# Patient Record
Sex: FEMALE | Race: WHITE | Employment: OTHER | URBAN - METROPOLITAN AREA
[De-identification: names, ages, dates, MRNs, and addresses within clinical notes are randomized per-mention and may not be internally consistent; named-entity substitution may affect disease eponyms.]

---

## 2017-01-27 ENCOUNTER — OFFICE VISIT (OUTPATIENT)
Dept: INTERNAL MEDICINE | Facility: CLINIC | Age: 82
End: 2017-01-27
Payer: COMMERCIAL

## 2017-01-27 VITALS
DIASTOLIC BLOOD PRESSURE: 66 MMHG | WEIGHT: 163 LBS | HEART RATE: 96 BPM | RESPIRATION RATE: 16 BRPM | SYSTOLIC BLOOD PRESSURE: 142 MMHG | TEMPERATURE: 98.1 F | BODY MASS INDEX: 29.32 KG/M2 | OXYGEN SATURATION: 96 %

## 2017-01-27 DIAGNOSIS — J20.9 ACUTE BRONCHITIS, UNSPECIFIED ORGANISM: Primary | ICD-10-CM

## 2017-01-27 PROCEDURE — 99213 OFFICE O/P EST LOW 20 MIN: CPT | Performed by: INTERNAL MEDICINE

## 2017-01-27 RX ORDER — BENZONATATE 200 MG/1
200 CAPSULE ORAL 3 TIMES DAILY PRN
Qty: 21 CAPSULE | Refills: 0 | Status: SHIPPED | OUTPATIENT
Start: 2017-01-27 | End: 2017-03-24

## 2017-01-27 RX ORDER — AZITHROMYCIN 250 MG/1
TABLET, FILM COATED ORAL
Qty: 6 TABLET | Refills: 0 | Status: SHIPPED | OUTPATIENT
Start: 2017-01-27 | End: 2017-02-03

## 2017-01-27 NOTE — NURSING NOTE
"Chief Complaint   Patient presents with     Cough     productive cough for about a week maybe with some crackling in the chest       Initial /66 mmHg  Pulse 96  Temp(Src) 98.1  F (36.7  C) (Temporal)  Resp 16  Wt 163 lb (73.936 kg)  SpO2 96% Estimated body mass index is 29.32 kg/(m^2) as calculated from the following:    Height as of 8/29/16: 5' 2.5\" (1.588 m).    Weight as of this encounter: 163 lb (73.936 kg).  BP completed using cuff size: adan Alvares MA    "

## 2017-01-27 NOTE — MR AVS SNAPSHOT
"              After Visit Summary   2017    Linn Sanon    MRN: [id number]           Patient Information     Date Of Birth          1926        Visit Information        Provider Department      2017 2:00 PM Yoseph Kumar MD [facility]         Follow-ups after your visit        Your next 10 appointments already scheduled     2017  2:00 PM   SHORT with Yoseph Kumar MD   [facility]    [address] [place] [id number] [phone]              Who to contact     If you have questions or need follow up information about today's clinic visit or your schedule please contact [facility] directly at [phone].  Normal or non-critical lab and imaging results will be communicated to you by [employer], letter or phone within 4 business days after the clinic has received the results. If you do not hear from us within 7 days, please contact the clinic through [employer] or phone. If you have a critical or abnormal lab result, we will notify you by phone as soon as possible.  Submit refill requests through [employer] or call your pharmacy and they will forward the refill request to us. Please allow 3 business days for your refill to be completed.          Additional Information About Your Visit        Individual [employer] Information     [employer] lets you send messages to your doctor, view your test results, renew your prescriptions, schedule appointments and more. To sign up, go to [email or web address] . Click on \"Log in\" on the left side of the screen, which will take you to the Welcome page. Then click on \"Sign up Now\" on the right side of the page.     You will be asked to enter the access code listed below, as well as some personal information. Please follow the directions to create your username and password.     Your access code is: [email or web address]  Expires: 2017  1:46 PM     Your access code will  in 90 days. " If you need help or a new code, please call your [facility] clinic or [phone].        Care EveryWhere ID     This is your Care EveryWhere ID. This could be used by other organizations to access your [facility] medical records  [id number]        Your Vitals Were     Pulse Temperature Respirations Pulse Oximetry          96 98.1  F (36.7  C) (Temporal) 16 96%         Blood Pressure from Last 3 Encounters:   01/27/17 142/66   11/04/16 136/66   09/09/16 116/64    Weight from Last 3 Encounters:   01/27/17 163 lb (73.936 kg)   11/04/16 168 lb (76.204 kg)   09/09/16 167 lb 3.2 oz (75.841 kg)              Today, you had the following     No orders found for display       Primary Care Provider Office Phone # Fax #    Yoseph Kumar [phone]       [facility] [address] [place] [address]        Thank you!     Thank you for choosing [facility]  for your care. Our goal is always to provide you with excellent care. Hearing back from our patients is one way we can continue to improve our services. Please take a few minutes to complete the written survey that you may receive in the mail after your visit with us. Thank you!             Your Updated Medication List - Protect others around you: Learn how to safely use, store and throw away your medicines at www.disposemymeds.org.          This list is accurate as of: 1/27/17  1:46 PM.  Always use your most recent med list.                   Brand Name Dispense Instructions for use    acetaminophen 500 MG Caps      Take 1,000 mg by mouth.       ALPRAZolam 0.5 MG tablet    XANAX    270 tablet    Take 1 tablet (0.5 mg) by mouth 3 times daily as needed for anxiety       aspirin 81 MG EC tablet      Take 1 tablet by mouth daily.       calcium 600 + D 600-400 MG-UNIT per tablet   Generic drug:  calcium-vitamin D      Take 1 tablet by mouth 2 times daily       GROUND FLAX SEEDS PO      Two tablespoons daily with cereal        KENALOG 40 MG/ML injection   Generic drug:  triamcinolone acetonide      40 mg by INTRA-ARTICULAR route once       ONE-A-DAY ESSENTIAL Tabs     30 tablet    Take  by mouth.       simvastatin 20 MG tablet    ZOCOR    90 tablet    Take 1 tablet (20 mg) by mouth At Bedtime

## 2017-01-27 NOTE — PROGRESS NOTES
SUBJECTIVE:                                                    Linn Sanon is a 89 year old female who presents to clinic today for the following health issues:      Chief Complaint   Patient presents with     Cough     productive cough for about a week maybe with some crackling in the chest     Cold for a bout a week or more.  Had a cough that has been severe and productive.  Home nurse said her chest was clear.  Patient could hear some rattling, sputum got some discolored last night.      Past Medical History   Diagnosis Date     Pure hypercholesterolemia      Generalized osteoarthrosis, unspecified site      Lump or mass in breast      Breast Lump or Mass     Varicella without mention of complication      Chickenpox     Measles without mention of complication      Measles     Mumps without mention of complication      Mumps     Scarlet fever      Unspecified closed fracture of ankle      Ankle fracture     Closed fracture of navicular (scaphoid) bone of wrist      Wrist fracture     Current Outpatient Prescriptions   Medication     azithromycin (ZITHROMAX) 250 MG tablet     benzonatate (TESSALON) 200 MG capsule     simvastatin (ZOCOR) 20 MG tablet     ALPRAZolam (XANAX) 0.5 MG tablet     calcium-vitamin D (CALCIUM 600 + D) 600-400 MG-UNIT per tablet     Flaxseed, Linseed, (GROUND FLAX SEEDS PO)     Multiple Vitamin (ONE-A-DAY ESSENTIAL) TABS     aspirin 81 MG EC tablet     triamcinolone acetonide (KENALOG) 40 MG/ML injection     acetaminophen 500 MG CAPS     No current facility-administered medications for this visit.     Social History   Substance Use Topics     Smoking status: Never Smoker      Smokeless tobacco: Never Used     Alcohol Use: No     Physical Exam  /66 mmHg  Pulse 96  Temp(Src) 98.1  F (36.7  C) (Temporal)  Resp 16  Wt 163 lb (73.936 kg)  SpO2 96%  General Appearance-healthy, alert, no distress  Eyes-conjuctiva clear, PERRL, EOM intact  ENT-ENT exam normal, no neck nodes or  sinus tenderness  Cardiac-regular rate and rhythm  with normal S1, S2 ; no murmur, rub or gallops  Lungs-clear to auscultation    ASSESSMENT:  Patient was 89 years old.  She has been sick with a cough and cold for over a week. She has a normal exam but is high risk due to her age. She does have more productive, colored sputum. Therefore we'll treat her with a Z-Alfa. Hopefully this will stop any pneumonia from developing. She will also have Tessalon Perles she can use for the cough as needed.    We also discussed her chronic back pain for which he tried to get her on Aleve. There are side effects but she has a normal creatinine and 1 250 mg Aleve a day should be okay for her.    Electronically signed by Yoseph Kumar MD

## 2017-02-03 DIAGNOSIS — J20.9 ACUTE BRONCHITIS, UNSPECIFIED ORGANISM: Primary | ICD-10-CM

## 2017-02-03 RX ORDER — AZITHROMYCIN 250 MG/1
TABLET, FILM COATED ORAL
Qty: 6 TABLET | Refills: 0 | Status: SHIPPED | OUTPATIENT
Start: 2017-02-03 | End: 2017-03-24

## 2017-02-03 NOTE — TELEPHONE ENCOUNTER
Pharmacy states Linn is not quite cleared up and she would like a refill p[debbie.     zithromax           Last Written Prescription Date: 1/27/17  Last Fill Quantity: 6, # refills: 0    Last Office Visit with [facility] or [facility] prescribing provider:  1/27/17   Future Office Visit:       WBC      7.1   10/14/2014  RBC     4.53   10/14/2014  HGB     13.8   10/14/2014  HCT     41.6   10/14/2014  No components found with this name: mct  MCV       92   10/14/2014  MCH     30.5   10/14/2014  MCHC     33.2   10/14/2014  RDW     13.8   10/14/2014  PLT      235   10/14/2014  AST       14   9/9/2016  ALT       17   9/9/2016  CREATININE   Date Value Ref Range Status   09/09/2016 0.76 0.52 - 1.04 mg/dL Final   ]

## 2017-02-23 ENCOUNTER — APPOINTMENT (OUTPATIENT)
Dept: CT IMAGING | Facility: CLINIC | Age: 82
End: 2017-02-23
Attending: FAMILY MEDICINE
Payer: MEDICARE

## 2017-02-23 ENCOUNTER — HOSPITAL ENCOUNTER (EMERGENCY)
Facility: CLINIC | Age: 82
Discharge: HOME | End: 2017-02-23
Attending: FAMILY MEDICINE | Admitting: FAMILY MEDICINE
Payer: MEDICARE

## 2017-02-23 VITALS
WEIGHT: 163 LBS | OXYGEN SATURATION: 98 % | RESPIRATION RATE: 20 BRPM | DIASTOLIC BLOOD PRESSURE: 74 MMHG | TEMPERATURE: 97 F | BODY MASS INDEX: 29.34 KG/M2 | SYSTOLIC BLOOD PRESSURE: 174 MMHG | HEART RATE: 92 BPM

## 2017-02-23 DIAGNOSIS — G45.9 TRANSIENT CEREBRAL ISCHEMIA, UNSPECIFIED TYPE: ICD-10-CM

## 2017-02-23 LAB
ANION GAP SERPL CALCULATED.3IONS-SCNC: 13 MMOL/L (ref 3–14)
APTT PPP: 32 SEC (ref 22–37)
BASOPHILS # BLD AUTO: 0 10E9/L (ref 0–0.2)
BASOPHILS NFR BLD AUTO: 0.2 %
BUN SERPL-MCNC: 14 MG/DL (ref 7–30)
CALCIUM SERPL-MCNC: 9.1 MG/DL (ref 8.5–10.1)
CHLORIDE SERPL-SCNC: 102 MMOL/L (ref 94–109)
CO2 SERPL-SCNC: 26 MMOL/L (ref 20–32)
CREAT SERPL-MCNC: 0.76 MG/DL (ref 0.52–1.04)
DIFFERENTIAL METHOD BLD: NORMAL
EOSINOPHIL # BLD AUTO: 0.1 10E9/L (ref 0–0.7)
EOSINOPHIL NFR BLD AUTO: 1 %
ERYTHROCYTE [DISTWIDTH] IN BLOOD BY AUTOMATED COUNT: 14.5 % (ref 10–15)
GFR SERPL CREATININE-BSD FRML MDRD: 72 ML/MIN/1.7M2
GLUCOSE BLDC GLUCOMTR-MCNC: 109 MG/DL (ref 70–99)
GLUCOSE SERPL-MCNC: 104 MG/DL (ref 70–99)
HCT VFR BLD AUTO: 38.6 % (ref 35–47)
HGB BLD-MCNC: 12.7 G/DL (ref 11.7–15.7)
IMM GRANULOCYTES # BLD: 0 10E9/L (ref 0–0.4)
IMM GRANULOCYTES NFR BLD: 0.2 %
INR PPP: 1 (ref 0.86–1.14)
LYMPHOCYTES # BLD AUTO: 2 10E9/L (ref 0.8–5.3)
LYMPHOCYTES NFR BLD AUTO: 32.3 %
MCH RBC QN AUTO: 30 PG (ref 26.5–33)
MCHC RBC AUTO-ENTMCNC: 32.9 G/DL (ref 31.5–36.5)
MCV RBC AUTO: 91 FL (ref 78–100)
MONOCYTES # BLD AUTO: 0.7 10E9/L (ref 0–1.3)
MONOCYTES NFR BLD AUTO: 11.7 %
NEUTROPHILS # BLD AUTO: 3.3 10E9/L (ref 1.6–8.3)
NEUTROPHILS NFR BLD AUTO: 54.6 %
PLATELET # BLD AUTO: 238 10E9/L (ref 150–450)
POTASSIUM SERPL-SCNC: 3.8 MMOL/L (ref 3.4–5.3)
RBC # BLD AUTO: 4.24 10E12/L (ref 3.8–5.2)
SODIUM SERPL-SCNC: 141 MMOL/L (ref 133–144)
TROPONIN I SERPL-MCNC: NORMAL UG/L (ref 0–0.04)
WBC # BLD AUTO: 6.1 10E9/L (ref 4–11)

## 2017-02-23 PROCEDURE — 99285 EMERGENCY DEPT VISIT HI MDM: CPT | Mod: 25 | Performed by: FAMILY MEDICINE

## 2017-02-23 PROCEDURE — 85730 THROMBOPLASTIN TIME PARTIAL: CPT | Performed by: FAMILY MEDICINE

## 2017-02-23 PROCEDURE — 25000132 ZZH RX MED GY IP 250 OP 250 PS 637: Mod: GY | Performed by: FAMILY MEDICINE

## 2017-02-23 PROCEDURE — 25500064 ZZH RX 255 OP 636: Performed by: FAMILY MEDICINE

## 2017-02-23 PROCEDURE — 93005 ELECTROCARDIOGRAM TRACING: CPT

## 2017-02-23 PROCEDURE — 80048 BASIC METABOLIC PNL TOTAL CA: CPT | Performed by: FAMILY MEDICINE

## 2017-02-23 PROCEDURE — 70498 CT ANGIOGRAPHY NECK: CPT

## 2017-02-23 PROCEDURE — 85610 PROTHROMBIN TIME: CPT | Performed by: FAMILY MEDICINE

## 2017-02-23 PROCEDURE — 70450 CT HEAD/BRAIN W/O DYE: CPT

## 2017-02-23 PROCEDURE — 84484 ASSAY OF TROPONIN QUANT: CPT | Performed by: FAMILY MEDICINE

## 2017-02-23 PROCEDURE — 99285 EMERGENCY DEPT VISIT HI MDM: CPT | Mod: 25

## 2017-02-23 PROCEDURE — A9270 NON-COVERED ITEM OR SERVICE: HCPCS | Mod: GY | Performed by: FAMILY MEDICINE

## 2017-02-23 PROCEDURE — 00000146 ZZHCL STATISTIC GLUCOSE BY METER IP

## 2017-02-23 PROCEDURE — 25000125 ZZHC RX 250: Performed by: FAMILY MEDICINE

## 2017-02-23 PROCEDURE — 85025 COMPLETE CBC W/AUTO DIFF WBC: CPT | Performed by: FAMILY MEDICINE

## 2017-02-23 PROCEDURE — 93010 ELECTROCARDIOGRAM REPORT: CPT | Performed by: FAMILY MEDICINE

## 2017-02-23 RX ORDER — IOPAMIDOL 755 MG/ML
500 INJECTION, SOLUTION INTRAVASCULAR ONCE
Status: COMPLETED | OUTPATIENT
Start: 2017-02-23 | End: 2017-02-23

## 2017-02-23 RX ORDER — ALPRAZOLAM 0.25 MG
0.25 TABLET ORAL ONCE
Status: COMPLETED | OUTPATIENT
Start: 2017-02-23 | End: 2017-02-23

## 2017-02-23 RX ORDER — LIDOCAINE 40 MG/G
CREAM TOPICAL
Status: DISCONTINUED | OUTPATIENT
Start: 2017-02-23 | End: 2017-02-23 | Stop reason: HOSPADM

## 2017-02-23 RX ADMIN — IOPAMIDOL 70 ML: 755 INJECTION, SOLUTION INTRAVENOUS at 15:20

## 2017-02-23 RX ADMIN — SODIUM CHLORIDE 100 ML: 9 INJECTION, SOLUTION INTRAVENOUS at 15:20

## 2017-02-23 RX ADMIN — ALPRAZOLAM 0.25 MG: 0.25 TABLET ORAL at 17:20

## 2017-02-23 ASSESSMENT — ENCOUNTER SYMPTOMS: SPEECH DIFFICULTY: 1

## 2017-02-23 NOTE — ED AVS SNAPSHOT
[facility] Emergency Department    [address] [place] [address]    Phone:  [phone]    Fax:  [phone]                                       Linn Sanon   MRN: [id number]    Department:  [facility] Emergency Department   Date of Visit:  2/23/2017           After Visit Summary Signature Page     I have received my discharge instructions, and my questions have been answered. I have discussed any challenges I see with this plan with the nurse or doctor.    ..........................................................................................................................................  Patient/Patient Representative Signature      ..........................................................................................................................................  Patient Representative Print Name and Relationship to Patient    ..................................................               ................................................  Date                                            Time    ..........................................................................................................................................  Reviewed by Signature/Title    ...................................................              ..............................................  Date                                                            Time

## 2017-02-23 NOTE — ED PROVIDER NOTES
"  History     Chief Complaint   Patient presents with     Altered Mental Status     Confusion and aphasia.Pt noted at 0800 when son called her.      The history is provided by the patient and a relative.     Linn Sanon is a 89 year old female who presents to the emergency department with slurred speech. Her son states that around 0800 this morning he called the patient as he usually does and she seemed fine at that time, however, when he called again at 1130 the patient was confused and slurring her speech. He states that it was all \"gibberish\" and not english. Patient reports that she is having difficulty finding her words. She says that when she went to make dinner she was unsure what she was doing. Patient denies a history of stroke or troubles with her brain.     I have reviewed the Medications, Allergies, Past Medical and Surgical History, and Social History in the Epic system.    Patient Active Problem List   Diagnosis     Anxiety state     HYPERLIPIDEMIA LDL GOAL <130     Spinal stenosis     Advanced directives, counseling/discussion     Health Care Home     Essential hypertension with goal blood pressure less than 140/90     Aortic valve stenosis, unspecified etiology     Past Medical History   Diagnosis Date     Closed fracture of navicular (scaphoid) bone of wrist      Wrist fracture     Generalized osteoarthrosis, unspecified site      Lump or mass in breast      Breast Lump or Mass     Measles without mention of complication      Measles     Mumps without mention of complication      Mumps     Pure hypercholesterolemia      Scarlet fever      Unspecified closed fracture of ankle      Ankle fracture     Varicella without mention of complication      Chickenpox       Past Surgical History   Procedure Laterality Date     C appendectomy  age 11     Hc excision breast lesion w xray marker, open single  1995     right     Hc remv cataract extracap,insert lens  9/18/2008     Right eye     Hc remv " cataract extracap,insert lens  10/16/2008     Left eye     Laser yag capsulotomy Left 9/18/2014     Procedure: LASER YAG CAPSULOTOMY;  Surgeon: Rafi Ramos MD;  Location:  OR       Family History   Problem Relation Age of Onset     Respiratory Brother      asthma     Respiratory Sister      asthma     Arthritis Sister      DIABETES Father      Cardiovascular Father      HEART DISEASE Paternal Grandfather      Thyroid Disease Sister        Social History   Substance Use Topics     Smoking status: Never Smoker     Smokeless tobacco: Never Used     Alcohol use No        Immunization History   Administered Date(s) Administered     Influenza (H1N1) 09/22/2010     Influenza (High Dose) 3 valent vaccine 09/20/2011, 09/10/2015, 09/09/2016     Influenza (IIV3) 10/19/1995, 11/11/1996, 11/17/1997, 10/29/1998, 10/18/1999, 12/14/2000, 11/08/2001, 11/04/2002, 10/23/2003, 10/14/2004, 10/12/2005, 10/30/2006, 10/19/2007, 11/06/2008, 09/14/2012     Pneumococcal (PCV 13) 11/13/2015     Pneumococcal 23 valent 11/08/1993, 09/20/2011     TD (ADULT, 7+) 11/06/2008     Tdap (Adacel,Boostrix) 10/23/2012     Zoster vaccine, live 10/09/2009          Allergies   Allergen Reactions     No Known Drug Allergies        Current Outpatient Prescriptions   Medication Sig Dispense Refill     azithromycin (ZITHROMAX) 250 MG tablet Two tablets first day, then one tablet daily for four days. 6 tablet 0     benzonatate (TESSALON) 200 MG capsule Take 1 capsule (200 mg) by mouth 3 times daily as needed for cough 21 capsule 0     simvastatin (ZOCOR) 20 MG tablet Take 1 tablet (20 mg) by mouth At Bedtime 90 tablet 3     triamcinolone acetonide (KENALOG) 40 MG/ML injection 40 mg by INTRA-ARTICULAR route once       ALPRAZolam (XANAX) 0.5 MG tablet Take 1 tablet (0.5 mg) by mouth 3 times daily as needed for anxiety 270 tablet 3     calcium-vitamin D (CALCIUM 600 + D) 600-400 MG-UNIT per tablet Take 1 tablet by mouth 2 times daily       Flaxseed,  Linseed, (GROUND FLAX SEEDS PO) Two tablespoons daily with cereal       Multiple Vitamin (ONE-A-DAY ESSENTIAL) TABS Take  by mouth. 30 tablet      acetaminophen 500 MG CAPS Take 1,000 mg by mouth.       aspirin 81 MG EC tablet Take 1 tablet by mouth daily.       Review of Systems   Neurological: Positive for speech difficulty.   All other systems reviewed and are negative.      Physical Exam      Physical Exam   Constitutional: She is oriented to person, place, and time. She appears well-developed and well-nourished.   HENT:   Head: Normocephalic and atraumatic.   Eyes: Conjunctivae and EOM are normal.   Neck: Normal range of motion.   Musculoskeletal: Normal range of motion.   Neurological: She is alert and oriented to person, place, and time.   She is able to recall her PCP and the Lord's Prayer without problem. She was unsure what floor she was on but she provided a reason for this as she has never been on this side of the hospital.    Skin: Skin is warm and dry.   Psychiatric: She has a normal mood and affect. Her behavior is normal.   Nursing note and vitals reviewed.      ED Course     ED Course     Procedures             EKG Interpretation:      Interpreted by Mariusz Mendoza  Time reviewed:  2:53 PM   Symptoms at time of EKG: brief episode of change in speech   Rhythm: normal sinus   Rate: normal (80)  Axis: normal  Ectopy: none  Conduction: normal  ST Segments/ T Waves: No ST-T wave changes  Q Waves: none  Comparison to prior: No old EKG available    Clinical Impression: normal EKG      Results for orders placed or performed during the hospital encounter of 02/23/17 (from the past 24 hour(s))   Glucose by meter   Result Value Ref Range    Glucose 109 (H) 70 - 99 mg/dL   Head CT w/o contrast    Narrative    CT HEAD WITHOUT CONTRAST February 23, 2017 2:03 PM    HISTORY: Slurred speech with onset at 7:50 AM today.    TECHNIQUE: Scans were obtained through the head without IV contrast.   Radiation dose for  this scan was reduced using automated exposure  control, adjustment of the mA and/or kV according to patient size, or  iterative reconstruction technique.    COMPARISON: None.    FINDINGS: Moderate generalized cerebral atrophy and mild patchy low  density in the white matter both hemispheres consistent with chronic  small vessel ischemic disease. No hemorrhage, mass lesion, or focal  area of acute infarction identified. Extensive membrane thickening and  debris in the left antrum. Cyst or polyp in the right antrum. Mild  disease in the ethmoid air cells. No bony abnormality.       Impression    IMPRESSION:   1. Atrophy and chronic white matter disease.  2. No acute intracranial abnormality.  3. Paranasal sinus disease especially visualized portion of the left  maxillary antrum.     ATIF CEVALLOS MD   CBC with platelets differential   Result Value Ref Range    WBC 6.1 4.0 - 11.0 10e9/L    RBC Count 4.24 3.8 - 5.2 10e12/L    Hemoglobin 12.7 11.7 - 15.7 g/dL    Hematocrit 38.6 35.0 - 47.0 %    MCV 91 78 - 100 fl    MCH 30.0 26.5 - 33.0 pg    MCHC 32.9 31.5 - 36.5 g/dL    RDW 14.5 10.0 - 15.0 %    Platelet Count 238 150 - 450 10e9/L    Diff Method Automated Method     % Neutrophils 54.6 %    % Lymphocytes 32.3 %    % Monocytes 11.7 %    % Eosinophils 1.0 %    % Basophils 0.2 %    % Immature Granulocytes 0.2 %    Absolute Neutrophil 3.3 1.6 - 8.3 10e9/L    Absolute Lymphocytes 2.0 0.8 - 5.3 10e9/L    Absolute Monocytes 0.7 0.0 - 1.3 10e9/L    Absolute Eosinophils 0.1 0.0 - 0.7 10e9/L    Absolute Basophils 0.0 0.0 - 0.2 10e9/L    Abs Immature Granulocytes 0.0 0 - 0.4 10e9/L   Basic metabolic panel   Result Value Ref Range    Sodium 141 133 - 144 mmol/L    Potassium 3.8 3.4 - 5.3 mmol/L    Chloride 102 94 - 109 mmol/L    Carbon Dioxide 26 20 - 32 mmol/L    Anion Gap 13 3 - 14 mmol/L    Glucose 104 (H) 70 - 99 mg/dL    Urea Nitrogen 14 7 - 30 mg/dL    Creatinine 0.76 0.52 - 1.04 mg/dL    GFR Estimate 72 >60 mL/min/1.7m2     GFR Estimate If Black 87 >60 mL/min/1.7m2    Calcium 9.1 8.5 - 10.1 mg/dL   INR   Result Value Ref Range    INR 1.00 0.86 - 1.14   Partial thromboplastin time   Result Value Ref Range    PTT 32 22 - 37 sec   Troponin I   Result Value Ref Range    Troponin I ES  0.000 - 0.045 ug/L     <0.015  The 99th percentile for upper reference range is 0.045 ug/L.  Troponin values in   the range of 0.045 - 0.120 ug/L may be associated with risks of adverse   clinical events.         Medications   0.9% sodium chloride BOLUS (not administered)   lidocaine 1 % 1 mL (not administered)   lidocaine (LMX4) kit (not administered)   sodium chloride (PF) 0.9% PF flush 3 mL (not administered)   sodium chloride (PF) 0.9% PF flush 3 mL (not administered)   sodium chloride (PF) 0.9% PF flush 3 mL (3 mLs Intravenous Given 2/23/17 1520)   iopamidol (ISOVUE-370) solution 500 mL (70 mLs Intravenous Given 2/23/17 1520)   sodium chloride 0.9 % for CT scan flush dose 500 mL (100 mLs Intravenous Given 2/23/17 1520)   ALPRAZolam (XANAX) tablet 0.25 mg (0.25 mg Oral Given 2/23/17 1720)       Assessments & Plan (with Medical Decision Making)  Linn came to the ER by ambulance today.  She had spoke with her son on the phone once this morning at around 8:00 and seemed to be doing well.  She was having no problem with conversation or memory.  He called her back at around 11:00 and she had some difficulty with speaking and seemed confused.  He decided to come see her and in the [place] of [place] her again at about 12:30 during which time she was still confused.  He called 911 and she was brought to the emergency department.  Here in the ED the patient had no pain, she was able to get up and walk from the wheelchair about 5 feet to the edge of the bed with minimal assistance, and was able to recite the Lord's Prayer to me word for word with no difficulties.  Her initial CT scan done without contrast was negative.  Her labs  here were negative.  Her blood pressure was mildly elevated during her ED stay.  Review of the chart shows that she typically runs more like 136-140 mmHg systolic blood pressure.  CT angiogram of the brain and neck was initially reported as negative including the carotids and [place].  Final report was not completed at the time of this note.  Patient has continued to have no neural deficits here in the ED.  Both she and her family agree that she is doing well.  [place] and has lived alone there for about 10 years now at the age of 89 years old.  The family has spoke to her repeatedly about moving to a nursing home but she is very happy and content in this setting.  The family said that she has been doing well in this setting and they are comfortable with her returning home.  I did discharge her from the ED in the care of her family.       I have reviewed the nursing notes.    I have reviewed the findings, diagnosis, plan and need for follow up with the patient.    New Prescriptions    No medications on file       Final diagnoses:   Transient cerebral ischemia, unspecified type       This document serves as a record of services personally performed by Mariusz Mendoza MD. It was created on their behalf by Millie Alves, a trained medical scribe. The creation of this record is based on the provider's personal observations and the statements of the patient. This document has been checked and approved by the attending provider.     Note: Chart documentation done in part with Dragon Voice Recognition software. Although reviewed after completion, some word and grammatical errors may remain.    2/23/2017   [facility] EMERGENCY DEPARTMENT     Mariusz Mendzoa MD  02/23/17 [id number]

## 2017-02-23 NOTE — DISCHARGE INSTRUCTIONS
Discharge Instructions for Transient Ischemic Attack (TIA)  You have been diagnosed with a transient ischemic attack (TIA). You can think of a TIA as a temporary or mini-stroke. Blood temporarily could not reach part of your brain. Unlike a stroke, TIAs usually cause no lasting damage. If you think you are having symptoms of a TIA or stroke, get medical help right away  --  even if the symptoms go away.   Prevention    Take your medications exactly as directed. Don t skip doses.  You are on all the correct medications.      Learn to take your blood pressure. Keep a log for your doctor.    Change your diet if your doctor tells you to. Your doctor may suggest that you cut back on salt. If so, here are some tips:    Limit canned, dried, packaged, and fast foods.    Don t add salt to your food at the table.    Season foods with herbs instead of salt when you cook.    Maintain a healthy weight. Get help to lose any extra pounds.    Begin an exercise program. Ask your doctor how to get started. You can benefit from simple activities, such as walking or gardening.    Limit your alcohol intake to no more than 2 drinks a day.    Know your cholesterol level. Follow your doctor s advice about how to keep cholesterol under control.    If you are a smoker, you need to quit now. Enroll in a stop-smoking program to improve your chances of success. Ask your doctor about medications or other methods to help you quit.    Your health care provider will give you information on dietary changes that you may need to make, based on your situation. Your provider may recommend that you see a registered dietitian for help with diet changes. Changes may include:    Reducing fat and cholesterol intake    Reducing sodium (salt) intake, especially if you have high blood pressure    Increasing your intake of fresh vegetables and fruits    Eating lean proteins, such as fish, poultry, and legumes (beans and peas) and eating less red meat and  processed meats    Using low-fat dairy products    Using vegetable and nut oils in limited amounts    Limiting sweets and processed foods such as chips, cookies, and baked goods    If you are overweight, your health care provider will work with you to lose weight and lower your body mass index (BMI) to a normal or near-normal level. Making diet changes and increasing physical activity can help.    Begin an exercise program. Ask your doctor how to get started and how much activity you should try to get on a daily or weekly basis. You can benefit from simple activities such as walking or gardening.    Learn stress-management techniques to help you deal with stress in your home and work life.  Follow-up care    Some medications require blood tests to check for progress or problems. Keep follow-up appointments for any blood tests ordered by your doctors.     When to seek medical care  Call 911 right away if you have any of the following:    Weakness, tingling, or loss of feeling on one side of your face or body    Sudden double vision, or trouble seeing in one or both eyes    Sudden trouble talking, or slurring your speech    Trouble understanding others    Sudden, severe headache    Dizziness, loss of balance, or a spinning feeling, a sense of falling    Blackouts or seizures       Thank you for choosing our Emergency Department for your care.     Sincerely,    Dr Karthik Mendoza M.D.

## 2017-02-23 NOTE — ED AVS SNAPSHOT
[facility] Emergency Department    [address] [place] [address]    Phone:  [phone]    Fax:  [phone]                                       Linn Sanon   MRN: [id number]    Department:  [facility] Emergency Department   Date of Visit:  2/23/2017           Patient Information     Date Of Birth          11/13/1926        Your diagnoses for this visit were:     Transient cerebral ischemia, unspecified type        You were seen by Mariusz Mendoza MD.      Follow-up Information     Follow up with Yoseph Kumar MD. Schedule an appointment as soon as possible for a visit in 1 week.    Specialty:  Internal Medicine    Contact information:    [facility]  [address]   [place] [address] [phone]          Discharge Instructions         Discharge Instructions for Transient Ischemic Attack (TIA)  You have been diagnosed with a transient ischemic attack (TIA). You can think of a TIA as a temporary or mini-stroke. Blood temporarily could not reach part of your brain. Unlike a stroke, TIAs usually cause no lasting damage. If you think you are having symptoms of a TIA or stroke, get medical help right away  --  even if the symptoms go away.   Prevention    Take your medications exactly as directed. Don t skip doses.  You are on all the correct medications.      Learn to take your blood pressure. Keep a log for your doctor.    Change your diet if your doctor tells you to. Your doctor may suggest that you cut back on salt. If so, here are some tips:    Limit canned, dried, packaged, and fast foods.    Don t add salt to your food at the table.    Season foods with herbs instead of salt when you cook.    Maintain a healthy weight. Get help to lose any extra pounds.    Begin an exercise program. Ask your doctor how to get started. You can benefit from simple activities, such as walking or gardening.    Limit your alcohol intake to no more than 2 drinks a  day.    Know your cholesterol level. Follow your doctor s advice about how to keep cholesterol under control.    If you are a smoker, you need to quit now. Enroll in a stop-smoking program to improve your chances of success. Ask your doctor about medications or other methods to help you quit.    Your health care provider will give you information on dietary changes that you may need to make, based on your situation. Your provider may recommend that you see a registered dietitian for help with diet changes. Changes may include:    Reducing fat and cholesterol intake    Reducing sodium (salt) intake, especially if you have high blood pressure    Increasing your intake of fresh vegetables and fruits    Eating lean proteins, such as fish, poultry, and legumes (beans and peas) and eating less red meat and processed meats    Using low-fat dairy products    Using vegetable and nut oils in limited amounts    Limiting sweets and processed foods such as chips, cookies, and baked goods    If you are overweight, your health care provider will work with you to lose weight and lower your body mass index (BMI) to a normal or near-normal level. Making diet changes and increasing physical activity can help.    Begin an exercise program. Ask your doctor how to get started and how much activity you should try to get on a daily or weekly basis. You can benefit from simple activities such as walking or gardening.    Learn stress-management techniques to help you deal with stress in your home and work life.  Follow-up care    Some medications require blood tests to check for progress or problems. Keep follow-up appointments for any blood tests ordered by your doctors.     When to seek medical care  Call 911 right away if you have any of the following:    Weakness, tingling, or loss of feeling on one side of your face or body    Sudden double vision, or trouble seeing in one or both eyes    Sudden trouble talking, or slurring your  speech    Trouble understanding others    Sudden, severe headache    Dizziness, loss of balance, or a spinning feeling, a sense of falling    Blackouts or seizures       Thank you for choosing our Emergency Department for your care.     Sincerely,    Dr Karthik Mendoza M.D.          24 Hour Appointment Hotline       To make an appointment at any [facility], call [phone] ([phone]). If you don't have a family doctor or clinic, we will help you find one. [place] clinics are conveniently located to serve the needs of you and your family.             Review of your medicines      Our records show that you are taking the medicines listed below. If these are incorrect, please call your family doctor or clinic.        Dose / Directions Last dose taken    acetaminophen 500 MG Caps   Dose:  1000 mg        Take 1,000 mg by mouth.   Refills:  0        ALPRAZolam 0.5 MG tablet   Commonly known as:  XANAX   Dose:  0.5 mg   Quantity:  270 tablet        Take 1 tablet (0.5 mg) by mouth 3 times daily as needed for anxiety   Refills:  3        aspirin 81 MG EC tablet   Dose:  1 tablet        Take 1 tablet by mouth daily.   Refills:  0        azithromycin 250 MG tablet   Commonly known as:  ZITHROMAX   Quantity:  6 tablet        Two tablets first day, then one tablet daily for four days.   Refills:  0        benzonatate 200 MG capsule   Commonly known as:  TESSALON   Dose:  200 mg   Quantity:  21 capsule        Take 1 capsule (200 mg) by mouth 3 times daily as needed for cough   Refills:  0        calcium 600 + D 600-400 MG-UNIT per tablet   Dose:  1 tablet   Generic drug:  calcium-vitamin D        Take 1 tablet by mouth 2 times daily   Refills:  0        GROUND FLAX SEEDS PO        Two tablespoons daily with cereal   Refills:  0        KENALOG 40 MG/ML injection   Dose:  40 mg   Generic drug:  triamcinolone acetonide        40 mg by INTRA-ARTICULAR route once   Refills:  0        ONE-A-DAY ESSENTIAL Tabs   Quantity:  " 30 tablet        Take  by mouth.   Refills:  0        simvastatin 20 MG tablet   Commonly known as:  ZOCOR   Dose:  20 mg   Quantity:  90 tablet        Take 1 tablet (20 mg) by mouth At Bedtime   Refills:  3                Procedures and tests performed during your visit     Activity: Bedrest    Basic metabolic panel    CBC with platelets differential    CT Head Neck Angio w/o & w Contrast    Dysphagia Screen    EKG 12-lead, tracing only    Glucose by meter    Head CT w/o contrast    INR    Notify CT that Stroke patient is in ED    Partial thromboplastin time    Peripheral IV catheter    Pulse oximetry nursing    Troponin I    Vital signs and neuro checks      Orders Needing Specimen Collection     None      Pending Results     Date and Time Order Name Status Description    2017 1356 CT Head Neck Angio w/o & w Contrast In process             Pending Culture Results     No orders found from 2017 to 2017.            Thank you for choosing [facility]       Thank you for choosing [facility] for your care. Our goal is always to provide you with excellent care. Hearing back from our patients is one way we can continue to improve our services. Please take a few minutes to complete the written survey that you may receive in the mail after you visit with us. Thank you!        [employer] Information     [employer] lets you send messages to your doctor, view your test results, renew your prescriptions, schedule appointments and more. To sign up, go to [email or web address] . Click on \"Log in\" on the left side of the screen, which will take you to the Welcome page. Then click on \"Sign up Now\" on the right side of the page.     You will be asked to enter the access code listed below, as well as some personal information. Please follow the directions to create your username and password.     Your access code is: [email or web address]  Expires: 2017  1:46 PM     Your access code will  in 90 days. If you need help or a " new code, please call your [facility] clinic or [phone].        Care EveryWhere ID     This is your Care EveryWhere ID. This could be used by other organizations to access your [facility] medical records  [id number]        After Visit Summary       This is your record. Keep this with you and show to your community pharmacist(s) and doctor(s) at your next visit.

## 2017-02-23 NOTE — ED NOTES
"Pt presents with confusion. Difficulty finding words. Pt states \"I feel confused.\" Son noted confusion and jarbled conversation at 0800.    "

## 2017-02-24 ENCOUNTER — TELEPHONE (OUTPATIENT)
Dept: INTERNAL MEDICINE | Facility: CLINIC | Age: 82
End: 2017-02-24

## 2017-02-24 NOTE — TELEPHONE ENCOUNTER
Reason for Call:  Other call back    Detailed comments: Gaurav is calling stating Linn was in ER yesterday with mini strokes. They did lots of tests, but things looked good. Gaurav is wondering if pt should be seen or if he should be doing something? Please call back and advise.     Phone Number Patient can be reached at: [phone]           Best Time: anytime    Can we leave a detailed message on this number? YES    Call taken on 2/24/2017 at 11:14 AM by Margarette Alvares

## 2017-02-24 NOTE — TELEPHONE ENCOUNTER
Patient's son notified and he would like to wait on the appointment and see how the next few days go. He will call back if he feels they need an appointment. CAS/PAPA

## 2017-02-24 NOTE — TELEPHONE ENCOUNTER
We can see her in the next week or two to follow up on things.  No urgency if she is feeling ok.

## 2017-03-22 DIAGNOSIS — J20.9 ACUTE BRONCHITIS, UNSPECIFIED ORGANISM: ICD-10-CM

## 2017-03-22 NOTE — TELEPHONE ENCOUNTER
Azithromycin      Last Written Prescription Date: 2/3/2017  Last Fill Quantity: 6,  # refills: 0   Last Office Visit with [facility] or [facility] prescribing provider: 1/27/2017                                         Next 5 appointments (look out 90 days)     Mar 24, 2017 10:00 AM CDT   [facility]    [address] [place] [id number] [phone]

## 2017-03-24 ENCOUNTER — OFFICE VISIT (OUTPATIENT)
Dept: INTERNAL MEDICINE | Facility: CLINIC | Age: 82
End: 2017-03-24
Payer: COMMERCIAL

## 2017-03-24 VITALS
DIASTOLIC BLOOD PRESSURE: 84 MMHG | WEIGHT: 159 LBS | RESPIRATION RATE: 16 BRPM | BODY MASS INDEX: 28.17 KG/M2 | TEMPERATURE: 98.1 F | HEIGHT: 63 IN | OXYGEN SATURATION: 97 % | SYSTOLIC BLOOD PRESSURE: 168 MMHG | HEART RATE: 78 BPM

## 2017-03-24 DIAGNOSIS — M25.561 CHRONIC PAIN OF RIGHT KNEE: ICD-10-CM

## 2017-03-24 DIAGNOSIS — Z86.73 HISTORY OF TIA (TRANSIENT ISCHEMIC ATTACK) AND STROKE: Primary | ICD-10-CM

## 2017-03-24 DIAGNOSIS — I10 BENIGN ESSENTIAL HYPERTENSION: ICD-10-CM

## 2017-03-24 DIAGNOSIS — G89.29 CHRONIC PAIN OF RIGHT KNEE: ICD-10-CM

## 2017-03-24 PROCEDURE — 99214 OFFICE O/P EST MOD 30 MIN: CPT | Mod: 25 | Performed by: INTERNAL MEDICINE

## 2017-03-24 PROCEDURE — 20610 DRAIN/INJ JOINT/BURSA W/O US: CPT | Mod: RT | Performed by: INTERNAL MEDICINE

## 2017-03-24 RX ORDER — COVID-19 ANTIGEN TEST
220 KIT MISCELLANEOUS DAILY PRN
COMMUNITY
End: 2017-09-01

## 2017-03-24 RX ORDER — DILTIAZEM HYDROCHLORIDE 120 MG/1
120 CAPSULE, EXTENDED RELEASE ORAL AT BEDTIME
Qty: 90 CAPSULE | Refills: 3 | Status: SHIPPED | OUTPATIENT
Start: 2017-03-24 | End: 2017-03-29 | Stop reason: ALTCHOICE

## 2017-03-24 RX ORDER — AZITHROMYCIN 250 MG/1
TABLET, FILM COATED ORAL
Qty: 6 TABLET | Refills: 0 | OUTPATIENT
Start: 2017-03-24

## 2017-03-24 NOTE — MR AVS SNAPSHOT
"              After Visit Summary   3/24/2017    Linn Sanon    MRN: [id number]           Patient Information     Date Of Birth          1926        Visit Information        Provider Department      3/24/2017 10:00 AM Yoseph Kumar MD [facility]         Follow-ups after your visit        Who to contact     If you have questions or need follow up information about today's clinic visit or your schedule please contact [facility] directly at [phone].  Normal or non-critical lab and imaging results will be communicated to you by [employer], letter or phone within 4 business days after the clinic has received the results. If you do not hear from us within 7 days, please contact the clinic through [employer] or phone. If you have a critical or abnormal lab result, we will notify you by phone as soon as possible.  Submit refill requests through [employer] or call your pharmacy and they will forward the refill request to us. Please allow 3 business days for your refill to be completed.          Additional Information About Your Visit        [employer] Information     [employer] lets you send messages to your doctor, view your test results, renew your prescriptions, schedule appointments and more. To sign up, go to [email or web address] . Click on \"Log in\" on the left side of the screen, which will take you to the Welcome page. Then click on \"Sign up Now\" on the right side of the page.     You will be asked to enter the access code listed below, as well as some personal information. Please follow the directions to create your username and password.     Your access code is: [email or web address]  Expires: 2017  2:46 PM     Your access code will  in 90 days. If you need help or a new code, please call your [facility] or [phone].        Care EveryWhere ID     This is your Care EveryWhere ID. This could be used by other organizations to access your [facility] medical " records  [id number]        Your Vitals Were     Pulse Temperature Respirations Pulse Oximetry BMI (Body Mass Index)       78 98.1  F (36.7  C) (Temporal) 16 97% 28.62 kg/m2        Blood Pressure from Last 3 Encounters:   03/24/17 168/84   02/23/17 174/74   01/27/17 142/66    Weight from Last 3 Encounters:   03/24/17 159 lb (72.1 kg)   02/23/17 163 lb (73.9 kg)   01/27/17 163 lb (73.9 kg)              Today, you had the following     No orders found for display       Primary Care Provider Office Phone # Fax #    Yoseph Kumar [phone]       [facility] [address] [place] [address]        Thank you!     Thank you for choosing [facility]  for your care. Our goal is always to provide you with excellent care. Hearing back from our patients is one way we can continue to improve our services. Please take a few minutes to complete the written survey that you may receive in the mail after your visit with us. Thank you!             Your Updated Medication List - Protect others around you: Learn how to safely use, store and throw away your medicines at www.disposemymeds.org.          This list is accurate as of: 3/24/17 10:00 AM.  Always use your most recent med list.                   Brand Name Dispense Instructions for use    acetaminophen 500 MG Caps      Take 1,000 mg by mouth.       ALEVE 220 MG capsule   Generic drug:  naproxen sodium      Take 220 mg by mouth daily as needed       ALPRAZolam 0.5 MG tablet    XANAX    270 tablet    Take 1 tablet (0.5 mg) by mouth 3 times daily as needed for anxiety       aspirin 81 MG EC tablet      Take 1 tablet by mouth daily.       calcium 600 + D 600-400 MG-UNIT per tablet   Generic drug:  calcium-vitamin D      Take 1 tablet by mouth 2 times daily       GROUND FLAX SEEDS PO      Two tablespoons daily with cereal       KENALOG 40 MG/ML injection   Generic drug:  triamcinolone acetonide      40 mg by INTRA-ARTICULAR route  once       ONE-A-DAY ESSENTIAL Tabs     30 tablet    Take  by mouth.       simvastatin 20 MG tablet    ZOCOR    90 tablet    Take 1 tablet (20 mg) by mouth At Bedtime

## 2017-03-24 NOTE — PROGRESS NOTES
SUBJECTIVE:                                                    Linn Sanon is a 89 year old female who presents to clinic today for the following health issues:      ED/UC Followup:    Facility:  [facility]  Date of visit: 2/23/17  Reason for visit: slurred speech  Current Status: followup     She and her son are here for a ER followup. She had some slurred speech and confusion, he called 911 for her. She got better, ER evaluation was negative.      bp is running higher then before.  Son has bp as well and has been on diltiazem.      No recurrence of symptoms.    Past Medical History:   Diagnosis Date     Closed fracture of navicular (scaphoid) bone of wrist     Wrist fracture     Generalized osteoarthrosis, unspecified site      Lump or mass in breast     Breast Lump or Mass     Measles without mention of complication     Measles     Mumps without mention of complication     Mumps     Pure hypercholesterolemia      Scarlet fever      Unspecified closed fracture of ankle     Ankle fracture     Varicella without mention of complication     Chickenpox     Current Outpatient Prescriptions   Medication     naproxen sodium (ALEVE) 220 MG capsule     diltiazem (DILACOR XR) 120 MG 24 hr capsule     simvastatin (ZOCOR) 20 MG tablet     triamcinolone acetonide (KENALOG) 40 MG/ML injection     ALPRAZolam (XANAX) 0.5 MG tablet     calcium-vitamin D (CALCIUM 600 + D) 600-400 MG-UNIT per tablet     Flaxseed, Linseed, (GROUND FLAX SEEDS PO)     Multiple Vitamin (ONE-A-DAY ESSENTIAL) TABS     acetaminophen 500 MG CAPS     aspirin 81 MG EC tablet     No current facility-administered medications for this visit.      Social History   Substance Use Topics     Smoking status: Never Smoker     Smokeless tobacco: Never Used     Alcohol use No     Review of Systems  Constitutional-No fevers, chills, or weight changes..  Eyes-No blurry or double vision.  ENT-No earpain, sore throat, voice changes or rhinitis.  Cardiac-No chest  "pain or palpitations.  Respiratory-No cough, sob, or hemoptysis.  GI-No nausea, vomitting, diarrhea, constipation, or blood in the stool.  Musculoskeletal-chronic back and left knee pains    Physical Exam  /84 (BP Location: Left arm, Patient Position: Chair, Cuff Size: Adult Regular)  Pulse 78  Temp 98.1  F (36.7  C) (Temporal)  Resp 16  Ht 5' 2.5\" (1.588 m)  Wt 159 lb (72.1 kg)  SpO2 97%  BMI 28.62 kg/m2  General Appearance-healthy, alert, no distress  Cardiac-regular rate and rhythm  with normal S1, S2 ; no murmur, rub or gallops  Lungs-clear to auscultation  Extremities-trace edema  Musculoskeletal-right knee with good rom, some grinding    ASSESSMENT:  TIA-no clear cause, negative imaging in the ER, no further symptoms. Already on as aspirin and statin, no changes except need to treat htn.  She is doing well, no need for rehab.      HTN-needs to be lower, bp is running 150-170 range, son is on diltazem, will try this in low dose for her as well, 120 mg XR and recheck in a month.    Knee pain and will try an injection today.    .       Procedure Note-knee injection  Consent obtained  Area prepped with chloroprep  Using a 25 gauge, 1.5 inch needle I injected 40 mg of kenalog and 2 cc of lidocaine from new vials via medial approach.   No complications.  Aftercare instructions given to the patient.    Electronically signed by Yoseph Kumar MD    "

## 2017-03-24 NOTE — NURSING NOTE
"Chief Complaint   Patient presents with     ER F/U     Recheck Medication     go over meds and supplements       Initial /84 (BP Location: Left arm, Patient Position: Chair, Cuff Size: Adult Regular)  Pulse 78  Temp 98.1  F (36.7  C) (Temporal)  Resp 16  Wt 159 lb (72.1 kg)  SpO2 97%  BMI 28.62 kg/m2 Estimated body mass index is 28.62 kg/(m^2) as calculated from the following:    Height as of 8/29/16: 5' 2.5\" (1.588 m).    Weight as of this encounter: 159 lb (72.1 kg).  Medication Reconciliation: complete    "

## 2017-03-29 DIAGNOSIS — I10 ESSENTIAL HYPERTENSION WITH GOAL BLOOD PRESSURE LESS THAN 140/90: Primary | ICD-10-CM

## 2017-03-29 RX ORDER — LOSARTAN POTASSIUM 100 MG/1
100 TABLET ORAL DAILY
Qty: 90 TABLET | Refills: 1 | Status: SHIPPED | OUTPATIENT
Start: 2017-03-29 | End: 2017-09-01

## 2017-03-29 NOTE — TELEPHONE ENCOUNTER
Reason for Call:  Other call back    Detailed comments: patient's son Chantal called stating patient's blood pressure's top number is still not coming down. Chantal states patient's pulse is lowered, but he is still concerned about the top number of patient's blood pressure. Chantal  wants to run this by Dr. Kumar and his nurse. Please advise.    Phone Number Patient can be reached at: Other phone number:  Chantal  # [phone]    Best Time: anytime    Can we leave a detailed message on this number? YES    Call taken on 3/29/2017 at 7:30 AM by Trina Cruz

## 2017-03-29 NOTE — TELEPHONE ENCOUNTER
Then I would stop the diltiazem and try losartan 100mg daily for bp control.  Then recheck a basic panel in 5-7 days after starting it.

## 2017-03-29 NOTE — TELEPHONE ENCOUNTER
Les returns call and message below is relayed.  Les states understanding and had no other questions or concerns.

## 2017-03-31 ENCOUNTER — DOCUMENTATION ONLY (OUTPATIENT)
Dept: FAMILY MEDICINE | Facility: CLINIC | Age: 82
End: 2017-03-31

## 2017-03-31 DIAGNOSIS — I10 ESSENTIAL HYPERTENSION WITH GOAL BLOOD PRESSURE LESS THAN 140/90: Primary | ICD-10-CM

## 2017-03-31 NOTE — PROGRESS NOTES
This patient is scheduled for lab work on April 7 to recheck medication use.  Please put a future order for her [facility].  Thank you, Adria

## 2017-04-07 ENCOUNTER — TELEPHONE (OUTPATIENT)
Dept: INTERNAL MEDICINE | Facility: CLINIC | Age: 82
End: 2017-04-07

## 2017-04-07 DIAGNOSIS — I10 ESSENTIAL HYPERTENSION WITH GOAL BLOOD PRESSURE LESS THAN 140/90: ICD-10-CM

## 2017-04-07 LAB
ANION GAP SERPL CALCULATED.3IONS-SCNC: 8 MMOL/L (ref 3–14)
BUN SERPL-MCNC: 13 MG/DL (ref 7–30)
CALCIUM SERPL-MCNC: 9.4 MG/DL (ref 8.5–10.1)
CHLORIDE SERPL-SCNC: 103 MMOL/L (ref 94–109)
CO2 SERPL-SCNC: 27 MMOL/L (ref 20–32)
CREAT SERPL-MCNC: 0.72 MG/DL (ref 0.52–1.04)
GFR SERPL CREATININE-BSD FRML MDRD: 77 ML/MIN/1.7M2
GLUCOSE SERPL-MCNC: 109 MG/DL (ref 70–99)
POTASSIUM SERPL-SCNC: 4 MMOL/L (ref 3.4–5.3)
SODIUM SERPL-SCNC: 138 MMOL/L (ref 133–144)

## 2017-04-07 PROCEDURE — 36415 COLL VENOUS BLD VENIPUNCTURE: CPT | Performed by: INTERNAL MEDICINE

## 2017-04-07 PROCEDURE — 80048 BASIC METABOLIC PNL TOTAL CA: CPT | Performed by: INTERNAL MEDICINE

## 2017-04-07 NOTE — TELEPHONE ENCOUNTER
----- Message from Yoseph Kumar MD sent at 4/7/2017 10:59 AM CDT -----  Labs are good continue bp medication

## 2017-04-20 ENCOUNTER — DOCUMENTATION ONLY (OUTPATIENT)
Dept: CARE COORDINATION | Facility: CLINIC | Age: 82
End: 2017-04-20

## 2017-04-20 NOTE — PROGRESS NOTES
[facility] now requests orders and shares plan of care/discharge summaries for some patients through [employer].  Please REPLY TO THIS MESSAGE in order to give authorization for orders when needed.  This is considered a verbal order, you will still receive a faxed copy of orders for signature.  Thank you for your assistance in improving collaboration for our patients.    ORDER    MD SUMMARY/PLAN OF CARE    FYI - Pt was sitting in a chair in her porch on 4/13 and pt slid from chair onto bottom and bumped the curlers she had in her hair. Pt received an abrasion on her scalp from the curlers but no other injuries.

## 2017-05-30 ENCOUNTER — TELEPHONE (OUTPATIENT)
Dept: INTERNAL MEDICINE | Facility: CLINIC | Age: 82
End: 2017-05-30

## 2017-07-19 DIAGNOSIS — F41.1 ANXIETY STATE: ICD-10-CM

## 2017-07-19 RX ORDER — ALPRAZOLAM 0.5 MG
0.5 TABLET ORAL 3 TIMES DAILY PRN
Qty: 270 TABLET | Refills: 0 | Status: SHIPPED | OUTPATIENT
Start: 2017-07-19 | End: 2017-10-03

## 2017-09-01 ENCOUNTER — OFFICE VISIT (OUTPATIENT)
Dept: INTERNAL MEDICINE | Facility: CLINIC | Age: 82
End: 2017-09-01
Payer: COMMERCIAL

## 2017-09-01 VITALS
DIASTOLIC BLOOD PRESSURE: 64 MMHG | HEART RATE: 82 BPM | TEMPERATURE: 97.1 F | BODY MASS INDEX: 29.23 KG/M2 | RESPIRATION RATE: 16 BRPM | SYSTOLIC BLOOD PRESSURE: 134 MMHG | OXYGEN SATURATION: 98 % | WEIGHT: 162.4 LBS

## 2017-09-01 DIAGNOSIS — E78.5 HYPERLIPIDEMIA LDL GOAL <130: ICD-10-CM

## 2017-09-01 DIAGNOSIS — M25.561 CHRONIC PAIN OF RIGHT KNEE: Primary | ICD-10-CM

## 2017-09-01 DIAGNOSIS — I10 ESSENTIAL HYPERTENSION WITH GOAL BLOOD PRESSURE LESS THAN 140/90: ICD-10-CM

## 2017-09-01 DIAGNOSIS — G89.29 CHRONIC PAIN OF RIGHT KNEE: Primary | ICD-10-CM

## 2017-09-01 PROCEDURE — 20610 DRAIN/INJ JOINT/BURSA W/O US: CPT | Mod: RT | Performed by: INTERNAL MEDICINE

## 2017-09-01 RX ORDER — SIMVASTATIN 20 MG
20 TABLET ORAL AT BEDTIME
Qty: 90 TABLET | Refills: 3 | Status: SHIPPED | OUTPATIENT
Start: 2017-09-01 | End: 2018-10-05

## 2017-09-01 RX ORDER — LOSARTAN POTASSIUM 100 MG/1
100 TABLET ORAL DAILY
Qty: 90 TABLET | Refills: 3 | Status: SHIPPED | OUTPATIENT
Start: 2017-09-01 | End: 2018-08-21

## 2017-09-01 ASSESSMENT — PAIN SCALES - GENERAL: PAINLEVEL: MODERATE PAIN (5)

## 2017-09-01 ASSESSMENT — PATIENT HEALTH QUESTIONNAIRE - PHQ9: SUM OF ALL RESPONSES TO PHQ QUESTIONS 1-9: 0

## 2017-09-01 NOTE — PROGRESS NOTES
SUBJECTIVE:   Linn Sanon is a 89 year old female who presents to clinic today for the following health issues:    Right Knee Pain Recheck    Right knee continues to bother her, would like an injection.  Back also bothers her.      Past Medical History:   Diagnosis Date     Closed fracture of navicular (scaphoid) bone of wrist     Wrist fracture     Generalized osteoarthrosis, unspecified site      Lump or mass in breast     Breast Lump or Mass     Measles without mention of complication     Measles     Mumps without mention of complication     Mumps     Pure hypercholesterolemia      Scarlet fever      Unspecified closed fracture of ankle     Ankle fracture     Varicella without mention of complication     Chickenpox     Current Outpatient Prescriptions   Medication     ALPRAZolam (XANAX) 0.5 MG tablet     losartan (COZAAR) 100 MG tablet     simvastatin (ZOCOR) 20 MG tablet     calcium-vitamin D (CALCIUM 600 + D) 600-400 MG-UNIT per tablet     Flaxseed, Linseed, (GROUND FLAX SEEDS PO)     Multiple Vitamin (ONE-A-DAY ESSENTIAL) TABS     acetaminophen 500 MG CAPS     aspirin 81 MG EC tablet     No current facility-administered medications for this visit.      Physical Exam  /64 (BP Location: Right arm, Patient Position: Chair, Cuff Size: Adult Regular)  Pulse 82  Temp 97.1  F (36.2  C) (Temporal)  Resp 16  Wt 162 lb 6.4 oz (73.7 kg)  SpO2 98%  Breastfeeding? No  BMI 29.23 kg/m2  General Appearance-healthy, alert, no distress  Right knee with some swelling and grinding      ASSESSMENT:  Chronic right knee pain she's had multiple injections in the past. We will do another injection to     Procedure note-right knee injection   consent was obtained  Area was marked and prepped with ChloraPrep    is a 25-gauge 1/2 inch needle injected via a medial approach 40 mg of Kenalog and 2 cc of 1% lidocaine both from brand-new bottles.    she had no complications    aftercare instructions were given to the  patient        Electronically signed by Yoseph Kumar MD

## 2017-09-01 NOTE — NURSING NOTE
"Chief Complaint   Patient presents with     Knee Pain     right, ongoing       Initial /64 (BP Location: Right arm, Patient Position: Chair, Cuff Size: Adult Regular)  Pulse 82  Temp 97.1  F (36.2  C) (Temporal)  Resp 16  Wt 162 lb 6.4 oz (73.7 kg)  SpO2 98%  Breastfeeding? No  BMI 29.23 kg/m2 Estimated body mass index is 29.23 kg/(m^2) as calculated from the following:    Height as of 3/24/17: 5' 2.5\" (1.588 m).    Weight as of this encounter: 162 lb 6.4 oz (73.7 kg).  Medication Reconciliation: complete     Health Maintenance Due   Topic Date Due     SHARIF QUESTIONNAIRE 1 YEAR  11/13/1944     PHQ-9 Q1YR  11/13/1944     ADVANCE DIRECTIVE PLANNING Q5 YRS  12/16/2016     INFLUENZA VACCINE (SYSTEM ASSIGNED)  09/01/2017     FALL RISK ASSESSMENT  09/09/2017     LIPID MONITORING Q1 YEAR  09/09/2017     Mimi Green CMA      "

## 2017-09-01 NOTE — MR AVS SNAPSHOT
After Visit Summary   9/1/2017    Linn Sanon    MRN: [id number]           Patient Information     Date Of Birth          11/13/1926        Visit Information        Provider Department      9/1/2017 10:30 AM Yoseph Kumar MD [facility]         Follow-ups after your visit        Your next 10 appointments already scheduled     Sep 01, 2017 10:30 AM CDT   Office Visit with Yoseph Kumar MD   [facility] ([address] [phone]           Bring a current list of meds and any records pertaining to this visit. For Physicals, please bring immunization records and any forms needing to be filled out. Please arrive 10 minutes early to complete paperwork.            Oct 03, 2017  9:30 AM CDT   PHYSICAL with Yoseph Kumar MD   [facility] ([address] [phone]              Who to contact     If you have questions or need follow up information about today's clinic visit or your schedule please contact [facility] directly at [phone].  Normal or non-critical lab and imaging results will be communicated to you by [employer], letter or phone within 4 business days after the clinic has received the results. If you do not hear from us within 7 days, please contact the clinic through [employer] or phone. If you have a critical or abnormal lab result, we will notify you by phone as soon as possible.  Submit refill requests through [employer] or call your pharmacy and they will forward the refill request to us. Please allow 3 business days for your refill to be completed.          Additional Information About Your Visit        MyChart Information     [employer] lets you send messages to your doctor, view your test results, renew your prescriptions, schedule appointments and more. To sign up, go to [email or web address] .  "Click on \"Log in\" on the left side of the screen, which will take you to the Welcome page. Then click on \"Sign up Now\" on the right side of the page.     You will be asked to enter the access code listed below, as well as some personal information. Please follow the directions to create your username and password.     Your access code is: [email or web address]  Expires: 2017 10:17 AM     Your access code will  in 90 days. If you need help or a new code, please call your [facility] clinic or [phone].        Care EveryWhere ID     This is your Care EveryWhere ID. This could be used by other organizations to access your [facility] medical records  [id number]        Your Vitals Were     Pulse Temperature Respirations Pulse Oximetry Breastfeeding? BMI (Body Mass Index)    82 97.1  F (36.2  C) (Temporal) 16 98% No 29.23 kg/m2       Blood Pressure from Last 3 Encounters:   17 134/64   17 168/84   17 174/74    Weight from Last 3 Encounters:   17 162 lb 6.4 oz (73.7 kg)   17 159 lb (72.1 kg)   17 163 lb (73.9 kg)              Today, you had the following     No orders found for display       Primary Care Provider Office Phone # Fax #    Yoseph Kumar [phone]       [facility] [address] [place] [address]        Equal Access to Services     MAGO MILLER AH: Hadii aad ku hadasho Soomaali, waaxda luqadaha, qaybta kaalmada adeegyada, waxay idiin aris garcia . So [facility] [phone].    ATENCIÓN: Si habla español, tiene a matos disposición servicios gratuitos de asistencia lingüística. Llame al [phone].    We comply with applicable federal civil rights laws and [place] laws. We do not discriminate on the basis of race, color, national origin, age, disability sex, sexual orientation or gender identity.            Thank you!     Thank you for choosing [facility]  for your care. Our goal is always to provide you with " excellent care. Hearing back from our patients is one way we can continue to improve our services. Please take a few minutes to complete the written survey that you may receive in the mail after your visit with us. Thank you!             Your Updated Medication List - Protect others around you: Learn how to safely use, store and throw away your medicines at www.disposemymeds.org.          This list is accurate as of: 9/1/17 10:17 AM.  Always use your most recent med list.                   Brand Name Dispense Instructions for use Diagnosis    acetaminophen 500 MG Caps      Take 1,000 mg by mouth.        ALPRAZolam 0.5 MG tablet    XANAX    270 tablet    Take 1 tablet (0.5 mg) by mouth 3 times daily as needed for anxiety    Anxiety state       aspirin 81 MG EC tablet      Take 1 tablet by mouth daily.        calcium 600 + D 600-400 MG-UNIT per tablet   Generic drug:  calcium-vitamin D      Take 1 tablet by mouth 2 times daily        GROUND FLAX SEEDS PO      Two tablespoons daily with cereal        losartan 100 MG tablet    COZAAR    90 tablet    Take 1 tablet (100 mg) by mouth daily    Essential hypertension with goal blood pressure less than 140/90       ONE-A-DAY ESSENTIAL Tabs     30 tablet    Take  by mouth.        simvastatin 20 MG tablet    ZOCOR    90 tablet    Take 1 tablet (20 mg) by mouth At Bedtime    Hyperlipidemia LDL goal <130

## 2017-09-06 RX ORDER — TRIAMCINOLONE ACETONIDE 40 MG/ML
40 INJECTION, SUSPENSION INTRA-ARTICULAR; INTRAMUSCULAR ONCE
COMMUNITY
End: 2017-12-01

## 2017-10-03 ENCOUNTER — OFFICE VISIT (OUTPATIENT)
Dept: INTERNAL MEDICINE | Facility: CLINIC | Age: 82
End: 2017-10-03
Payer: COMMERCIAL

## 2017-10-03 VITALS
SYSTOLIC BLOOD PRESSURE: 138 MMHG | DIASTOLIC BLOOD PRESSURE: 66 MMHG | RESPIRATION RATE: 16 BRPM | WEIGHT: 161 LBS | OXYGEN SATURATION: 97 % | BODY MASS INDEX: 28.98 KG/M2 | TEMPERATURE: 96.2 F | HEART RATE: 82 BPM

## 2017-10-03 DIAGNOSIS — Z00.00 ENCOUNTER FOR ROUTINE ADULT HEALTH EXAMINATION WITHOUT ABNORMAL FINDINGS: Primary | ICD-10-CM

## 2017-10-03 DIAGNOSIS — I10 ESSENTIAL HYPERTENSION WITH GOAL BLOOD PRESSURE LESS THAN 140/90: ICD-10-CM

## 2017-10-03 DIAGNOSIS — F41.1 ANXIETY STATE: ICD-10-CM

## 2017-10-03 DIAGNOSIS — I35.0 AORTIC VALVE STENOSIS, UNSPECIFIED ETIOLOGY: ICD-10-CM

## 2017-10-03 DIAGNOSIS — Z23 NEED FOR PROPHYLACTIC VACCINATION AND INOCULATION AGAINST INFLUENZA: ICD-10-CM

## 2017-10-03 DIAGNOSIS — E78.5 HYPERLIPIDEMIA LDL GOAL <130: ICD-10-CM

## 2017-10-03 LAB
ALBUMIN SERPL-MCNC: 4 G/DL (ref 3.4–5)
ALP SERPL-CCNC: 44 U/L (ref 40–150)
ALT SERPL W P-5'-P-CCNC: 17 U/L (ref 0–50)
ANION GAP SERPL CALCULATED.3IONS-SCNC: 8 MMOL/L (ref 3–14)
AST SERPL W P-5'-P-CCNC: 15 U/L (ref 0–45)
BILIRUB SERPL-MCNC: 0.5 MG/DL (ref 0.2–1.3)
BUN SERPL-MCNC: 12 MG/DL (ref 7–30)
CALCIUM SERPL-MCNC: 9.4 MG/DL (ref 8.5–10.1)
CHLORIDE SERPL-SCNC: 101 MMOL/L (ref 94–109)
CHOLEST SERPL-MCNC: 145 MG/DL
CO2 SERPL-SCNC: 29 MMOL/L (ref 20–32)
CREAT SERPL-MCNC: 0.58 MG/DL (ref 0.52–1.04)
GFR SERPL CREATININE-BSD FRML MDRD: >90 ML/MIN/1.7M2
GLUCOSE SERPL-MCNC: 107 MG/DL (ref 70–99)
HDLC SERPL-MCNC: 68 MG/DL
LDLC SERPL CALC-MCNC: 51 MG/DL
NONHDLC SERPL-MCNC: 77 MG/DL
POTASSIUM SERPL-SCNC: 3.9 MMOL/L (ref 3.4–5.3)
PROT SERPL-MCNC: 7.6 G/DL (ref 6.8–8.8)
SODIUM SERPL-SCNC: 138 MMOL/L (ref 133–144)
TRIGL SERPL-MCNC: 128 MG/DL

## 2017-10-03 PROCEDURE — 99397 PER PM REEVAL EST PAT 65+ YR: CPT | Mod: 25 | Performed by: INTERNAL MEDICINE

## 2017-10-03 PROCEDURE — 36415 COLL VENOUS BLD VENIPUNCTURE: CPT | Performed by: INTERNAL MEDICINE

## 2017-10-03 PROCEDURE — G0008 ADMIN INFLUENZA VIRUS VAC: HCPCS | Performed by: INTERNAL MEDICINE

## 2017-10-03 PROCEDURE — 90662 IIV NO PRSV INCREASED AG IM: CPT | Performed by: INTERNAL MEDICINE

## 2017-10-03 PROCEDURE — 80061 LIPID PANEL: CPT | Performed by: INTERNAL MEDICINE

## 2017-10-03 PROCEDURE — 80053 COMPREHEN METABOLIC PANEL: CPT | Performed by: INTERNAL MEDICINE

## 2017-10-03 RX ORDER — ALPRAZOLAM 0.5 MG
0.5 TABLET ORAL 3 TIMES DAILY PRN
Qty: 270 TABLET | Refills: 1 | Status: SHIPPED | OUTPATIENT
Start: 2017-10-03 | End: 2018-01-29

## 2017-10-03 ASSESSMENT — PAIN SCALES - GENERAL: PAINLEVEL: NO PAIN (0)

## 2017-10-03 NOTE — NURSING NOTE
"Chief Complaint   Patient presents with     Wellness Visit       Initial /66  Pulse 82  Temp 96.2  F (35.7  C) (Temporal)  Resp 16  Wt 161 lb (73 kg)  SpO2 97%  BMI 28.98 kg/m2 Estimated body mass index is 28.98 kg/(m^2) as calculated from the following:    Height as of 3/24/17: 5' 2.5\" (1.588 m).    Weight as of this encounter: 161 lb (73 kg).  Medication Reconciliation: complete    "

## 2017-10-03 NOTE — PROGRESS NOTES
SUBJECTIVE:   Linn Sanon is a 89 year old female who presents for Preventive Visit.    Are you in the first 12 months of your Medicare Part B coverage?  No    Healthy Habits:    Do you get at least three servings of calcium containing foods daily (dairy, green leafy vegetables, etc.)? 1-2 servings plus the supplements    Amount of exercise or daily activities, outside of work: exercises for sciatica    Problems taking medications regularly No    Medication side effects: No    Have you had an eye exam in the past two years? yes    Do you see a dentist twice per year? no    Do you have sleep apnea, excessive snoring or daytime drowsiness?no    COGNITIVE SCREEN  1) Repeat 3 items (Banana, Sunrise, Chair)    2) Clock draw: ABNORMAL   3) 3 item recall: Recalls 2 objects   Results: ABNORMAL clock, 1-2 items recalled: PROBABLE COGNITIVE IMPAIRMENT, **INFORM PROVIDER**    Mini-CogTM Copyright HALEY Paiz. Licensed by the author for use in [facility]; reprinted with permission ([email or web address]@[email or web address]). All rights reserved.                    Reviewed and updated as needed this visit by clinical staffTobacco  Allergies  Meds  Med Hx  Soc Hx        Reviewed and updated as needed this visit by Provider        Social History   Substance Use Topics     Smoking status: Never Smoker     Smokeless tobacco: Never Used     Alcohol use No       The patient does not drink >3 drinks per day nor >7 drinks per week.    Today's PHQ-2 Score:   PHQ-2 ( 1999 Pfizer) 10/3/2017 9/1/2017   Q1: Little interest or pleasure in doing things 0 0   Q2: Feeling down, depressed or hopeless 0 0   PHQ-2 Score 0 0         Do you feel safe in your environment - Yes    Do you have a Health Care Directive?: Yes: Patient states has Advance Directive and will bring in a copy to clinic.    Current providers sharing in care for this patient include: Patient Care Team:  Yoseph Kumar MD as PCP - General (Internal Medicine)      Hearing impairment:  "No    Ability to successfully perform activities of daily living: Yes, no assistance needed     Fall risk:  Fallen 2 or more times in the past year?: No  Any fall with injury in the past year?: No      Home safety:  none identified      The following health maintenance items are reviewed in Epic and correct as of today:Health Maintenance   Topic Date Due     SHARIF QUESTIONNAIRE 1 YEAR  11/13/1944     ADVANCE DIRECTIVE PLANNING Q5 YRS  12/16/2016     INFLUENZA VACCINE (SYSTEM ASSIGNED)  09/01/2017     LIPID MONITORING Q1 YEAR  09/09/2017     FALL RISK ASSESSMENT  09/01/2018     PHQ-9 Q1YR  09/01/2018     TETANUS IMMUNIZATION (SYSTEM ASSIGNED)  10/23/2022     PNEUMOCOCCAL  Completed         Pneumonia Vaccine:already done    ROS:  C: NEGATIVE for fever, chills, change in weight  I: NEGATIVE for worrisome rashes, moles or lesions  E: NEGATIVE for vision changes or irritation  E/M: NEGATIVE for ear, mouth and throat problems  R: NEGATIVE for significant cough or SOB  B: NEGATIVE for masses, tenderness or discharge  CV: NEGATIVE for chest pain, palpitations or peripheral edema  GI: NEGATIVE for nausea, abdominal pain, heartburn, or change in bowel habits  : NEGATIVE for frequency, dysuria, or hematuria  M: NEGATIVE for significant arthralgias or myalgia  N: NEGATIVE for weakness, dizziness or paresthesias  E: NEGATIVE for temperature intolerance, skin/hair changes  H: NEGATIVE for bleeding problems  P: NEGATIVE for changes in mood or affect    OBJECTIVE:   /66  Pulse 82  Temp 96.2  F (35.7  C) (Temporal)  Resp 16  Wt 161 lb (73 kg)  SpO2 97%  BMI 28.98 kg/m2 Estimated body mass index is 28.98 kg/(m^2) as calculated from the following:    Height as of 3/24/17: 5' 2.5\" (1.588 m).    Weight as of this encounter: 161 lb (73 kg).  EXAM:   GENERAL: healthy, alert and no distress  EYES: Eyes grossly normal to inspection, PERRL and conjunctivae and sclerae normal  HENT: ear canals and TM's normal, nose and mouth " "without ulcers or lesions  NECK: no adenopathy, no asymmetry, masses, or scars and thyroid normal to palpation  RESP: lungs clear to auscultation - no rales, rhonchi or wheezes  CV: regular rates and rhythm and systolic ejection murmur at the base  ABDOMEN: soft, nontender, no hepatosplenomegaly, no masses and bowel sounds normal  MS: no gross musculoskeletal defects noted, no edema  SKIN: no suspicious lesions or rashes  NEURO: Normal strength and tone, mentation intact and speech normal  PSYCH: mentation appears normal, affect normal/bright    ASSESSMENT / PLAN:       ICD-10-CM    1. Encounter for routine adult health examination without abnormal findings Z00.00    2. Anxiety state F41.1 ALPRAZolam (XANAX) 0.5 MG tablet   3. Hyperlipidemia LDL goal <130 E78.5 Lipid Profile   4. Essential hypertension with goal blood pressure less than 140/90 I10 Lipid Profile     Comprehensive metabolic panel   5. Aortic valve stenosis, unspecified etiology I35.0      No symptoms for aortic stenosis so will wait and do echo next year.  Anxiety and chronic alprazolam is refilled.    End of Life Planning:  Patient currently has an advanced directive: Yes.  Practitioner is supportive of decision.    COUNSELING:  Reviewed preventive health counseling, as reflected in patient instructions       Regular exercise       Healthy diet/nutrition       Immunizations    Vaccinated for: Influenza              Estimated body mass index is 28.98 kg/(m^2) as calculated from the following:    Height as of 3/24/17: 5' 2.5\" (1.588 m).    Weight as of this encounter: 161 lb (73 kg).  Weight management plan: Discussed healthy diet and exercise guidelines and patient will follow up in 12 months in clinic to re-evaluate.   reports that she has never smoked. She has never used smokeless tobacco.        Appropriate preventive services were discussed with this patient, including applicable screening as appropriate for cardiovascular disease, diabetes, " osteopenia/osteoporosis, and glaucoma.  As appropriate for age/gender, discussed screening for colorectal cancer, prostate cancer, breast cancer, and cervical cancer. Checklist reviewing preventive services available has been given to the patient.    Reviewed patients plan of care and provided an AVS. The Basic Care Plan (routine screening as documented in Health Maintenance) for Linn meets the Care Plan requirement. This Care Plan has been established and reviewed with the Patient and son.    Counseling Resources:  ATP IV Guidelines  Pooled Cohorts Equation Calculator  Breast Cancer Risk Calculator  FRAX Risk Assessment  ICSI Preventive Guidelines  Dietary Guidelines for Americans, 2010  USDA's MyPlate  ASA Prophylaxis  Lung CA Screening    Yoseph Kumar MD  [facility]

## 2017-10-03 NOTE — PROGRESS NOTES
Screening Questionnaire for Adult Immunization    Are you sick today?   No   Do you have allergies to medications, food, a vaccine component or latex?   No   Have you ever had a serious reaction after receiving a vaccination?   No   Do you have a long-term health problem with heart disease, lung disease, asthma, kidney disease, metabolic disease (e.g. diabetes), anemia, or other blood disorder?   No   Do you have cancer, leukemia, HIV/AIDS, or any other immune system problem?   No   In the past 3 months, have you taken medications that affect  your immune system, such as prednisone, other steroids, or anticancer drugs; drugs for the treatment of rheumatoid arthritis, Crohn s disease, or psoriasis; or have you had radiation treatments?   No   Have you had a seizure, or a brain or other nervous system problem?   No   During the past year, have you received a transfusion of blood or blood     products, or been given immune (gamma) globulin or antiviral drug?   No   For women: Are you pregnant or is there a chance you could become        pregnant during the next month?   No   Have you received any vaccinations in the past 4 weeks?   No     Immunization questionnaire answers were all negative.        Per orders of Dr. Yoseph Kumar, injection of HD influenza given by Aleksandra Alvares. Patient instructed to remain in clinic for 15 minutes afterwards, and to report any adverse reaction to me immediately.       Screening performed by Aleksandra Alvares on 10/3/2017 at 10:21 AM.    Injectable Influenza Immunization Documentation    1.  Is the person to be vaccinated sick today?   No    2. Does the person to be vaccinated have an allergy to a component   of the vaccine?   No    3. Has the person to be vaccinated ever had a serious reaction   to influenza vaccine in the past?   No    4. Has the person to be vaccinated ever had Guillain-Barré syndrome?   No    Form completed by Aleksandra Alvares MA

## 2017-10-03 NOTE — MR AVS SNAPSHOT
After Visit Summary   10/3/2017    Linn Sanon    MRN: [id number]           Patient Information     Date Of Birth          11/13/1926        Visit Information        Provider Department      10/3/2017 9:30 AM Yoseph Kumar MD [facility]        Today's Diagnoses     Encounter for routine adult health examination without abnormal findings    -  1    Anxiety state        Hyperlipidemia LDL goal <130        Essential hypertension with goal blood pressure less than 140/90        Aortic valve stenosis, unspecified etiology        Need for prophylactic vaccination and inoculation against influenza          Care Instructions      Preventive Health Recommendations    Female Ages 65 +    Yearly exam:     See your health care provider every year in order to  o Review health changes.   o Discuss preventive care.    o Review your medicines if your doctor has prescribed any.      You no longer need a yearly Pap test unless you've had an abnormal Pap test in the past 10 years. If you have vaginal symptoms, such as bleeding or discharge, be sure to talk with your provider about a Pap test.      Every 1 to 2 years, have a mammogram.  If you are over 69, talk with your health care provider about whether or not you want to continue having screening mammograms.      Every 10 years, have a colonoscopy. Or, have a yearly FIT test (stool test). These exams will check for colon cancer.       Have a cholesterol test every 5 years, or more often if your doctor advises it.       Have a diabetes test (fasting glucose) every three years. If you are at risk for diabetes, you should have this test more often.       At age 65, have a bone density scan (DEXA) to check for osteoporosis (brittle bone disease).    Shots:    Get a flu shot each year.    Get a tetanus shot every 10 years.    Talk to your doctor about your pneumonia vaccines. There are now two you should receive - Pneumovax (PPSV 23) and Prevnar (PCV  "13).    Talk to your doctor about the shingles vaccine.    Talk to your doctor about the hepatitis B vaccine.    Nutrition:     Eat at least 5 servings of fruits and vegetables each day.      Eat whole-grain bread, whole-wheat pasta and brown rice instead of white grains and rice.      Talk to your provider about Calcium and Vitamin D.     Lifestyle    Exercise at least 150 minutes a week (30 minutes a day, 5 days a week). This will help you control your weight and prevent disease.      Limit alcohol to one drink per day.      No smoking.       Wear sunscreen to prevent skin cancer.       See your dentist twice a year for an exam and cleaning.      See your eye doctor every 1 to 2 years to screen for conditions such as glaucoma, macular degeneration and cataracts.          Follow-ups after your visit        Who to contact     If you have questions or need follow up information about today's clinic visit or your schedule please contact [facility] directly at [phone].  Normal or non-critical lab and imaging results will be communicated to you by GRIDhart, letter or phone within 4 business days after the clinic has received the results. If you do not hear from us within 7 days, please contact the clinic through [place] or phone. If you have a critical or abnormal lab result, we will notify you by phone as soon as possible.  Submit refill requests through Layer or call your pharmacy and they will forward the refill request to us. Please allow 3 business days for your refill to be completed.          Additional Information About Your Visit        Layer Information     Layer lets you send messages to your doctor, view your test results, renew your prescriptions, schedule appointments and more. To sign up, go to [email or web address] . Click on \"Log in\" on the left side of the screen, which will take you to the Welcome page. Then click on \"Sign up Now\" on the right side of the page.     You " will be asked to enter the access code listed below, as well as some personal information. Please follow the directions to create your username and password.     Your access code is: [email or web address]  Expires: 2017 10:17 AM     Your access code will  in 90 days. If you need help or a new code, please call your [facility] or [phone].        Care EveryWhere ID     This is your Care EveryWhere ID. This could be used by other organizations to access your [facility] medical records  [id number]        Your Vitals Were     Pulse Temperature Respirations Pulse Oximetry BMI (Body Mass Index)       82 96.2  F (35.7  C) (Temporal) 16 97% 28.98 kg/m2        Blood Pressure from Last 3 Encounters:   10/03/17 138/66   17 134/64   17 168/84    Weight from Last 3 Encounters:   10/03/17 161 lb (73 kg)   17 162 lb 6.4 oz (73.7 kg)   17 159 lb (72.1 kg)              We Performed the Following     Comprehensive metabolic panel     FLU VACCINE, INCREASED ANTIGEN, PRESV FREE, AGE 65+ [28591]     Lipid Profile     Vaccine Administration, Initial [72924]          Where to get your medicines      Some of these will need a paper prescription and others can be bought over the counter.  Ask your nurse if you have questions.     Bring a paper prescription for each of these medications     ALPRAZolam 0.5 MG tablet          Primary Care Provider Office Phone # Fax #    Yoseph Kumar [phone]       [facility] [address] [place] [address]        Equal Access to Services     [facility]: Hadii aad ku hadasho Soomaali, waaxda luqadaha, qaybta kaalmada darlene, cici garcia . So [facility] [phone].    ATENCIÓN: Si habla español, tiene a matos disposición servicios gratuitos de asistencia lingüística. Llame al [phone].    We comply with applicable federal civil rights laws and [place] laws. We do not discriminate on the basis of race,  color, national origin, age, disability, sex, sexual orientation, or gender identity.            Thank you!     Thank you for choosing [facility]  for your care. Our goal is always to provide you with excellent care. Hearing back from our patients is one way we can continue to improve our services. Please take a few minutes to complete the written survey that you may receive in the mail after your visit with us. Thank you!             Your Updated Medication List - Protect others around you: Learn how to safely use, store and throw away your medicines at www.disposemymeds.org.          This list is accurate as of: 10/3/17 11:59 AM.  Always use your most recent med list.                   Brand Name Dispense Instructions for use Diagnosis    acetaminophen 500 MG Caps      Take 1,000 mg by mouth.        ALPRAZolam 0.5 MG tablet    XANAX    270 tablet    Take 1 tablet (0.5 mg) by mouth 3 times daily as needed for anxiety    Anxiety state       aspirin 81 MG EC tablet      Take 1 tablet by mouth daily.        calcium 600 + D 600-400 MG-UNIT per tablet   Generic drug:  calcium-vitamin D      Take 1 tablet by mouth daily        GROUND FLAX SEEDS PO      Two tablespoons daily with cereal        KENALOG 40 MG/ML injection   Generic drug:  triamcinolone acetonide      40 mg by INTRA-ARTICULAR route once        losartan 100 MG tablet    COZAAR    90 tablet    Take 1 tablet (100 mg) by mouth daily    Essential hypertension with goal blood pressure less than 140/90       ONE-A-DAY ESSENTIAL Tabs     30 tablet    Take  by mouth.        simvastatin 20 MG tablet    ZOCOR    90 tablet    Take 1 tablet (20 mg) by mouth At Bedtime    Hyperlipidemia LDL goal <130

## 2017-10-04 ENCOUNTER — TELEPHONE (OUTPATIENT)
Dept: INTERNAL MEDICINE | Facility: CLINIC | Age: 82
End: 2017-10-04

## 2017-10-04 NOTE — TELEPHONE ENCOUNTER
Reason for Call:  Other questions    Detailed comments: patient calling wants to make sure that her chart states they she needs to take 1 600mg calcium +400D twice a day, and then she got the flu shot yesterday and wants to make sure it is the one for 65 and older, patients  patient states the paper states Kenalog 40mg-ml inj patient would like Aleksandra to call her and give her the answers    Phone Number Patient can be reached at: Home number on file [phone] (home)    Best Time: any    Can we leave a detailed message on this number? YES    Call taken on 10/4/2017 at 8:57 AM by Day Squires

## 2017-11-30 ENCOUNTER — TELEPHONE (OUTPATIENT)
Dept: FAMILY MEDICINE | Facility: CLINIC | Age: 82
End: 2017-11-30
Payer: COMMERCIAL

## 2017-11-30 DIAGNOSIS — M48.00 SPINAL STENOSIS, UNSPECIFIED SPINAL REGION: Primary | ICD-10-CM

## 2017-11-30 NOTE — TELEPHONE ENCOUNTER
Forms received from  Home [facility] on 11/30/2017.  Forms with RN to review medications.  Orders pended for provider to sign.    Forms given to PCP for signature on .

## 2017-12-01 NOTE — TELEPHONE ENCOUNTER
Med reconciliation completed. Form and chart forwarded to PCP for signature.   Cristel Alvares RN

## 2018-01-15 ENCOUNTER — TELEPHONE (OUTPATIENT)
Dept: FAMILY MEDICINE | Facility: CLINIC | Age: 83
End: 2018-01-15
Payer: COMMERCIAL

## 2018-01-15 DIAGNOSIS — M48.061 SPINAL STENOSIS OF LUMBAR REGION WITHOUT NEUROGENIC CLAUDICATION: Primary | ICD-10-CM

## 2018-01-15 PROCEDURE — G0180 MD CERTIFICATION HHA PATIENT: HCPCS | Performed by: INTERNAL MEDICINE

## 2018-01-15 NOTE — TELEPHONE ENCOUNTER
Forms received from  Home Care on 01/15/18.  Forms with RN to review medications.  Orders pended for provider to sign.    Forms given to Cristel for PCP to sign.     Forwarded to PCP 1/19/2018

## 2018-01-19 DIAGNOSIS — Z53.9 DIAGNOSIS NOT YET DEFINED: Primary | ICD-10-CM

## 2018-01-19 PROCEDURE — 99207 C MD CERTIFICATION HHA PATIENT: CPT | Performed by: INTERNAL MEDICINE

## 2018-01-29 DIAGNOSIS — F41.1 ANXIETY STATE: ICD-10-CM

## 2018-01-29 NOTE — TELEPHONE ENCOUNTER
Alprazolam 0.5 MG      Last Written Prescription Date:  10/3/17  Last Fill Quantity: 270,   # refills: 0  Last Office Visit: 10/3/17  Future Office visit:       Routing refill request to provider for review/approval because:  Drug not on the [facility], [facility] or [facility] refill protocol or controlled substance

## 2018-01-30 RX ORDER — ALPRAZOLAM 0.5 MG
0.5 TABLET ORAL 3 TIMES DAILY PRN
Qty: 270 TABLET | Refills: 1 | Status: SHIPPED | OUTPATIENT
Start: 2018-01-30 | End: 2018-10-05

## 2018-02-07 ENCOUNTER — TELEPHONE (OUTPATIENT)
Dept: INTERNAL MEDICINE | Facility: CLINIC | Age: 83
End: 2018-02-07

## 2018-02-07 NOTE — TELEPHONE ENCOUNTER
Linn calls to let Dr Kumar and Aleksandra know that she will now be [facility] because of [facility] closing.

## 2018-02-28 ENCOUNTER — TELEPHONE (OUTPATIENT)
Dept: INTERNAL MEDICINE | Facility: CLINIC | Age: 83
End: 2018-02-28

## 2018-02-28 NOTE — TELEPHONE ENCOUNTER
Reason for Call:  Medication or medication refill:    Do you use a [facility]?  Name of the pharmacy and phone number for the current request:  [facility] [place] - [phone]    Name of the medication requested: something for pain     Other request: patient is calling stating that her sciatic nerve pain is really bothering her and she would like something for the pain-would like to speak with someone today since PL is out tomorrow     Can we leave a detailed message on this number? YES    Phone number patient can be reached at: Home number on file [phone] (home)    Best Time: any    Call taken on 2/28/2018 at 2:05 PM by Angélica Benavidez

## 2018-02-28 NOTE — TELEPHONE ENCOUNTER
She could take some ibuprofen, 200-400mg twice a day in addition to her tylenol.  Take with food, use some topical aspercream on the area.

## 2018-03-02 NOTE — TELEPHONE ENCOUNTER
Linn has been using Ibuprofen (200 mg) in addition to her acetaminophen.  She will increase to 400 mg twice a day and add the aspercream.   Linn rates her pain at a 7/10 when she is walking around. It is fine when she is sitting or laying down.    If things don't improve by Monday she will be calling and asking for a medrol dose pack.  That is what worked for her in 2015 when she had the same pain. Advised that Dr Kumar may want to see Linn prior to prescribing. Son verbalized understanding.  Son states he would like to avoid PT due to the increased pain level it caused with the prior episode of SI pain.     Plan was reviewed with Linn and her son.

## 2018-03-05 NOTE — TELEPHONE ENCOUNTER
": 1926  PHONE #'s: [phone] (home)     PRESENTING PROBLEM:  Worried about her BPs going up.     Vital Signs 2017   Systolic 199 188 188 187 189   Diastolic 94 91 85 86 87   Pulse 92         Vital Signs 2017 2017 2017 3/24/2017 2017   Systolic 193 172 174 168    Diastolic 85 87 74 84    Pulse    78      Vital Signs 2017 10/3/2017 10/3/2017   Systolic 134  138   Diastolic 64  66   Pulse 82  82       NURSING ASSESSMENT  Description:  Pt concerned about her Ibuprofen affecting her BPs. She clearly seems confused on how she is  taking her medications, Keep reiterating how she was writing down what she was taking or checking off and asked me probably 25 times if she could take 2 Ibuprofen , then proceeded to tell me Dr. King told her to take her meds this way, and Cristel told her to take them another way.....    Onset/duration:  ?  Precip. factors:  Pt seems confused on her medication regimine and this will affect her BPs or not. She was giving me readings from back in November. Then when I asked what they are now. \" OH, I am reading , Let me look for todays,  Top numbers 118, 133, 135, 143. When asked what today and what bottoms numbers are, didn't know bottom numbers didn't write them down. Rn asked if she lives alone ?  \" Yes, but my son helps me set up my meds and I checked them off when I take them. \"   Assoc. Sx:  Wondering if she can bend to get her hair washed after taking her Tylenol?  The Home Care nurse comes on Thursday to help her shower and wash her hair. \" I try to take my meds 10 minutes before she gets here so I can bend . I heard you aren't supposed to bend after you take Tylenol. \"   Improves/worsens Sx:  NA  Pain scale (1-10)   Says she has sciatica and is in pain every day. Wondering if she can take Ibuprofen and asked many times how often and how many , and if it interferes with her BP. She seems to think it " "is effecting her BP.   Sx specific meds: Cozaar, Zocor, Tylenol. ( I see she has XANAX,but didn't mention it at all. )   LMP/preg/breast feeding:  NA.  Last exam/Tx:   LOV with Dr. Kumar was in October 2017    RECOMMENDED DISPOSITION:  Home care advice - It is OK to take 2 Ibuprofen if she needs to for back pain, but take with food or milk to lessen upset stomach. You can do this 3 times per day. Recommend she schedule appt with Dr. Kumar as should probably be seen again. Rn tried to call son, Chantal , to inform his of this recommendation, but NA.    Will comply with recommendation:\" I will have to have my son coordinate his schedule with appt so he can take me.  \"  If further questions/concerns or if Sx do not improve, worsen or new Sx develop, call your PCP or [facility] as soon as possible.    NOTES:  Disposition was determined by the first positive assessment question, therefore all previous assessment questions were negative.  Informed to check provider manual or call insurance company to assure coverage.    Guideline used:Hypertension  Telephone Triage Protocols for Nurses, Fifth Edition, Nella Quintana RN    "

## 2018-03-05 NOTE — TELEPHONE ENCOUNTER
"Patients son Chantal requesting a nurse to call Linn to discuss blood pressure as son states it has \"skyrocketed\", the top number is 180 (didn't know the bottom number), no symptoms with the high blood pressure.  Please advise patient at [phone]  Thank you,  Christy Gann  Patient Representative    "

## 2018-03-06 NOTE — TELEPHONE ENCOUNTER
Son is calling to follow up, patient is still wanting to speak with someone, please advise  Thank you,  Christy Gann  Patient Representative

## 2018-03-12 ENCOUNTER — TELEPHONE (OUTPATIENT)
Dept: INTERNAL MEDICINE | Facility: CLINIC | Age: 83
End: 2018-03-12

## 2018-03-12 NOTE — TELEPHONE ENCOUNTER
She can take 2 ibuprofen up to three times a day if needed for pain, if pain is less then just take 1 twice a day.

## 2018-03-12 NOTE — TELEPHONE ENCOUNTER
Reason for Call:  Other Ibuprofen    Detailed comments: pts son called. Stated pt is feeling better. Wondering if she should continue taking Ibuprofen? Pt would like to speak directly to Aleksandra. Would also like a letter sent to her home with directions on if and how often she should be taking Ibuprofen.    Phone Number Patient can be reached at: Home number on file [phone] (home)    Best Time:     Can we leave a detailed message on this number? YES    Call taken on 3/12/2018 at 8:42 AM by Juli Hernández

## 2018-03-19 ENCOUNTER — TELEPHONE (OUTPATIENT)
Dept: FAMILY MEDICINE | Facility: CLINIC | Age: 83
End: 2018-03-19
Payer: COMMERCIAL

## 2018-03-19 DIAGNOSIS — M48.07 SPINAL STENOSIS, LUMBOSACRAL REGION: Primary | ICD-10-CM

## 2018-03-19 DIAGNOSIS — Z53.9 DIAGNOSIS NOT YET DEFINED: Primary | ICD-10-CM

## 2018-03-19 PROCEDURE — 99207 C MD RECERTIFICATION HHA PT: CPT | Performed by: INTERNAL MEDICINE

## 2018-03-19 NOTE — TELEPHONE ENCOUNTER
Forms received from [facility] on 02/09/2018.  Forms with RN to review medications.  Orders pended for provider to sign.    Forms given to Cristel for PCP signature.

## 2018-03-19 NOTE — TELEPHONE ENCOUNTER
Form and chart forwarded to PCP for signatures. Med reconciliation completed by EDGAR.   Cristel Alvares RN

## 2018-05-18 ENCOUNTER — TELEPHONE (OUTPATIENT)
Dept: INTERNAL MEDICINE | Facility: CLINIC | Age: 83
End: 2018-05-18
Payer: COMMERCIAL

## 2018-05-18 DIAGNOSIS — M48.07 SPINAL STENOSIS, LUMBOSACRAL REGION: Primary | ICD-10-CM

## 2018-05-18 PROCEDURE — G0179 MD RECERTIFICATION HHA PT: HCPCS | Performed by: INTERNAL MEDICINE

## 2018-05-18 NOTE — TELEPHONE ENCOUNTER
Forms received from [facility]   on 5/18/18 for Med Rec.  Forms with RN to review medications.  Orders pended for provider to sign.    Forms given to PCP for signature on 5/21/2018.

## 2018-07-29 ENCOUNTER — DOCUMENTATION ONLY (OUTPATIENT)
Dept: OTHER | Facility: CLINIC | Age: 83
End: 2018-07-29

## 2018-08-02 ENCOUNTER — TELEPHONE (OUTPATIENT)
Dept: INTERNAL MEDICINE | Facility: CLINIC | Age: 83
End: 2018-08-02
Payer: COMMERCIAL

## 2018-08-02 DIAGNOSIS — M48.061 SPINAL STENOSIS OF LUMBAR REGION WITHOUT NEUROGENIC CLAUDICATION: Primary | ICD-10-CM

## 2018-08-02 PROCEDURE — 99207 C MD RECERTIFICATION HHA PT: CPT | Performed by: INTERNAL MEDICINE

## 2018-08-03 DIAGNOSIS — Z53.9 DIAGNOSIS NOT YET DEFINED: Primary | ICD-10-CM

## 2018-08-03 PROCEDURE — 99207 C MD RECERTIFICATION HHA PT: CPT | Performed by: INTERNAL MEDICINE

## 2018-08-21 DIAGNOSIS — I10 ESSENTIAL HYPERTENSION WITH GOAL BLOOD PRESSURE LESS THAN 140/90: ICD-10-CM

## 2018-08-21 NOTE — TELEPHONE ENCOUNTER
"Requested Prescriptions   Pending Prescriptions Disp Refills     losartan (COZAAR) 100 MG tablet [Pharmacy Med Name: LOSARTAN 100MG TABS] 90 tablet 1    Last Written Prescription Date:  9/01/2017  Last Fill Quantity: 90,  # refills: 3   Last office visit: 10/03/2017 with prescribing provider:  Dr. Kumar   Future Office Visit:     Sig: TAKE ONE TABLET BY MOUTH ONCE DAILY    Angiotensin-II Receptors Passed    8/21/2018 11:41 AM       Passed - Blood pressure under 140/90 in past 12 months    BP Readings from Last 3 Encounters:   10/03/17 138/66   09/01/17 134/64   03/24/17 168/84                Passed - Recent (12 mo) or future (30 days) visit within the authorizing provider's specialty    Patient had office visit in the last 12 months or has a visit in the next 30 days with authorizing provider or within the authorizing provider's specialty.  See \"Patient Info\" tab in inbasket, or \"Choose Columns\" in Meds & Orders section of the refill encounter.           Passed - Patient is age 18 or older       Passed - No active pregnancy on record       Passed - Normal serum creatinine on file in past 12 months    Recent Labs   Lab Test  10/03/17   1021   CR  0.58            Passed - Normal serum potassium on file in past 12 months    Recent Labs   Lab Test  10/03/17   1021   POTASSIUM  3.9                   Passed - No positive pregnancy test in past 12 months          "

## 2018-08-22 RX ORDER — LOSARTAN POTASSIUM 100 MG/1
TABLET ORAL
Qty: 90 TABLET | Refills: 0 | Status: SHIPPED | OUTPATIENT
Start: 2018-08-22 | End: 2018-10-05

## 2018-08-22 NOTE — TELEPHONE ENCOUNTER
Given 3 month supply. Due for recheck in October, has appt scheduled 10/5/18.       Tiny Sullivan RN. . .  8/22/2018, 9:10 AM

## 2018-09-19 ENCOUNTER — TELEPHONE (OUTPATIENT)
Dept: INTERNAL MEDICINE | Facility: CLINIC | Age: 83
End: 2018-09-19

## 2018-09-19 NOTE — TELEPHONE ENCOUNTER
Forms received from [facility]   on 9/19/18 for med rec.  Forms with RN to review medications.  Orders pended for provider to sign.    Forms given to PCP for signature on .

## 2018-09-24 DIAGNOSIS — Z53.9 DIAGNOSIS NOT YET DEFINED: Primary | ICD-10-CM

## 2018-10-05 ENCOUNTER — OFFICE VISIT (OUTPATIENT)
Dept: INTERNAL MEDICINE | Facility: CLINIC | Age: 83
End: 2018-10-05
Payer: COMMERCIAL

## 2018-10-05 VITALS
SYSTOLIC BLOOD PRESSURE: 128 MMHG | OXYGEN SATURATION: 96 % | DIASTOLIC BLOOD PRESSURE: 66 MMHG | TEMPERATURE: 96.7 F | HEIGHT: 63 IN | HEART RATE: 88 BPM | BODY MASS INDEX: 29.77 KG/M2 | WEIGHT: 168 LBS | RESPIRATION RATE: 16 BRPM

## 2018-10-05 DIAGNOSIS — Z00.00 ENCOUNTER FOR ROUTINE ADULT HEALTH EXAMINATION WITHOUT ABNORMAL FINDINGS: Primary | ICD-10-CM

## 2018-10-05 DIAGNOSIS — I10 ESSENTIAL HYPERTENSION WITH GOAL BLOOD PRESSURE LESS THAN 140/90: ICD-10-CM

## 2018-10-05 DIAGNOSIS — E78.5 HYPERLIPIDEMIA LDL GOAL <130: ICD-10-CM

## 2018-10-05 DIAGNOSIS — Z23 NEED FOR PROPHYLACTIC VACCINATION AND INOCULATION AGAINST INFLUENZA: ICD-10-CM

## 2018-10-05 DIAGNOSIS — F41.1 ANXIETY STATE: ICD-10-CM

## 2018-10-05 LAB
ALBUMIN SERPL-MCNC: 4 G/DL (ref 3.4–5)
ALP SERPL-CCNC: 46 U/L (ref 40–150)
ALT SERPL W P-5'-P-CCNC: 19 U/L (ref 0–50)
ANION GAP SERPL CALCULATED.3IONS-SCNC: 6 MMOL/L (ref 3–14)
AST SERPL W P-5'-P-CCNC: 19 U/L (ref 0–45)
BILIRUB SERPL-MCNC: 0.6 MG/DL (ref 0.2–1.3)
BUN SERPL-MCNC: 13 MG/DL (ref 7–30)
CALCIUM SERPL-MCNC: 9.5 MG/DL (ref 8.5–10.1)
CHLORIDE SERPL-SCNC: 102 MMOL/L (ref 94–109)
CHOLEST SERPL-MCNC: 145 MG/DL
CO2 SERPL-SCNC: 30 MMOL/L (ref 20–32)
CREAT SERPL-MCNC: 0.77 MG/DL (ref 0.52–1.04)
GFR SERPL CREATININE-BSD FRML MDRD: 70 ML/MIN/1.7M2
GLUCOSE SERPL-MCNC: 113 MG/DL (ref 70–99)
HDLC SERPL-MCNC: 60 MG/DL
LDLC SERPL CALC-MCNC: 63 MG/DL
NONHDLC SERPL-MCNC: 85 MG/DL
POTASSIUM SERPL-SCNC: 4.1 MMOL/L (ref 3.4–5.3)
PROT SERPL-MCNC: 7.5 G/DL (ref 6.8–8.8)
SODIUM SERPL-SCNC: 138 MMOL/L (ref 133–144)
TRIGL SERPL-MCNC: 111 MG/DL

## 2018-10-05 PROCEDURE — G0438 PPPS, INITIAL VISIT: HCPCS | Performed by: INTERNAL MEDICINE

## 2018-10-05 PROCEDURE — 80061 LIPID PANEL: CPT | Performed by: INTERNAL MEDICINE

## 2018-10-05 PROCEDURE — 80053 COMPREHEN METABOLIC PANEL: CPT | Performed by: INTERNAL MEDICINE

## 2018-10-05 PROCEDURE — G0008 ADMIN INFLUENZA VIRUS VAC: HCPCS | Performed by: INTERNAL MEDICINE

## 2018-10-05 PROCEDURE — 36415 COLL VENOUS BLD VENIPUNCTURE: CPT | Performed by: INTERNAL MEDICINE

## 2018-10-05 PROCEDURE — 90662 IIV NO PRSV INCREASED AG IM: CPT | Performed by: INTERNAL MEDICINE

## 2018-10-05 RX ORDER — ALPRAZOLAM 0.5 MG
0.5 TABLET ORAL 3 TIMES DAILY PRN
Qty: 270 TABLET | Refills: 3 | Status: SHIPPED | OUTPATIENT
Start: 2018-10-05 | End: 2019-04-15

## 2018-10-05 RX ORDER — SIMVASTATIN 20 MG
20 TABLET ORAL AT BEDTIME
Qty: 90 TABLET | Refills: 3 | Status: SHIPPED | OUTPATIENT
Start: 2018-10-05 | End: 2019-01-01

## 2018-10-05 RX ORDER — LOSARTAN POTASSIUM 100 MG/1
100 TABLET ORAL DAILY
Qty: 90 TABLET | Refills: 3 | Status: SHIPPED | OUTPATIENT
Start: 2018-10-05 | End: 2019-01-01

## 2018-10-05 ASSESSMENT — ANXIETY QUESTIONNAIRES
IF YOU CHECKED OFF ANY PROBLEMS ON THIS QUESTIONNAIRE, HOW DIFFICULT HAVE THESE PROBLEMS MADE IT FOR YOU TO DO YOUR WORK, TAKE CARE OF THINGS AT HOME, OR GET ALONG WITH OTHER PEOPLE: SOMEWHAT DIFFICULT
5. BEING SO RESTLESS THAT IT IS HARD TO SIT STILL: NOT AT ALL
6. BECOMING EASILY ANNOYED OR IRRITABLE: NOT AT ALL
3. WORRYING TOO MUCH ABOUT DIFFERENT THINGS: SEVERAL DAYS
1. FEELING NERVOUS, ANXIOUS, OR ON EDGE: SEVERAL DAYS
2. NOT BEING ABLE TO STOP OR CONTROL WORRYING: SEVERAL DAYS
GAD7 TOTAL SCORE: 4
7. FEELING AFRAID AS IF SOMETHING AWFUL MIGHT HAPPEN: NOT AT ALL

## 2018-10-05 ASSESSMENT — PATIENT HEALTH QUESTIONNAIRE - PHQ9: 5. POOR APPETITE OR OVEREATING: SEVERAL DAYS

## 2018-10-05 ASSESSMENT — PAIN SCALES - GENERAL: PAINLEVEL: NO PAIN (0)

## 2018-10-05 NOTE — MR AVS SNAPSHOT
After Visit Summary   10/5/2018    Linn Sanon    MRN: [id number]           Patient Information     Date Of Birth          11/13/1926        Visit Information        Provider Department      10/5/2018 10:00 AM Yoseph Kumar MD [facility] Instructions      Preventive Health Recommendations    Female Ages 65 +    Yearly exam:     See your health care provider every year in order to  o Review health changes.   o Discuss preventive care.    o Review your medicines if your doctor has prescribed any.      You no longer need a yearly Pap test unless you've had an abnormal Pap test in the past 10 years. If you have vaginal symptoms, such as bleeding or discharge, be sure to talk with your provider about a Pap test.      Every 1 to 2 years, have a mammogram.  If you are over 69, talk with your health care provider about whether or not you want to continue having screening mammograms.      Every 10 years, have a colonoscopy. Or, have a yearly FIT test (stool test). These exams will check for colon cancer.       Have a cholesterol test every 5 years, or more often if your doctor advises it.       Have a diabetes test (fasting glucose) every three years. If you are at risk for diabetes, you should have this test more often.       At age 65, have a bone density scan (DEXA) to check for osteoporosis (brittle bone disease).    Shots:    Get a flu shot each year.    Get a tetanus shot every 10 years.    Talk to your doctor about your pneumonia vaccines. There are now two you should receive - Pneumovax (PPSV 23) and Prevnar (PCV 13).    Talk to your pharmacist about the shingles vaccine.    Talk to your doctor about the hepatitis B vaccine.    Nutrition:     Eat at least 5 servings of fruits and vegetables each day.      Eat whole-grain bread, whole-wheat pasta and brown rice instead of white grains and rice.      Get adequate Calcium and Vitamin D.     Lifestyle    Exercise at least  "150 minutes a week (30 minutes a day, 5 days a week). This will help you control your weight and prevent disease.      Limit alcohol to one drink per day.      No smoking.       Wear sunscreen to prevent skin cancer.       See your dentist twice a year for an exam and cleaning.      See your eye doctor every 1 to 2 years to screen for conditions such as glaucoma, macular degeneration and cataracts.          Follow-ups after your visit        Who to contact     If you have questions or need follow up information about today's clinic visit or your schedule please contact [facility] directly at [phone].  Normal or non-critical lab and imaging results will be communicated to you by MyChart, letter or phone within 4 business days after the clinic has received the results. If you do not hear from us within 7 days, please contact the clinic through MyChart or phone. If you have a critical or abnormal lab result, we will notify you by phone as soon as possible.  Submit refill requests through [employer] or call your pharmacy and they will forward the refill request to us. Please allow 3 business days for your refill to be completed.          Additional Information About Your Visit        Care EveryWhere ID     This is your Care EveryWhere ID. This could be used by other organizations to access your [facility] medical records  [id number]        Your Vitals Were     Pulse Temperature Respirations Height Pulse Oximetry BMI (Body Mass Index)    88 96.7  F (35.9  C) (Temporal) 16 5' 2.5\" (1.588 m) 96% 30.24 kg/m2       Blood Pressure from Last 3 Encounters:   10/05/18 128/66   10/03/17 138/66   09/01/17 134/64    Weight from Last 3 Encounters:   10/05/18 168 lb (76.2 kg)   10/03/17 161 lb (73 kg)   09/01/17 162 lb 6.4 oz (73.7 kg)              Today, you had the following     No orders found for display         Today's Medication Changes          These changes are accurate as of 10/5/18 10:06 AM.  If you have " any questions, ask your nurse or doctor.               Stop taking these medicines if you haven't already. Please contact your care team if you have questions.     calcium 600 + D 600-400 MG-UNIT per tablet   Generic drug:  calcium carbonate 600 mg-vitamin D 400 units   Stopped by:  Yoseph Kumar MD                    Primary Care Provider Office Phone # Fax #    Yoseph Kumar [phone]       [address] [facility] [address]        Equal Access to Services     [facility]: Hadii aad ku hadasho Soomaali, waaxda luqadaha, qaybta kaalmada adeegyada, waxay idiin hayaan adeeg kharash la'aan . So [facility] [phone].    ATENCIÓN: Si habla español, tiene a matos disposición servicios gratuitos de asistencia lingüística. [facility] [phone].    We comply with applicable federal civil rights laws and [place] laws. We do not discriminate on the basis of race, color, national origin, age, disability, sex, sexual orientation, or gender identity.            Thank you!     Thank you for choosing [facility]  for your care. Our goal is always to provide you with excellent care. Hearing back from our patients is one way we can continue to improve our services. Please take a few minutes to complete the written survey that you may receive in the mail after your visit with us. Thank you!             Your Updated Medication List - Protect others around you: Learn how to safely use, store and throw away your medicines at www.disposemymeds.org.          This list is accurate as of 10/5/18 10:06 AM.  Always use your most recent med list.                   Brand Name Dispense Instructions for use Diagnosis    acetaminophen 500 MG Caps      Take 1,000 mg by mouth.        ALPRAZolam 0.5 MG tablet    XANAX    270 tablet    Take 1 tablet (0.5 mg) by mouth 3 times daily as needed for anxiety    Anxiety state       aspirin 81 MG EC tablet      Take 1 tablet by mouth daily.        CALCIUM 600+D  600-800 MG-UNIT Tabs   Generic drug:  Calcium Carb-Cholecalciferol      Take 1 tablet by mouth 2 times daily        GROUND FLAX SEEDS PO      Two tablespoons daily with cereal        IBUPROFEN PO      Take 200-400 mg by mouth 2 times daily as needed        losartan 100 MG tablet    COZAAR    90 tablet    TAKE ONE TABLET BY MOUTH ONCE DAILY    Essential hypertension with goal blood pressure less than 140/90       ONE-A-DAY ESSENTIAL Tabs     30 tablet    Take  by mouth.        simvastatin 20 MG tablet    ZOCOR    90 tablet    Take 1 tablet (20 mg) by mouth At Bedtime    Hyperlipidemia LDL goal <130

## 2018-10-05 NOTE — PROGRESS NOTES
SUBJECTIVE:   Linn Sanon is a 89 year old female who presents for Preventive Visit.      Are you in the first 12 months of your Medicare Part B coverage?      Healthy Habits:    Do you get at least three servings of calcium containing foods daily (dairy, green leafy vegetables, etc.)? yes    Amount of exercise or daily activities, outside of work: has exercises before she gets up in the morning    Problems taking medications regularly No    Medication side effects: No    Have you had an eye exam in the past two years? yes    Do you see a dentist twice per year? no    Do you have sleep apnea, excessive snoring or daytime drowsiness?no      Ability to successfully perform activities of daily living: No, needs assistance with: transportation and bathing    Home safety:  none identified     Hearing impairment: No    Fall risk:  Fallen 2 or more times in the past year?: No  Any fall with injury in the past year?: No    Home health aid every two weeks, helps with hair and bathing.  She has a walker with a phone if help needed.    COGNITIVE SCREEN  1) Repeat 3 items (Leader, Season, Table)    2) Clock draw: NORMAL  3) 3 item recall: Recalls NO objects   Results: 0 items recalled: PROBABLE COGNITIVE IMPAIRMENT, **INFORM PROVIDER**  Recalls them when I come back in.       Mini-CogTM Copyright HALEY Paiz. Licensed by the author for use in [facility]; reprinted with permission (gómez@.[facility]). All rights reserved.        Reviewed and updated as needed this visit by clinical staff  Tobacco  Allergies  Meds         Reviewed and updated as needed this visit by Provider        Social History   Substance Use Topics     Smoking status: Never Smoker     Smokeless tobacco: Never Used     Alcohol use No       If you drink alcohol do you typically have >3 drinks per day or >7 drinks per week? No                        Today's PHQ-2 Score:   PHQ-2 ( 1999 Pfizer) 10/3/2017 9/1/2017   Q1: Little interest or pleasure  "in doing things 0 0   Q2: Feeling down, depressed or hopeless 0 0   PHQ-2 Score 0 0       Do you feel safe in your environment - Yes    Do you have a Health Care Directive?: Yes: Advance Directive has been received and scanned.    Current providers sharing in care for this patient include:   Patient Care Team:  Yoseph Kumar MD as PCP - General (Internal Medicine)    The following health maintenance items are reviewed in Epic and correct as of today:  Health Maintenance   Topic Date Due     SHARIF QUESTIONNAIRE 1 YEAR  11/13/1944     INFLUENZA VACCINE (1) 09/01/2018     PHQ-9 Q1YR  09/01/2018     LIPID MONITORING Q1 YEAR  10/03/2018     FALL RISK ASSESSMENT  10/03/2018     TETANUS IMMUNIZATION (SYSTEM ASSIGNED)  10/23/2022     ADVANCE DIRECTIVE PLANNING Q5 YRS  07/29/2023     PNEUMOCOCCAL  Completed         Pneumonia Vaccine:already done    ROS:  CONSTITUTIONAL: NEGATIVE for fever, chills, change in weight  INTEGUMENTARY/SKIN: NEGATIVE for worrisome rashes, moles or lesions  EYES: NEGATIVE for vision changes or irritation  ENT/MOUTH: NEGATIVE for ear, mouth and throat problems  RESP: NEGATIVE for significant cough or SOB  BREAST: NEGATIVE for masses, tenderness or discharge  CV: NEGATIVE for chest pain, palpitations or peripheral edema  GI: NEGATIVE for nausea, abdominal pain, heartburn, or change in bowel habits  : NEGATIVE for frequency, dysuria, or hematuria  MUSCULOSKELETAL:knee and back pains  NEURO: NEGATIVE for weakness, dizziness or paresthesias  ENDOCRINE: NEGATIVE for temperature intolerance, skin/hair changes  HEME: NEGATIVE for bleeding problems  PSYCHIATRIC: NEGATIVE for changes in mood or affect    OBJECTIVE:   /66  Pulse 88  Temp 96.7  F (35.9  C) (Temporal)  Resp 16  Ht 5' 2.5\" (1.588 m)  Wt 168 lb (76.2 kg)  SpO2 96%  BMI 30.24 kg/m2 Estimated body mass index is 30.24 kg/(m^2) as calculated from the following:    Height as of this encounter: 5' 2.5\" (1.588 m).    Weight as of this " "encounter: 168 lb (76.2 kg).  EXAM:   GENERAL: healthy, alert and no distress  EYES: Eyes grossly normal to inspection, PERRL and conjunctivae and sclerae normal  HENT: ear canals and TM's normal, nose and mouth without ulcers or lesions  NECK: no adenopathy, no asymmetry, masses, or scars and thyroid normal to palpation  RESP: lungs clear to auscultation - no rales, rhonchi or wheezes  CV: regular rate and rhythm, normal S1 S2, no S3 or S4,  SYSTOLIC MURMUR AT THE BASE   edema and peripheral pulses strong  ABDOMEN: soft, nontender, no hepatosplenomegaly, no masses and bowel sounds normal  SKIN: no suspicious lesions or rashes  NEURO: Normal strength and tone, mentation intact and speech normal  PSYCH: mentation appears normal, affect normal/bright        ASSESSMENT / PLAN:       ICD-10-CM    1. Encounter for routine adult health examination without abnormal findings Z00.00 Comprehensive metabolic panel   2. Essential hypertension with goal blood pressure less than 140/90 I10 losartan (COZAAR) 100 MG tablet     Comprehensive metabolic panel   3. Hyperlipidemia LDL goal <130 E78.5 simvastatin (ZOCOR) 20 MG tablet     Lipid Profile   4. Anxiety state F41.1 ALPRAZolam (XANAX) 0.5 MG tablet     Doing ok, chronic back pains, will do ibuprofen 200mg three times a day, tylenol as needed.    Discussed safety and recommended a lifeline, she uses a phone with her, she doesn't want lifeline yet.         End of Life Planning:  Patient currently has an advanced directive: Yes.  Practitioner is supportive of decision.    COUNSELING:  Reviewed preventive health counseling, as reflected in patient instructions       Immunizations    Vaccinated for: Influenza          BP Readings from Last 1 Encounters:   10/05/18 128/66     Estimated body mass index is 30.24 kg/(m^2) as calculated from the following:    Height as of this encounter: 5' 2.5\" (1.588 m).    Weight as of this encounter: 168 lb (76.2 kg).      Weight management plan: " Discussed healthy diet and exercise guidelines and patient will follow up in 12 months in clinic to re-evaluate.     reports that she has never smoked. She has never used smokeless tobacco.      Appropriate preventive services were discussed with this patient, including applicable screening as appropriate for cardiovascular disease, diabetes, osteopenia/osteoporosis, and glaucoma.  As appropriate for age/gender, discussed screening for colorectal cancer, prostate cancer, breast cancer, and cervical cancer. Checklist reviewing preventive services available has been given to the patient.    Reviewed patients plan of care and provided an AVS. The Basic Care Plan (routine screening as documented in Health Maintenance) for Linn meets the Care Plan requirement. This Care Plan has been established and reviewed with the Patient.    Counseling Resources:  ATP IV Guidelines  Pooled Cohorts Equation Calculator  Breast Cancer Risk Calculator  FRAX Risk Assessment  ICSI Preventive Guidelines  Dietary Guidelines for Americans, 2010  USDA's MyPlate  ASA Prophylaxis  Lung CA Screening    Yoseph Kumar MD  [facility]

## 2018-10-05 NOTE — PROGRESS NOTES
Injectable Influenza Immunization Documentation    1.  Is the person to be vaccinated sick today?   No    2. Does the person to be vaccinated have an allergy to a component   of the vaccine?   No  Egg Allergy Algorithm Link    3. Has the person to be vaccinated ever had a serious reaction   to influenza vaccine in the past?   No    4. Has the person to be vaccinated ever had Guillain-Barré syndrome?   No    Form completed by Kylie Pacheco CMA  Prior to injection verified patient identity using patient's name and date of birth.  Due to injection administration, patient instructed to remain in clinic for 15 minutes  afterwards, and to report any adverse reaction to me immediately.

## 2018-10-05 NOTE — LETTER
October 5, 2018      Linn Sanon  [address]  [place] [address]        Dear ,    We are writing to inform you of your test results.    Her labs are good    Resulted Orders   Comprehensive metabolic panel   Result Value Ref Range    Sodium 138 133 - 144 mmol/L    Potassium 4.1 3.4 - 5.3 mmol/L    Chloride 102 94 - 109 mmol/L    Carbon Dioxide 30 20 - 32 mmol/L    Anion Gap 6 3 - 14 mmol/L    Glucose 113 (H) 70 - 99 mg/dL      Comment:      Fasting specimen    Urea Nitrogen 13 7 - 30 mg/dL    Creatinine 0.77 0.52 - 1.04 mg/dL    GFR Estimate 70 >60 mL/min/1.7m2      Comment:      Non  GFR Calc    GFR Estimate If Black 85 >60 mL/min/1.7m2      Comment:       GFR Calc    Calcium 9.5 8.5 - 10.1 mg/dL    Bilirubin Total 0.6 0.2 - 1.3 mg/dL    Albumin 4.0 3.4 - 5.0 g/dL    Protein Total 7.5 6.8 - 8.8 g/dL    Alkaline Phosphatase 46 40 - 150 U/L    ALT 19 0 - 50 U/L    AST 19 0 - 45 U/L   Lipid Profile   Result Value Ref Range    Cholesterol 145 <200 mg/dL    Triglycerides 111 <150 mg/dL      Comment:      Fasting specimen    HDL Cholesterol 60 >49 mg/dL    LDL Cholesterol Calculated 63 <100 mg/dL      Comment:      Desirable:       <100 mg/dl    Non HDL Cholesterol 85 <130 mg/dL       If you have any questions or concerns, please call the clinic at the number listed above.       Sincerely,        Yoseph Kumar MD

## 2018-10-06 ASSESSMENT — ANXIETY QUESTIONNAIRES: GAD7 TOTAL SCORE: 4

## 2018-10-06 ASSESSMENT — PATIENT HEALTH QUESTIONNAIRE - PHQ9: SUM OF ALL RESPONSES TO PHQ QUESTIONS 1-9: 3

## 2018-10-09 DIAGNOSIS — K13.79 MOUTH SORE: Primary | ICD-10-CM

## 2018-10-09 RX ORDER — MUPIROCIN 20 MG/G
OINTMENT TOPICAL 3 TIMES DAILY
Qty: 22 G | Refills: 1 | Status: SHIPPED | OUTPATIENT
Start: 2018-10-09 | End: 2019-01-01

## 2018-10-09 RX ORDER — MUPIROCIN CALCIUM 20 MG/G
CREAM TOPICAL 3 TIMES DAILY
Qty: 15 G | Refills: 0 | Status: SHIPPED | OUTPATIENT
Start: 2018-10-09 | End: 2018-10-09

## 2018-10-09 NOTE — TELEPHONE ENCOUNTER
RN TRIAGE CALL:    Patient Contact    Attempt # 1    Was call answered?  No.  Unable to leave message.Unidentified vm.

## 2018-10-09 NOTE — TELEPHONE ENCOUNTER
Lnin's son , Gaurav, stopped in the clinic today to share his concern about his mother.  She has a sore on left side of the crack of her mouth. It has worsened and its starting to run down the side of her face in the crease of skin down her chin.      They have tried lotrimin for a few days and it has worsened.  No signs of rash elsewhere on her body.      RN huddled with provider.  Ok to start Bactroban ointment.  If does not improve with use go back to lotrimin and try for longer duration. Script called to [facility] Gaurav was notified.      Cristel Alvares RN

## 2018-10-09 NOTE — TELEPHONE ENCOUNTER
Reason for call:  Patient reporting a symptom    Symptom or request: Son Chantal is requesting a call back to discuss patients symptoms, seems to be more red & not healing with the OTC product, worsened since Friday.   Please advise.    Duration (how long have symptoms been present):     Have you been treated for this before? Yes    Additional comments:     Phone Number patient can be reached at:  Home number on file [phone] (home)    Best Time:      Can we leave a detailed message on this number:  YES    Call taken on 10/9/2018 at 8:40 AM by Christy Gann

## 2018-10-15 ENCOUNTER — TELEPHONE (OUTPATIENT)
Dept: INTERNAL MEDICINE | Facility: CLINIC | Age: 83
End: 2018-10-15

## 2018-10-15 NOTE — TELEPHONE ENCOUNTER
Pt's son calling. He states that she did not received an AVS after her last visit. She states she got a flu shot, and some medications were changed. She did not get any of the that documentation. He asks that you give Linn a call to go over everything at [phone] ASAP this AM.   Thank you,  Alix Viera- Pt Rep.

## 2018-11-26 ENCOUNTER — TELEPHONE (OUTPATIENT)
Dept: FAMILY MEDICINE | Facility: CLINIC | Age: 83
End: 2018-11-26

## 2018-11-26 NOTE — TELEPHONE ENCOUNTER
Forms received from  Home Care on 11/19/18.  Forms with RN to review medications.  Orders pended for provider to sign.    Forms given to Cristel for PCP signature and then give to Sheila or Cuca when done.

## 2019-01-01 ENCOUNTER — TELEPHONE (OUTPATIENT)
Dept: INTERNAL MEDICINE | Facility: CLINIC | Age: 84
End: 2019-01-01

## 2019-01-01 ENCOUNTER — OFFICE VISIT (OUTPATIENT)
Dept: INTERNAL MEDICINE | Facility: CLINIC | Age: 84
End: 2019-01-01
Payer: COMMERCIAL

## 2019-01-01 VITALS
RESPIRATION RATE: 16 BRPM | BODY MASS INDEX: 28.89 KG/M2 | TEMPERATURE: 96.9 F | WEIGHT: 157 LBS | HEIGHT: 62 IN | HEART RATE: 84 BPM | DIASTOLIC BLOOD PRESSURE: 66 MMHG | OXYGEN SATURATION: 96 % | SYSTOLIC BLOOD PRESSURE: 124 MMHG

## 2019-01-01 DIAGNOSIS — E78.5 HYPERLIPIDEMIA LDL GOAL <130: ICD-10-CM

## 2019-01-01 DIAGNOSIS — F41.1 ANXIETY STATE: ICD-10-CM

## 2019-01-01 DIAGNOSIS — I10 ESSENTIAL HYPERTENSION WITH GOAL BLOOD PRESSURE LESS THAN 140/90: ICD-10-CM

## 2019-01-01 DIAGNOSIS — Z53.9 DIAGNOSIS NOT YET DEFINED: Primary | ICD-10-CM

## 2019-01-01 DIAGNOSIS — K13.79 MOUTH SORE: ICD-10-CM

## 2019-01-01 DIAGNOSIS — Z00.00 ENCOUNTER FOR MEDICARE ANNUAL WELLNESS EXAM: Primary | ICD-10-CM

## 2019-01-01 DIAGNOSIS — M48.061 SPINAL STENOSIS OF LUMBAR REGION WITHOUT NEUROGENIC CLAUDICATION: ICD-10-CM

## 2019-01-01 DIAGNOSIS — Z23 NEED FOR PROPHYLACTIC VACCINATION AND INOCULATION AGAINST INFLUENZA: ICD-10-CM

## 2019-01-01 LAB
ALBUMIN SERPL-MCNC: 4 G/DL (ref 3.4–5)
ALP SERPL-CCNC: 56 U/L (ref 40–150)
ALT SERPL W P-5'-P-CCNC: 19 U/L (ref 0–50)
ANION GAP SERPL CALCULATED.3IONS-SCNC: 5 MMOL/L (ref 3–14)
AST SERPL W P-5'-P-CCNC: 18 U/L (ref 0–45)
BILIRUB SERPL-MCNC: 0.5 MG/DL (ref 0.2–1.3)
BUN SERPL-MCNC: 12 MG/DL (ref 7–30)
CALCIUM SERPL-MCNC: 9.7 MG/DL (ref 8.5–10.1)
CHLORIDE SERPL-SCNC: 105 MMOL/L (ref 94–109)
CO2 SERPL-SCNC: 30 MMOL/L (ref 20–32)
CREAT SERPL-MCNC: 0.71 MG/DL (ref 0.52–1.04)
ERYTHROCYTE [DISTWIDTH] IN BLOOD BY AUTOMATED COUNT: 13.2 % (ref 10–15)
GFR SERPL CREATININE-BSD FRML MDRD: 74 ML/MIN/{1.73_M2}
GLUCOSE SERPL-MCNC: 115 MG/DL (ref 70–99)
HCT VFR BLD AUTO: 38.2 % (ref 35–47)
HGB BLD-MCNC: 12.7 G/DL (ref 11.7–15.7)
MCH RBC QN AUTO: 30.8 PG (ref 26.5–33)
MCHC RBC AUTO-ENTMCNC: 33.2 G/DL (ref 31.5–36.5)
MCV RBC AUTO: 93 FL (ref 78–100)
PLATELET # BLD AUTO: 253 10E9/L (ref 150–450)
POTASSIUM SERPL-SCNC: 4 MMOL/L (ref 3.4–5.3)
PROT SERPL-MCNC: 7.2 G/DL (ref 6.8–8.8)
RBC # BLD AUTO: 4.12 10E12/L (ref 3.8–5.2)
SODIUM SERPL-SCNC: 140 MMOL/L (ref 133–144)
WBC # BLD AUTO: 6.9 10E9/L (ref 4–11)

## 2019-01-01 PROCEDURE — G0008 ADMIN INFLUENZA VIRUS VAC: HCPCS | Performed by: INTERNAL MEDICINE

## 2019-01-01 PROCEDURE — 90662 IIV NO PRSV INCREASED AG IM: CPT | Performed by: INTERNAL MEDICINE

## 2019-01-01 PROCEDURE — 99213 OFFICE O/P EST LOW 20 MIN: CPT | Mod: 25 | Performed by: INTERNAL MEDICINE

## 2019-01-01 PROCEDURE — 80053 COMPREHEN METABOLIC PANEL: CPT | Performed by: INTERNAL MEDICINE

## 2019-01-01 PROCEDURE — 85027 COMPLETE CBC AUTOMATED: CPT | Performed by: INTERNAL MEDICINE

## 2019-01-01 PROCEDURE — G0179 MD RECERTIFICATION HHA PT: HCPCS | Performed by: INTERNAL MEDICINE

## 2019-01-01 PROCEDURE — G0439 PPPS, SUBSEQ VISIT: HCPCS | Performed by: INTERNAL MEDICINE

## 2019-01-01 PROCEDURE — 36415 COLL VENOUS BLD VENIPUNCTURE: CPT | Performed by: INTERNAL MEDICINE

## 2019-01-01 RX ORDER — ALPRAZOLAM 0.5 MG
0.5 TABLET ORAL 3 TIMES DAILY PRN
Qty: 270 TABLET | Refills: 3 | Status: SHIPPED | OUTPATIENT
Start: 2019-01-01 | End: 2020-01-01

## 2019-01-01 RX ORDER — MUPIROCIN 20 MG/G
OINTMENT TOPICAL 3 TIMES DAILY
Qty: 22 G | Refills: 1 | Status: ON HOLD | OUTPATIENT
Start: 2019-01-01 | End: 2020-01-01

## 2019-01-01 RX ORDER — SIMVASTATIN 20 MG
20 TABLET ORAL AT BEDTIME
Qty: 90 TABLET | Refills: 3 | Status: SHIPPED | OUTPATIENT
Start: 2019-01-01 | End: 2020-01-01

## 2019-01-01 RX ORDER — LOSARTAN POTASSIUM 100 MG/1
100 TABLET ORAL DAILY
Qty: 90 TABLET | Refills: 3 | Status: SHIPPED | OUTPATIENT
Start: 2019-01-01 | End: 2020-01-01

## 2019-01-01 ASSESSMENT — PAIN SCALES - GENERAL: PAINLEVEL: NO PAIN (0)

## 2019-01-01 ASSESSMENT — ACTIVITIES OF DAILY LIVING (ADL)
CURRENT_FUNCTION: LAUNDRY REQUIRES ASSISTANCE
CURRENT_FUNCTION: HOUSEWORK REQUIRES ASSISTANCE
CURRENT_FUNCTION: PREPARING MEALS REQUIRES ASSISTANCE
CURRENT_FUNCTION: SHOPPING REQUIRES ASSISTANCE
CURRENT_FUNCTION: TRANSPORTATION REQUIRES ASSISTANCE
CURRENT_FUNCTION: BATHING REQUIRES ASSISTANCE

## 2019-01-01 ASSESSMENT — MIFFLIN-ST. JEOR: SCORE: 1079.37

## 2019-01-16 ENCOUNTER — TELEPHONE (OUTPATIENT)
Dept: INTERNAL MEDICINE | Facility: CLINIC | Age: 84
End: 2019-01-16

## 2019-01-16 NOTE — TELEPHONE ENCOUNTER
Forms received from  Home Care on 01/16/19.  Forms with RN to review medications.  Orders pended for provider to sign.    Forms given to Cristel for PCP signature and then give to Sheila or Cuca when done.

## 2019-01-21 DIAGNOSIS — Z53.9 DIAGNOSIS NOT YET DEFINED: Primary | ICD-10-CM

## 2019-01-21 PROCEDURE — G0179 MD RECERTIFICATION HHA PT: HCPCS | Performed by: INTERNAL MEDICINE

## 2019-01-21 NOTE — TELEPHONE ENCOUNTER
Medication reconciliation completed by RN. Form and chart forwarded to PCP for signatures.    Cristel Alvares RN

## 2019-03-11 ENCOUNTER — TELEPHONE (OUTPATIENT)
Dept: INTERNAL MEDICINE | Facility: CLINIC | Age: 84
End: 2019-03-11

## 2019-03-11 DIAGNOSIS — Z53.9 DIAGNOSIS NOT YET DEFINED: Primary | ICD-10-CM

## 2019-03-11 PROCEDURE — G0179 MD RECERTIFICATION HHA PT: HCPCS | Performed by: INTERNAL MEDICINE

## 2019-03-11 NOTE — TELEPHONE ENCOUNTER
Medication reconciliation completed.  Placed in PCP folder for review.  Will close encounter at this time.    Bailey Norman RN

## 2019-03-11 NOTE — TELEPHONE ENCOUNTER
Reason for Call:  Form, our goal is to have forms completed with 72 hours, however, some forms may require a visit or additional information.    Type of letter, form or note:  Home Health Certification    Who is the form from?: Home care    Where did the form come from: form was faxed in    What clinic location was the form placed at?: [facility]    Where the form was placed: Given to MA/RN    What number is listed as a contact on the form?:

## 2019-03-13 ENCOUNTER — TELEPHONE (OUTPATIENT)
Dept: INTERNAL MEDICINE | Facility: CLINIC | Age: 84
End: 2019-03-13

## 2019-03-13 DIAGNOSIS — Z53.9 DIAGNOSIS NOT YET DEFINED: Primary | ICD-10-CM

## 2019-03-13 PROCEDURE — G0179 MD RECERTIFICATION HHA PT: HCPCS | Performed by: INTERNAL MEDICINE

## 2019-03-13 NOTE — TELEPHONE ENCOUNTER
Reason for Call:  Form, our goal is to have forms completed with 72 hours, however, some forms may require a visit or additional information.    Type of letter, form or note:  Home Health Certification    Who is the form from?: Home care    Where did the form come from: form was faxed in    What clinic location was the form placed at?: [facility]    Where the form was placed: Given to MA/RN    What number is listed as a contact on the form?:        Additional comments:

## 2019-04-15 DIAGNOSIS — F41.1 ANXIETY STATE: ICD-10-CM

## 2019-04-15 RX ORDER — ALPRAZOLAM 0.5 MG
0.5 TABLET ORAL 3 TIMES DAILY PRN
Qty: 270 TABLET | Refills: 3 | Status: SHIPPED | OUTPATIENT
Start: 2019-04-15 | End: 2019-01-01

## 2019-04-15 NOTE — TELEPHONE ENCOUNTER
Alprazolam 0.5 MG       Last Written Prescription Date:  10/5/18  Last Fill Quantity: 270,   # refills: 3  Last Office Visit: 10/5/18  Future Office visit:       Routing refill request to provider for review/approval because:  Drug not on the [facility], [facility] or [facility] refill protocol or controlled substance

## 2019-05-29 ENCOUNTER — TELEPHONE (OUTPATIENT)
Dept: INTERNAL MEDICINE | Facility: CLINIC | Age: 84
End: 2019-05-29

## 2019-05-29 NOTE — TELEPHONE ENCOUNTER
Reason for Call:  Form, our goal is to have forms completed with 72 hours, however, some forms may require a visit or additional information.    Type of letter, form or note:  Home Health Certification    Who is the form from?: Home care    Where did the form come from: form was faxed in    What clinic location was the form placed at?: [facility]    Where the form was placed: Given to MA/RN    What number is listed as a contact on the form?:        Additional comments:

## 2019-06-04 DIAGNOSIS — Z53.9 DIAGNOSIS NOT YET DEFINED: Primary | ICD-10-CM

## 2019-06-04 PROCEDURE — G0179 MD RECERTIFICATION HHA PT: HCPCS | Performed by: INTERNAL MEDICINE

## 2019-07-15 NOTE — TELEPHONE ENCOUNTER
Reason for Call:  Form, our goal is to have forms completed with 72 hours, however, some forms may require a visit or additional information.    Type of letter, form or note:  Home Health Certification    Who is the form from?: Home care    Where did the form come from: form was faxed in    What clinic location was the form placed at?: [facility]    Where the form was placed: Given to MA/RN    What number is listed as a contact on the form?: [phone]       Additional comments:     Call taken on 7/15/2019 at 3:33 PM by Luanne Waite

## 2019-07-29 NOTE — TELEPHONE ENCOUNTER
Reason for Call:  Work in 7.30 Appointment, Requested Provider:  Yoseph Kumar MD    PCP: Yoseph Kumar    Reason for visit: Fell last week and son would like her to get checked out Tuesday, 7.30 when he is able to bring her. Pt has bruising on face but otherwise seems fine. Please advise.     Duration of symptoms:     Have you been treated for this in the past? No    Additional comments:     Can we leave a detailed message on this number? YES    Phone number patient can be reached at: Home number on file [phone] (home)    Best Time:     Call taken on 7/29/2019 at 7:06 AM by Eda Chisholm

## 2019-08-21 NOTE — TELEPHONE ENCOUNTER
Patient was informed that she can wait and take her pills after the appointment on the 8th. She will come fasting and then after the appointment and lab work she can eat and take her medications.    Mimi Green, CMA

## 2019-08-21 NOTE — TELEPHONE ENCOUNTER
She can wait and take her pills after the appt on the 8th. Fast and then after the appt and labs she can eat and take her medications

## 2019-08-21 NOTE — TELEPHONE ENCOUNTER
Patient calls for the 2nd time, she seems overly concerned about how to take her medications prior to her appointment for her physical on October 8th.   She wants lab work done, and was advised to fast. So now she is very concerned if, when and how to take her medications.  Was advised to take them with water in the morning. She's very concerned about taking them without food, yet does not want to make a 2nd trip in just for a lab. She seems to be a little confused.   Please advise.

## 2019-09-09 NOTE — TELEPHONE ENCOUNTER
Please call him, I assume it was mychart code not care everywhere.  Ok for her to stop or not take calcium.

## 2019-09-09 NOTE — TELEPHONE ENCOUNTER
Reason for Call:  Other prescription    Detailed comments: Pt calling. She is wondering if she should be taking Calcium or not? She also has questions about care everywhere? She got a call that says he code is going to .     Phone Number Patient can be reached at: Home number on file [phone] (home)    Best Time: any     Can we leave a detailed message on this number? YES    Call taken on 2019 at 8:47 AM by Alix Viera

## 2019-09-09 NOTE — TELEPHONE ENCOUNTER
Patient called back, read her Dr. Kumar's message and she sounds confused. She has been taken 1 tab calcium a day. She wants Aleksandra to call her back today.

## 2019-09-13 NOTE — TELEPHONE ENCOUNTER
Reason for Call:  Form, our goal is to have forms completed with 72 hours, however, some forms may require a visit or additional information.    Type of letter, form or note:  Home Health Certification    Who is the form from?: Home care    Where did the form come from: form was faxed in    What clinic location was the form placed at?: [facility]    Where the form was placed: Given to MA/RN    What number is listed as a contact on the form?:        Additional comments:     Call taken on 9/13/2019 at 4:24 PM by Marnie Rivas

## 2019-10-08 NOTE — PROGRESS NOTES
"SUBJECTIVE:   Linn Sanon is a 89 year old female who presents for Preventive Visit.  Are you in the first 12 months of your Medicare coverage?  No    Healthy Habits:     In general, how would you rate your overall health?  Fair    Frequency of exercise:  None    Duration of exercise:  Less than 15 minutes    Do you usually eat at least 4 servings of fruit and vegetables a day, include whole grains    & fiber and avoid regularly eating high fat or \"junk\" foods?  No    Taking medications regularly:  No    Barriers to taking medications:  None    Medication side effects:  None    Ability to successfully perform activities of daily living:  Transportation requires assistance, shopping requires assistance, preparing meals requires assistance, housework requires assistance, laundry requires assistance and bathing requires assistance    Home Safety:  No safety concerns identified    Hearing Impairment:  No hearing concerns    In the past 6 months, have you been bothered by leaking of urine?  No    In general, how would you rate your overall mental or emotional health?  Good      PHQ-2 Total Score: 0    Additional concerns today:  Yes    Doing ok, needs flu shot.  Walks with walker.  Does meals in the microwave.    Home health nurse continues to see her.    Some swelling in her legs.      Do you feel safe in your environment? Yes    Do you have a Health Care Directive? Yes: Advance Directive has been received and scanned.    Fall risk  Fallen 2 or more times in the past year?: No  Any fall with injury in the past year?: No    Cognitive Screening   1) Repeat 3 items (Leader, Season, Table)    2) Clock draw: NORMAL  3) 3 item recall: Recalls 2 objects   Results: NORMAL clock, 1-2 items recalled: COGNITIVE IMPAIRMENT LESS LIKELY    Mini-CogTM Copyright HALEY Paiz. Licensed by the author for use in [facility]; reprinted with permission (gómez@.[email or web address]). All rights reserved.      Do you have sleep apnea, " excessive snoring or daytime drowsiness?: no    Reviewed and updated as needed this visit by clinical staff  Allergies  Meds         Reviewed and updated as needed this visit by Provider        Social History     Tobacco Use     Smoking status: Never Smoker     Smokeless tobacco: Never Used   Substance Use Topics     Alcohol use: No     If you drink alcohol do you typically have >3 drinks per day or >7 drinks per week? No    Alcohol Use 10/3/2017   Prescreen: >3 drinks/day or >7 drinks/week? The patient does not drink >3 drinks per day nor >7 drinks per week.     Current providers sharing in care for this patient include:   Patient Care Team:  Yoseph Kumar MD as PCP - General (Internal Medicine)  Yoseph Kumar MD as Assigned PCP    The following health maintenance items are reviewed in Epic and correct as of today:  Health Maintenance   Topic Date Due     ZOSTER IMMUNIZATION (2 of 3) 12/04/2009     INFLUENZA VACCINE (1) 09/01/2019     LIPID  10/05/2019     FALL RISK ASSESSMENT  10/05/2019     MEDICARE ANNUAL WELLNESS VISIT  10/05/2019     SHARIF ASSESSMENT  10/05/2019     PHQ-9  10/05/2019     DTAP/TDAP/TD IMMUNIZATION (3 - Td) 10/23/2022     ADVANCE CARE PLANNING  07/29/2023     PNEUMOCOCCAL IMMUNIZATION 65+ LOW/MEDIUM RISK  Completed     IPV IMMUNIZATION  Aged Out     MENINGITIS IMMUNIZATION  Aged Out     Lab work is in process  Pneumonia Vaccine:already done.    Review of Systems  CONSTITUTIONAL: NEGATIVE for fever, chills, change in weight  INTEGUMENTARY/SKIN: NEGATIVE for worrisome rashes, moles or lesions  EYES: NEGATIVE for vision changes or irritation  ENT/MOUTH: NEGATIVE for ear, mouth and throat problems  RESP: NEGATIVE for significant cough or SOB  BREAST: NEGATIVE for masses, tenderness or discharge  CV: NEGATIVE for chest pain, palpitations or peripheral edema  GI: NEGATIVE for nausea, abdominal pain, heartburn, or change in bowel habits  : NEGATIVE for frequency, dysuria, or hematuria  NEURO:  "NEGATIVE for weakness, dizziness or paresthesias  ENDOCRINE: NEGATIVE for temperature intolerance, skin/hair changes  HEME: NEGATIVE for bleeding problems  PSYCHIATRIC: NEGATIVE for changes in mood or affect    OBJECTIVE:   Pulse 84   Temp 96.9  F (36.1  C) (Temporal)   Resp 16   Ht 1.581 m (5' 2.25\")   Wt 71.2 kg (157 lb)   SpO2 96%   BMI 28.49 kg/m   Estimated body mass index is 28.49 kg/m  as calculated from the following:    Height as of this encounter: 1.581 m (5' 2.25\").    Weight as of this encounter: 71.2 kg (157 lb).  Physical Exam  GENERAL: healthy, alert and no distress  EYES: Eyes grossly normal to inspection, PERRL and conjunctivae and sclerae normal  HENT: ear canals and TM's normal, nose and mouth without ulcers or lesions  NECK: no adenopathy, no asymmetry, masses, or scars and thyroid normal to palpation  RESP: lungs clear to auscultation - no rales, rhonchi or wheezes  CV: regular rates and rhythm and grade 4/6 systolic murmur heard best over the base  ABDOMEN: soft, nontender, no hepatosplenomegaly, no masses and bowel sounds normal  MS: 1+ pitting edema, left worse then right   SKIN: no suspicious lesions or rashes  NEURO: Normal strength and tone, mentation intact and speech normal  PSYCH: mentation appears normal, affect normal/bright      ASSESSMENT / PLAN:       ICD-10-CM    1. Encounter for Medicare annual wellness exam Z00.00    2. Anxiety state F41.1 ALPRAZolam (XANAX) 0.5 MG tablet   3. Essential hypertension with goal blood pressure less than 140/90 I10 losartan (COZAAR) 100 MG tablet     Comprehensive metabolic panel     CBC with platelets   4. Hyperlipidemia LDL goal <130 E78.5 simvastatin (ZOCOR) 20 MG tablet   5. Mouth sore K13.79 mupirocin (BACTROBAN) 2 % external ointment   6. Spinal stenosis of lumbar region without neurogenic claudication M48.061 Comprehensive metabolic panel     Overall she is doing very well, she is almost 89 years old.  She is on 01 blood pressure " "medication of losartan which we will refill, check her kidney function.  She is on cholesterol medication of simvastatin 20 mg a day we will continue this and check her LFTs.  Questionable calcium use she wants to continue this and I think is fine she is only taking 600 mg daily she really wants to continue vitamin D and its the easiest for her to take it together.  Aspirin due to a history of a possible TIA she will keep on her baby aspirin as well.    Spinal stenosis and chronic pain she does well with ibuprofen 2 to 3 pills daily along with the Tylenol at night.    All of her questions are answered she will get a flu shot today.        End of Life Planning:  Patient currently has an advanced directive: Yes.  Practitioner is supportive of decision.    COUNSELING:  Reviewed preventive health counseling, as reflected in patient instructions       Healthy diet/nutrition       Immunizations    Vaccinated for: Influenza          Estimated body mass index is 28.49 kg/m  as calculated from the following:    Height as of this encounter: 1.581 m (5' 2.25\").    Weight as of this encounter: 71.2 kg (157 lb).         reports that she has never smoked. She has never used smokeless tobacco.      Appropriate preventive services were discussed with this patient, including applicable screening as appropriate for cardiovascular disease, diabetes, osteopenia/osteoporosis, and glaucoma.  As appropriate for age/gender, discussed screening for colorectal cancer, prostate cancer, breast cancer, and cervical cancer. Checklist reviewing preventive services available has been given to the patient.    Reviewed patients plan of care and provided an AVS. The Basic Care Plan (routine screening as documented in Health Maintenance) for Linn meets the Care Plan requirement. This Care Plan has been established and reviewed with the Patient and son.    Counseling Resources:  ATP IV Guidelines  Pooled Cohorts Equation Calculator  Breast Cancer " Risk Calculator  FRAX Risk Assessment  ICSI Preventive Guidelines  Dietary Guidelines for Americans, 2010  USDA's MyPlate  ASA Prophylaxis  Lung CA Screening    Yospeh Kumar MD  [facility]    Identified Health Risks:    The patient was provided with suggestions to help her develop a healthy physical lifestyle.  She is at risk for lack of exercise and has been provided with information to increase physical activity for the benefit of her well-being.  The patient was counseled and encouraged to consider modifying their diet and eating habits. She was provided with information on recommended healthy diet options.  The patient reports that she has difficulty with activities of daily living. I have asked that the patient make a follow up appointment in 53   weeks where this issue will be further evaluated and addressed.    ICD-10-CM    1. Encounter for Medicare annual wellness exam Z00.00    2. Anxiety state F41.1 ALPRAZolam (XANAX) 0.5 MG tablet   3. Essential hypertension with goal blood pressure less than 140/90 I10 losartan (COZAAR) 100 MG tablet     Comprehensive metabolic panel     CBC with platelets   4. Hyperlipidemia LDL goal <130 E78.5 simvastatin (ZOCOR) 20 MG tablet   5. Mouth sore K13.79 mupirocin (BACTROBAN) 2 % external ointment   6. Spinal stenosis of lumbar region without neurogenic claudication M48.061 Comprehensive metabolic panel

## 2019-10-08 NOTE — PROGRESS NOTES
Prior to immunization administration, verified patients identity using patient s name and date of birth. Please see Immunization Activity for additional information.     Screening Questionnaire for Adult Immunization    Are you sick today?   No   Do you have allergies to medications, food, a vaccine component or latex?   No   Have you ever had a serious reaction after receiving a vaccination?   No   Do you have a long-term health problem with heart disease, lung disease, asthma, kidney disease, metabolic disease (e.g. diabetes), anemia, or other blood disorder?   No   Do you have cancer, leukemia, HIV/AIDS, or any other immune system problem?   No   In the past 3 months, have you taken medications that affect  your immune system, such as prednisone, other steroids, or anticancer drugs; drugs for the treatment of rheumatoid arthritis, Crohn s disease, or psoriasis; or have you had radiation treatments?   No   Have you had a seizure, or a brain or other nervous system problem?   No   During the past year, have you received a transfusion of blood or blood     products, or been given immune (gamma) globulin or antiviral drug?   No   For women: Are you pregnant or is there a chance you could become        pregnant during the next month?   No   Have you received any vaccinations in the past 4 weeks?   No     Immunization questionnaire answers were all negative.        Per orders of Dr. Yoseph Kumar, injection of HD Influenza given by Aleksandra Alvares CMA. Patient instructed to remain in clinic for 15 minutes afterwards, and to report any adverse reaction to me immediately.       Screening performed by Aleksandra Alvares CMA on 10/8/2019 at 10:53 AM.

## 2019-10-08 NOTE — PATIENT INSTRUCTIONS
Patient Education   Personalized Prevention Plan  You are due for the preventive services outlined below.  Your care team is available to assist you in scheduling these services.  If you have already completed any of these items, please share that information with your care team to update in your medical record.  Health Maintenance Due   Topic Date Due     Zoster (Shingles) Vaccine (2 of 3) 12/04/2009     Flu Vaccine (1) 09/01/2019     Cholesterol Lab  10/05/2019     FALL RISK ASSESSMENT  10/05/2019     Annual Wellness Visit  10/05/2019     Anxiety Assessment  10/05/2019     Depression Assessment  10/05/2019     Your Health Risk Assessment indicates you feel you are not in good health    A healthy lifestyle helps keep the body fit and the mind alert. It helps protect you from disease, helps you fight disease, and helps prevent chronic disease (disease that doesn't go away) from getting worse. This is important as you get older and begin to notice twinges in muscles and joints and a decline in the strength and stamina you once took for granted. A healthy lifestyle includes good healthcare, good nutrition, weight control, recreation, and regular exercise. Avoid harmful substances and do what you can to keep safe. Another part of a healthy lifestyle is stay mentally active and socially involved.    Good healthcare     Have a wellness visit every year.     If you have new symptoms, let us know right away. Don't wait until the next checkup.     Take medicines exactly as prescribed and keep your medicines in a safe place. Tell us if your medicine causes problems.   Healthy diet and weight control     Eat 3 or 4 small, nutritious, low-fat, high-fiber meals a day. Include a variety of fruits, vegetables, and whole-grain foods.     Make sure you get enough calcium in your diet. Calcium, vitamin D, and exercise help prevent osteoporosis (bone thinning).     If you live alone, try eating with others when you can. That way  you get a good meal and have company while you eat it.     Try to keep a healthy weight. If you eat more calories than your body uses for energy, it will be stored as fat and you will gain weight.     Recreation   Recreation is not limited to sports and team events. It includes any activity that provides relaxation, interest, enjoyment, and exercise. Recreation provides an outlet for physical, mental, and social energy. It can give a sense of worth and achievement. It can help you stay healthy.    Mental Exercise and Social Involvement  Mental and emotional health is as important as physical health. Keep in touch with friends and family. Stay as active as possible. Continue to learn and challenge yourself.   Things you can do to stay mentally active are:    Learn something new, like a foreign language or musical instrument.     Play SCRABBLE or do crossword puzzles. If you cannot find people to play these games with you at home, you can play them with others on your computer through the Internet.     Join a games club--anything from card games to chess or checkers or lawn bowling.     Start a new hobby.     Go back to school.     Volunteer.     Read.   Keep up with world events.    Exercise for a Healthier Heart     Exercise with a friend. When activity is fun, you're more likely to stick with it.   You may wonder how you can improve the health of your heart. If you re thinking about exercise, you re on the right track. You don t need to become an athlete, but you do need a certain amount of brisk exercise to help strengthen your heart. If you have been diagnosed with a heart condition, your doctor may recommend exercise to help stabilize your condition. To help make exercise a habit, choose safe, fun activities.  Be sure to check with your healthcare provider before starting an exercise program.   Why exercise?  Exercising regularly offers many healthy rewards. It can help you do all of the following:    Improve  your blood cholesterol level to help prevent further heart trouble    Lower your blood pressure to help prevent a stroke or heart attack    Control diabetes, or reduce your risk of getting this disease    Improve your heart and lung function    Reach and maintain a healthy weight    Make your muscles stronger and more limber so you can stay active    Prevent falls and fractures by slowing the loss of bone mass (osteoporosis)    Manage stress better    Reduce your blood pressure    Improve your sense of self and your body image  Exercise tips  Ease into your routine. Set small goals. Then build on them.  Exercise on most days. Aim for a total of 150 or more minutes of moderate to  vigorous intensity activity each week. Consider 40 minutes, 3 to 4 times a week. For best results, activity should last for 40 minutes on average. It is OK to work up to the 40 minute period over time. Examples of moderate-intensity activity is walking 1 mile in 15 minutes or 30 to 45 minutes of yard work.  Step up your daily activity level. Along with your exercise program, try being more active throughout the day. Walk instead of drive. Do more household tasks or yard work.  Choose one or more activities you enjoy. Walking is one of the easiest things you can do. You can also try swimming, riding a bike, dancing, or taking an exercise class.  Stop exercising and call your doctor if you:    Have chest pain or feel dizzy or lightheaded    Feel burning, tightness, pressure, or heaviness in your chest, neck, shoulders, back, or arms    Have unusual shortness of breath    Have increased joint or muscle pain    Have palpitations or an irregular heartbeat   Date Last Reviewed: 5/1/2016 2000-2018 [employer]. [address] [place] [id number]. All rights reserved. This information is not intended as a substitute for professional medical care. Always follow your healthcare professional's  instructions.          Understanding USDA MyPlate  The USDA (U.S. Department of Agriculture) has guidelines to help you make healthy food choices. These are called MyPlate. MyPlate shows the food groups that make up healthy meals using the image of a place setting. Before you eat, think about the healthiest choices for what to put onto your plate or into your cup or bowl. To learn more about building a healthy plate, visit www.choosemyplate.gov.    The food groups    Fruits. Any fruit or 100% fruit juice counts as part of the Fruit Group. Fruits may be fresh, canned, frozen, or dried, and may be whole, cut-up, or pureed. Make half your plate fruits and vegetables.    Vegetables. Any vegetable or 100% vegetable juice counts as a member of the Vegetable Group. Vegetables may be fresh, frozen, canned, or dried. They can be served raw or cooked and may be whole, cut-up, or mashed. Make half your plate fruits and vegetables.    Grains. All foods made from grains are part of the Grains Group. These include wheat, rice, oats, cornmeal, and barley such as bread, pasta, oatmeal, cereal, tortillas, and grits. Grains should be no more than a quarter of your plate. At least half of your grains should be whole grains.    Protein. This group includes meat, poultry, seafood, beans and peas, eggs, processed soy products (like tofu), nuts (including nut butters), and seeds. Make protein choices no more than a quarter of your plate. Meat and poultry choices should be lean or low fat.    Dairy. All fluid milk products and foods made from milk that contain calcium, like yogurt and cheese, are part of the Dairy Group. (Foods that have little calcium, such as cream, butter, and cream cheese, are not part of the group.) Most dairy choices should be low-fat or fat-free.    Oils. These are fats that are liquid at room temperature. They include canola, corn, olive, soybean, and sunflower oil. Foods that are mainly oil include mayonnaise,  certain salad dressings, and soft margarines. You should have only 5 to 7 teaspoons of oils a day. You probably already get this much from the food you eat.  Date Last Reviewed: 8/1/2017 2000-2018 [employer]. [address] [place] [address]. All rights reserved. This information is not intended as a substitute for professional medical care. Always follow your healthcare professional's instructions.        Activities of Daily Living    Your Health Risk Assessment indicates you have difficulties with activities of daily living such as housework, bathing, preparing meals, taking medication, etc. Please make a follow up appointment for us to address this issue in more detail.

## 2019-10-08 NOTE — PROGRESS NOTES
Prescription for patient's xanax was faxed to [facility] in [place] at [phone].    Kenzie Sherwood, CMA

## 2019-10-11 NOTE — TELEPHONE ENCOUNTER
Reason for Call:  Other flu inj    Detailed comments: pt would like a copy showing she had her flu shot. Please mail to her home    Phone Number Patient can be reached at: Home number on file [phone] (home)    Best Time:     Can we leave a detailed message on this number? YES    Call taken on 10/11/2019 at 9:02 AM by Juli Hernández

## 2019-10-15 NOTE — TELEPHONE ENCOUNTER
Reason for Call:  Request for results:    Name of test or procedure: Labs from 10.8    Date of test of procedure: 10.8    [place] or procedure[place] [facility] [place]    OK to leave the result message on voice mail or with a family member? Not Applicable    Phone number Patient can be reached at:      Additional comments: Pt calling and states she misplaced the copy of her labs from her physical, please mail patient a copy of her labs.     Call taken on 10/15/2019 at 10:12 AM by Eda Chisholm

## 2019-10-15 NOTE — LETTER
October 15, 2019      Linn Sanon  [address]  [facility] [id number]        Dear ,    We are writing to inform you of your test results.    The following are the results from your visit on 10/8/19.        Component      Latest Ref Rng & Units 10/8/2019   Sodium      133 - 144 mmol/L 140   Potassium      3.4 - 5.3 mmol/L 4.0   Chloride      94 - 109 mmol/L 105   Carbon Dioxide      20 - 32 mmol/L 30   Anion Gap      3 - 14 mmol/L 5   Glucose      70 - 99 mg/dL 115 (H)   Urea Nitrogen      7 - 30 mg/dL 12   Creatinine      0.52 - 1.04 mg/dL 0.71   GFR Estimate      >60 mL/min/1.73:m2 74   GFR Estimate If Black      >60 mL/min/1.73:m2 85   Calcium      8.5 - 10.1 mg/dL 9.7   Bilirubin Total      0.2 - 1.3 mg/dL 0.5   Albumin      3.4 - 5.0 g/dL 4.0   Protein Total      6.8 - 8.8 g/dL 7.2   Alkaline Phosphatase      40 - 150 U/L 56   ALT      0 - 50 U/L 19   AST      0 - 45 U/L 18   WBC      4.0 - 11.0 10e9/L 6.9   RBC Count      3.8 - 5.2 10e12/L 4.12   Hemoglobin      11.7 - 15.7 g/dL 12.7   Hematocrit      35.0 - 47.0 % 38.2   MCV      78 - 100 fl 93   MCH      26.5 - 33.0 pg 30.8   MCHC      31.5 - 36.5 g/dL 33.2   RDW      10.0 - 15.0 % 13.2   Platelet Count      150 - 450 10e9/L 253         If you have any questions or concerns, please call the clinic at the number listed above.       Sincerely,        Yoseph Kumar MD

## 2019-10-15 NOTE — TELEPHONE ENCOUNTER
Letter with results on 10/8/19 have been sent to patient.    Mimi Green, [facility]

## 2019-10-18 NOTE — TELEPHONE ENCOUNTER
Patient is wondering why she did not have her cholesterol checked at her last visit on 10/8/19. Please advise.

## 2019-10-18 NOTE — TELEPHONE ENCOUNTER
Pt calling stating she received her results in the mail and there is no Cholesterol/Lipid on there? Please call and advise.

## 2019-10-21 NOTE — TELEPHONE ENCOUNTER
Reason for Call:  Other call back    Detailed comments: Patient calling to see if the cholesterol was ordered for her on the 8th. Informed her I did not see any but would like a call back from Aleksandra- Dr Lemons nurse to go over with her. She thinks she misplaced the slip that stated the cholesterol results. Please advise.     Phone Number Patient can be reached at: Home number on file [phone] (home)    Best Time:      Can we leave a detailed message on this number? NO    Call taken on 10/21/2019 at 9:07 AM by Dianna Alvares

## 2019-10-21 NOTE — TELEPHONE ENCOUNTER
We do not need to check her cholesterol every year.  She is on treatment, her cholesterol is very good and has not changed for 4 years. She is doing a very good job, keep it up.

## 2019-10-21 NOTE — TELEPHONE ENCOUNTER
Patient was informed that We do not need to check her cholesterol every year.  She is on treatment, her cholesterol is very good and has not changed for 4 years. She is doing a very good job, keep it up.     Mimi Green, CMA

## 2019-11-25 NOTE — TELEPHONE ENCOUNTER
Reason for Call:  Form, our goal is to have forms completed with 72 hours, however, some forms may require a visit or additional information.    Type of letter, form or note:  Home Health Certification    Who is the form from?: Home care    Where did the form come from: form was faxed in    What clinic location was the form placed at?: [facility]    Where the form was placed: Given to MA/RN    What number is listed as a contact on the form?:        Additional comments:

## 2020-01-01 ENCOUNTER — HOSPITAL LABORATORY (OUTPATIENT)
Facility: OTHER | Age: 85
End: 2020-01-01

## 2020-01-01 ENCOUNTER — ASSISTED LIVING VISIT (OUTPATIENT)
Dept: GERIATRICS | Facility: CLINIC | Age: 85
End: 2020-01-01
Payer: COMMERCIAL

## 2020-01-01 ENCOUNTER — APPOINTMENT (OUTPATIENT)
Dept: GENERAL RADIOLOGY | Facility: CLINIC | Age: 85
DRG: 189 | End: 2020-01-01
Attending: NURSE PRACTITIONER
Payer: MEDICARE

## 2020-01-01 ENCOUNTER — TELEPHONE (OUTPATIENT)
Dept: INTERNAL MEDICINE | Facility: CLINIC | Age: 85
End: 2020-01-01

## 2020-01-01 ENCOUNTER — TELEPHONE (OUTPATIENT)
Dept: ORTHOPEDICS | Facility: OTHER | Age: 85
End: 2020-01-01

## 2020-01-01 ENCOUNTER — APPOINTMENT (OUTPATIENT)
Dept: PHYSICAL THERAPY | Facility: CLINIC | Age: 85
DRG: 291 | End: 2020-01-01
Payer: MEDICARE

## 2020-01-01 ENCOUNTER — NURSING HOME VISIT (OUTPATIENT)
Dept: GERIATRICS | Facility: CLINIC | Age: 85
End: 2020-01-01
Payer: COMMERCIAL

## 2020-01-01 ENCOUNTER — TELEPHONE (OUTPATIENT)
Dept: ORTHOPEDICS | Facility: CLINIC | Age: 85
End: 2020-01-01

## 2020-01-01 ENCOUNTER — VIRTUAL VISIT (OUTPATIENT)
Dept: GERIATRICS | Facility: CLINIC | Age: 85
End: 2020-01-01
Payer: COMMERCIAL

## 2020-01-01 ENCOUNTER — HOSPITAL LABORATORY (OUTPATIENT)
Dept: NURSING HOME | Facility: OTHER | Age: 85
End: 2020-01-01

## 2020-01-01 ENCOUNTER — TELEPHONE (OUTPATIENT)
Dept: GERIATRICS | Facility: CLINIC | Age: 85
End: 2020-01-01

## 2020-01-01 ENCOUNTER — HOSPITAL ENCOUNTER (INPATIENT)
Facility: CLINIC | Age: 85
LOS: 4 days | Discharge: NURSING, REHAB OR LONG-TERM CARE | DRG: 189 | End: 2020-01-23
Attending: NURSE PRACTITIONER | Admitting: FAMILY MEDICINE
Payer: MEDICARE

## 2020-01-01 ENCOUNTER — APPOINTMENT (OUTPATIENT)
Dept: CARDIOLOGY | Facility: CLINIC | Age: 85
DRG: 189 | End: 2020-01-01
Attending: NURSE PRACTITIONER
Payer: MEDICARE

## 2020-01-01 ENCOUNTER — APPOINTMENT (OUTPATIENT)
Dept: GENERAL RADIOLOGY | Facility: CLINIC | Age: 85
DRG: 291 | End: 2020-01-01
Attending: FAMILY MEDICINE
Payer: MEDICARE

## 2020-01-01 ENCOUNTER — HOSPITAL ENCOUNTER (EMERGENCY)
Facility: CLINIC | Age: 85
Discharge: HOME | End: 2020-06-15
Attending: NURSE PRACTITIONER | Admitting: NURSE PRACTITIONER
Payer: MEDICARE

## 2020-01-01 ENCOUNTER — APPOINTMENT (OUTPATIENT)
Dept: PHYSICAL THERAPY | Facility: CLINIC | Age: 85
DRG: 189 | End: 2020-01-01
Payer: MEDICARE

## 2020-01-01 ENCOUNTER — NURSE TRIAGE (OUTPATIENT)
Dept: NURSING | Facility: CLINIC | Age: 85
End: 2020-01-01

## 2020-01-01 ENCOUNTER — APPOINTMENT (OUTPATIENT)
Dept: PHYSICAL THERAPY | Facility: CLINIC | Age: 85
DRG: 189 | End: 2020-01-01
Attending: NURSE PRACTITIONER
Payer: MEDICARE

## 2020-01-01 ENCOUNTER — HOSPITAL ENCOUNTER (INPATIENT)
Facility: CLINIC | Age: 85
LOS: 14 days | Discharge: NURSING, REHAB OR LONG-TERM CARE | DRG: 291 | End: 2020-04-06
Attending: FAMILY MEDICINE | Admitting: INTERNAL MEDICINE
Payer: MEDICARE

## 2020-01-01 ENCOUNTER — APPOINTMENT (OUTPATIENT)
Dept: CARDIOLOGY | Facility: CLINIC | Age: 85
DRG: 291 | End: 2020-01-01
Attending: FAMILY MEDICINE
Payer: MEDICARE

## 2020-01-01 ENCOUNTER — APPOINTMENT (OUTPATIENT)
Dept: PHYSICAL THERAPY | Facility: CLINIC | Age: 85
DRG: 291 | End: 2020-01-01
Attending: NURSE PRACTITIONER
Payer: MEDICARE

## 2020-01-01 ENCOUNTER — APPOINTMENT (OUTPATIENT)
Dept: GENERAL RADIOLOGY | Facility: CLINIC | Age: 85
DRG: 189 | End: 2020-01-01
Attending: FAMILY MEDICINE
Payer: MEDICARE

## 2020-01-01 ENCOUNTER — APPOINTMENT (OUTPATIENT)
Dept: GENERAL RADIOLOGY | Facility: CLINIC | Age: 85
DRG: 291 | End: 2020-01-01
Attending: ORTHOPAEDIC SURGERY
Payer: MEDICARE

## 2020-01-01 ENCOUNTER — DISCHARGE SUMMARY NURSING HOME (OUTPATIENT)
Dept: GERIATRICS | Facility: CLINIC | Age: 85
End: 2020-01-01
Payer: COMMERCIAL

## 2020-01-01 ENCOUNTER — DOCUMENTATION ONLY (OUTPATIENT)
Dept: OTHER | Facility: CLINIC | Age: 85
End: 2020-01-01

## 2020-01-01 ENCOUNTER — APPOINTMENT (OUTPATIENT)
Dept: GENERAL RADIOLOGY | Facility: CLINIC | Age: 85
DRG: 291 | End: 2020-01-01
Attending: NURSE PRACTITIONER
Payer: MEDICARE

## 2020-01-01 ENCOUNTER — MEDICAL CORRESPONDENCE (OUTPATIENT)
Dept: HEALTH INFORMATION MANAGEMENT | Facility: CLINIC | Age: 85
End: 2020-01-01

## 2020-01-01 VITALS
OXYGEN SATURATION: 94 % | TEMPERATURE: 95.5 F | SYSTOLIC BLOOD PRESSURE: 141 MMHG | DIASTOLIC BLOOD PRESSURE: 76 MMHG | HEART RATE: 84 BPM | RESPIRATION RATE: 18 BRPM | BODY MASS INDEX: 31.93 KG/M2 | HEIGHT: 62 IN | WEIGHT: 173.5 LBS

## 2020-01-01 VITALS
OXYGEN SATURATION: 93 % | WEIGHT: 177.4 LBS | DIASTOLIC BLOOD PRESSURE: 75 MMHG | HEART RATE: 85 BPM | RESPIRATION RATE: 10 BRPM | BODY MASS INDEX: 32.65 KG/M2 | TEMPERATURE: 98.6 F | SYSTOLIC BLOOD PRESSURE: 127 MMHG | HEIGHT: 62 IN

## 2020-01-01 VITALS
OXYGEN SATURATION: 91 % | RESPIRATION RATE: 22 BRPM | DIASTOLIC BLOOD PRESSURE: 78 MMHG | HEART RATE: 108 BPM | SYSTOLIC BLOOD PRESSURE: 139 MMHG

## 2020-01-01 VITALS
HEART RATE: 85 BPM | DIASTOLIC BLOOD PRESSURE: 60 MMHG | OXYGEN SATURATION: 92 % | TEMPERATURE: 92 F | RESPIRATION RATE: 19 BRPM | WEIGHT: 167.9 LBS | SYSTOLIC BLOOD PRESSURE: 120 MMHG | BODY MASS INDEX: 30.71 KG/M2

## 2020-01-01 VITALS
HEART RATE: 85 BPM | DIASTOLIC BLOOD PRESSURE: 56 MMHG | OXYGEN SATURATION: 93 % | RESPIRATION RATE: 16 BRPM | TEMPERATURE: 95.1 F | SYSTOLIC BLOOD PRESSURE: 103 MMHG

## 2020-01-01 VITALS
WEIGHT: 170.7 LBS | SYSTOLIC BLOOD PRESSURE: 138 MMHG | HEIGHT: 62 IN | BODY MASS INDEX: 31.41 KG/M2 | RESPIRATION RATE: 18 BRPM | DIASTOLIC BLOOD PRESSURE: 85 MMHG | TEMPERATURE: 97.6 F | HEART RATE: 80 BPM | OXYGEN SATURATION: 90 %

## 2020-01-01 VITALS
WEIGHT: 174.5 LBS | OXYGEN SATURATION: 96 % | TEMPERATURE: 96.8 F | RESPIRATION RATE: 20 BRPM | SYSTOLIC BLOOD PRESSURE: 149 MMHG | BODY MASS INDEX: 32.11 KG/M2 | HEART RATE: 84 BPM | DIASTOLIC BLOOD PRESSURE: 69 MMHG | HEIGHT: 62 IN

## 2020-01-01 VITALS
WEIGHT: 167.3 LBS | HEIGHT: 62 IN | OXYGEN SATURATION: 94 % | DIASTOLIC BLOOD PRESSURE: 75 MMHG | TEMPERATURE: 97.7 F | SYSTOLIC BLOOD PRESSURE: 130 MMHG | HEART RATE: 81 BPM | BODY MASS INDEX: 30.79 KG/M2 | RESPIRATION RATE: 18 BRPM

## 2020-01-01 VITALS
WEIGHT: 173.28 LBS | HEART RATE: 79 BPM | OXYGEN SATURATION: 93 % | SYSTOLIC BLOOD PRESSURE: 116 MMHG | DIASTOLIC BLOOD PRESSURE: 59 MMHG | BODY MASS INDEX: 31.89 KG/M2 | TEMPERATURE: 97.2 F | HEIGHT: 62 IN | RESPIRATION RATE: 20 BRPM

## 2020-01-01 VITALS
RESPIRATION RATE: 18 BRPM | DIASTOLIC BLOOD PRESSURE: 85 MMHG | HEART RATE: 67 BPM | OXYGEN SATURATION: 97 % | SYSTOLIC BLOOD PRESSURE: 138 MMHG | TEMPERATURE: 98.3 F | BODY MASS INDEX: 30.69 KG/M2 | WEIGHT: 167.8 LBS

## 2020-01-01 VITALS
DIASTOLIC BLOOD PRESSURE: 61 MMHG | SYSTOLIC BLOOD PRESSURE: 127 MMHG | WEIGHT: 163.6 LBS | OXYGEN SATURATION: 92 % | TEMPERATURE: 98.5 F | HEART RATE: 95 BPM | RESPIRATION RATE: 18 BRPM | HEIGHT: 62 IN | BODY MASS INDEX: 30.11 KG/M2

## 2020-01-01 VITALS
OXYGEN SATURATION: 94 % | RESPIRATION RATE: 19 BRPM | BODY MASS INDEX: 31.56 KG/M2 | WEIGHT: 171.5 LBS | SYSTOLIC BLOOD PRESSURE: 132 MMHG | HEART RATE: 115 BPM | DIASTOLIC BLOOD PRESSURE: 85 MMHG | HEIGHT: 62 IN | TEMPERATURE: 95.2 F

## 2020-01-01 VITALS
SYSTOLIC BLOOD PRESSURE: 122 MMHG | RESPIRATION RATE: 18 BRPM | HEART RATE: 79 BPM | OXYGEN SATURATION: 94 % | DIASTOLIC BLOOD PRESSURE: 70 MMHG

## 2020-01-01 VITALS
HEART RATE: 76 BPM | HEIGHT: 62 IN | DIASTOLIC BLOOD PRESSURE: 85 MMHG | BODY MASS INDEX: 31.41 KG/M2 | RESPIRATION RATE: 18 BRPM | OXYGEN SATURATION: 93 % | WEIGHT: 170.7 LBS | SYSTOLIC BLOOD PRESSURE: 138 MMHG | TEMPERATURE: 98.3 F

## 2020-01-01 VITALS
WEIGHT: 163.2 LBS | HEART RATE: 80 BPM | BODY MASS INDEX: 29.85 KG/M2 | TEMPERATURE: 98.5 F | RESPIRATION RATE: 17 BRPM | SYSTOLIC BLOOD PRESSURE: 86 MMHG | DIASTOLIC BLOOD PRESSURE: 49 MMHG | OXYGEN SATURATION: 91 %

## 2020-01-01 VITALS
TEMPERATURE: 95.7 F | SYSTOLIC BLOOD PRESSURE: 143 MMHG | WEIGHT: 176 LBS | HEART RATE: 77 BPM | BODY MASS INDEX: 32.39 KG/M2 | HEIGHT: 62 IN | OXYGEN SATURATION: 96 % | DIASTOLIC BLOOD PRESSURE: 70 MMHG | RESPIRATION RATE: 16 BRPM

## 2020-01-01 VITALS
HEIGHT: 62 IN | DIASTOLIC BLOOD PRESSURE: 85 MMHG | WEIGHT: 172.1 LBS | TEMPERATURE: 98.8 F | OXYGEN SATURATION: 91 % | RESPIRATION RATE: 18 BRPM | SYSTOLIC BLOOD PRESSURE: 138 MMHG | BODY MASS INDEX: 31.67 KG/M2 | HEART RATE: 68 BPM

## 2020-01-01 VITALS
HEART RATE: 80 BPM | DIASTOLIC BLOOD PRESSURE: 63 MMHG | SYSTOLIC BLOOD PRESSURE: 130 MMHG | BODY MASS INDEX: 31.64 KG/M2 | TEMPERATURE: 96.2 F | WEIGHT: 173 LBS | RESPIRATION RATE: 20 BRPM | OXYGEN SATURATION: 92 %

## 2020-01-01 VITALS
DIASTOLIC BLOOD PRESSURE: 61 MMHG | TEMPERATURE: 98.9 F | OXYGEN SATURATION: 89 % | RESPIRATION RATE: 18 BRPM | BODY MASS INDEX: 30.03 KG/M2 | WEIGHT: 163.2 LBS | SYSTOLIC BLOOD PRESSURE: 127 MMHG | HEART RATE: 86 BPM | HEIGHT: 62 IN

## 2020-01-01 VITALS
HEIGHT: 62 IN | RESPIRATION RATE: 19 BRPM | SYSTOLIC BLOOD PRESSURE: 132 MMHG | HEART RATE: 115 BPM | TEMPERATURE: 95.2 F | DIASTOLIC BLOOD PRESSURE: 85 MMHG | OXYGEN SATURATION: 94 % | BODY MASS INDEX: 31.19 KG/M2 | WEIGHT: 169.5 LBS

## 2020-01-01 VITALS
TEMPERATURE: 96 F | WEIGHT: 174.5 LBS | BODY MASS INDEX: 32.11 KG/M2 | SYSTOLIC BLOOD PRESSURE: 130 MMHG | RESPIRATION RATE: 18 BRPM | HEIGHT: 62 IN | OXYGEN SATURATION: 97 % | HEART RATE: 75 BPM | DIASTOLIC BLOOD PRESSURE: 68 MMHG

## 2020-01-01 VITALS
HEART RATE: 78 BPM | HEIGHT: 63 IN | RESPIRATION RATE: 20 BRPM | DIASTOLIC BLOOD PRESSURE: 62 MMHG | BODY MASS INDEX: 31.87 KG/M2 | SYSTOLIC BLOOD PRESSURE: 105 MMHG | TEMPERATURE: 99 F

## 2020-01-01 VITALS
TEMPERATURE: 96.3 F | DIASTOLIC BLOOD PRESSURE: 70 MMHG | BODY MASS INDEX: 31.88 KG/M2 | WEIGHT: 179.9 LBS | SYSTOLIC BLOOD PRESSURE: 133 MMHG | HEART RATE: 87 BPM | HEIGHT: 63 IN | RESPIRATION RATE: 20 BRPM | OXYGEN SATURATION: 89 %

## 2020-01-01 VITALS
RESPIRATION RATE: 20 BRPM | DIASTOLIC BLOOD PRESSURE: 80 MMHG | HEART RATE: 65 BPM | BODY MASS INDEX: 32.28 KG/M2 | WEIGHT: 175.4 LBS | SYSTOLIC BLOOD PRESSURE: 140 MMHG | TEMPERATURE: 99.2 F | HEIGHT: 62 IN | OXYGEN SATURATION: 90 %

## 2020-01-01 VITALS
HEIGHT: 63 IN | RESPIRATION RATE: 16 BRPM | HEART RATE: 58 BPM | BODY MASS INDEX: 32.8 KG/M2 | SYSTOLIC BLOOD PRESSURE: 126 MMHG | DIASTOLIC BLOOD PRESSURE: 65 MMHG | OXYGEN SATURATION: 94 % | TEMPERATURE: 98.2 F | WEIGHT: 185.1 LBS

## 2020-01-01 DIAGNOSIS — M62.81 GENERALIZED MUSCLE WEAKNESS: ICD-10-CM

## 2020-01-01 DIAGNOSIS — B34.8 PARAINFLUENZA TYPE 1 INFECTION: Primary | ICD-10-CM

## 2020-01-01 DIAGNOSIS — T83.511A URINARY TRACT INFECTION ASSOCIATED WITH INDWELLING URETHRAL CATHETER, INITIAL ENCOUNTER (H): ICD-10-CM

## 2020-01-01 DIAGNOSIS — Z51.81 ENCOUNTER FOR THERAPEUTIC DRUG MONITORING: ICD-10-CM

## 2020-01-01 DIAGNOSIS — R05.9 COUGH: ICD-10-CM

## 2020-01-01 DIAGNOSIS — Z86.73 HISTORY OF TIA (TRANSIENT ISCHEMIC ATTACK) AND STROKE: ICD-10-CM

## 2020-01-01 DIAGNOSIS — Z51.81 ANTICOAGULATION GOAL OF INR 2 TO 3: ICD-10-CM

## 2020-01-01 DIAGNOSIS — R40.4 SEDATED DUE TO MEDICATION: ICD-10-CM

## 2020-01-01 DIAGNOSIS — F41.1 GENERALIZED ANXIETY DISORDER: Primary | ICD-10-CM

## 2020-01-01 DIAGNOSIS — R06.00 DYSPNEA, UNSPECIFIED TYPE: ICD-10-CM

## 2020-01-01 DIAGNOSIS — K52.9 IBD (INFLAMMATORY BOWEL DISEASE): Primary | ICD-10-CM

## 2020-01-01 DIAGNOSIS — E78.5 HYPERLIPIDEMIA LDL GOAL <130: ICD-10-CM

## 2020-01-01 DIAGNOSIS — F41.1 GENERALIZED ANXIETY DISORDER: ICD-10-CM

## 2020-01-01 DIAGNOSIS — I48.91 CONTROLLED ATRIAL FIBRILLATION (H): ICD-10-CM

## 2020-01-01 DIAGNOSIS — J21.9 BRONCHIOLITIS: ICD-10-CM

## 2020-01-01 DIAGNOSIS — J90 BILATERAL PLEURAL EFFUSION: ICD-10-CM

## 2020-01-01 DIAGNOSIS — I10 ESSENTIAL HYPERTENSION WITH GOAL BLOOD PRESSURE LESS THAN 140/90: ICD-10-CM

## 2020-01-01 DIAGNOSIS — K59.01 SLOW TRANSIT CONSTIPATION: ICD-10-CM

## 2020-01-01 DIAGNOSIS — S82.841A CLOSED BIMALLEOLAR FRACTURE OF RIGHT ANKLE, INITIAL ENCOUNTER: ICD-10-CM

## 2020-01-01 DIAGNOSIS — S82.841D CLOSED BIMALLEOLAR FRACTURE OF RIGHT ANKLE WITH ROUTINE HEALING, SUBSEQUENT ENCOUNTER: Primary | ICD-10-CM

## 2020-01-01 DIAGNOSIS — B34.8 PARAINFLUENZA TYPE 1 INFECTION: ICD-10-CM

## 2020-01-01 DIAGNOSIS — I35.0 SEVERE AORTIC STENOSIS: ICD-10-CM

## 2020-01-01 DIAGNOSIS — Z97.8 FOLEY CATHETER PRESENT: ICD-10-CM

## 2020-01-01 DIAGNOSIS — I48.91 NEW ONSET ATRIAL FIBRILLATION (H): ICD-10-CM

## 2020-01-01 DIAGNOSIS — B37.2 YEAST DERMATITIS: ICD-10-CM

## 2020-01-01 DIAGNOSIS — R53.1 WEAKNESS: ICD-10-CM

## 2020-01-01 DIAGNOSIS — Z51.5 END OF LIFE CARE: Primary | ICD-10-CM

## 2020-01-01 DIAGNOSIS — Z79.01 LONG TERM CURRENT USE OF ANTICOAGULANT: ICD-10-CM

## 2020-01-01 DIAGNOSIS — R53.1 GENERALIZED WEAKNESS: ICD-10-CM

## 2020-01-01 DIAGNOSIS — Z79.01 ANTICOAGULATION GOAL OF INR 2 TO 3: ICD-10-CM

## 2020-01-01 DIAGNOSIS — N31.9 NEUROGENIC BLADDER: ICD-10-CM

## 2020-01-01 DIAGNOSIS — I50.9 CHRONIC CONGESTIVE HEART FAILURE, UNSPECIFIED HEART FAILURE TYPE (H): ICD-10-CM

## 2020-01-01 DIAGNOSIS — T50.905A SEDATED DUE TO MEDICATION: ICD-10-CM

## 2020-01-01 DIAGNOSIS — K52.9 IBD (INFLAMMATORY BOWEL DISEASE): ICD-10-CM

## 2020-01-01 DIAGNOSIS — I10 ESSENTIAL HYPERTENSION WITH GOAL BLOOD PRESSURE LESS THAN 140/90: Primary | ICD-10-CM

## 2020-01-01 DIAGNOSIS — S82.831A CLOSED FRACTURE OF DISTAL END OF RIGHT FIBULA, UNSPECIFIED FRACTURE MORPHOLOGY, INITIAL ENCOUNTER: ICD-10-CM

## 2020-01-01 DIAGNOSIS — M17.11 PRIMARY OSTEOARTHRITIS OF RIGHT KNEE: Primary | ICD-10-CM

## 2020-01-01 DIAGNOSIS — I35.0 AORTIC VALVE STENOSIS, ETIOLOGY OF CARDIAC VALVE DISEASE UNSPECIFIED: ICD-10-CM

## 2020-01-01 DIAGNOSIS — I48.91 NEW ONSET ATRIAL FIBRILLATION (H): Primary | ICD-10-CM

## 2020-01-01 DIAGNOSIS — I48.91 CONTROLLED ATRIAL FIBRILLATION (H): Primary | ICD-10-CM

## 2020-01-01 DIAGNOSIS — R41.82 ALTERED MENTAL STATUS, UNSPECIFIED ALTERED MENTAL STATUS TYPE: ICD-10-CM

## 2020-01-01 DIAGNOSIS — S82.841G CLOSED BIMALLEOLAR FRACTURE OF RIGHT ANKLE WITH DELAYED HEALING, SUBSEQUENT ENCOUNTER: ICD-10-CM

## 2020-01-01 DIAGNOSIS — R60.0 BILATERAL LEG EDEMA: ICD-10-CM

## 2020-01-01 DIAGNOSIS — F06.2 PSYCHOTIC DISORDER DUE TO MEDICAL CONDITION WITH DELUSIONS: ICD-10-CM

## 2020-01-01 DIAGNOSIS — J96.01 ACUTE RESPIRATORY FAILURE WITH HYPOXIA (H): ICD-10-CM

## 2020-01-01 DIAGNOSIS — Z79.01 LONG TERM CURRENT USE OF ANTICOAGULANT THERAPY: ICD-10-CM

## 2020-01-01 DIAGNOSIS — R00.0 TACHYCARDIA: ICD-10-CM

## 2020-01-01 DIAGNOSIS — I48.91 ATRIAL FIBRILLATION WITH RVR (H): ICD-10-CM

## 2020-01-01 DIAGNOSIS — Z51.5 END OF LIFE CARE: ICD-10-CM

## 2020-01-01 DIAGNOSIS — I50.9 CHRONIC CONGESTIVE HEART FAILURE, UNSPECIFIED HEART FAILURE TYPE (H): Primary | ICD-10-CM

## 2020-01-01 DIAGNOSIS — I48.91 ATRIAL FIBRILLATION WITH RVR (H): Primary | ICD-10-CM

## 2020-01-01 DIAGNOSIS — R19.5 LOOSE STOOLS: ICD-10-CM

## 2020-01-01 DIAGNOSIS — I50.9 ACUTE HEART FAILURE, UNSPECIFIED HEART FAILURE TYPE (H): ICD-10-CM

## 2020-01-01 DIAGNOSIS — Z79.01 ANTICOAGULATION GOAL OF INR 2 TO 3: Primary | ICD-10-CM

## 2020-01-01 DIAGNOSIS — I27.20 MODERATE TO SEVERE PULMONARY HYPERTENSION (H): ICD-10-CM

## 2020-01-01 DIAGNOSIS — R60.0 LOWER EXTREMITY EDEMA: ICD-10-CM

## 2020-01-01 DIAGNOSIS — K52.9 CHRONIC DIARRHEA: ICD-10-CM

## 2020-01-01 DIAGNOSIS — R19.7 DIARRHEA, UNSPECIFIED TYPE: ICD-10-CM

## 2020-01-01 DIAGNOSIS — Z53.9 DIAGNOSIS NOT YET DEFINED: Primary | ICD-10-CM

## 2020-01-01 DIAGNOSIS — F41.1 ANXIETY STATE: ICD-10-CM

## 2020-01-01 DIAGNOSIS — B34.9 VIRAL SYNDROME: ICD-10-CM

## 2020-01-01 DIAGNOSIS — R33.9 URINARY RETENTION: Primary | ICD-10-CM

## 2020-01-01 DIAGNOSIS — R26.2 DIFFICULTY WALKING: ICD-10-CM

## 2020-01-01 DIAGNOSIS — E87.6 HYPOPOTASSEMIA: ICD-10-CM

## 2020-01-01 DIAGNOSIS — I35.0 AORTIC VALVE STENOSIS, UNSPECIFIED ETIOLOGY: ICD-10-CM

## 2020-01-01 DIAGNOSIS — S82.841D CLOSED BIMALLEOLAR FRACTURE OF RIGHT ANKLE WITH ROUTINE HEALING, SUBSEQUENT ENCOUNTER: ICD-10-CM

## 2020-01-01 DIAGNOSIS — F41.1 ANXIETY STATE: Primary | ICD-10-CM

## 2020-01-01 DIAGNOSIS — M48.061 SPINAL STENOSIS OF LUMBAR REGION WITHOUT NEUROGENIC CLAUDICATION: ICD-10-CM

## 2020-01-01 DIAGNOSIS — N30.00 ACUTE CYSTITIS WITHOUT HEMATURIA: ICD-10-CM

## 2020-01-01 DIAGNOSIS — Z51.81 ANTICOAGULATION GOAL OF INR 2 TO 3: Primary | ICD-10-CM

## 2020-01-01 DIAGNOSIS — I05.0 RHEUMATIC MITRAL STENOSIS: ICD-10-CM

## 2020-01-01 DIAGNOSIS — G89.29 OTHER CHRONIC PAIN: ICD-10-CM

## 2020-01-01 DIAGNOSIS — I35.0 AORTIC VALVE STENOSIS, UNSPECIFIED ETIOLOGY: Primary | ICD-10-CM

## 2020-01-01 DIAGNOSIS — R53.83 LETHARGY: Primary | ICD-10-CM

## 2020-01-01 DIAGNOSIS — N39.0 URINARY TRACT INFECTION ASSOCIATED WITH INDWELLING URETHRAL CATHETER, INITIAL ENCOUNTER (H): ICD-10-CM

## 2020-01-01 DIAGNOSIS — T14.8XXA BRUISE: ICD-10-CM

## 2020-01-01 DIAGNOSIS — I35.0 NONRHEUMATIC AORTIC VALVE STENOSIS: ICD-10-CM

## 2020-01-01 DIAGNOSIS — M62.81 GENERALIZED MUSCLE WEAKNESS: Primary | ICD-10-CM

## 2020-01-01 DIAGNOSIS — G47.9 SLEEP DISTURBANCES: ICD-10-CM

## 2020-01-01 DIAGNOSIS — J18.9 PNEUMONIA OF BOTH LOWER LOBES DUE TO INFECTIOUS ORGANISM: Primary | ICD-10-CM

## 2020-01-01 DIAGNOSIS — I50.32 CHRONIC HEART FAILURE WITH PRESERVED EJECTION FRACTION (H): Primary | ICD-10-CM

## 2020-01-01 DIAGNOSIS — R60.1 ANASARCA: ICD-10-CM

## 2020-01-01 DIAGNOSIS — R60.0 LOCALIZED EDEMA: ICD-10-CM

## 2020-01-01 DIAGNOSIS — Z53.9 ERRONEOUS ENCOUNTER--DISREGARD: Primary | ICD-10-CM

## 2020-01-01 DIAGNOSIS — Z51.5 CARING FOR THE DYING: ICD-10-CM

## 2020-01-01 DIAGNOSIS — I50.9 ACUTE HEART FAILURE, UNSPECIFIED HEART FAILURE TYPE (H): Primary | ICD-10-CM

## 2020-01-01 DIAGNOSIS — I48.91 ATRIAL FIBRILLATION, UNSPECIFIED TYPE (H): ICD-10-CM

## 2020-01-01 DIAGNOSIS — N18.30 CKD (CHRONIC KIDNEY DISEASE) STAGE 3, GFR 30-59 ML/MIN (H): ICD-10-CM

## 2020-01-01 DIAGNOSIS — E87.1 HYPONATREMIA: ICD-10-CM

## 2020-01-01 LAB
ALBUMIN SERPL-MCNC: 2.7 G/DL (ref 3.4–5)
ALBUMIN SERPL-MCNC: 2.9 G/DL (ref 3.4–5)
ALBUMIN SERPL-MCNC: 3.2 G/DL (ref 3.4–5)
ALBUMIN UR-MCNC: NEGATIVE MG/DL
ALP SERPL-CCNC: 38 U/L (ref 40–150)
ALP SERPL-CCNC: 42 U/L (ref 40–150)
ALT SERPL W P-5'-P-CCNC: 14 U/L (ref 0–50)
ALT SERPL W P-5'-P-CCNC: 17 U/L (ref 0–50)
AMORPH CRY #/AREA URNS HPF: ABNORMAL /HPF
ANION GAP SERPL CALCULATED.3IONS-SCNC: 2 MMOL/L (ref 3–14)
ANION GAP SERPL CALCULATED.3IONS-SCNC: 2 MMOL/L (ref 3–14)
ANION GAP SERPL CALCULATED.3IONS-SCNC: 3 MMOL/L (ref 3–14)
ANION GAP SERPL CALCULATED.3IONS-SCNC: 4 MMOL/L (ref 3–14)
ANION GAP SERPL CALCULATED.3IONS-SCNC: 5 MMOL/L (ref 3–14)
ANION GAP SERPL CALCULATED.3IONS-SCNC: 6 MMOL/L (ref 3–14)
ANION GAP SERPL CALCULATED.3IONS-SCNC: 7 MMOL/L (ref 3–14)
APPEARANCE UR: ABNORMAL
APPEARANCE UR: CLEAR
APPEARANCE UR: CLEAR
AST SERPL W P-5'-P-CCNC: 19 U/L (ref 0–45)
AST SERPL W P-5'-P-CCNC: 33 U/L (ref 0–45)
BACTERIA #/AREA URNS HPF: ABNORMAL /HPF
BACTERIA #/AREA URNS HPF: ABNORMAL /HPF
BACTERIA SPEC CULT: ABNORMAL
BACTERIA SPEC CULT: NO GROWTH
BACTERIA SPEC CULT: NO GROWTH
BASE EXCESS BLDV CALC-SCNC: 11 MMOL/L
BASE EXCESS BLDV CALC-SCNC: 11.4 MMOL/L
BASE EXCESS BLDV CALC-SCNC: 11.7 MMOL/L
BASE EXCESS BLDV CALC-SCNC: 12.4 MMOL/L
BASE EXCESS BLDV CALC-SCNC: 12.8 MMOL/L
BASE EXCESS BLDV CALC-SCNC: 13.9 MMOL/L
BASOPHILS # BLD AUTO: 0 10E9/L (ref 0–0.2)
BASOPHILS NFR BLD AUTO: 0.2 %
BASOPHILS NFR BLD AUTO: 0.3 %
BASOPHILS NFR BLD AUTO: 0.3 %
BASOPHILS NFR BLD AUTO: 0.4 %
BILIRUB SERPL-MCNC: 0.3 MG/DL (ref 0.2–1.3)
BILIRUB SERPL-MCNC: 0.4 MG/DL (ref 0.2–1.3)
BILIRUB UR QL STRIP: NEGATIVE
BUN SERPL-MCNC: 11 MG/DL (ref 7–30)
BUN SERPL-MCNC: 12 MG/DL (ref 7–30)
BUN SERPL-MCNC: 14 MG/DL (ref 7–30)
BUN SERPL-MCNC: 16 MG/DL (ref 7–30)
BUN SERPL-MCNC: 17 MG/DL (ref 7–30)
BUN SERPL-MCNC: 18 MG/DL (ref 7–30)
BUN SERPL-MCNC: 18 MG/DL (ref 7–30)
BUN SERPL-MCNC: 19 MG/DL (ref 7–30)
BUN SERPL-MCNC: 20 MG/DL (ref 7–30)
BUN SERPL-MCNC: 20 MG/DL (ref 7–30)
C DIFF TOX B STL QL: NEGATIVE
C PNEUM DNA SPEC QL NAA+PROBE: NOT DETECTED
CALCIUM SERPL-MCNC: 8.1 MG/DL (ref 8.5–10.1)
CALCIUM SERPL-MCNC: 8.3 MG/DL (ref 8.5–10.1)
CALCIUM SERPL-MCNC: 8.3 MG/DL (ref 8.5–10.1)
CALCIUM SERPL-MCNC: 8.4 MG/DL (ref 8.5–10.1)
CALCIUM SERPL-MCNC: 8.5 MG/DL (ref 8.5–10.1)
CALCIUM SERPL-MCNC: 8.6 MG/DL (ref 8.5–10.1)
CALCIUM SERPL-MCNC: 8.7 MG/DL (ref 8.5–10.1)
CALCIUM SERPL-MCNC: 8.7 MG/DL (ref 8.5–10.1)
CALCIUM SERPL-MCNC: 8.8 MG/DL (ref 8.5–10.1)
CALCIUM SERPL-MCNC: 8.9 MG/DL (ref 8.5–10.1)
CALCIUM SERPL-MCNC: 9.1 MG/DL (ref 8.5–10.1)
CALCIUM SERPL-MCNC: 9.2 MG/DL (ref 8.5–10.1)
CHLORIDE SERPL-SCNC: 100 MMOL/L (ref 94–109)
CHLORIDE SERPL-SCNC: 101 MMOL/L (ref 94–109)
CHLORIDE SERPL-SCNC: 102 MMOL/L (ref 94–109)
CHLORIDE SERPL-SCNC: 102 MMOL/L (ref 94–109)
CHLORIDE SERPL-SCNC: 104 MMOL/L (ref 94–109)
CHLORIDE SERPL-SCNC: 105 MMOL/L (ref 94–109)
CHLORIDE SERPL-SCNC: 107 MMOL/L (ref 94–109)
CHLORIDE SERPL-SCNC: 109 MMOL/L (ref 94–109)
CHLORIDE SERPL-SCNC: 110 MMOL/L (ref 94–109)
CHLORIDE SERPL-SCNC: 110 MMOL/L (ref 94–109)
CHLORIDE SERPL-SCNC: 91 MMOL/L (ref 94–109)
CHLORIDE SERPL-SCNC: 92 MMOL/L (ref 94–109)
CHLORIDE SERPL-SCNC: 93 MMOL/L (ref 94–109)
CHLORIDE SERPL-SCNC: 93 MMOL/L (ref 94–109)
CHLORIDE SERPL-SCNC: 94 MMOL/L (ref 94–109)
CHLORIDE SERPL-SCNC: 94 MMOL/L (ref 94–109)
CHLORIDE SERPL-SCNC: 95 MMOL/L (ref 94–109)
CHLORIDE SERPL-SCNC: 95 MMOL/L (ref 94–109)
CHLORIDE SERPL-SCNC: 97 MMOL/L (ref 94–109)
CHLORIDE SERPL-SCNC: 98 MMOL/L (ref 94–109)
CO2 SERPL-SCNC: 24 MMOL/L (ref 20–32)
CO2 SERPL-SCNC: 25 MMOL/L (ref 20–32)
CO2 SERPL-SCNC: 26 MMOL/L (ref 20–32)
CO2 SERPL-SCNC: 28 MMOL/L (ref 20–32)
CO2 SERPL-SCNC: 29 MMOL/L (ref 20–32)
CO2 SERPL-SCNC: 30 MMOL/L (ref 20–32)
CO2 SERPL-SCNC: 30 MMOL/L (ref 20–32)
CO2 SERPL-SCNC: 31 MMOL/L (ref 20–32)
CO2 SERPL-SCNC: 31 MMOL/L (ref 20–32)
CO2 SERPL-SCNC: 32 MMOL/L (ref 20–32)
CO2 SERPL-SCNC: 32 MMOL/L (ref 20–32)
CO2 SERPL-SCNC: 33 MMOL/L (ref 20–32)
CO2 SERPL-SCNC: 34 MMOL/L (ref 20–32)
CO2 SERPL-SCNC: 35 MMOL/L (ref 20–32)
CO2 SERPL-SCNC: 37 MMOL/L (ref 20–32)
CO2 SERPL-SCNC: 37 MMOL/L (ref 20–32)
COLOR UR AUTO: YELLOW
CREAT SERPL-MCNC: 0.59 MG/DL (ref 0.52–1.04)
CREAT SERPL-MCNC: 0.61 MG/DL (ref 0.52–1.04)
CREAT SERPL-MCNC: 0.64 MG/DL (ref 0.52–1.04)
CREAT SERPL-MCNC: 0.66 MG/DL (ref 0.52–1.04)
CREAT SERPL-MCNC: 0.66 MG/DL (ref 0.52–1.04)
CREAT SERPL-MCNC: 0.68 MG/DL (ref 0.52–1.04)
CREAT SERPL-MCNC: 0.69 MG/DL (ref 0.52–1.04)
CREAT SERPL-MCNC: 0.7 MG/DL (ref 0.52–1.04)
CREAT SERPL-MCNC: 0.7 MG/DL (ref 0.52–1.04)
CREAT SERPL-MCNC: 0.71 MG/DL (ref 0.52–1.04)
CREAT SERPL-MCNC: 0.73 MG/DL (ref 0.52–1.04)
CREAT SERPL-MCNC: 0.73 MG/DL (ref 0.52–1.04)
CREAT SERPL-MCNC: 0.74 MG/DL (ref 0.52–1.04)
CREAT SERPL-MCNC: 0.74 MG/DL (ref 0.52–1.04)
CREAT SERPL-MCNC: 0.75 MG/DL (ref 0.52–1.04)
CREAT SERPL-MCNC: 0.75 MG/DL (ref 0.52–1.04)
CREAT SERPL-MCNC: 0.77 MG/DL (ref 0.52–1.04)
CREAT SERPL-MCNC: 0.77 MG/DL (ref 0.52–1.04)
CREAT SERPL-MCNC: 0.78 MG/DL (ref 0.52–1.04)
CREAT SERPL-MCNC: 0.79 MG/DL (ref 0.52–1.04)
CREAT SERPL-MCNC: 0.8 MG/DL (ref 0.52–1.04)
CREAT SERPL-MCNC: 0.8 MG/DL (ref 0.52–1.04)
CREAT SERPL-MCNC: 0.82 MG/DL (ref 0.52–1.04)
CREAT SERPL-MCNC: 0.89 MG/DL (ref 0.52–1.04)
CREAT UR-MCNC: 57 MG/DL
CRP SERPL-MCNC: 103 MG/L (ref 0–8)
CRP SERPL-MCNC: 32.7 MG/L (ref 0–8)
CRP SERPL-MCNC: 61.3 MG/L (ref 0–8)
DIFFERENTIAL METHOD BLD: ABNORMAL
DIGOXIN SERPL-MCNC: 0.6 UG/L (ref 0.5–2)
DIGOXIN SERPL-MCNC: 1.2 UG/L (ref 0.5–2)
EOSINOPHIL NFR BLD AUTO: 0 %
EOSINOPHIL NFR BLD AUTO: 0.5 %
EOSINOPHIL NFR BLD AUTO: 0.5 %
EOSINOPHIL NFR BLD AUTO: 1.4 %
ERYTHROCYTE [DISTWIDTH] IN BLOOD BY AUTOMATED COUNT: 14 % (ref 10–15)
ERYTHROCYTE [DISTWIDTH] IN BLOOD BY AUTOMATED COUNT: 14.1 % (ref 10–15)
ERYTHROCYTE [DISTWIDTH] IN BLOOD BY AUTOMATED COUNT: 14.3 % (ref 10–15)
ERYTHROCYTE [DISTWIDTH] IN BLOOD BY AUTOMATED COUNT: 14.6 % (ref 10–15)
ERYTHROCYTE [DISTWIDTH] IN BLOOD BY AUTOMATED COUNT: 14.6 % (ref 10–15)
ERYTHROCYTE [DISTWIDTH] IN BLOOD BY AUTOMATED COUNT: 14.7 % (ref 10–15)
ERYTHROCYTE [DISTWIDTH] IN BLOOD BY AUTOMATED COUNT: 14.7 % (ref 10–15)
ERYTHROCYTE [DISTWIDTH] IN BLOOD BY AUTOMATED COUNT: 15 % (ref 10–15)
ERYTHROCYTE [DISTWIDTH] IN BLOOD BY AUTOMATED COUNT: 16.8 % (ref 10–15)
ERYTHROCYTE [DISTWIDTH] IN BLOOD BY AUTOMATED COUNT: 17.7 % (ref 10–15)
FLUAV H1 2009 PAND RNA SPEC QL NAA+PROBE: NOT DETECTED
FLUAV H1 RNA SPEC QL NAA+PROBE: NOT DETECTED
FLUAV H3 RNA SPEC QL NAA+PROBE: NOT DETECTED
FLUAV RNA SPEC QL NAA+PROBE: NOT DETECTED
FLUAV+FLUBV AG SPEC QL: NEGATIVE
FLUBV RNA SPEC QL NAA+PROBE: NOT DETECTED
GFR SERPL CREATININE-BSD FRML MDRD: 56 ML/MIN/{1.73_M2}
GFR SERPL CREATININE-BSD FRML MDRD: 61 ML/MIN/{1.73_M2}
GFR SERPL CREATININE-BSD FRML MDRD: 63 ML/MIN/{1.73_M2}
GFR SERPL CREATININE-BSD FRML MDRD: 63 ML/MIN/{1.73_M2}
GFR SERPL CREATININE-BSD FRML MDRD: 65 ML/MIN/{1.73_M2}
GFR SERPL CREATININE-BSD FRML MDRD: 65 ML/MIN/{1.73_M2}
GFR SERPL CREATININE-BSD FRML MDRD: 66 ML/MIN/{1.73_M2}
GFR SERPL CREATININE-BSD FRML MDRD: 66 ML/MIN/{1.73_M2}
GFR SERPL CREATININE-BSD FRML MDRD: 69 ML/MIN/{1.73_M2}
GFR SERPL CREATININE-BSD FRML MDRD: 71 ML/MIN/{1.73_M2}
GFR SERPL CREATININE-BSD FRML MDRD: 71 ML/MIN/{1.73_M2}
GFR SERPL CREATININE-BSD FRML MDRD: 73 ML/MIN/{1.73_M2}
GFR SERPL CREATININE-BSD FRML MDRD: 73 ML/MIN/{1.73_M2}
GFR SERPL CREATININE-BSD FRML MDRD: 74 ML/MIN/{1.73_M2}
GFR SERPL CREATININE-BSD FRML MDRD: 74 ML/MIN/{1.73_M2}
GFR SERPL CREATININE-BSD FRML MDRD: 75 ML/MIN/{1.73_M2}
GFR SERPL CREATININE-BSD FRML MDRD: 76 ML/MIN/{1.73_M2}
GFR SERPL CREATININE-BSD FRML MDRD: 76 ML/MIN/{1.73_M2}
GFR SERPL CREATININE-BSD FRML MDRD: 77 ML/MIN/{1.73_M2}
GFR SERPL CREATININE-BSD FRML MDRD: 78 ML/MIN/{1.73_M2}
GFR SERPL CREATININE-BSD FRML MDRD: 79 ML/MIN/{1.73_M2}
GLUCOSE SERPL-MCNC: 100 MG/DL (ref 70–99)
GLUCOSE SERPL-MCNC: 100 MG/DL (ref 70–99)
GLUCOSE SERPL-MCNC: 101 MG/DL (ref 70–99)
GLUCOSE SERPL-MCNC: 103 MG/DL (ref 70–99)
GLUCOSE SERPL-MCNC: 103 MG/DL (ref 70–99)
GLUCOSE SERPL-MCNC: 106 MG/DL (ref 70–99)
GLUCOSE SERPL-MCNC: 107 MG/DL (ref 70–99)
GLUCOSE SERPL-MCNC: 108 MG/DL (ref 70–99)
GLUCOSE SERPL-MCNC: 111 MG/DL (ref 70–99)
GLUCOSE SERPL-MCNC: 111 MG/DL (ref 70–99)
GLUCOSE SERPL-MCNC: 112 MG/DL (ref 70–99)
GLUCOSE SERPL-MCNC: 113 MG/DL (ref 70–99)
GLUCOSE SERPL-MCNC: 129 MG/DL (ref 70–99)
GLUCOSE SERPL-MCNC: 134 MG/DL (ref 70–99)
GLUCOSE SERPL-MCNC: 145 MG/DL (ref 70–99)
GLUCOSE SERPL-MCNC: 90 MG/DL (ref 70–99)
GLUCOSE SERPL-MCNC: 97 MG/DL (ref 70–99)
GLUCOSE SERPL-MCNC: 97 MG/DL (ref 70–99)
GLUCOSE SERPL-MCNC: 98 MG/DL (ref 70–99)
GLUCOSE SERPL-MCNC: 99 MG/DL (ref 70–99)
GLUCOSE UR STRIP-MCNC: NEGATIVE MG/DL
HADV DNA SPEC QL NAA+PROBE: NOT DETECTED
HCO3 BLDV-SCNC: 35 MMOL/L (ref 21–28)
HCO3 BLDV-SCNC: 36 MMOL/L (ref 21–28)
HCO3 BLDV-SCNC: 37 MMOL/L (ref 21–28)
HCO3 BLDV-SCNC: 38 MMOL/L (ref 21–28)
HCO3 BLDV-SCNC: 38 MMOL/L (ref 21–28)
HCO3 BLDV-SCNC: 39 MMOL/L (ref 21–28)
HCOV PNL SPEC NAA+PROBE: NOT DETECTED
HCT VFR BLD AUTO: 27.3 % (ref 35–47)
HCT VFR BLD AUTO: 28.6 % (ref 35–47)
HCT VFR BLD AUTO: 28.7 % (ref 35–47)
HCT VFR BLD AUTO: 29.8 % (ref 35–47)
HCT VFR BLD AUTO: 30.5 % (ref 35–47)
HCT VFR BLD AUTO: 30.6 % (ref 35–47)
HCT VFR BLD AUTO: 31.6 % (ref 35–47)
HCT VFR BLD AUTO: 32.1 % (ref 35–47)
HCT VFR BLD AUTO: 32.1 % (ref 35–47)
HCT VFR BLD AUTO: 32.4 % (ref 35–47)
HCT VFR BLD AUTO: 33.8 % (ref 35–47)
HCT VFR BLD AUTO: 34.2 % (ref 35–47)
HCT VFR BLD AUTO: 34.7 % (ref 35–47)
HCT VFR BLD AUTO: 36.3 % (ref 35–47)
HGB BLD-MCNC: 10 G/DL (ref 11.7–15.7)
HGB BLD-MCNC: 10 G/DL (ref 11.7–15.7)
HGB BLD-MCNC: 10.3 G/DL (ref 11.7–15.7)
HGB BLD-MCNC: 10.5 G/DL (ref 11.7–15.7)
HGB BLD-MCNC: 10.6 G/DL (ref 11.7–15.7)
HGB BLD-MCNC: 10.9 G/DL (ref 11.7–15.7)
HGB BLD-MCNC: 11 G/DL (ref 11.7–15.7)
HGB BLD-MCNC: 11.2 G/DL (ref 11.7–15.7)
HGB BLD-MCNC: 11.3 G/DL (ref 11.7–15.7)
HGB BLD-MCNC: 8.9 G/DL (ref 11.7–15.7)
HGB BLD-MCNC: 8.9 G/DL (ref 11.7–15.7)
HGB BLD-MCNC: 9.1 G/DL (ref 11.7–15.7)
HGB BLD-MCNC: 9.2 G/DL (ref 11.7–15.7)
HGB BLD-MCNC: 9.3 G/DL (ref 11.7–15.7)
HGB BLD-MCNC: 9.7 G/DL (ref 11.7–15.7)
HGB BLD-MCNC: 9.7 G/DL (ref 11.7–15.7)
HGB UR QL STRIP: ABNORMAL
HGB UR QL STRIP: NEGATIVE
HMPV RNA SPEC QL NAA+PROBE: NOT DETECTED
HPIV1 RNA SPEC QL NAA+PROBE: ABNORMAL
HPIV2 RNA SPEC QL NAA+PROBE: NOT DETECTED
HPIV3 RNA SPEC QL NAA+PROBE: NOT DETECTED
HPIV4 RNA SPEC QL NAA+PROBE: NOT DETECTED
HYALINE CASTS #/AREA URNS LPF: 13 /LPF (ref 0–2)
IMM GRANULOCYTES # BLD: 0 10E9/L (ref 0–0.4)
IMM GRANULOCYTES # BLD: 0.1 10E9/L (ref 0–0.4)
IMM GRANULOCYTES NFR BLD: 0.3 %
IMM GRANULOCYTES NFR BLD: 0.3 %
IMM GRANULOCYTES NFR BLD: 0.4 %
IMM GRANULOCYTES NFR BLD: 0.7 %
INR PPP: 1.1 (ref 0.86–1.14)
INR PPP: 1.2 (ref 0.86–1.14)
INR PPP: 1.23 (ref 0.86–1.14)
INR PPP: 1.49 (ref 0.86–1.14)
INR PPP: 1.54 (ref 0.86–1.14)
INR PPP: 1.64 (ref 0.86–1.14)
INR PPP: 1.67 (ref 0.86–1.14)
INR PPP: 1.78 (ref 0.86–1.14)
INR PPP: 2.06 (ref 0.86–1.14)
INR PPP: 2.07 (ref 0.86–1.14)
INR PPP: 2.11 (ref 0.86–1.14)
INR PPP: 3.3 (ref 0.86–1.14)
INR PPP: 3.39 (ref 0.86–1.14)
INR PPP: 4.21 (ref 0.86–1.14)
INR PPP: 4.96 (ref 0.86–1.14)
INR PPP: 5 (ref 0.86–1.14)
INR PPP: 6.4 (ref 0.86–1.14)
KETONES UR STRIP-MCNC: NEGATIVE MG/DL
LACTATE BLD-SCNC: 1 MMOL/L (ref 0.7–2)
LACTATE BLD-SCNC: 1.1 MMOL/L (ref 0.7–2)
LACTATE BLD-SCNC: 1.1 MMOL/L (ref 0.7–2)
LEUKOCYTE ESTERASE UR QL STRIP: ABNORMAL
LEUKOCYTE ESTERASE UR QL STRIP: NEGATIVE
LEUKOCYTE ESTERASE UR QL STRIP: NEGATIVE
LYMPHOCYTES # BLD AUTO: 0.7 10E9/L (ref 0.8–5.3)
LYMPHOCYTES # BLD AUTO: 1.5 10E9/L (ref 0.8–5.3)
LYMPHOCYTES # BLD AUTO: 2.2 10E9/L (ref 0.8–5.3)
LYMPHOCYTES # BLD AUTO: 3.2 10E9/L (ref 0.8–5.3)
LYMPHOCYTES NFR BLD AUTO: 12.8 %
LYMPHOCYTES NFR BLD AUTO: 15.1 %
LYMPHOCYTES NFR BLD AUTO: 21.9 %
LYMPHOCYTES NFR BLD AUTO: 34.2 %
Lab: ABNORMAL
Lab: ABNORMAL
Lab: NORMAL
Lab: NORMAL
M PNEUMO DNA SPEC QL NAA+PROBE: NOT DETECTED
MAGNESIUM SERPL-MCNC: 1.8 MG/DL (ref 1.6–2.3)
MAGNESIUM SERPL-MCNC: 2.1 MG/DL (ref 1.6–2.3)
MAGNESIUM SERPL-MCNC: 2.2 MG/DL (ref 1.6–2.3)
MCH RBC QN AUTO: 26.1 PG (ref 26.5–33)
MCH RBC QN AUTO: 26.8 PG (ref 26.5–33)
MCH RBC QN AUTO: 28 PG (ref 26.5–33)
MCH RBC QN AUTO: 28 PG (ref 26.5–33)
MCH RBC QN AUTO: 28.2 PG (ref 26.5–33)
MCH RBC QN AUTO: 28.3 PG (ref 26.5–33)
MCH RBC QN AUTO: 28.6 PG (ref 26.5–33)
MCH RBC QN AUTO: 29.9 PG (ref 26.5–33)
MCH RBC QN AUTO: 30.1 PG (ref 26.5–33)
MCH RBC QN AUTO: 30.3 PG (ref 26.5–33)
MCH RBC QN AUTO: 30.4 PG (ref 26.5–33)
MCH RBC QN AUTO: 30.5 PG (ref 26.5–33)
MCH RBC QN AUTO: 30.5 PG (ref 26.5–33)
MCH RBC QN AUTO: 30.6 PG (ref 26.5–33)
MCHC RBC AUTO-ENTMCNC: 29.9 G/DL (ref 31.5–36.5)
MCHC RBC AUTO-ENTMCNC: 30 G/DL (ref 31.5–36.5)
MCHC RBC AUTO-ENTMCNC: 31.7 G/DL (ref 31.5–36.5)
MCHC RBC AUTO-ENTMCNC: 32.1 G/DL (ref 31.5–36.5)
MCHC RBC AUTO-ENTMCNC: 32.2 G/DL (ref 31.5–36.5)
MCHC RBC AUTO-ENTMCNC: 32.5 G/DL (ref 31.5–36.5)
MCHC RBC AUTO-ENTMCNC: 32.6 G/DL (ref 31.5–36.5)
MCHC RBC AUTO-ENTMCNC: 32.7 G/DL (ref 31.5–36.5)
MCHC RBC AUTO-ENTMCNC: 32.7 G/DL (ref 31.5–36.5)
MCHC RBC AUTO-ENTMCNC: 32.8 G/DL (ref 31.5–36.5)
MCHC RBC AUTO-ENTMCNC: 33 G/DL (ref 31.5–36.5)
MCHC RBC AUTO-ENTMCNC: 33.2 G/DL (ref 31.5–36.5)
MCV RBC AUTO: 87 FL (ref 78–100)
MCV RBC AUTO: 88 FL (ref 78–100)
MCV RBC AUTO: 88 FL (ref 78–100)
MCV RBC AUTO: 90 FL (ref 78–100)
MCV RBC AUTO: 92 FL (ref 78–100)
MCV RBC AUTO: 92 FL (ref 78–100)
MCV RBC AUTO: 93 FL (ref 78–100)
MICROBIOLOGIST REVIEW: ABNORMAL
MONOCYTES # BLD AUTO: 0.7 10E9/L (ref 0–1.3)
MONOCYTES # BLD AUTO: 0.8 10E9/L (ref 0–1.3)
MONOCYTES # BLD AUTO: 1.2 10E9/L (ref 0–1.3)
MONOCYTES # BLD AUTO: 1.6 10E9/L (ref 0–1.3)
MONOCYTES NFR BLD AUTO: 12.5 %
MONOCYTES NFR BLD AUTO: 13.8 %
MONOCYTES NFR BLD AUTO: 16 %
MONOCYTES NFR BLD AUTO: 8.6 %
MUCOUS THREADS #/AREA URNS LPF: PRESENT /LPF
NEUTROPHILS # BLD AUTO: 3.1 10E9/L (ref 1.6–8.3)
NEUTROPHILS # BLD AUTO: 5.2 10E9/L (ref 1.6–8.3)
NEUTROPHILS # BLD AUTO: 6.4 10E9/L (ref 1.6–8.3)
NEUTROPHILS # BLD AUTO: 8.3 10E9/L (ref 1.6–8.3)
NEUTROPHILS NFR BLD AUTO: 55.1 %
NEUTROPHILS NFR BLD AUTO: 64.5 %
NEUTROPHILS NFR BLD AUTO: 68.3 %
NEUTROPHILS NFR BLD AUTO: 71.9 %
NITRATE UR QL: NEGATIVE
NRBC # BLD AUTO: 0 10*3/UL
NRBC BLD AUTO-RTO: 0 /100
NT-PROBNP SERPL-MCNC: 1347 PG/ML (ref 0–1800)
NT-PROBNP SERPL-MCNC: 1638 PG/ML (ref 0–1800)
O2/TOTAL GAS SETTING VFR VENT: 21 %
O2/TOTAL GAS SETTING VFR VENT: 24 %
PCO2 BLDV: 43 MM HG (ref 40–50)
PCO2 BLDV: 47 MM HG (ref 40–50)
PCO2 BLDV: 53 MM HG (ref 40–50)
PCO2 BLDV: 53 MM HG (ref 40–50)
PCO2 BLDV: 54 MM HG (ref 40–50)
PCO2 BLDV: 55 MM HG (ref 40–50)
PH BLDV: 7.45 PH (ref 7.32–7.43)
PH BLDV: 7.45 PH (ref 7.32–7.43)
PH BLDV: 7.46 PH (ref 7.32–7.43)
PH BLDV: 7.48 PH (ref 7.32–7.43)
PH BLDV: 7.5 PH (ref 7.32–7.43)
PH BLDV: 7.52 PH (ref 7.32–7.43)
PH UR STRIP: 5 PH (ref 5–7)
PH UR STRIP: 6 PH (ref 5–7)
PH UR STRIP: 8 PH (ref 5–7)
PLATELET # BLD AUTO: 110 10E9/L (ref 150–450)
PLATELET # BLD AUTO: 120 10E9/L (ref 150–450)
PLATELET # BLD AUTO: 127 10E9/L (ref 150–450)
PLATELET # BLD AUTO: 145 10E9/L (ref 150–450)
PLATELET # BLD AUTO: 188 10E9/L (ref 150–450)
PLATELET # BLD AUTO: 235 10E9/L (ref 150–450)
PLATELET # BLD AUTO: 246 10E9/L (ref 150–450)
PLATELET # BLD AUTO: 249 10E9/L (ref 150–450)
PLATELET # BLD AUTO: 255 10E9/L (ref 150–450)
PLATELET # BLD AUTO: 263 10E9/L (ref 150–450)
PLATELET # BLD AUTO: 279 10E9/L (ref 150–450)
PLATELET # BLD AUTO: 295 10E9/L (ref 150–450)
PLATELET # BLD AUTO: 311 10E9/L (ref 150–450)
PLATELET # BLD AUTO: 334 10E9/L (ref 150–450)
PLATELET # BLD AUTO: 361 10E9/L (ref 150–450)
PLATELET # BLD AUTO: 403 10E9/L (ref 150–450)
PO2 BLDV: 110 MM HG (ref 25–47)
PO2 BLDV: 28 MM HG (ref 25–47)
PO2 BLDV: 37 MM HG (ref 25–47)
PO2 BLDV: 40 MM HG (ref 25–47)
PO2 BLDV: 41 MM HG (ref 25–47)
PO2 BLDV: 73 MM HG (ref 25–47)
POTASSIUM SERPL-SCNC: 3.4 MMOL/L (ref 3.4–5.3)
POTASSIUM SERPL-SCNC: 3.5 MMOL/L (ref 3.4–5.3)
POTASSIUM SERPL-SCNC: 3.6 MMOL/L (ref 3.4–5.3)
POTASSIUM SERPL-SCNC: 3.6 MMOL/L (ref 3.4–5.3)
POTASSIUM SERPL-SCNC: 3.7 MMOL/L (ref 3.4–5.3)
POTASSIUM SERPL-SCNC: 3.7 MMOL/L (ref 3.4–5.3)
POTASSIUM SERPL-SCNC: 3.8 MMOL/L (ref 3.4–5.3)
POTASSIUM SERPL-SCNC: 3.9 MMOL/L (ref 3.4–5.3)
POTASSIUM SERPL-SCNC: 4 MMOL/L (ref 3.4–5.3)
POTASSIUM SERPL-SCNC: 4.1 MMOL/L (ref 3.4–5.3)
POTASSIUM SERPL-SCNC: 4.2 MMOL/L (ref 3.4–5.3)
POTASSIUM SERPL-SCNC: 4.3 MMOL/L (ref 3.4–5.3)
POTASSIUM SERPL-SCNC: 4.5 MMOL/L (ref 3.4–5.3)
POTASSIUM SERPL-SCNC: 4.5 MMOL/L (ref 3.4–5.3)
POTASSIUM SERPL-SCNC: 4.6 MMOL/L (ref 3.4–5.3)
POTASSIUM SERPL-SCNC: 4.6 MMOL/L (ref 3.4–5.3)
PROCALCITONIN SERPL-MCNC: <0.05 NG/ML
PROT SERPL-MCNC: 5.8 G/DL (ref 6.8–8.8)
PROT SERPL-MCNC: 6.3 G/DL (ref 6.8–8.8)
PROT UR-MCNC: 0.06 G/L
PROT/CREAT 24H UR: 0.11 G/G CR (ref 0–0.2)
RBC # BLD AUTO: 3.15 10E12/L (ref 3.8–5.2)
RBC # BLD AUTO: 3.25 10E12/L (ref 3.8–5.2)
RBC # BLD AUTO: 3.27 10E12/L (ref 3.8–5.2)
RBC # BLD AUTO: 3.29 10E12/L (ref 3.8–5.2)
RBC # BLD AUTO: 3.3 10E12/L (ref 3.8–5.2)
RBC # BLD AUTO: 3.44 10E12/L (ref 3.8–5.2)
RBC # BLD AUTO: 3.48 10E12/L (ref 3.8–5.2)
RBC # BLD AUTO: 3.62 10E12/L (ref 3.8–5.2)
RBC # BLD AUTO: 3.68 10E12/L (ref 3.8–5.2)
RBC # BLD AUTO: 3.75 10E12/L (ref 3.8–5.2)
RBC # BLD AUTO: 3.85 10E12/L (ref 3.8–5.2)
RBC # BLD AUTO: 4.18 10E12/L (ref 3.8–5.2)
RBC #/AREA URNS AUTO: 0 /HPF (ref 0–2)
RBC #/AREA URNS AUTO: 0 /HPF (ref 0–2)
RBC #/AREA URNS AUTO: 10 /HPF (ref 0–2)
RBC #/AREA URNS AUTO: 81 /HPF (ref 0–2)
RBC #/AREA URNS AUTO: <1 /HPF (ref 0–2)
RSV RNA SPEC QL NAA+PROBE: NOT DETECTED
RSV RNA SPEC QL NAA+PROBE: NOT DETECTED
RV+EV RNA SPEC QL NAA+PROBE: NOT DETECTED
SARS-COV-2 PCR COMMENT: NORMAL
SARS-COV-2 RNA SPEC QL NAA+PROBE: NEGATIVE
SARS-COV-2 RNA SPEC QL NAA+PROBE: NORMAL
SARS-COV-2 RNA SPEC QL NAA+PROBE: NOT DETECTED
SODIUM SERPL-SCNC: 126 MMOL/L (ref 133–144)
SODIUM SERPL-SCNC: 128 MMOL/L (ref 133–144)
SODIUM SERPL-SCNC: 129 MMOL/L (ref 133–144)
SODIUM SERPL-SCNC: 130 MMOL/L (ref 133–144)
SODIUM SERPL-SCNC: 131 MMOL/L (ref 133–144)
SODIUM SERPL-SCNC: 131 MMOL/L (ref 133–144)
SODIUM SERPL-SCNC: 132 MMOL/L (ref 133–144)
SODIUM SERPL-SCNC: 133 MMOL/L (ref 133–144)
SODIUM SERPL-SCNC: 134 MMOL/L (ref 133–144)
SODIUM SERPL-SCNC: 134 MMOL/L (ref 133–144)
SODIUM SERPL-SCNC: 135 MMOL/L (ref 133–144)
SODIUM SERPL-SCNC: 136 MMOL/L (ref 133–144)
SODIUM SERPL-SCNC: 136 MMOL/L (ref 133–144)
SODIUM SERPL-SCNC: 138 MMOL/L (ref 133–144)
SODIUM SERPL-SCNC: 142 MMOL/L (ref 133–144)
SOURCE: ABNORMAL
SP GR UR STRIP: 1.01 (ref 1–1.03)
SP GR UR STRIP: 1.02 (ref 1–1.03)
SPECIMEN SOURCE: ABNORMAL
SPECIMEN SOURCE: NORMAL
SQUAMOUS #/AREA URNS AUTO: <1 /HPF (ref 0–1)
T4 FREE SERPL-MCNC: 1.17 NG/DL (ref 0.76–1.46)
TROPONIN I SERPL-MCNC: 0.03 UG/L (ref 0–0.04)
TSH SERPL DL<=0.005 MIU/L-ACNC: 4.35 MU/L (ref 0.4–4)
UROBILINOGEN UR STRIP-MCNC: 0 MG/DL (ref 0–2)
WBC # BLD AUTO: 11.5 10E9/L (ref 4–11)
WBC # BLD AUTO: 4.4 10E9/L (ref 4–11)
WBC # BLD AUTO: 4.6 10E9/L (ref 4–11)
WBC # BLD AUTO: 5.1 10E9/L (ref 4–11)
WBC # BLD AUTO: 5.3 10E9/L (ref 4–11)
WBC # BLD AUTO: 5.6 10E9/L (ref 4–11)
WBC # BLD AUTO: 6.1 10E9/L (ref 4–11)
WBC # BLD AUTO: 7 10E9/L (ref 4–11)
WBC # BLD AUTO: 7.5 10E9/L (ref 4–11)
WBC # BLD AUTO: 7.7 10E9/L (ref 4–11)
WBC # BLD AUTO: 8.9 10E9/L (ref 4–11)
WBC # BLD AUTO: 9.5 10E9/L (ref 4–11)
WBC # BLD AUTO: 9.7 10E9/L (ref 4–11)
WBC # BLD AUTO: 9.9 10E9/L (ref 4–11)
WBC #/AREA URNS AUTO: 0 /HPF (ref 0–5)
WBC #/AREA URNS AUTO: 1 /HPF (ref 0–5)
WBC #/AREA URNS AUTO: 11 /HPF (ref 0–5)
WBC #/AREA URNS AUTO: 123 /HPF (ref 0–5)
WBC #/AREA URNS AUTO: 47 /HPF (ref 0–5)
WBC CLUMPS #/AREA URNS HPF: PRESENT /HPF
YEAST #/AREA URNS HPF: ABNORMAL /HPF

## 2020-01-01 PROCEDURE — 99207 ZZC CDG-MDM COMPONENT: MEETS LOW - DOWN CODED: CPT | Performed by: NURSE PRACTITIONER

## 2020-01-01 PROCEDURE — 96361 HYDRATE IV INFUSION ADD-ON: CPT

## 2020-01-01 PROCEDURE — 85027 COMPLETE CBC AUTOMATED: CPT | Performed by: FAMILY MEDICINE

## 2020-01-01 PROCEDURE — 85025 COMPLETE CBC W/AUTO DIFF WBC: CPT | Performed by: FAMILY MEDICINE

## 2020-01-01 PROCEDURE — 94640 AIRWAY INHALATION TREATMENT: CPT

## 2020-01-01 PROCEDURE — 25000132 ZZH RX MED GY IP 250 OP 250 PS 637: Performed by: NURSE PRACTITIONER

## 2020-01-01 PROCEDURE — 12000000 ZZH R&B MED SURG/OB

## 2020-01-01 PROCEDURE — 25000125 ZZHC RX 250: Performed by: NURSE PRACTITIONER

## 2020-01-01 PROCEDURE — 84145 PROCALCITONIN (PCT): CPT | Performed by: NURSE PRACTITIONER

## 2020-01-01 PROCEDURE — 97530 THERAPEUTIC ACTIVITIES: CPT | Mod: GP | Performed by: PHYSICAL THERAPIST

## 2020-01-01 PROCEDURE — 99233 SBSQ HOSP IP/OBS HIGH 50: CPT | Performed by: FAMILY MEDICINE

## 2020-01-01 PROCEDURE — 73562 X-RAY EXAM OF KNEE 3: CPT | Mod: TC,RT

## 2020-01-01 PROCEDURE — 81001 URINALYSIS AUTO W/SCOPE: CPT | Performed by: PEDIATRICS

## 2020-01-01 PROCEDURE — 82565 ASSAY OF CREATININE: CPT | Performed by: PEDIATRICS

## 2020-01-01 PROCEDURE — 25000132 ZZH RX MED GY IP 250 OP 250 PS 637: Performed by: INTERNAL MEDICINE

## 2020-01-01 PROCEDURE — 25000128 H RX IP 250 OP 636: Performed by: PEDIATRICS

## 2020-01-01 PROCEDURE — 96360 HYDRATION IV INFUSION INIT: CPT

## 2020-01-01 PROCEDURE — 82803 BLOOD GASES ANY COMBINATION: CPT | Performed by: FAMILY MEDICINE

## 2020-01-01 PROCEDURE — 25000125 ZZHC RX 250: Performed by: PEDIATRICS

## 2020-01-01 PROCEDURE — 99309 SBSQ NF CARE MODERATE MDM 30: CPT | Performed by: NURSE PRACTITIONER

## 2020-01-01 PROCEDURE — 40000274 ZZH STATISTIC RCP CONSULT EA 30 MIN

## 2020-01-01 PROCEDURE — 85610 PROTHROMBIN TIME: CPT | Performed by: PEDIATRICS

## 2020-01-01 PROCEDURE — 36415 COLL VENOUS BLD VENIPUNCTURE: CPT | Performed by: INTERNAL MEDICINE

## 2020-01-01 PROCEDURE — G0378 HOSPITAL OBSERVATION PER HR: HCPCS

## 2020-01-01 PROCEDURE — 25000132 ZZH RX MED GY IP 250 OP 250 PS 637: Performed by: FAMILY MEDICINE

## 2020-01-01 PROCEDURE — 94640 AIRWAY INHALATION TREATMENT: CPT | Mod: 76

## 2020-01-01 PROCEDURE — 93325 DOPPLER ECHO COLOR FLOW MAPG: CPT

## 2020-01-01 PROCEDURE — 83605 ASSAY OF LACTIC ACID: CPT | Performed by: NURSE PRACTITIONER

## 2020-01-01 PROCEDURE — 36415 COLL VENOUS BLD VENIPUNCTURE: CPT | Performed by: PEDIATRICS

## 2020-01-01 PROCEDURE — 40000983 ZZH STATISTIC HFNC ADULT NON-CPAP

## 2020-01-01 PROCEDURE — 99207 ZZC CDG-MDM COMPONENT: MEETS MODERATE - UP CODED: CPT | Performed by: PEDIATRICS

## 2020-01-01 PROCEDURE — 97110 THERAPEUTIC EXERCISES: CPT | Mod: GP | Performed by: PHYSICAL THERAPIST

## 2020-01-01 PROCEDURE — 99308 SBSQ NF CARE LOW MDM 20: CPT | Mod: 95 | Performed by: NURSE PRACTITIONER

## 2020-01-01 PROCEDURE — 25800030 ZZH RX IP 258 OP 636: Performed by: FAMILY MEDICINE

## 2020-01-01 PROCEDURE — 25000128 H RX IP 250 OP 636: Performed by: FAMILY MEDICINE

## 2020-01-01 PROCEDURE — 96374 THER/PROPH/DIAG INJ IV PUSH: CPT | Performed by: NURSE PRACTITIONER

## 2020-01-01 PROCEDURE — 25000132 ZZH RX MED GY IP 250 OP 250 PS 637: Performed by: PEDIATRICS

## 2020-01-01 PROCEDURE — 86140 C-REACTIVE PROTEIN: CPT | Performed by: NURSE PRACTITIONER

## 2020-01-01 PROCEDURE — 80048 BASIC METABOLIC PNL TOTAL CA: CPT | Performed by: INTERNAL MEDICINE

## 2020-01-01 PROCEDURE — 99309 SBSQ NF CARE MODERATE MDM 30: CPT | Mod: 95 | Performed by: NURSE PRACTITIONER

## 2020-01-01 PROCEDURE — 94799 UNLISTED PULMONARY SVC/PX: CPT

## 2020-01-01 PROCEDURE — 97162 PT EVAL MOD COMPLEX 30 MIN: CPT | Mod: GP | Performed by: PHYSICAL THERAPIST

## 2020-01-01 PROCEDURE — 80048 BASIC METABOLIC PNL TOTAL CA: CPT | Performed by: PEDIATRICS

## 2020-01-01 PROCEDURE — 97530 THERAPEUTIC ACTIVITIES: CPT | Mod: GP

## 2020-01-01 PROCEDURE — 99305 1ST NF CARE MODERATE MDM 35: CPT | Performed by: FAMILY MEDICINE

## 2020-01-01 PROCEDURE — 87186 SC STD MICRODIL/AGAR DIL: CPT | Performed by: PEDIATRICS

## 2020-01-01 PROCEDURE — 99207 ZZC CDG-MDM COMPONENT: MEETS MODERATE - UP CODED: CPT | Performed by: FAMILY MEDICINE

## 2020-01-01 PROCEDURE — 80048 BASIC METABOLIC PNL TOTAL CA: CPT | Performed by: NURSE PRACTITIONER

## 2020-01-01 PROCEDURE — 86140 C-REACTIVE PROTEIN: CPT | Performed by: FAMILY MEDICINE

## 2020-01-01 PROCEDURE — 80048 BASIC METABOLIC PNL TOTAL CA: CPT | Performed by: FAMILY MEDICINE

## 2020-01-01 PROCEDURE — 99310 SBSQ NF CARE HIGH MDM 45: CPT | Mod: GT | Performed by: NURSE PRACTITIONER

## 2020-01-01 PROCEDURE — 73562 X-RAY EXAM OF KNEE 3: CPT | Mod: TC | Performed by: RADIOLOGY

## 2020-01-01 PROCEDURE — 25000132 ZZH RX MED GY IP 250 OP 250 PS 637: Performed by: HOSPITALIST

## 2020-01-01 PROCEDURE — 40000986 XR ANKLE RT G/E 3 VW: Mod: RT

## 2020-01-01 PROCEDURE — 99207 ZZC MOONLIGHTING INDICATOR: CPT | Performed by: FAMILY MEDICINE

## 2020-01-01 PROCEDURE — 81001 URINALYSIS AUTO W/SCOPE: CPT | Performed by: NURSE PRACTITIONER

## 2020-01-01 PROCEDURE — 99233 SBSQ HOSP IP/OBS HIGH 50: CPT | Performed by: PEDIATRICS

## 2020-01-01 PROCEDURE — 83735 ASSAY OF MAGNESIUM: CPT | Performed by: PEDIATRICS

## 2020-01-01 PROCEDURE — 99285 EMERGENCY DEPT VISIT HI MDM: CPT | Mod: 25 | Performed by: EMERGENCY MEDICINE

## 2020-01-01 PROCEDURE — 25000128 H RX IP 250 OP 636: Performed by: NURSE PRACTITIONER

## 2020-01-01 PROCEDURE — 83880 ASSAY OF NATRIURETIC PEPTIDE: CPT | Performed by: NURSE PRACTITIONER

## 2020-01-01 PROCEDURE — 82803 BLOOD GASES ANY COMBINATION: CPT | Performed by: PEDIATRICS

## 2020-01-01 PROCEDURE — 36415 COLL VENOUS BLD VENIPUNCTURE: CPT | Performed by: FAMILY MEDICINE

## 2020-01-01 PROCEDURE — 20610 DRAIN/INJ JOINT/BURSA W/O US: CPT | Mod: RT | Performed by: NURSE PRACTITIONER

## 2020-01-01 PROCEDURE — 99207 ZZC APP CREDIT; MD BILLING SHARED VISIT: CPT | Performed by: NURSE PRACTITIONER

## 2020-01-01 PROCEDURE — 71045 X-RAY EXAM CHEST 1 VIEW: CPT | Mod: TC

## 2020-01-01 PROCEDURE — 85049 AUTOMATED PLATELET COUNT: CPT | Performed by: PEDIATRICS

## 2020-01-01 PROCEDURE — 93321 DOPPLER ECHO F-UP/LMTD STD: CPT | Mod: 26 | Performed by: INTERNAL MEDICINE

## 2020-01-01 PROCEDURE — 83880 ASSAY OF NATRIURETIC PEPTIDE: CPT | Performed by: FAMILY MEDICINE

## 2020-01-01 PROCEDURE — 25000128 H RX IP 250 OP 636: Performed by: INTERNAL MEDICINE

## 2020-01-01 PROCEDURE — 99222 1ST HOSP IP/OBS MODERATE 55: CPT | Mod: AI | Performed by: FAMILY MEDICINE

## 2020-01-01 PROCEDURE — 87804 INFLUENZA ASSAY W/OPTIC: CPT | Performed by: NURSE PRACTITIONER

## 2020-01-01 PROCEDURE — 99207 ZZC APP CREDIT; MD BILLING SHARED VISIT: CPT | Performed by: PEDIATRICS

## 2020-01-01 PROCEDURE — 99233 SBSQ HOSP IP/OBS HIGH 50: CPT | Performed by: HOSPITALIST

## 2020-01-01 PROCEDURE — 85027 COMPLETE CBC AUTOMATED: CPT | Performed by: HOSPITALIST

## 2020-01-01 PROCEDURE — 40000270 ZZH STATISTIC OXYGEN  O2DAILY TECH TIME

## 2020-01-01 PROCEDURE — 36415 COLL VENOUS BLD VENIPUNCTURE: CPT | Performed by: NURSE PRACTITIONER

## 2020-01-01 PROCEDURE — 84439 ASSAY OF FREE THYROXINE: CPT | Performed by: INTERNAL MEDICINE

## 2020-01-01 PROCEDURE — 80048 BASIC METABOLIC PNL TOTAL CA: CPT | Performed by: HOSPITALIST

## 2020-01-01 PROCEDURE — 99285 EMERGENCY DEPT VISIT HI MDM: CPT | Mod: 25

## 2020-01-01 PROCEDURE — 80053 COMPREHEN METABOLIC PANEL: CPT | Performed by: NURSE PRACTITIONER

## 2020-01-01 PROCEDURE — 25000128 H RX IP 250 OP 636: Performed by: HOSPITALIST

## 2020-01-01 PROCEDURE — 40000275 ZZH STATISTIC RCP TIME EA 10 MIN

## 2020-01-01 PROCEDURE — 87086 URINE CULTURE/COLONY COUNT: CPT | Performed by: NURSE PRACTITIONER

## 2020-01-01 PROCEDURE — 25800030 ZZH RX IP 258 OP 636: Performed by: NURSE PRACTITIONER

## 2020-01-01 PROCEDURE — 99284 EMERGENCY DEPT VISIT MOD MDM: CPT | Mod: Z6 | Performed by: EMERGENCY MEDICINE

## 2020-01-01 PROCEDURE — 84484 ASSAY OF TROPONIN QUANT: CPT | Performed by: FAMILY MEDICINE

## 2020-01-01 PROCEDURE — 99232 SBSQ HOSP IP/OBS MODERATE 35: CPT | Performed by: PEDIATRICS

## 2020-01-01 PROCEDURE — 99239 HOSP IP/OBS DSCHRG MGMT >30: CPT | Performed by: PEDIATRICS

## 2020-01-01 PROCEDURE — 96360 HYDRATION IV INFUSION INIT: CPT | Performed by: EMERGENCY MEDICINE

## 2020-01-01 PROCEDURE — 93306 TTE W/DOPPLER COMPLETE: CPT

## 2020-01-01 PROCEDURE — 84295 ASSAY OF SERUM SODIUM: CPT | Performed by: PEDIATRICS

## 2020-01-01 PROCEDURE — 99223 1ST HOSP IP/OBS HIGH 75: CPT | Mod: AI | Performed by: INTERNAL MEDICINE

## 2020-01-01 PROCEDURE — 83605 ASSAY OF LACTIC ACID: CPT | Performed by: INTERNAL MEDICINE

## 2020-01-01 PROCEDURE — 87633 RESP VIRUS 12-25 TARGETS: CPT | Performed by: NURSE PRACTITIONER

## 2020-01-01 PROCEDURE — 85018 HEMOGLOBIN: CPT | Performed by: FAMILY MEDICINE

## 2020-01-01 PROCEDURE — 99207 ZZC CDG-MDM COMPONENT: MEETS MODERATE - UP CODED: CPT | Performed by: HOSPITALIST

## 2020-01-01 PROCEDURE — 87040 BLOOD CULTURE FOR BACTERIA: CPT | Performed by: NURSE PRACTITIONER

## 2020-01-01 PROCEDURE — 93308 TTE F-UP OR LMTD: CPT | Mod: 26 | Performed by: INTERNAL MEDICINE

## 2020-01-01 PROCEDURE — 36415 COLL VENOUS BLD VENIPUNCTURE: CPT | Performed by: HOSPITALIST

## 2020-01-01 PROCEDURE — 99285 EMERGENCY DEPT VISIT HI MDM: CPT | Mod: 25 | Performed by: NURSE PRACTITIONER

## 2020-01-01 PROCEDURE — G0179 MD RECERTIFICATION HHA PT: HCPCS | Performed by: INTERNAL MEDICINE

## 2020-01-01 PROCEDURE — 80162 ASSAY OF DIGOXIN TOTAL: CPT | Performed by: INTERNAL MEDICINE

## 2020-01-01 PROCEDURE — 99310 SBSQ NF CARE HIGH MDM 45: CPT | Performed by: NURSE PRACTITIONER

## 2020-01-01 PROCEDURE — 99221 1ST HOSP IP/OBS SF/LOW 40: CPT | Performed by: ORTHOPAEDIC SURGERY

## 2020-01-01 PROCEDURE — 84156 ASSAY OF PROTEIN URINE: CPT | Performed by: PEDIATRICS

## 2020-01-01 PROCEDURE — 87486 CHLMYD PNEUM DNA AMP PROBE: CPT | Performed by: NURSE PRACTITIONER

## 2020-01-01 PROCEDURE — 99232 SBSQ HOSP IP/OBS MODERATE 35: CPT | Performed by: FAMILY MEDICINE

## 2020-01-01 PROCEDURE — 99207 ZZC MOONLIGHTING INDICATOR: CPT | Mod: 59 | Performed by: FAMILY MEDICINE

## 2020-01-01 PROCEDURE — 80053 COMPREHEN METABOLIC PANEL: CPT | Performed by: FAMILY MEDICINE

## 2020-01-01 PROCEDURE — 85027 COMPLETE CBC AUTOMATED: CPT | Performed by: NURSE PRACTITIONER

## 2020-01-01 PROCEDURE — 99284 EMERGENCY DEPT VISIT MOD MDM: CPT | Mod: Z6 | Performed by: NURSE PRACTITIONER

## 2020-01-01 PROCEDURE — 87086 URINE CULTURE/COLONY COUNT: CPT | Performed by: PEDIATRICS

## 2020-01-01 PROCEDURE — 84132 ASSAY OF SERUM POTASSIUM: CPT | Performed by: PEDIATRICS

## 2020-01-01 PROCEDURE — 83605 ASSAY OF LACTIC ACID: CPT | Performed by: FAMILY MEDICINE

## 2020-01-01 PROCEDURE — 99285 EMERGENCY DEPT VISIT HI MDM: CPT | Mod: 25 | Performed by: FAMILY MEDICINE

## 2020-01-01 PROCEDURE — 99207 ZZC CDG-CODE CATEGORY CHANGED: CPT | Performed by: FAMILY MEDICINE

## 2020-01-01 PROCEDURE — 84443 ASSAY THYROID STIM HORMONE: CPT | Performed by: INTERNAL MEDICINE

## 2020-01-01 PROCEDURE — 87088 URINE BACTERIA CULTURE: CPT | Performed by: PEDIATRICS

## 2020-01-01 PROCEDURE — 83735 ASSAY OF MAGNESIUM: CPT | Performed by: INTERNAL MEDICINE

## 2020-01-01 PROCEDURE — 85025 COMPLETE CBC W/AUTO DIFF WBC: CPT | Performed by: NURSE PRACTITIONER

## 2020-01-01 PROCEDURE — 93005 ELECTROCARDIOGRAM TRACING: CPT

## 2020-01-01 PROCEDURE — 99305 1ST NF CARE MODERATE MDM 35: CPT | Mod: 95 | Performed by: FAMILY MEDICINE

## 2020-01-01 PROCEDURE — 99316 NF DSCHRG MGMT 30 MIN+: CPT | Performed by: NURSE PRACTITIONER

## 2020-01-01 PROCEDURE — 93306 TTE W/DOPPLER COMPLETE: CPT | Mod: 26 | Performed by: INTERNAL MEDICINE

## 2020-01-01 PROCEDURE — 87581 M.PNEUMON DNA AMP PROBE: CPT | Performed by: NURSE PRACTITIONER

## 2020-01-01 PROCEDURE — 25000125 ZZHC RX 250: Performed by: HOSPITALIST

## 2020-01-01 PROCEDURE — 93325 DOPPLER ECHO COLOR FLOW MAPG: CPT | Mod: 26 | Performed by: INTERNAL MEDICINE

## 2020-01-01 PROCEDURE — 82040 ASSAY OF SERUM ALBUMIN: CPT | Performed by: PEDIATRICS

## 2020-01-01 PROCEDURE — 93010 ELECTROCARDIOGRAM REPORT: CPT | Mod: Z6 | Performed by: FAMILY MEDICINE

## 2020-01-01 PROCEDURE — 73600 X-RAY EXAM OF ANKLE: CPT | Mod: TC,RT

## 2020-01-01 PROCEDURE — 71046 X-RAY EXAM CHEST 2 VIEWS: CPT | Mod: TC

## 2020-01-01 PROCEDURE — 99239 HOSP IP/OBS DSCHRG MGMT >30: CPT | Performed by: FAMILY MEDICINE

## 2020-01-01 PROCEDURE — 73610 X-RAY EXAM OF ANKLE: CPT | Mod: TC,RT

## 2020-01-01 RX ORDER — BENZTROPINE MESYLATE 0.5 MG/1
1-2 TABLET ORAL 3 TIMES DAILY PRN
Status: DISCONTINUED | OUTPATIENT
Start: 2020-01-01 | End: 2020-01-01 | Stop reason: HOSPADM

## 2020-01-01 RX ORDER — ONDANSETRON 2 MG/ML
4 INJECTION INTRAMUSCULAR; INTRAVENOUS EVERY 6 HOURS PRN
Status: DISCONTINUED | OUTPATIENT
Start: 2020-01-01 | End: 2020-01-01 | Stop reason: HOSPADM

## 2020-01-01 RX ORDER — DICYCLOMINE HCL 20 MG
20 TABLET ORAL 3 TIMES DAILY
Qty: 90 TABLET | Refills: 1 | Status: ON HOLD
Start: 2020-01-01 | End: 2020-01-01

## 2020-01-01 RX ORDER — IPRATROPIUM BROMIDE AND ALBUTEROL SULFATE 2.5; .5 MG/3ML; MG/3ML
3 SOLUTION RESPIRATORY (INHALATION) EVERY 4 HOURS PRN
DISCHARGE
Start: 2020-01-01 | End: 2020-01-01

## 2020-01-01 RX ORDER — LACTOBACILLUS ACIDOPHILUS 500MM CELL
1 CAPSULE ORAL DAILY
Qty: 30 CAPSULE | Refills: 4
Start: 2020-01-01

## 2020-01-01 RX ORDER — CITALOPRAM HYDROBROMIDE 10 MG/1
10 TABLET ORAL DAILY
Qty: 14 TABLET | Refills: 0 | Status: SHIPPED | OUTPATIENT
Start: 2020-01-01 | End: 2020-01-01

## 2020-01-01 RX ORDER — WARFARIN SODIUM 5 MG/1
5 TABLET ORAL
Status: COMPLETED | OUTPATIENT
Start: 2020-01-01 | End: 2020-01-01

## 2020-01-01 RX ORDER — GUAIFENESIN 600 MG/1
600 TABLET, EXTENDED RELEASE ORAL 2 TIMES DAILY
Status: DISCONTINUED | OUTPATIENT
Start: 2020-01-01 | End: 2020-01-01 | Stop reason: HOSPADM

## 2020-01-01 RX ORDER — DIGOXIN 125 MCG
125 TABLET ORAL DAILY
Status: DISCONTINUED | OUTPATIENT
Start: 2020-01-01 | End: 2020-01-01 | Stop reason: HOSPADM

## 2020-01-01 RX ORDER — SENNA AND DOCUSATE SODIUM 50; 8.6 MG/1; MG/1
1 TABLET, FILM COATED ORAL DAILY
Qty: 20 TABLET | Refills: 1 | Status: CANCELLED | OUTPATIENT
Start: 2020-01-01

## 2020-01-01 RX ORDER — ACETAMINOPHEN 325 MG/1
650 TABLET ORAL EVERY 4 HOURS PRN
Status: DISCONTINUED | OUTPATIENT
Start: 2020-01-01 | End: 2020-01-01

## 2020-01-01 RX ORDER — WARFARIN SODIUM 2.5 MG/1
2.5 TABLET ORAL
Status: COMPLETED | OUTPATIENT
Start: 2020-01-01 | End: 2020-01-01

## 2020-01-01 RX ORDER — IBUPROFEN 600 MG/1
600 TABLET, FILM COATED ORAL EVERY 6 HOURS PRN
Status: DISCONTINUED | OUTPATIENT
Start: 2020-01-01 | End: 2020-01-01 | Stop reason: HOSPADM

## 2020-01-01 RX ORDER — ACETAMINOPHEN 650 MG/1
650 SUPPOSITORY RECTAL EVERY 4 HOURS PRN
Status: DISCONTINUED | OUTPATIENT
Start: 2020-01-01 | End: 2020-01-01 | Stop reason: HOSPADM

## 2020-01-01 RX ORDER — POTASSIUM CHLORIDE 7.45 MG/ML
10 INJECTION INTRAVENOUS
Status: DISCONTINUED | OUTPATIENT
Start: 2020-01-01 | End: 2020-01-01 | Stop reason: HOSPADM

## 2020-01-01 RX ORDER — ALPRAZOLAM 0.25 MG
0.25 TABLET ORAL 2 TIMES DAILY
Qty: 60 TABLET | Refills: 0 | Status: SHIPPED | OUTPATIENT
Start: 2020-01-01 | End: 2020-01-01

## 2020-01-01 RX ORDER — WARFARIN SODIUM 2.5 MG/1
TABLET ORAL
Qty: 60 TABLET | Refills: 0 | Status: SHIPPED | OUTPATIENT
Start: 2020-01-01

## 2020-01-01 RX ORDER — ALBUTEROL SULFATE 90 UG/1
2 AEROSOL, METERED RESPIRATORY (INHALATION) EVERY 4 HOURS PRN
Qty: 1 INHALER | Refills: 1 | Status: ON HOLD | OUTPATIENT
Start: 2020-01-01 | End: 2020-01-01

## 2020-01-01 RX ORDER — CITALOPRAM HYDROBROMIDE 20 MG/1
20 TABLET ORAL DAILY
Status: ON HOLD | COMMUNITY
End: 2020-01-01

## 2020-01-01 RX ORDER — ALPRAZOLAM 0.5 MG
0.5 TABLET ORAL 3 TIMES DAILY
COMMUNITY
End: 2020-01-01

## 2020-01-01 RX ORDER — ALPRAZOLAM 0.25 MG
0.25 TABLET ORAL 3 TIMES DAILY
Qty: 90 TABLET | Refills: 0 | Status: SHIPPED | OUTPATIENT
Start: 2020-01-01 | End: 2020-01-01

## 2020-01-01 RX ORDER — POTASSIUM CHLORIDE 1500 MG/1
40 TABLET, EXTENDED RELEASE ORAL 2 TIMES DAILY
Qty: 120 TABLET | Refills: 0 | Status: SHIPPED | OUTPATIENT
Start: 2020-01-01

## 2020-01-01 RX ORDER — ALPRAZOLAM 0.5 MG
0.5 TABLET ORAL 3 TIMES DAILY
Qty: 60 TABLET | Refills: 0 | Status: CANCELLED
Start: 2020-01-01

## 2020-01-01 RX ORDER — CITALOPRAM HYDROBROMIDE 10 MG/1
10 TABLET ORAL DAILY
Status: DISCONTINUED | OUTPATIENT
Start: 2020-01-01 | End: 2020-01-01 | Stop reason: HOSPADM

## 2020-01-01 RX ORDER — CITALOPRAM HYDROBROMIDE 10 MG/1
10 TABLET ORAL DAILY
Qty: 30 TABLET | Status: CANCELLED
Start: 2020-01-01

## 2020-01-01 RX ORDER — ALPRAZOLAM 0.5 MG
0.5 TABLET ORAL 3 TIMES DAILY
Qty: 30 TABLET | Refills: 0 | Status: SHIPPED | OUTPATIENT
Start: 2020-01-01 | End: 2020-01-01

## 2020-01-01 RX ORDER — SIMVASTATIN 20 MG
20 TABLET ORAL AT BEDTIME
Status: DISCONTINUED | OUTPATIENT
Start: 2020-01-01 | End: 2020-01-01 | Stop reason: HOSPADM

## 2020-01-01 RX ORDER — OXYCODONE HYDROCHLORIDE 5 MG/1
2.5 TABLET ORAL EVERY 4 HOURS PRN
Qty: 20 TABLET | Refills: 0 | Status: SHIPPED | OUTPATIENT
Start: 2020-01-01

## 2020-01-01 RX ORDER — IBUPROFEN 600 MG/1
600 TABLET, FILM COATED ORAL EVERY 6 HOURS PRN
DISCHARGE
Start: 2020-01-01 | End: 2020-01-01

## 2020-01-01 RX ORDER — GUAIFENESIN 600 MG/1
600 TABLET, EXTENDED RELEASE ORAL 2 TIMES DAILY
Status: ON HOLD | DISCHARGE
Start: 2020-01-01 | End: 2020-01-01

## 2020-01-01 RX ORDER — POTASSIUM CHLORIDE 1500 MG/1
20-40 TABLET, EXTENDED RELEASE ORAL
Status: DISCONTINUED | OUTPATIENT
Start: 2020-01-01 | End: 2020-01-01 | Stop reason: HOSPADM

## 2020-01-01 RX ORDER — ALPRAZOLAM 0.25 MG
0.25 TABLET ORAL 3 TIMES DAILY
Qty: 90 TABLET | Refills: 1 | Status: ON HOLD | OUTPATIENT
Start: 2020-01-01 | End: 2020-01-01

## 2020-01-01 RX ORDER — SODIUM CHLORIDE 9 MG/ML
INJECTION, SOLUTION INTRAVENOUS CONTINUOUS
Status: DISCONTINUED | OUTPATIENT
Start: 2020-01-01 | End: 2020-01-01

## 2020-01-01 RX ORDER — NALOXONE HYDROCHLORIDE 0.4 MG/ML
.1-.4 INJECTION, SOLUTION INTRAMUSCULAR; INTRAVENOUS; SUBCUTANEOUS
Status: DISCONTINUED | OUTPATIENT
Start: 2020-01-01 | End: 2020-01-01 | Stop reason: HOSPADM

## 2020-01-01 RX ORDER — GABAPENTIN 100 MG/1
200 CAPSULE ORAL AT BEDTIME
Qty: 60 CAPSULE | Refills: 1 | Status: ON HOLD | OUTPATIENT
Start: 2020-01-01 | End: 2020-01-01

## 2020-01-01 RX ORDER — SIMVASTATIN 10 MG
10 TABLET ORAL
Qty: 30 TABLET | Refills: 1 | Status: ON HOLD | OUTPATIENT
Start: 2020-01-01 | End: 2020-01-01

## 2020-01-01 RX ORDER — ASPIRIN 81 MG/1
81 TABLET ORAL DAILY
Status: DISCONTINUED | OUTPATIENT
Start: 2020-01-01 | End: 2020-01-01 | Stop reason: HOSPADM

## 2020-01-01 RX ORDER — DIGOXIN 0.25 MG/ML
125 INJECTION INTRAMUSCULAR; INTRAVENOUS EVERY 6 HOURS
Status: COMPLETED | OUTPATIENT
Start: 2020-01-01 | End: 2020-01-01

## 2020-01-01 RX ORDER — LEVALBUTEROL INHALATION SOLUTION 1.25 MG/3ML
1.25 SOLUTION RESPIRATORY (INHALATION) EVERY 4 HOURS PRN
Status: DISCONTINUED | OUTPATIENT
Start: 2020-01-01 | End: 2020-01-01 | Stop reason: HOSPADM

## 2020-01-01 RX ORDER — MAGNESIUM SULFATE HEPTAHYDRATE 40 MG/ML
4 INJECTION, SOLUTION INTRAVENOUS EVERY 4 HOURS PRN
Status: DISCONTINUED | OUTPATIENT
Start: 2020-01-01 | End: 2020-01-01 | Stop reason: HOSPADM

## 2020-01-01 RX ORDER — METHYLPREDNISOLONE ACETATE 40 MG/ML
40 INJECTION, SUSPENSION INTRA-ARTICULAR; INTRALESIONAL; INTRAMUSCULAR; SOFT TISSUE ONCE
Status: COMPLETED | OUTPATIENT
Start: 2020-01-01 | End: 2020-01-01

## 2020-01-01 RX ORDER — ONDANSETRON 4 MG/1
4 TABLET, ORALLY DISINTEGRATING ORAL EVERY 6 HOURS PRN
Status: DISCONTINUED | OUTPATIENT
Start: 2020-01-01 | End: 2020-01-01 | Stop reason: HOSPADM

## 2020-01-01 RX ORDER — FUROSEMIDE 10 MG/ML
10 INJECTION INTRAMUSCULAR; INTRAVENOUS ONCE
Status: COMPLETED | OUTPATIENT
Start: 2020-01-01 | End: 2020-01-01

## 2020-01-01 RX ORDER — ALPRAZOLAM 0.25 MG
0.25 TABLET ORAL 3 TIMES DAILY
Qty: 90 TABLET | Refills: 0 | Status: SHIPPED | OUTPATIENT
Start: 2020-01-01 | End: 2020-01-01 | Stop reason: ALTCHOICE

## 2020-01-01 RX ORDER — ALPRAZOLAM 0.5 MG
0.5 TABLET ORAL 3 TIMES DAILY PRN
Qty: 60 TABLET | Refills: 0 | Status: SHIPPED | OUTPATIENT
Start: 2020-01-01 | End: 2020-01-01 | Stop reason: DRUGHIGH

## 2020-01-01 RX ORDER — POTASSIUM CHLORIDE 750 MG/1
10 TABLET, EXTENDED RELEASE ORAL 2 TIMES DAILY
Status: DISCONTINUED | OUTPATIENT
Start: 2020-01-01 | End: 2020-01-01

## 2020-01-01 RX ORDER — GABAPENTIN 100 MG/1
200 CAPSULE ORAL AT BEDTIME
Qty: 60 CAPSULE | Refills: 0 | Status: SHIPPED | OUTPATIENT
Start: 2020-01-01

## 2020-01-01 RX ORDER — TORSEMIDE 20 MG/1
20 TABLET ORAL DAILY
Qty: 30 TABLET | Refills: 0 | Status: SHIPPED | OUTPATIENT
Start: 2020-01-01

## 2020-01-01 RX ORDER — ALPRAZOLAM 0.5 MG
0.5 TABLET ORAL 3 TIMES DAILY
Qty: 30 TABLET | Refills: 0 | Status: SHIPPED | OUTPATIENT
Start: 2020-01-01 | End: 2020-01-01 | Stop reason: DRUGHIGH

## 2020-01-01 RX ORDER — LOSARTAN POTASSIUM 50 MG/1
50 TABLET ORAL DAILY
Qty: 30 TABLET | Refills: 1 | Status: ON HOLD | OUTPATIENT
Start: 2020-01-01 | End: 2020-01-01

## 2020-01-01 RX ORDER — DIGOXIN 125 MCG
125 TABLET ORAL DAILY
Qty: 30 TABLET | Refills: 0 | Status: SHIPPED | OUTPATIENT
Start: 2020-01-01

## 2020-01-01 RX ORDER — POLYETHYLENE GLYCOL 3350 17 G/17G
17 POWDER, FOR SOLUTION ORAL DAILY PRN
Status: DISCONTINUED | OUTPATIENT
Start: 2020-01-01 | End: 2020-01-01

## 2020-01-01 RX ORDER — MORPHINE SULFATE 4 MG/ML
4 INJECTION, SOLUTION INTRAMUSCULAR; INTRAVENOUS
Status: COMPLETED | OUTPATIENT
Start: 2020-01-01 | End: 2020-01-01

## 2020-01-01 RX ORDER — LOPERAMIDE HCL 2 MG
2 CAPSULE ORAL 4 TIMES DAILY PRN
Qty: 20 CAPSULE | Refills: 0 | Status: SHIPPED | OUTPATIENT
Start: 2020-01-01

## 2020-01-01 RX ORDER — DIGOXIN 0.25 MG/ML
125 INJECTION INTRAMUSCULAR; INTRAVENOUS ONCE
Status: COMPLETED | OUTPATIENT
Start: 2020-01-01 | End: 2020-01-01

## 2020-01-01 RX ORDER — FUROSEMIDE 20 MG
20 TABLET ORAL DAILY
Qty: 30 TABLET | Refills: 1 | Status: ON HOLD | OUTPATIENT
Start: 2020-01-01 | End: 2020-01-01

## 2020-01-01 RX ORDER — POTASSIUM CHLORIDE 1.5 G/1.58G
20-40 POWDER, FOR SOLUTION ORAL
Status: DISCONTINUED | OUTPATIENT
Start: 2020-01-01 | End: 2020-01-01 | Stop reason: HOSPADM

## 2020-01-01 RX ORDER — POTASSIUM CHLORIDE 1500 MG/1
40 TABLET, EXTENDED RELEASE ORAL 3 TIMES DAILY
Status: DISCONTINUED | OUTPATIENT
Start: 2020-01-01 | End: 2020-01-01 | Stop reason: HOSPADM

## 2020-01-01 RX ORDER — METOPROLOL TARTRATE 1 MG/ML
5 INJECTION, SOLUTION INTRAVENOUS EVERY 6 HOURS
Status: DISCONTINUED | OUTPATIENT
Start: 2020-01-01 | End: 2020-01-01

## 2020-01-01 RX ORDER — SENNA AND DOCUSATE SODIUM 50; 8.6 MG/1; MG/1
1 TABLET, FILM COATED ORAL DAILY PRN
Qty: 20 TABLET | Refills: 1 | Status: ON HOLD
Start: 2020-01-01 | End: 2020-01-01

## 2020-01-01 RX ORDER — BISACODYL 10 MG
10 SUPPOSITORY, RECTAL RECTAL ONCE
Status: COMPLETED | OUTPATIENT
Start: 2020-01-01 | End: 2020-01-01

## 2020-01-01 RX ORDER — DICYCLOMINE HYDROCHLORIDE 10 MG/1
20 CAPSULE ORAL
Status: DISCONTINUED | OUTPATIENT
Start: 2020-01-01 | End: 2020-01-01

## 2020-01-01 RX ORDER — FUROSEMIDE 10 MG/ML
40 INJECTION INTRAMUSCULAR; INTRAVENOUS EVERY 6 HOURS
Status: DISCONTINUED | OUTPATIENT
Start: 2020-01-01 | End: 2020-01-01

## 2020-01-01 RX ORDER — PROCHLORPERAZINE MALEATE 5 MG
5 TABLET ORAL EVERY 6 HOURS PRN
Status: DISCONTINUED | OUTPATIENT
Start: 2020-01-01 | End: 2020-01-01 | Stop reason: HOSPADM

## 2020-01-01 RX ORDER — POTASSIUM CL/LIDO/0.9 % NACL 10MEQ/0.1L
10 INTRAVENOUS SOLUTION, PIGGYBACK (ML) INTRAVENOUS
Status: DISCONTINUED | OUTPATIENT
Start: 2020-01-01 | End: 2020-01-01 | Stop reason: HOSPADM

## 2020-01-01 RX ORDER — ALPRAZOLAM 0.5 MG
0.5 TABLET ORAL 3 TIMES DAILY PRN
Status: DISCONTINUED | OUTPATIENT
Start: 2020-01-01 | End: 2020-01-01 | Stop reason: HOSPADM

## 2020-01-01 RX ORDER — MORPHINE SULFATE 100 MG/5ML
5 SOLUTION ORAL
Qty: 30 ML | Refills: 0 | Status: SHIPPED | OUTPATIENT
Start: 2020-01-01

## 2020-01-01 RX ORDER — ALPRAZOLAM 0.25 MG
TABLET ORAL
Qty: 90 TABLET | Refills: 5 | Status: SHIPPED | OUTPATIENT
Start: 2020-01-01 | End: 2020-01-01

## 2020-01-01 RX ORDER — MORPHINE SULFATE 2 MG/ML
2-4 INJECTION, SOLUTION INTRAMUSCULAR; INTRAVENOUS EVERY 4 HOURS PRN
Status: DISCONTINUED | OUTPATIENT
Start: 2020-01-01 | End: 2020-01-01

## 2020-01-01 RX ORDER — METOPROLOL TARTRATE 25 MG/1
25 TABLET, FILM COATED ORAL 2 TIMES DAILY
Qty: 60 TABLET | Refills: 0 | Status: SHIPPED | OUTPATIENT
Start: 2020-01-01

## 2020-01-01 RX ORDER — QUETIAPINE FUMARATE 25 MG/1
25 TABLET, FILM COATED ORAL ONCE
Status: COMPLETED | OUTPATIENT
Start: 2020-01-01 | End: 2020-01-01

## 2020-01-01 RX ORDER — LIDOCAINE HYDROCHLORIDE 10 MG/ML
4 INJECTION, SOLUTION INFILTRATION; PERINEURAL ONCE
Status: COMPLETED | OUTPATIENT
Start: 2020-01-01 | End: 2020-01-01

## 2020-01-01 RX ORDER — LIDOCAINE 40 MG/G
CREAM TOPICAL
Status: DISCONTINUED | OUTPATIENT
Start: 2020-01-01 | End: 2020-01-01 | Stop reason: HOSPADM

## 2020-01-01 RX ORDER — GABAPENTIN 800 MG/1
800 TABLET ORAL 3 TIMES DAILY
Status: CANCELLED | OUTPATIENT
Start: 2020-01-01

## 2020-01-01 RX ORDER — CITALOPRAM HYDROBROMIDE 10 MG/1
10 TABLET ORAL DAILY
Qty: 30 TABLET | Refills: 3
Start: 2020-01-01 | End: 2020-01-01

## 2020-01-01 RX ORDER — METOLAZONE 2.5 MG/1
2.5 TABLET ORAL
Qty: 15 TABLET | Refills: 0 | Status: SHIPPED | OUTPATIENT
Start: 2020-01-01

## 2020-01-01 RX ORDER — LOSARTAN POTASSIUM 50 MG/1
100 TABLET ORAL DAILY
Status: DISCONTINUED | OUTPATIENT
Start: 2020-01-01 | End: 2020-01-01 | Stop reason: HOSPADM

## 2020-01-01 RX ORDER — TAMSULOSIN HYDROCHLORIDE 0.4 MG/1
0.4 CAPSULE ORAL DAILY
COMMUNITY
Start: 2020-01-01

## 2020-01-01 RX ORDER — WARFARIN SODIUM 2.5 MG/1
2.5 TABLET ORAL
Status: DISCONTINUED | OUTPATIENT
Start: 2020-01-01 | End: 2020-01-01 | Stop reason: HOSPADM

## 2020-01-01 RX ORDER — OSELTAMIVIR PHOSPHATE 75 MG/1
75 CAPSULE ORAL DAILY
COMMUNITY
Start: 2020-01-01 | End: 2020-01-01

## 2020-01-01 RX ORDER — SENNA AND DOCUSATE SODIUM 50; 8.6 MG/1; MG/1
1 TABLET, FILM COATED ORAL AT BEDTIME
Start: 2020-01-01 | End: 2020-01-01

## 2020-01-01 RX ORDER — IPRATROPIUM BROMIDE AND ALBUTEROL SULFATE 2.5; .5 MG/3ML; MG/3ML
3 SOLUTION RESPIRATORY (INHALATION) EVERY 4 HOURS PRN
Status: DISCONTINUED | OUTPATIENT
Start: 2020-01-01 | End: 2020-01-01 | Stop reason: HOSPADM

## 2020-01-01 RX ORDER — TORSEMIDE 20 MG/1
20 TABLET ORAL DAILY
Status: DISCONTINUED | OUTPATIENT
Start: 2020-01-01 | End: 2020-01-01 | Stop reason: HOSPADM

## 2020-01-01 RX ORDER — FUROSEMIDE 10 MG/ML
40 INJECTION INTRAMUSCULAR; INTRAVENOUS ONCE
Status: COMPLETED | OUTPATIENT
Start: 2020-01-01 | End: 2020-01-01

## 2020-01-01 RX ORDER — CITALOPRAM HYDROBROMIDE 20 MG/1
20 TABLET ORAL DAILY
Status: DISCONTINUED | OUTPATIENT
Start: 2020-01-01 | End: 2020-01-01

## 2020-01-01 RX ORDER — ALBUTEROL SULFATE 90 UG/1
2 AEROSOL, METERED RESPIRATORY (INHALATION) EVERY 4 HOURS PRN
Qty: 1 INHALER | Refills: 0 | Status: SHIPPED | OUTPATIENT
Start: 2020-01-01

## 2020-01-01 RX ORDER — ACETAMINOPHEN 500 MG
1000 TABLET ORAL 3 TIMES DAILY
Status: DISCONTINUED | OUTPATIENT
Start: 2020-01-01 | End: 2020-01-01 | Stop reason: HOSPADM

## 2020-01-01 RX ORDER — METOPROLOL TARTRATE 25 MG/1
25 TABLET, FILM COATED ORAL 2 TIMES DAILY
Status: DISCONTINUED | OUTPATIENT
Start: 2020-01-01 | End: 2020-01-01 | Stop reason: HOSPADM

## 2020-01-01 RX ORDER — POTASSIUM CHLORIDE 29.8 MG/ML
20 INJECTION INTRAVENOUS
Status: DISCONTINUED | OUTPATIENT
Start: 2020-01-01 | End: 2020-01-01 | Stop reason: HOSPADM

## 2020-01-01 RX ORDER — ALPRAZOLAM 0.25 MG
0.25 TABLET ORAL DAILY PRN
Qty: 20 TABLET | Refills: 0 | Status: SHIPPED | OUTPATIENT
Start: 2020-01-01 | End: 2020-01-01

## 2020-01-01 RX ORDER — LOPERAMIDE HCL 2 MG
2 CAPSULE ORAL 4 TIMES DAILY PRN
Status: DISCONTINUED | OUTPATIENT
Start: 2020-01-01 | End: 2020-01-01 | Stop reason: HOSPADM

## 2020-01-01 RX ORDER — MORPHINE SULFATE 100 MG/5ML
5 SOLUTION ORAL
Qty: 30 ML | Refills: 0 | Status: SHIPPED | OUTPATIENT
Start: 2020-01-01 | End: 2020-01-01

## 2020-01-01 RX ORDER — QUETIAPINE FUMARATE 25 MG/1
25 TABLET, FILM COATED ORAL AT BEDTIME
Status: DISCONTINUED | OUTPATIENT
Start: 2020-01-01 | End: 2020-01-01

## 2020-01-01 RX ORDER — ALPRAZOLAM 0.25 MG
0.25 TABLET ORAL 3 TIMES DAILY
Status: DISCONTINUED | OUTPATIENT
Start: 2020-01-01 | End: 2020-01-01 | Stop reason: HOSPADM

## 2020-01-01 RX ORDER — OXYCODONE HYDROCHLORIDE 5 MG/1
2.5 TABLET ORAL EVERY 4 HOURS PRN
Qty: 10 TABLET | Refills: 0 | Status: SHIPPED | OUTPATIENT
Start: 2020-01-01 | End: 2020-01-01

## 2020-01-01 RX ORDER — ALPRAZOLAM 0.25 MG
0.25 TABLET ORAL 2 TIMES DAILY
COMMUNITY
End: 2020-01-01

## 2020-01-01 RX ORDER — PROCHLORPERAZINE 25 MG
12.5 SUPPOSITORY, RECTAL RECTAL EVERY 12 HOURS PRN
Status: DISCONTINUED | OUTPATIENT
Start: 2020-01-01 | End: 2020-01-01 | Stop reason: HOSPADM

## 2020-01-01 RX ORDER — MAGNESIUM SULFATE HEPTAHYDRATE 40 MG/ML
2 INJECTION, SOLUTION INTRAVENOUS DAILY PRN
Status: DISCONTINUED | OUTPATIENT
Start: 2020-01-01 | End: 2020-01-01 | Stop reason: HOSPADM

## 2020-01-01 RX ORDER — MORPHINE SULFATE 2 MG/ML
1 INJECTION, SOLUTION INTRAMUSCULAR; INTRAVENOUS
Status: DISCONTINUED | OUTPATIENT
Start: 2020-01-01 | End: 2020-01-01

## 2020-01-01 RX ORDER — ACETAMINOPHEN 500 MG
1000 TABLET ORAL 3 TIMES DAILY
Qty: 180 TABLET | Refills: 0 | Status: SHIPPED | OUTPATIENT
Start: 2020-01-01

## 2020-01-01 RX ORDER — TROLAMINE SALICYLATE 10 G/100G
CREAM TOPICAL 4 TIMES DAILY PRN
Start: 2020-01-01

## 2020-01-01 RX ORDER — SODIUM CHLORIDE AND POTASSIUM CHLORIDE 150; 900 MG/100ML; MG/100ML
INJECTION, SOLUTION INTRAVENOUS CONTINUOUS
Status: DISCONTINUED | OUTPATIENT
Start: 2020-01-01 | End: 2020-01-01

## 2020-01-01 RX ORDER — POTASSIUM CHLORIDE 1500 MG/1
20 TABLET, EXTENDED RELEASE ORAL 3 TIMES DAILY
Status: DISCONTINUED | OUTPATIENT
Start: 2020-01-01 | End: 2020-01-01

## 2020-01-01 RX ORDER — ACETAMINOPHEN 500 MG
1000 TABLET ORAL EVERY 6 HOURS PRN
Status: DISCONTINUED | OUTPATIENT
Start: 2020-01-01 | End: 2020-01-01

## 2020-01-01 RX ORDER — QUETIAPINE FUMARATE 25 MG/1
12.5 TABLET, FILM COATED ORAL AT BEDTIME
Qty: 15 TABLET | Refills: 0 | Status: SHIPPED | OUTPATIENT
Start: 2020-01-01 | End: 2020-01-01

## 2020-01-01 RX ORDER — LORAZEPAM 2 MG/ML
1 CONCENTRATE ORAL
Qty: 30 ML | Refills: 0 | Status: SHIPPED | OUTPATIENT
Start: 2020-01-01

## 2020-01-01 RX ORDER — MORPHINE SULFATE 10 MG/5ML
5 SOLUTION ORAL
Qty: 100 ML | Refills: 0 | Status: SHIPPED | OUTPATIENT
Start: 2020-01-01 | End: 2020-01-01

## 2020-01-01 RX ORDER — SIMVASTATIN 20 MG
20 TABLET ORAL AT BEDTIME
Qty: 30 TABLET | Refills: 0 | Status: SHIPPED | OUTPATIENT
Start: 2020-01-01

## 2020-01-01 RX ADMIN — MICONAZOLE NITRATE: 20 POWDER TOPICAL at 08:56

## 2020-01-01 RX ADMIN — SIMVASTATIN 20 MG: 20 TABLET, FILM COATED ORAL at 21:27

## 2020-01-01 RX ADMIN — TORSEMIDE 20 MG: 20 TABLET ORAL at 08:11

## 2020-01-01 RX ADMIN — FUROSEMIDE 5 MG/HR: 10 INJECTION, SOLUTION INTRAVENOUS at 05:14

## 2020-01-01 RX ADMIN — IPRATROPIUM BROMIDE AND ALBUTEROL SULFATE 3 ML: .5; 3 SOLUTION RESPIRATORY (INHALATION) at 12:03

## 2020-01-01 RX ADMIN — Medication 12.5 MG: at 20:55

## 2020-01-01 RX ADMIN — DIGOXIN 125 MCG: 125 TABLET ORAL at 08:22

## 2020-01-01 RX ADMIN — FUROSEMIDE 40 MG: 10 INJECTION, SOLUTION INTRAVENOUS at 21:30

## 2020-01-01 RX ADMIN — SIMVASTATIN 20 MG: 20 TABLET, FILM COATED ORAL at 22:35

## 2020-01-01 RX ADMIN — ALPRAZOLAM 0.25 MG: 0.25 TABLET ORAL at 14:11

## 2020-01-01 RX ADMIN — MICONAZOLE NITRATE: 20 POWDER TOPICAL at 21:21

## 2020-01-01 RX ADMIN — SODIUM CHLORIDE 500 ML: 9 INJECTION, SOLUTION INTRAVENOUS at 16:04

## 2020-01-01 RX ADMIN — SIMVASTATIN 20 MG: 20 TABLET, FILM COATED ORAL at 19:54

## 2020-01-01 RX ADMIN — ALPRAZOLAM 0.25 MG: 0.25 TABLET ORAL at 08:46

## 2020-01-01 RX ADMIN — DICYCLOMINE HYDROCHLORIDE 20 MG: 10 CAPSULE ORAL at 17:44

## 2020-01-01 RX ADMIN — METOPROLOL TARTRATE 5 MG: 5 INJECTION INTRAVENOUS at 09:31

## 2020-01-01 RX ADMIN — CITALOPRAM HYDROBROMIDE 20 MG: 20 TABLET ORAL at 09:14

## 2020-01-01 RX ADMIN — Medication 12.5 MG: at 16:02

## 2020-01-01 RX ADMIN — METOPROLOL TARTRATE 25 MG: 25 TABLET, FILM COATED ORAL at 08:55

## 2020-01-01 RX ADMIN — ACETAMINOPHEN 1000 MG: 500 TABLET, FILM COATED ORAL at 14:45

## 2020-01-01 RX ADMIN — DICYCLOMINE HYDROCHLORIDE 20 MG: 10 CAPSULE ORAL at 18:31

## 2020-01-01 RX ADMIN — MORPHINE SULFATE 4 MG: 4 INJECTION, SOLUTION INTRAMUSCULAR; INTRAVENOUS at 19:27

## 2020-01-01 RX ADMIN — DIGOXIN 125 MCG: 125 TABLET ORAL at 09:03

## 2020-01-01 RX ADMIN — DIGOXIN 125 MCG: 125 TABLET ORAL at 09:27

## 2020-01-01 RX ADMIN — CITALOPRAM HYDROBROMIDE 10 MG: 10 TABLET ORAL at 08:11

## 2020-01-01 RX ADMIN — FUROSEMIDE 40 MG: 10 INJECTION, SOLUTION INTRAVENOUS at 15:36

## 2020-01-01 RX ADMIN — POTASSIUM CHLORIDE 20 MEQ: 1500 TABLET, EXTENDED RELEASE ORAL at 19:54

## 2020-01-01 RX ADMIN — POTASSIUM CHLORIDE AND SODIUM CHLORIDE: 900; 150 INJECTION, SOLUTION INTRAVENOUS at 19:58

## 2020-01-01 RX ADMIN — GUAIFENESIN 600 MG: 600 TABLET, EXTENDED RELEASE ORAL at 20:10

## 2020-01-01 RX ADMIN — POLYETHYLENE GLYCOL 3350 17 G: 17 POWDER, FOR SOLUTION ORAL at 03:22

## 2020-01-01 RX ADMIN — ACETAMINOPHEN 650 MG: 325 TABLET, FILM COATED ORAL at 12:12

## 2020-01-01 RX ADMIN — DICYCLOMINE HYDROCHLORIDE 20 MG: 10 CAPSULE ORAL at 12:12

## 2020-01-01 RX ADMIN — Medication 12.5 MG: at 20:52

## 2020-01-01 RX ADMIN — IPRATROPIUM BROMIDE 0.5 MG: 0.5 SOLUTION RESPIRATORY (INHALATION) at 01:03

## 2020-01-01 RX ADMIN — POTASSIUM CHLORIDE 10 MEQ: 750 TABLET, EXTENDED RELEASE ORAL at 09:34

## 2020-01-01 RX ADMIN — MICONAZOLE NITRATE: 20 POWDER TOPICAL at 20:42

## 2020-01-01 RX ADMIN — ALPRAZOLAM 0.25 MG: 0.25 TABLET ORAL at 13:32

## 2020-01-01 RX ADMIN — METOPROLOL TARTRATE 25 MG: 25 TABLET, FILM COATED ORAL at 09:37

## 2020-01-01 RX ADMIN — GUAIFENESIN 600 MG: 600 TABLET, EXTENDED RELEASE ORAL at 20:13

## 2020-01-01 RX ADMIN — DICYCLOMINE HYDROCHLORIDE 20 MG: 10 CAPSULE ORAL at 12:20

## 2020-01-01 RX ADMIN — FUROSEMIDE 5 MG/HR: 10 INJECTION, SOLUTION INTRAVENOUS at 02:34

## 2020-01-01 RX ADMIN — SIMVASTATIN 20 MG: 20 TABLET, FILM COATED ORAL at 21:29

## 2020-01-01 RX ADMIN — MORPHINE SULFATE 1 MG: 2 INJECTION, SOLUTION INTRAMUSCULAR; INTRAVENOUS at 00:34

## 2020-01-01 RX ADMIN — ALPRAZOLAM 0.25 MG: 0.25 TABLET ORAL at 10:08

## 2020-01-01 RX ADMIN — METOPROLOL TARTRATE 25 MG: 25 TABLET, FILM COATED ORAL at 09:16

## 2020-01-01 RX ADMIN — GUAIFENESIN 600 MG: 600 TABLET, EXTENDED RELEASE ORAL at 21:44

## 2020-01-01 RX ADMIN — POTASSIUM CHLORIDE AND SODIUM CHLORIDE: 900; 150 INJECTION, SOLUTION INTRAVENOUS at 16:55

## 2020-01-01 RX ADMIN — SIMVASTATIN 20 MG: 20 TABLET, FILM COATED ORAL at 21:14

## 2020-01-01 RX ADMIN — CITALOPRAM HYDROBROMIDE 20 MG: 20 TABLET ORAL at 08:47

## 2020-01-01 RX ADMIN — ENOXAPARIN SODIUM 40 MG: 40 INJECTION SUBCUTANEOUS at 09:35

## 2020-01-01 RX ADMIN — CITALOPRAM HYDROBROMIDE 10 MG: 10 TABLET ORAL at 08:44

## 2020-01-01 RX ADMIN — ALPRAZOLAM 0.25 MG: 0.25 TABLET ORAL at 08:55

## 2020-01-01 RX ADMIN — DICYCLOMINE HYDROCHLORIDE 20 MG: 10 CAPSULE ORAL at 12:24

## 2020-01-01 RX ADMIN — GUAIFENESIN 600 MG: 600 TABLET, EXTENDED RELEASE ORAL at 10:20

## 2020-01-01 RX ADMIN — METOPROLOL TARTRATE 25 MG: 25 TABLET, FILM COATED ORAL at 08:11

## 2020-01-01 RX ADMIN — ALPRAZOLAM 0.5 MG: 0.5 TABLET ORAL at 10:06

## 2020-01-01 RX ADMIN — CITALOPRAM HYDROBROMIDE 20 MG: 20 TABLET ORAL at 09:00

## 2020-01-01 RX ADMIN — IPRATROPIUM BROMIDE AND ALBUTEROL SULFATE 3 ML: .5; 3 SOLUTION RESPIRATORY (INHALATION) at 17:45

## 2020-01-01 RX ADMIN — SIMVASTATIN 20 MG: 20 TABLET, FILM COATED ORAL at 21:05

## 2020-01-01 RX ADMIN — POTASSIUM CHLORIDE 20 MEQ: 1.5 POWDER, FOR SOLUTION ORAL at 09:03

## 2020-01-01 RX ADMIN — ASPIRIN 81 MG: 81 TABLET, DELAYED RELEASE ORAL at 09:14

## 2020-01-01 RX ADMIN — FUROSEMIDE 40 MG: 10 INJECTION, SOLUTION INTRAVENOUS at 14:35

## 2020-01-01 RX ADMIN — DIGOXIN 125 MCG: 0.25 INJECTION INTRAMUSCULAR; INTRAVENOUS at 08:31

## 2020-01-01 RX ADMIN — MORPHINE SULFATE 2 MG: 2 INJECTION, SOLUTION INTRAMUSCULAR; INTRAVENOUS at 10:55

## 2020-01-01 RX ADMIN — ALPRAZOLAM 0.25 MG: 0.25 TABLET ORAL at 14:35

## 2020-01-01 RX ADMIN — POTASSIUM CHLORIDE 20 MEQ: 1500 TABLET, EXTENDED RELEASE ORAL at 21:19

## 2020-01-01 RX ADMIN — IBUPROFEN 600 MG: 600 TABLET ORAL at 14:53

## 2020-01-01 RX ADMIN — DICYCLOMINE HYDROCHLORIDE 20 MG: 10 CAPSULE ORAL at 09:16

## 2020-01-01 RX ADMIN — ALPRAZOLAM 0.5 MG: 0.5 TABLET ORAL at 21:43

## 2020-01-01 RX ADMIN — ENOXAPARIN SODIUM 40 MG: 40 INJECTION SUBCUTANEOUS at 09:48

## 2020-01-01 RX ADMIN — METOPROLOL TARTRATE 25 MG: 25 TABLET, FILM COATED ORAL at 09:15

## 2020-01-01 RX ADMIN — Medication 12.5 MG: at 19:55

## 2020-01-01 RX ADMIN — ENOXAPARIN SODIUM 40 MG: 40 INJECTION SUBCUTANEOUS at 09:36

## 2020-01-01 RX ADMIN — CITALOPRAM HYDROBROMIDE 20 MG: 20 TABLET ORAL at 08:22

## 2020-01-01 RX ADMIN — POTASSIUM CHLORIDE 40 MEQ: 1500 TABLET, EXTENDED RELEASE ORAL at 14:22

## 2020-01-01 RX ADMIN — WARFARIN SODIUM 2.5 MG: 2.5 TABLET ORAL at 17:24

## 2020-01-01 RX ADMIN — IPRATROPIUM BROMIDE 0.5 MG: 0.5 SOLUTION RESPIRATORY (INHALATION) at 13:50

## 2020-01-01 RX ADMIN — ALPRAZOLAM 0.5 MG: 0.5 TABLET ORAL at 11:07

## 2020-01-01 RX ADMIN — BISACODYL 10 MG: 10 SUPPOSITORY RECTAL at 11:32

## 2020-01-01 RX ADMIN — IPRATROPIUM BROMIDE 0.5 MG: 0.5 SOLUTION RESPIRATORY (INHALATION) at 10:03

## 2020-01-01 RX ADMIN — ENOXAPARIN SODIUM 40 MG: 40 INJECTION SUBCUTANEOUS at 08:55

## 2020-01-01 RX ADMIN — DICYCLOMINE HYDROCHLORIDE 20 MG: 10 CAPSULE ORAL at 08:22

## 2020-01-01 RX ADMIN — DICYCLOMINE HYDROCHLORIDE 20 MG: 10 CAPSULE ORAL at 12:16

## 2020-01-01 RX ADMIN — LOPERAMIDE HYDROCHLORIDE 2 MG: 2 CAPSULE ORAL at 18:22

## 2020-01-01 RX ADMIN — ACETAMINOPHEN 650 MG: 325 TABLET, FILM COATED ORAL at 17:21

## 2020-01-01 RX ADMIN — ASPIRIN 81 MG: 81 TABLET, DELAYED RELEASE ORAL at 08:11

## 2020-01-01 RX ADMIN — DIGOXIN 125 MCG: 125 TABLET ORAL at 09:36

## 2020-01-01 RX ADMIN — IPRATROPIUM BROMIDE 0.5 MG: 0.5 SOLUTION RESPIRATORY (INHALATION) at 05:21

## 2020-01-01 RX ADMIN — POTASSIUM CHLORIDE 20 MEQ: 1500 TABLET, EXTENDED RELEASE ORAL at 14:44

## 2020-01-01 RX ADMIN — ALPRAZOLAM 0.5 MG: 0.5 TABLET ORAL at 14:29

## 2020-01-01 RX ADMIN — DIGOXIN 125 MCG: 125 TABLET ORAL at 09:33

## 2020-01-01 RX ADMIN — METOPROLOL TARTRATE 25 MG: 25 TABLET, FILM COATED ORAL at 20:26

## 2020-01-01 RX ADMIN — SODIUM CHLORIDE: 9 INJECTION, SOLUTION INTRAVENOUS at 22:17

## 2020-01-01 RX ADMIN — DICYCLOMINE HYDROCHLORIDE 20 MG: 10 CAPSULE ORAL at 08:46

## 2020-01-01 RX ADMIN — IPRATROPIUM BROMIDE 0.5 MG: 0.5 SOLUTION RESPIRATORY (INHALATION) at 17:00

## 2020-01-01 RX ADMIN — IPRATROPIUM BROMIDE AND ALBUTEROL SULFATE 3 ML: .5; 3 SOLUTION RESPIRATORY (INHALATION) at 15:32

## 2020-01-01 RX ADMIN — FUROSEMIDE 40 MG: 10 INJECTION, SOLUTION INTRAVENOUS at 09:03

## 2020-01-01 RX ADMIN — Medication 2.5 MG: at 02:01

## 2020-01-01 RX ADMIN — MICONAZOLE NITRATE: 20 POWDER TOPICAL at 21:18

## 2020-01-01 RX ADMIN — METOPROLOL TARTRATE 25 MG: 25 TABLET, FILM COATED ORAL at 08:23

## 2020-01-01 RX ADMIN — ACETAMINOPHEN 650 MG: 325 TABLET, FILM COATED ORAL at 22:21

## 2020-01-01 RX ADMIN — MICONAZOLE NITRATE: 20 POWDER TOPICAL at 20:21

## 2020-01-01 RX ADMIN — POTASSIUM CHLORIDE 20 MEQ: 1500 TABLET, EXTENDED RELEASE ORAL at 14:45

## 2020-01-01 RX ADMIN — POTASSIUM CHLORIDE 20 MEQ: 1500 TABLET, EXTENDED RELEASE ORAL at 09:17

## 2020-01-01 RX ADMIN — LEVALBUTEROL HYDROCHLORIDE 1.25 MG: 1.25 SOLUTION RESPIRATORY (INHALATION) at 12:55

## 2020-01-01 RX ADMIN — METOPROLOL TARTRATE 25 MG: 25 TABLET, FILM COATED ORAL at 08:44

## 2020-01-01 RX ADMIN — QUETIAPINE FUMARATE 25 MG: 25 TABLET ORAL at 20:39

## 2020-01-01 RX ADMIN — MICONAZOLE NITRATE: 20 POWDER TOPICAL at 20:35

## 2020-01-01 RX ADMIN — ALPRAZOLAM 0.25 MG: 0.25 TABLET ORAL at 08:44

## 2020-01-01 RX ADMIN — DICYCLOMINE HYDROCHLORIDE 20 MG: 10 CAPSULE ORAL at 07:58

## 2020-01-01 RX ADMIN — FUROSEMIDE 40 MG: 10 INJECTION, SOLUTION INTRAVENOUS at 09:06

## 2020-01-01 RX ADMIN — DIGOXIN 125 MCG: 0.25 INJECTION INTRAMUSCULAR; INTRAVENOUS at 15:04

## 2020-01-01 RX ADMIN — POTASSIUM CHLORIDE 40 MEQ: 1500 TABLET, EXTENDED RELEASE ORAL at 08:08

## 2020-01-01 RX ADMIN — DIGOXIN 125 MCG: 125 TABLET ORAL at 08:11

## 2020-01-01 RX ADMIN — ALPRAZOLAM 0.25 MG: 0.25 TABLET ORAL at 09:07

## 2020-01-01 RX ADMIN — METOPROLOL TARTRATE 25 MG: 25 TABLET, FILM COATED ORAL at 21:59

## 2020-01-01 RX ADMIN — ALPRAZOLAM 0.25 MG: 0.25 TABLET ORAL at 08:11

## 2020-01-01 RX ADMIN — POTASSIUM CHLORIDE 40 MEQ: 1500 TABLET, EXTENDED RELEASE ORAL at 08:30

## 2020-01-01 RX ADMIN — MAGNESIUM SULFATE IN WATER 2 G: 40 INJECTION, SOLUTION INTRAVENOUS at 19:30

## 2020-01-01 RX ADMIN — IPRATROPIUM BROMIDE 0.5 MG: 0.5 SOLUTION RESPIRATORY (INHALATION) at 20:34

## 2020-01-01 RX ADMIN — DIGOXIN 125 MCG: 125 TABLET ORAL at 09:06

## 2020-01-01 RX ADMIN — SODIUM CHLORIDE: 9 INJECTION, SOLUTION INTRAVENOUS at 15:41

## 2020-01-01 RX ADMIN — MICONAZOLE NITRATE: 20 POWDER TOPICAL at 09:16

## 2020-01-01 RX ADMIN — FUROSEMIDE 5 MG/HR: 10 INJECTION, SOLUTION INTRAVENOUS at 04:54

## 2020-01-01 RX ADMIN — SIMVASTATIN 20 MG: 20 TABLET, FILM COATED ORAL at 20:55

## 2020-01-01 RX ADMIN — SODIUM CHLORIDE: 9 INJECTION, SOLUTION INTRAVENOUS at 01:28

## 2020-01-01 RX ADMIN — MICONAZOLE NITRATE: 20 POWDER TOPICAL at 21:34

## 2020-01-01 RX ADMIN — ONDANSETRON 4 MG: 4 TABLET, ORALLY DISINTEGRATING ORAL at 04:18

## 2020-01-01 RX ADMIN — FUROSEMIDE 40 MG: 10 INJECTION, SOLUTION INTRAVENOUS at 03:51

## 2020-01-01 RX ADMIN — ALPRAZOLAM 0.25 MG: 0.25 TABLET ORAL at 20:51

## 2020-01-01 RX ADMIN — ALPRAZOLAM 0.25 MG: 0.25 TABLET ORAL at 20:21

## 2020-01-01 RX ADMIN — POTASSIUM CHLORIDE 10 MEQ: 750 TABLET, EXTENDED RELEASE ORAL at 08:47

## 2020-01-01 RX ADMIN — MORPHINE SULFATE 2 MG: 2 INJECTION, SOLUTION INTRAMUSCULAR; INTRAVENOUS at 23:50

## 2020-01-01 RX ADMIN — IPRATROPIUM BROMIDE 0.5 MG: 0.5 SOLUTION RESPIRATORY (INHALATION) at 11:43

## 2020-01-01 RX ADMIN — METOPROLOL TARTRATE 25 MG: 25 TABLET, FILM COATED ORAL at 20:20

## 2020-01-01 RX ADMIN — IPRATROPIUM BROMIDE 0.5 MG: 0.5 SOLUTION RESPIRATORY (INHALATION) at 09:52

## 2020-01-01 RX ADMIN — POTASSIUM CHLORIDE 10 MEQ: 750 TABLET, EXTENDED RELEASE ORAL at 20:38

## 2020-01-01 RX ADMIN — SIMVASTATIN 20 MG: 20 TABLET, FILM COATED ORAL at 20:51

## 2020-01-01 RX ADMIN — DICYCLOMINE HYDROCHLORIDE 20 MG: 10 CAPSULE ORAL at 17:24

## 2020-01-01 RX ADMIN — DIGOXIN 125 MCG: 125 TABLET ORAL at 09:00

## 2020-01-01 RX ADMIN — LOSARTAN POTASSIUM 100 MG: 50 TABLET ORAL at 08:43

## 2020-01-01 RX ADMIN — FUROSEMIDE 10 MG: 10 INJECTION, SOLUTION INTRAVENOUS at 22:43

## 2020-01-01 RX ADMIN — METOPROLOL TARTRATE 25 MG: 25 TABLET, FILM COATED ORAL at 12:21

## 2020-01-01 RX ADMIN — FUROSEMIDE 10 MG: 10 INJECTION, SOLUTION INTRAVENOUS at 07:44

## 2020-01-01 RX ADMIN — POTASSIUM CHLORIDE 10 MEQ: 750 TABLET, EXTENDED RELEASE ORAL at 20:26

## 2020-01-01 RX ADMIN — ACETAMINOPHEN 650 MG: 325 TABLET, FILM COATED ORAL at 10:14

## 2020-01-01 RX ADMIN — MICONAZOLE NITRATE: 20 POWDER TOPICAL at 08:15

## 2020-01-01 RX ADMIN — ALPRAZOLAM 0.25 MG: 0.25 TABLET ORAL at 15:35

## 2020-01-01 RX ADMIN — ENOXAPARIN SODIUM 40 MG: 40 INJECTION SUBCUTANEOUS at 10:08

## 2020-01-01 RX ADMIN — ALPRAZOLAM 0.25 MG: 0.25 TABLET ORAL at 08:22

## 2020-01-01 RX ADMIN — POTASSIUM CHLORIDE 10 MEQ: 750 TABLET, EXTENDED RELEASE ORAL at 08:20

## 2020-01-01 RX ADMIN — LIDOCAINE HYDROCHLORIDE 4 ML: 10 INJECTION, SOLUTION INFILTRATION; PERINEURAL at 11:03

## 2020-01-01 RX ADMIN — ACETAMINOPHEN 1000 MG: 500 TABLET, FILM COATED ORAL at 08:55

## 2020-01-01 RX ADMIN — SIMVASTATIN 20 MG: 20 TABLET, FILM COATED ORAL at 21:17

## 2020-01-01 RX ADMIN — DICYCLOMINE HYDROCHLORIDE 20 MG: 10 CAPSULE ORAL at 11:12

## 2020-01-01 RX ADMIN — METOPROLOL TARTRATE 25 MG: 25 TABLET, FILM COATED ORAL at 20:44

## 2020-01-01 RX ADMIN — GUAIFENESIN 600 MG: 600 TABLET, EXTENDED RELEASE ORAL at 20:09

## 2020-01-01 RX ADMIN — ENOXAPARIN SODIUM 40 MG: 40 INJECTION SUBCUTANEOUS at 09:14

## 2020-01-01 RX ADMIN — ALPRAZOLAM 0.25 MG: 0.25 TABLET ORAL at 20:20

## 2020-01-01 RX ADMIN — FLUTICASONE FUROATE 1 PUFF: 200 POWDER RESPIRATORY (INHALATION) at 08:38

## 2020-01-01 RX ADMIN — POTASSIUM CHLORIDE 40 MEQ: 1500 TABLET, EXTENDED RELEASE ORAL at 21:14

## 2020-01-01 RX ADMIN — POTASSIUM CHLORIDE 20 MEQ: 1500 TABLET, EXTENDED RELEASE ORAL at 09:16

## 2020-01-01 RX ADMIN — ASPIRIN 81 MG: 81 TABLET, DELAYED RELEASE ORAL at 09:45

## 2020-01-01 RX ADMIN — ENOXAPARIN SODIUM 40 MG: 40 INJECTION SUBCUTANEOUS at 09:40

## 2020-01-01 RX ADMIN — DICYCLOMINE HYDROCHLORIDE 20 MG: 10 CAPSULE ORAL at 09:07

## 2020-01-01 RX ADMIN — ASPIRIN 81 MG: 81 TABLET, DELAYED RELEASE ORAL at 08:46

## 2020-01-01 RX ADMIN — Medication 2.5 MG: at 11:24

## 2020-01-01 RX ADMIN — ALPRAZOLAM 0.25 MG: 0.25 TABLET ORAL at 21:14

## 2020-01-01 RX ADMIN — ACETAMINOPHEN 650 MG: 325 TABLET, FILM COATED ORAL at 13:41

## 2020-01-01 RX ADMIN — ACETAMINOPHEN 650 MG: 325 TABLET, FILM COATED ORAL at 03:24

## 2020-01-01 RX ADMIN — ENOXAPARIN SODIUM 40 MG: 40 INJECTION SUBCUTANEOUS at 09:00

## 2020-01-01 RX ADMIN — ACETAMINOPHEN 650 MG: 325 TABLET, FILM COATED ORAL at 09:16

## 2020-01-01 RX ADMIN — FUROSEMIDE 40 MG: 10 INJECTION, SOLUTION INTRAVENOUS at 15:14

## 2020-01-01 RX ADMIN — SIMVASTATIN 20 MG: 20 TABLET, FILM COATED ORAL at 20:26

## 2020-01-01 RX ADMIN — GUAIFENESIN 600 MG: 600 TABLET, EXTENDED RELEASE ORAL at 08:43

## 2020-01-01 RX ADMIN — POTASSIUM CHLORIDE 10 MEQ: 750 TABLET, EXTENDED RELEASE ORAL at 09:27

## 2020-01-01 RX ADMIN — Medication 12.5 MG: at 21:04

## 2020-01-01 RX ADMIN — POLYETHYLENE GLYCOL 3350 17 G: 17 POWDER, FOR SOLUTION ORAL at 15:01

## 2020-01-01 RX ADMIN — METOPROLOL TARTRATE 25 MG: 25 TABLET, FILM COATED ORAL at 21:02

## 2020-01-01 RX ADMIN — FUROSEMIDE 40 MG: 10 INJECTION, SOLUTION INTRAVENOUS at 09:36

## 2020-01-01 RX ADMIN — ACETAMINOPHEN 1000 MG: 500 TABLET, FILM COATED ORAL at 02:52

## 2020-01-01 RX ADMIN — IBUPROFEN 600 MG: 600 TABLET ORAL at 23:51

## 2020-01-01 RX ADMIN — CITALOPRAM HYDROBROMIDE 20 MG: 20 TABLET ORAL at 08:46

## 2020-01-01 RX ADMIN — CITALOPRAM HYDROBROMIDE 20 MG: 20 TABLET ORAL at 09:34

## 2020-01-01 RX ADMIN — ACETAMINOPHEN 1000 MG: 500 TABLET, FILM COATED ORAL at 19:37

## 2020-01-01 RX ADMIN — FUROSEMIDE 40 MG: 10 INJECTION, SOLUTION INTRAVENOUS at 22:00

## 2020-01-01 RX ADMIN — ASPIRIN 81 MG: 81 TABLET, DELAYED RELEASE ORAL at 08:55

## 2020-01-01 RX ADMIN — DICYCLOMINE HYDROCHLORIDE 20 MG: 10 CAPSULE ORAL at 12:02

## 2020-01-01 RX ADMIN — LEVALBUTEROL HYDROCHLORIDE 1.25 MG: 1.25 SOLUTION RESPIRATORY (INHALATION) at 16:03

## 2020-01-01 RX ADMIN — CITALOPRAM HYDROBROMIDE 20 MG: 20 TABLET ORAL at 09:27

## 2020-01-01 RX ADMIN — ACETAMINOPHEN 650 MG: 325 TABLET, FILM COATED ORAL at 21:30

## 2020-01-01 RX ADMIN — DICYCLOMINE HYDROCHLORIDE 20 MG: 10 CAPSULE ORAL at 13:04

## 2020-01-01 RX ADMIN — ACETAMINOPHEN 650 MG: 325 TABLET, FILM COATED ORAL at 08:45

## 2020-01-01 RX ADMIN — ACETAMINOPHEN 650 MG: 325 TABLET, FILM COATED ORAL at 00:24

## 2020-01-01 RX ADMIN — DICYCLOMINE HYDROCHLORIDE 20 MG: 10 CAPSULE ORAL at 18:03

## 2020-01-01 RX ADMIN — ALPRAZOLAM 0.25 MG: 0.25 TABLET ORAL at 16:06

## 2020-01-01 RX ADMIN — DICYCLOMINE HYDROCHLORIDE 20 MG: 10 CAPSULE ORAL at 09:03

## 2020-01-01 RX ADMIN — DIGOXIN 125 MCG: 125 TABLET ORAL at 08:46

## 2020-01-01 RX ADMIN — ENOXAPARIN SODIUM 40 MG: 40 INJECTION SUBCUTANEOUS at 09:16

## 2020-01-01 RX ADMIN — ALPRAZOLAM 0.25 MG: 0.25 TABLET ORAL at 21:58

## 2020-01-01 RX ADMIN — ALPRAZOLAM 0.25 MG: 0.25 TABLET ORAL at 14:13

## 2020-01-01 RX ADMIN — MICONAZOLE NITRATE: 20 POWDER TOPICAL at 20:58

## 2020-01-01 RX ADMIN — GUAIFENESIN 600 MG: 600 TABLET, EXTENDED RELEASE ORAL at 08:35

## 2020-01-01 RX ADMIN — ENOXAPARIN SODIUM 40 MG: 40 INJECTION SUBCUTANEOUS at 10:38

## 2020-01-01 RX ADMIN — ACETAMINOPHEN 1000 MG: 500 TABLET, FILM COATED ORAL at 14:22

## 2020-01-01 RX ADMIN — LOSARTAN POTASSIUM 100 MG: 50 TABLET ORAL at 08:31

## 2020-01-01 RX ADMIN — QUETIAPINE FUMARATE 25 MG: 25 TABLET ORAL at 02:19

## 2020-01-01 RX ADMIN — FUROSEMIDE 5 MG/HR: 10 INJECTION, SOLUTION INTRAVENOUS at 09:04

## 2020-01-01 RX ADMIN — FUROSEMIDE 40 MG: 10 INJECTION, SOLUTION INTRAVENOUS at 15:03

## 2020-01-01 RX ADMIN — ASPIRIN 81 MG: 81 TABLET, DELAYED RELEASE ORAL at 09:34

## 2020-01-01 RX ADMIN — FUROSEMIDE 40 MG: 10 INJECTION, SOLUTION INTRAVENOUS at 03:01

## 2020-01-01 RX ADMIN — Medication 12.5 MG: at 21:16

## 2020-01-01 RX ADMIN — ALPRAZOLAM 0.25 MG: 0.25 TABLET ORAL at 09:03

## 2020-01-01 RX ADMIN — CITALOPRAM HYDROBROMIDE 20 MG: 20 TABLET ORAL at 09:37

## 2020-01-01 RX ADMIN — ASPIRIN 81 MG: 81 TABLET, DELAYED RELEASE ORAL at 09:16

## 2020-01-01 RX ADMIN — ALPRAZOLAM 0.5 MG: 0.5 TABLET ORAL at 08:35

## 2020-01-01 RX ADMIN — DICYCLOMINE HYDROCHLORIDE 20 MG: 10 CAPSULE ORAL at 07:51

## 2020-01-01 RX ADMIN — ALPRAZOLAM 0.25 MG: 0.25 TABLET ORAL at 14:01

## 2020-01-01 RX ADMIN — DICYCLOMINE HYDROCHLORIDE 20 MG: 10 CAPSULE ORAL at 08:47

## 2020-01-01 RX ADMIN — SIMVASTATIN 20 MG: 20 TABLET, FILM COATED ORAL at 21:44

## 2020-01-01 RX ADMIN — FUROSEMIDE 40 MG: 10 INJECTION, SOLUTION INTRAVENOUS at 10:08

## 2020-01-01 RX ADMIN — SIMVASTATIN 20 MG: 20 TABLET, FILM COATED ORAL at 20:20

## 2020-01-01 RX ADMIN — IPRATROPIUM BROMIDE 0.5 MG: 0.5 SOLUTION RESPIRATORY (INHALATION) at 00:10

## 2020-01-01 RX ADMIN — METOPROLOL TARTRATE 25 MG: 25 TABLET, FILM COATED ORAL at 20:51

## 2020-01-01 RX ADMIN — DIGOXIN 125 MCG: 125 TABLET ORAL at 08:44

## 2020-01-01 RX ADMIN — DICYCLOMINE HYDROCHLORIDE 20 MG: 10 CAPSULE ORAL at 15:36

## 2020-01-01 RX ADMIN — CITALOPRAM HYDROBROMIDE 20 MG: 20 TABLET ORAL at 08:55

## 2020-01-01 RX ADMIN — DICYCLOMINE HYDROCHLORIDE 20 MG: 10 CAPSULE ORAL at 12:05

## 2020-01-01 RX ADMIN — Medication 2.5 MG: at 15:41

## 2020-01-01 RX ADMIN — FUROSEMIDE 40 MG: 10 INJECTION, SOLUTION INTRAVENOUS at 08:46

## 2020-01-01 RX ADMIN — ALPRAZOLAM 0.25 MG: 0.25 TABLET ORAL at 21:27

## 2020-01-01 RX ADMIN — METOPROLOL TARTRATE 25 MG: 25 TABLET, FILM COATED ORAL at 09:03

## 2020-01-01 RX ADMIN — METOPROLOL TARTRATE 25 MG: 25 TABLET, FILM COATED ORAL at 08:46

## 2020-01-01 RX ADMIN — ACETAMINOPHEN 650 MG: 325 TABLET, FILM COATED ORAL at 18:54

## 2020-01-01 RX ADMIN — IPRATROPIUM BROMIDE 0.5 MG: 0.5 SOLUTION RESPIRATORY (INHALATION) at 15:59

## 2020-01-01 RX ADMIN — POTASSIUM CHLORIDE 20 MEQ: 1500 TABLET, EXTENDED RELEASE ORAL at 08:55

## 2020-01-01 RX ADMIN — DIGOXIN 125 MCG: 125 TABLET ORAL at 08:55

## 2020-01-01 RX ADMIN — POTASSIUM CHLORIDE 20 MEQ: 1500 TABLET, EXTENDED RELEASE ORAL at 11:12

## 2020-01-01 RX ADMIN — IPRATROPIUM BROMIDE 0.5 MG: 0.5 SOLUTION RESPIRATORY (INHALATION) at 01:08

## 2020-01-01 RX ADMIN — ALPRAZOLAM 0.5 MG: 0.5 TABLET ORAL at 18:33

## 2020-01-01 RX ADMIN — WARFARIN SODIUM 5 MG: 5 TABLET ORAL at 17:55

## 2020-01-01 RX ADMIN — ALPRAZOLAM 0.25 MG: 0.25 TABLET ORAL at 15:01

## 2020-01-01 RX ADMIN — IPRATROPIUM BROMIDE 0.5 MG: 0.5 SOLUTION RESPIRATORY (INHALATION) at 09:41

## 2020-01-01 RX ADMIN — DICYCLOMINE HYDROCHLORIDE 20 MG: 10 CAPSULE ORAL at 17:21

## 2020-01-01 RX ADMIN — GUAIFENESIN 600 MG: 600 TABLET, EXTENDED RELEASE ORAL at 08:30

## 2020-01-01 RX ADMIN — POTASSIUM CHLORIDE 10 MEQ: 750 TABLET, EXTENDED RELEASE ORAL at 21:27

## 2020-01-01 RX ADMIN — IPRATROPIUM BROMIDE 0.5 MG: 0.5 SOLUTION RESPIRATORY (INHALATION) at 08:01

## 2020-01-01 RX ADMIN — ASPIRIN 81 MG: 81 TABLET, DELAYED RELEASE ORAL at 09:00

## 2020-01-01 RX ADMIN — CITALOPRAM HYDROBROMIDE 20 MG: 20 TABLET ORAL at 09:45

## 2020-01-01 RX ADMIN — CITALOPRAM HYDROBROMIDE 20 MG: 20 TABLET ORAL at 09:06

## 2020-01-01 RX ADMIN — POTASSIUM CHLORIDE 40 MEQ: 1500 TABLET, EXTENDED RELEASE ORAL at 20:55

## 2020-01-01 RX ADMIN — IPRATROPIUM BROMIDE 0.5 MG: 0.5 SOLUTION RESPIRATORY (INHALATION) at 12:55

## 2020-01-01 RX ADMIN — METOPROLOL TARTRATE 25 MG: 25 TABLET, FILM COATED ORAL at 20:02

## 2020-01-01 RX ADMIN — SIMVASTATIN 20 MG: 20 TABLET, FILM COATED ORAL at 20:38

## 2020-01-01 RX ADMIN — MICONAZOLE NITRATE: 20 POWDER TOPICAL at 07:59

## 2020-01-01 RX ADMIN — POTASSIUM CHLORIDE 20 MEQ: 1500 TABLET, EXTENDED RELEASE ORAL at 09:03

## 2020-01-01 RX ADMIN — ALPRAZOLAM 0.25 MG: 0.25 TABLET ORAL at 14:45

## 2020-01-01 RX ADMIN — IPRATROPIUM BROMIDE 0.5 MG: 0.5 SOLUTION RESPIRATORY (INHALATION) at 03:51

## 2020-01-01 RX ADMIN — WARFARIN SODIUM 2.5 MG: 2.5 TABLET ORAL at 17:37

## 2020-01-01 RX ADMIN — METOPROLOL TARTRATE 25 MG: 25 TABLET, FILM COATED ORAL at 08:47

## 2020-01-01 RX ADMIN — FLUTICASONE FUROATE 1 PUFF: 200 POWDER RESPIRATORY (INHALATION) at 14:09

## 2020-01-01 RX ADMIN — POTASSIUM CHLORIDE 10 MEQ: 750 TABLET, EXTENDED RELEASE ORAL at 20:51

## 2020-01-01 RX ADMIN — ALPRAZOLAM 0.25 MG: 0.25 TABLET ORAL at 20:55

## 2020-01-01 RX ADMIN — MICONAZOLE NITRATE: 20 POWDER TOPICAL at 08:44

## 2020-01-01 RX ADMIN — ASPIRIN 81 MG: 81 TABLET, DELAYED RELEASE ORAL at 08:47

## 2020-01-01 RX ADMIN — ALPRAZOLAM 0.25 MG: 0.25 TABLET ORAL at 14:22

## 2020-01-01 RX ADMIN — FUROSEMIDE 10 MG: 10 INJECTION, SOLUTION INTRAVENOUS at 02:16

## 2020-01-01 RX ADMIN — Medication 12.5 MG: at 21:14

## 2020-01-01 RX ADMIN — SIMVASTATIN 20 MG: 20 TABLET, FILM COATED ORAL at 20:10

## 2020-01-01 RX ADMIN — ASPIRIN 81 MG: 81 TABLET, DELAYED RELEASE ORAL at 08:22

## 2020-01-01 RX ADMIN — LOSARTAN POTASSIUM 100 MG: 50 TABLET ORAL at 08:35

## 2020-01-01 RX ADMIN — DICYCLOMINE HYDROCHLORIDE 20 MG: 10 CAPSULE ORAL at 15:56

## 2020-01-01 RX ADMIN — WARFARIN SODIUM 5 MG: 5 TABLET ORAL at 18:40

## 2020-01-01 RX ADMIN — ALPRAZOLAM 0.5 MG: 0.5 TABLET ORAL at 20:10

## 2020-01-01 RX ADMIN — METOPROLOL TARTRATE 25 MG: 25 TABLET, FILM COATED ORAL at 09:27

## 2020-01-01 RX ADMIN — POLYETHYLENE GLYCOL 3350 17 G: 17 POWDER, FOR SOLUTION ORAL at 19:37

## 2020-01-01 RX ADMIN — DICYCLOMINE HYDROCHLORIDE 20 MG: 10 CAPSULE ORAL at 16:07

## 2020-01-01 RX ADMIN — TORSEMIDE 20 MG: 20 TABLET ORAL at 09:52

## 2020-01-01 RX ADMIN — MICONAZOLE NITRATE: 20 POWDER TOPICAL at 09:04

## 2020-01-01 RX ADMIN — METHYLPREDNISOLONE ACETATE 40 MG: 40 INJECTION, SUSPENSION INTRA-ARTICULAR; INTRALESIONAL; INTRAMUSCULAR; SOFT TISSUE at 11:03

## 2020-01-01 RX ADMIN — POTASSIUM CHLORIDE 10 MEQ: 750 TABLET, EXTENDED RELEASE ORAL at 09:03

## 2020-01-01 RX ADMIN — IBUPROFEN 600 MG: 600 TABLET ORAL at 10:06

## 2020-01-01 RX ADMIN — IPRATROPIUM BROMIDE 0.5 MG: 0.5 SOLUTION RESPIRATORY (INHALATION) at 20:37

## 2020-01-01 RX ADMIN — CITALOPRAM HYDROBROMIDE 20 MG: 20 TABLET ORAL at 09:16

## 2020-01-01 RX ADMIN — ALPRAZOLAM 0.25 MG: 0.25 TABLET ORAL at 21:16

## 2020-01-01 RX ADMIN — POTASSIUM CHLORIDE 10 MEQ: 750 TABLET, EXTENDED RELEASE ORAL at 20:53

## 2020-01-01 RX ADMIN — POTASSIUM CHLORIDE 10 MEQ: 750 TABLET, EXTENDED RELEASE ORAL at 09:37

## 2020-01-01 RX ADMIN — POTASSIUM CHLORIDE 20 MEQ: 1500 TABLET, EXTENDED RELEASE ORAL at 09:36

## 2020-01-01 RX ADMIN — ENOXAPARIN SODIUM 40 MG: 40 INJECTION SUBCUTANEOUS at 08:48

## 2020-01-01 RX ADMIN — DICYCLOMINE HYDROCHLORIDE 20 MG: 10 CAPSULE ORAL at 16:37

## 2020-01-01 RX ADMIN — DICYCLOMINE HYDROCHLORIDE 20 MG: 10 CAPSULE ORAL at 11:22

## 2020-01-01 RX ADMIN — ALPRAZOLAM 0.25 MG: 0.25 TABLET ORAL at 09:37

## 2020-01-01 RX ADMIN — DIGOXIN 125 MCG: 125 TABLET ORAL at 09:14

## 2020-01-01 RX ADMIN — FUROSEMIDE 5 MG/HR: 10 INJECTION, SOLUTION INTRAVENOUS at 18:07

## 2020-01-01 RX ADMIN — ALPRAZOLAM 0.25 MG: 0.25 TABLET ORAL at 21:04

## 2020-01-01 RX ADMIN — ACETAMINOPHEN 650 MG: 325 TABLET, FILM COATED ORAL at 20:51

## 2020-01-01 RX ADMIN — FUROSEMIDE 40 MG: 10 INJECTION, SOLUTION INTRAVENOUS at 03:00

## 2020-01-01 RX ADMIN — DICYCLOMINE HYDROCHLORIDE 20 MG: 10 CAPSULE ORAL at 11:56

## 2020-01-01 RX ADMIN — POTASSIUM CHLORIDE 40 MEQ: 1500 TABLET, EXTENDED RELEASE ORAL at 08:46

## 2020-01-01 RX ADMIN — ALPRAZOLAM 0.25 MG: 0.25 TABLET ORAL at 13:41

## 2020-01-01 RX ADMIN — IPRATROPIUM BROMIDE AND ALBUTEROL SULFATE 3 ML: .5; 3 SOLUTION RESPIRATORY (INHALATION) at 12:35

## 2020-01-01 RX ADMIN — MICONAZOLE NITRATE: 20 POWDER TOPICAL at 19:53

## 2020-01-01 RX ADMIN — IPRATROPIUM BROMIDE 0.5 MG: 0.5 SOLUTION RESPIRATORY (INHALATION) at 17:44

## 2020-01-01 RX ADMIN — FUROSEMIDE 5 MG/HR: 10 INJECTION, SOLUTION INTRAVENOUS at 01:28

## 2020-01-01 RX ADMIN — METOPROLOL TARTRATE 25 MG: 25 TABLET, FILM COATED ORAL at 21:16

## 2020-01-01 RX ADMIN — FUROSEMIDE 5 MG/HR: 10 INJECTION, SOLUTION INTRAVENOUS at 05:31

## 2020-01-01 RX ADMIN — FUROSEMIDE 40 MG: 10 INJECTION, SOLUTION INTRAVENOUS at 20:45

## 2020-01-01 RX ADMIN — FUROSEMIDE 5 MG/HR: 10 INJECTION, SOLUTION INTRAVENOUS at 14:42

## 2020-01-01 RX ADMIN — IPRATROPIUM BROMIDE 0.5 MG: 0.5 SOLUTION RESPIRATORY (INHALATION) at 21:35

## 2020-01-01 RX ADMIN — DICYCLOMINE HYDROCHLORIDE 20 MG: 10 CAPSULE ORAL at 12:03

## 2020-01-01 RX ADMIN — METOPROLOL TARTRATE 25 MG: 25 TABLET, FILM COATED ORAL at 20:55

## 2020-01-01 RX ADMIN — DICYCLOMINE HYDROCHLORIDE 20 MG: 10 CAPSULE ORAL at 08:55

## 2020-01-01 RX ADMIN — POTASSIUM CHLORIDE 40 MEQ: 1500 TABLET, EXTENDED RELEASE ORAL at 13:32

## 2020-01-01 RX ADMIN — ACETAMINOPHEN 650 MG: 325 TABLET, FILM COATED ORAL at 12:00

## 2020-01-01 RX ADMIN — Medication 2.5 MG: at 06:35

## 2020-01-01 RX ADMIN — ENOXAPARIN SODIUM 40 MG: 40 INJECTION SUBCUTANEOUS at 09:06

## 2020-01-01 RX ADMIN — ACETAMINOPHEN 650 MG: 325 TABLET, FILM COATED ORAL at 16:37

## 2020-01-01 RX ADMIN — ALPRAZOLAM 0.25 MG: 0.25 TABLET ORAL at 20:39

## 2020-01-01 RX ADMIN — POTASSIUM CHLORIDE 10 MEQ: 750 TABLET, EXTENDED RELEASE ORAL at 21:30

## 2020-01-01 RX ADMIN — ALPRAZOLAM 0.25 MG: 0.25 TABLET ORAL at 20:02

## 2020-01-01 RX ADMIN — ACETAMINOPHEN 650 MG: 325 TABLET, FILM COATED ORAL at 09:27

## 2020-01-01 RX ADMIN — SIMVASTATIN 20 MG: 20 TABLET, FILM COATED ORAL at 20:52

## 2020-01-01 RX ADMIN — ACETAMINOPHEN 1000 MG: 500 TABLET, FILM COATED ORAL at 08:44

## 2020-01-01 RX ADMIN — DICYCLOMINE HYDROCHLORIDE 20 MG: 10 CAPSULE ORAL at 09:45

## 2020-01-01 RX ADMIN — DICYCLOMINE HYDROCHLORIDE 20 MG: 10 CAPSULE ORAL at 12:28

## 2020-01-01 RX ADMIN — ACETAMINOPHEN 1000 MG: 500 TABLET, FILM COATED ORAL at 05:08

## 2020-01-01 RX ADMIN — ALPRAZOLAM 0.5 MG: 0.5 TABLET ORAL at 20:39

## 2020-01-01 RX ADMIN — ALPRAZOLAM 0.25 MG: 0.25 TABLET ORAL at 15:00

## 2020-01-01 RX ADMIN — ASPIRIN 81 MG: 81 TABLET, DELAYED RELEASE ORAL at 08:44

## 2020-01-01 RX ADMIN — POTASSIUM CHLORIDE 20 MEQ: 1500 TABLET, EXTENDED RELEASE ORAL at 08:45

## 2020-01-01 RX ADMIN — ALPRAZOLAM 0.25 MG: 0.25 TABLET ORAL at 09:16

## 2020-01-01 RX ADMIN — ALPRAZOLAM 0.25 MG: 0.25 TABLET ORAL at 09:14

## 2020-01-01 RX ADMIN — POTASSIUM CHLORIDE 10 MEQ: 750 TABLET, EXTENDED RELEASE ORAL at 20:20

## 2020-01-01 RX ADMIN — METOPROLOL TARTRATE 25 MG: 25 TABLET, FILM COATED ORAL at 09:07

## 2020-01-01 RX ADMIN — MICONAZOLE NITRATE: 20 POWDER TOPICAL at 09:01

## 2020-01-01 RX ADMIN — POTASSIUM CHLORIDE AND SODIUM CHLORIDE: 900; 150 INJECTION, SOLUTION INTRAVENOUS at 05:56

## 2020-01-01 RX ADMIN — DICYCLOMINE HYDROCHLORIDE 20 MG: 10 CAPSULE ORAL at 09:36

## 2020-01-01 RX ADMIN — POTASSIUM CHLORIDE 40 MEQ: 1500 TABLET, EXTENDED RELEASE ORAL at 20:21

## 2020-01-01 RX ADMIN — IPRATROPIUM BROMIDE 0.5 MG: 0.5 SOLUTION RESPIRATORY (INHALATION) at 13:19

## 2020-01-01 RX ADMIN — ALPRAZOLAM 0.25 MG: 0.25 TABLET ORAL at 20:26

## 2020-01-01 RX ADMIN — IPRATROPIUM BROMIDE 0.5 MG: 0.5 SOLUTION RESPIRATORY (INHALATION) at 15:55

## 2020-01-01 RX ADMIN — WARFARIN SODIUM 5 MG: 5 TABLET ORAL at 19:10

## 2020-01-01 RX ADMIN — DICYCLOMINE HYDROCHLORIDE 20 MG: 10 CAPSULE ORAL at 07:46

## 2020-01-01 RX ADMIN — IBUPROFEN 600 MG: 600 TABLET ORAL at 05:13

## 2020-01-01 RX ADMIN — CITALOPRAM HYDROBROMIDE 20 MG: 20 TABLET ORAL at 09:03

## 2020-01-01 RX ADMIN — ASPIRIN 81 MG: 81 TABLET, DELAYED RELEASE ORAL at 09:36

## 2020-01-01 RX ADMIN — ALPRAZOLAM 0.25 MG: 0.25 TABLET ORAL at 08:45

## 2020-01-01 RX ADMIN — Medication 12.5 MG: at 20:20

## 2020-01-01 RX ADMIN — METOPROLOL TARTRATE 25 MG: 25 TABLET, FILM COATED ORAL at 09:33

## 2020-01-01 RX ADMIN — IPRATROPIUM BROMIDE 0.5 MG: 0.5 SOLUTION RESPIRATORY (INHALATION) at 04:41

## 2020-01-01 RX ADMIN — POTASSIUM CHLORIDE 20 MEQ: 1500 TABLET, EXTENDED RELEASE ORAL at 09:28

## 2020-01-01 RX ADMIN — POTASSIUM CHLORIDE 20 MEQ: 1500 TABLET, EXTENDED RELEASE ORAL at 09:08

## 2020-01-01 RX ADMIN — ASPIRIN 81 MG: 81 TABLET, DELAYED RELEASE ORAL at 09:03

## 2020-01-01 RX ADMIN — METOPROLOL TARTRATE 25 MG: 25 TABLET, FILM COATED ORAL at 21:27

## 2020-01-01 RX ADMIN — ACETAMINOPHEN 1000 MG: 500 TABLET, FILM COATED ORAL at 21:14

## 2020-01-01 RX ADMIN — POTASSIUM CHLORIDE 10 MEQ: 750 TABLET, EXTENDED RELEASE ORAL at 09:06

## 2020-01-01 RX ADMIN — METOPROLOL TARTRATE 25 MG: 25 TABLET, FILM COATED ORAL at 21:05

## 2020-01-01 RX ADMIN — SIMVASTATIN 20 MG: 20 TABLET, FILM COATED ORAL at 20:13

## 2020-01-01 RX ADMIN — IPRATROPIUM BROMIDE 0.5 MG: 0.5 SOLUTION RESPIRATORY (INHALATION) at 21:31

## 2020-01-01 RX ADMIN — POTASSIUM CHLORIDE 20 MEQ: 1500 TABLET, EXTENDED RELEASE ORAL at 08:19

## 2020-01-01 RX ADMIN — ALPRAZOLAM 0.25 MG: 0.25 TABLET ORAL at 20:53

## 2020-01-01 RX ADMIN — SIMVASTATIN 20 MG: 20 TABLET, FILM COATED ORAL at 20:02

## 2020-01-01 RX ADMIN — ACETAMINOPHEN 650 MG: 325 TABLET, FILM COATED ORAL at 05:57

## 2020-01-01 RX ADMIN — IPRATROPIUM BROMIDE 0.5 MG: 0.5 SOLUTION RESPIRATORY (INHALATION) at 04:37

## 2020-01-01 RX ADMIN — DIGOXIN 125 MCG: 125 TABLET ORAL at 09:16

## 2020-01-01 RX ADMIN — ALPRAZOLAM 0.25 MG: 0.25 TABLET ORAL at 19:50

## 2020-01-01 RX ADMIN — ENOXAPARIN SODIUM 40 MG: 40 INJECTION SUBCUTANEOUS at 08:47

## 2020-01-01 RX ADMIN — ASPIRIN 81 MG: 81 TABLET, DELAYED RELEASE ORAL at 09:06

## 2020-01-01 RX ADMIN — POLYETHYLENE GLYCOL 3350 17 G: 17 POWDER, FOR SOLUTION ORAL at 10:14

## 2020-01-01 RX ADMIN — ALPRAZOLAM 0.25 MG: 0.25 TABLET ORAL at 13:38

## 2020-01-01 RX ADMIN — ALPRAZOLAM 0.25 MG: 0.25 TABLET ORAL at 15:56

## 2020-01-01 RX ADMIN — POTASSIUM CHLORIDE 10 MEQ: 750 TABLET, EXTENDED RELEASE ORAL at 09:01

## 2020-01-01 RX ADMIN — MICONAZOLE NITRATE: 20 POWDER TOPICAL at 08:25

## 2020-01-01 RX ADMIN — POTASSIUM CHLORIDE 10 MEQ: 750 TABLET, EXTENDED RELEASE ORAL at 09:15

## 2020-01-01 RX ADMIN — POTASSIUM CHLORIDE 10 MEQ: 750 TABLET, EXTENDED RELEASE ORAL at 12:22

## 2020-01-01 RX ADMIN — METOPROLOL TARTRATE 25 MG: 25 TABLET, FILM COATED ORAL at 09:00

## 2020-01-01 RX ADMIN — ACETAMINOPHEN 650 MG: 325 TABLET, FILM COATED ORAL at 09:34

## 2020-01-01 RX ADMIN — POTASSIUM CHLORIDE 10 MEQ: 750 TABLET, EXTENDED RELEASE ORAL at 20:02

## 2020-01-01 RX ADMIN — ALPRAZOLAM 0.5 MG: 0.5 TABLET ORAL at 23:51

## 2020-01-01 RX ADMIN — METOPROLOL TARTRATE 25 MG: 25 TABLET, FILM COATED ORAL at 21:14

## 2020-01-01 RX ADMIN — ENOXAPARIN SODIUM 40 MG: 40 INJECTION SUBCUTANEOUS at 09:04

## 2020-01-01 RX ADMIN — MICONAZOLE NITRATE: 20 POWDER TOPICAL at 09:40

## 2020-01-01 RX ADMIN — POTASSIUM CHLORIDE 10 MEQ: 750 TABLET, EXTENDED RELEASE ORAL at 21:04

## 2020-01-01 RX ADMIN — ALPRAZOLAM 0.25 MG: 0.25 TABLET ORAL at 09:33

## 2020-01-01 RX ADMIN — Medication 12.5 MG: at 20:02

## 2020-01-01 RX ADMIN — ALPRAZOLAM 0.25 MG: 0.25 TABLET ORAL at 09:27

## 2020-01-01 RX ADMIN — ALPRAZOLAM 0.5 MG: 0.5 TABLET ORAL at 10:17

## 2020-01-01 RX ADMIN — DICYCLOMINE HYDROCHLORIDE 20 MG: 10 CAPSULE ORAL at 20:01

## 2020-01-01 RX ADMIN — TORSEMIDE 20 MG: 20 TABLET ORAL at 08:44

## 2020-01-01 RX ADMIN — MICONAZOLE NITRATE: 20 POWDER TOPICAL at 21:04

## 2020-01-01 RX ADMIN — POTASSIUM CHLORIDE 20 MEQ: 1500 TABLET, EXTENDED RELEASE ORAL at 09:34

## 2020-01-01 RX ADMIN — FUROSEMIDE 5 MG/HR: 10 INJECTION, SOLUTION INTRAVENOUS at 09:35

## 2020-01-01 RX ADMIN — FUROSEMIDE 40 MG: 10 INJECTION, SOLUTION INTRAVENOUS at 20:23

## 2020-01-01 RX ADMIN — DICYCLOMINE HYDROCHLORIDE 20 MG: 10 CAPSULE ORAL at 07:53

## 2020-01-01 RX ADMIN — IPRATROPIUM BROMIDE 0.5 MG: 0.5 SOLUTION RESPIRATORY (INHALATION) at 01:49

## 2020-01-01 RX ADMIN — LOSARTAN POTASSIUM 100 MG: 50 TABLET ORAL at 10:17

## 2020-01-01 RX ADMIN — METOPROLOL TARTRATE 25 MG: 25 TABLET, FILM COATED ORAL at 19:51

## 2020-01-01 RX ADMIN — ASPIRIN 81 MG: 81 TABLET, DELAYED RELEASE ORAL at 09:27

## 2020-01-01 RX ADMIN — ALPRAZOLAM 0.5 MG: 0.5 TABLET ORAL at 14:46

## 2020-01-01 ASSESSMENT — ACTIVITIES OF DAILY LIVING (ADL)
TOILETING: 2-->ASSISTIVE PERSON
ADLS_ACUITY_SCORE: 27
ADLS_ACUITY_SCORE: 21
RETIRED_COMMUNICATION: 0-->UNDERSTANDS/COMMUNICATES WITHOUT DIFFICULTY
ADLS_ACUITY_SCORE: 22
NUMBER_OF_TIMES_PATIENT_HAS_FALLEN_WITHIN_LAST_SIX_MONTHS: 1
ADLS_ACUITY_SCORE: 31
ADLS_ACUITY_SCORE: 25
ADLS_ACUITY_SCORE: 22
ADLS_ACUITY_SCORE: 29
ADLS_ACUITY_SCORE: 23
ADLS_ACUITY_SCORE: 25
ADLS_ACUITY_SCORE: 23
AMBULATION: 3-->ASSISTIVE EQUIPMENT AND PERSON
ADLS_ACUITY_SCORE: 23
WHICH_OF_THE_ABOVE_FUNCTIONAL_RISKS_HAD_A_RECENT_ONSET_OR_CHANGE?: FALL HISTORY
ADLS_ACUITY_SCORE: 19
ADLS_ACUITY_SCORE: 27
ADLS_ACUITY_SCORE: 23
ADLS_ACUITY_SCORE: 29
DRESS: 2-->ASSISTIVE PERSON
ADLS_ACUITY_SCORE: 27
ADLS_ACUITY_SCORE: 25
ADLS_ACUITY_SCORE: 19
RETIRED_EATING: 0-->INDEPENDENT
ADLS_ACUITY_SCORE: 27
SWALLOWING: 0-->SWALLOWS FOODS/LIQUIDS WITHOUT DIFFICULTY
ADLS_ACUITY_SCORE: 27
DRESS: 2-->ASSISTIVE PERSON
ADLS_ACUITY_SCORE: 25
BATHING: 2-->ASSISTIVE PERSON
ADLS_ACUITY_SCORE: 23
ADLS_ACUITY_SCORE: 27
ADLS_ACUITY_SCORE: 27
ADLS_ACUITY_SCORE: 23
RETIRED_COMMUNICATION: 0-->UNDERSTANDS/COMMUNICATES WITHOUT DIFFICULTY
ADLS_ACUITY_SCORE: 23
RETIRED_EATING: 0-->INDEPENDENT
ADLS_ACUITY_SCORE: 27
ADLS_ACUITY_SCORE: 31
ADLS_ACUITY_SCORE: 31
SWALLOWING: 0-->SWALLOWS FOODS/LIQUIDS WITHOUT DIFFICULTY
ADLS_ACUITY_SCORE: 23
TRANSFERRING: 2-->ASSISTIVE PERSON
ADLS_ACUITY_SCORE: 19
ADLS_ACUITY_SCORE: 22
ADLS_ACUITY_SCORE: 23
FALL_HISTORY_WITHIN_LAST_SIX_MONTHS: YES
FALL_HISTORY_WITHIN_LAST_SIX_MONTHS: NO
TOILETING: 2-->ASSISTIVE PERSON
ADLS_ACUITY_SCORE: 23
ADLS_ACUITY_SCORE: 25
ADLS_ACUITY_SCORE: 22
ADLS_ACUITY_SCORE: 29
ADLS_ACUITY_SCORE: 33
ADLS_ACUITY_SCORE: 21
ADLS_ACUITY_SCORE: 31
ADLS_ACUITY_SCORE: 25
ADLS_ACUITY_SCORE: 29
ADLS_ACUITY_SCORE: 33
ADLS_ACUITY_SCORE: 27
ADLS_ACUITY_SCORE: 21
ADLS_ACUITY_SCORE: 23
ADLS_ACUITY_SCORE: 27
ADLS_ACUITY_SCORE: 23
ADLS_ACUITY_SCORE: 22
ADLS_ACUITY_SCORE: 23
ADLS_ACUITY_SCORE: 21
TRANSFERRING: 3-->ASSISTIVE EQUIPMENT AND PERSON
ADLS_ACUITY_SCORE: 25
ADLS_ACUITY_SCORE: 23
ADLS_ACUITY_SCORE: 22
ADLS_ACUITY_SCORE: 23
ADLS_ACUITY_SCORE: 22
ADLS_ACUITY_SCORE: 23
ADLS_ACUITY_SCORE: 25
ADLS_ACUITY_SCORE: 23
ADLS_ACUITY_SCORE: 22
ADLS_ACUITY_SCORE: 23
ADLS_ACUITY_SCORE: 22
COGNITION: 0 - NO COGNITION ISSUES REPORTED
ADLS_ACUITY_SCORE: 26
ADLS_ACUITY_SCORE: 25
ADLS_ACUITY_SCORE: 31
ADLS_ACUITY_SCORE: 25
ADLS_ACUITY_SCORE: 27
ADLS_ACUITY_SCORE: 21
ADLS_ACUITY_SCORE: 25
ADLS_ACUITY_SCORE: 25
ADLS_ACUITY_SCORE: 22
AMBULATION: 2-->ASSISTIVE PERSON
ADLS_ACUITY_SCORE: 31
ADLS_ACUITY_SCORE: 27
ADLS_ACUITY_SCORE: 25
ADLS_ACUITY_SCORE: 23
ADLS_ACUITY_SCORE: 23
ADLS_ACUITY_SCORE: 33
ADLS_ACUITY_SCORE: 22
ADLS_ACUITY_SCORE: 23
ADLS_ACUITY_SCORE: 25
ADLS_ACUITY_SCORE: 23
ADLS_ACUITY_SCORE: 25
ADLS_ACUITY_SCORE: 23
ADLS_ACUITY_SCORE: 25
ADLS_ACUITY_SCORE: 25
ADLS_ACUITY_SCORE: 23
ADLS_ACUITY_SCORE: 23
ADLS_ACUITY_SCORE: 20
ADLS_ACUITY_SCORE: 19
ADLS_ACUITY_SCORE: 25
ADLS_ACUITY_SCORE: 27
COGNITION: 0 - NO COGNITION ISSUES REPORTED
ADLS_ACUITY_SCORE: 25
ADLS_ACUITY_SCORE: 33
ADLS_ACUITY_SCORE: 25
ADLS_ACUITY_SCORE: 29
ADLS_ACUITY_SCORE: 25
BATHING: 2-->ASSISTIVE PERSON
ADLS_ACUITY_SCORE: 25
ADLS_ACUITY_SCORE: 27
ADLS_ACUITY_SCORE: 21
ADLS_ACUITY_SCORE: 23
ADLS_ACUITY_SCORE: 23
ADLS_ACUITY_SCORE: 22

## 2020-01-01 ASSESSMENT — MIFFLIN-ST. JEOR
SCORE: 1144.25
SCORE: 1190.13
SCORE: 1252.13
SCORE: 1213.74
SCORE: 1117.12
SCORE: 1276.13
SCORE: 1327.13
SCORE: 1156.58
SCORE: 1132.54
SCORE: 1309.13
SCORE: 1098.52
SCORE: 1219.13
SCORE: 1100.33
SCORE: 1230.13
SCORE: 1162.93
SCORE: 1149.78
SCORE: 1329.13
SCORE: 1310.13
SCORE: 1299.13
SCORE: 1298.13
SCORE: 1149.78
SCORE: 1239.13
SCORE: 1136.17
SCORE: 1153.86
SCORE: 1138.89
SCORE: 1245.13
SCORE: 1251.13
SCORE: 1127.1
SCORE: 1166.13
SCORE: 1145.24
SCORE: 1257.13
SCORE: 1180.13
SCORE: 1132.54
SCORE: 1209.13

## 2020-01-01 ASSESSMENT — PAIN DESCRIPTION - DESCRIPTORS: DESCRIPTORS: ACHING

## 2020-01-19 PROBLEM — R53.1 WEAKNESS: Status: ACTIVE | Noted: 2020-01-01

## 2020-01-19 PROBLEM — J06.9 VIRAL UPPER RESPIRATORY TRACT INFECTION: Status: ACTIVE | Noted: 2020-01-01

## 2020-01-19 PROBLEM — J96.01 ACUTE RESPIRATORY FAILURE WITH HYPOXIA (H): Status: ACTIVE | Noted: 2020-01-01

## 2020-01-19 PROBLEM — Z86.73 HISTORY OF TIA (TRANSIENT ISCHEMIC ATTACK) AND STROKE: Status: ACTIVE | Noted: 2017-03-24

## 2020-01-19 NOTE — H&P
[facility]    History and Physical - Hospitalist Service       Date of Admission:  1/19/2020    Assessment & Plan   Linn Sanon is a 89 year old female admitted on 1/19/2020. She presented to the ER with cough, congestion, weakness, diarrhea. Patient has been afebrile, not hypoxic, mildly hypertensive. Influenza negative. CXR clear. No leukocytosis. Patient is feeling too weak to discharge home, patient lives alone. Patient will be registered to observation and monitored overnight. Patient will have SW and physical therapy assessment tomorrow.         Viral upper respiratory tract infection  Assessment: Patient presented to the emergency department with complaints of cough congestion weakness diarrhea.  Patient evaluation overall reassuring, urinalysis negative, white blood cells normal, hemoglobin 10.  Lactic acid normal at 1.1.  Chest x-ray clear.  BNP only mildly elevated no other signs of heart failure.  Patient has trace pedal edema which is stable.  Patient does have aortic valve stenosis, 3 out of 6 to 4 out of 6 murmur on exam.  Patient influenza negative.  Viral panel pending.  Patient overall feeling very weak, appears ill and flushed.  Patient has coarse lung sounds but not hypoxic.  Procalcitonin pending. UC pending.   Plan: Registered to observation status, monitor overnight.  Mucinex 600 twice a day.  Duo nebs every 4 hours as needed for shortness of breath and wheezing.      Anxiety state  Assessment: Chronic anxiety takes Xanax as needed at home.  Plan: Continue home medication.      Essential hypertension with goal blood pressure less than 140/90  Assessment: Chronic and stable.  Patient takes Cozaar 100 mg daily.  Plan: Continue home medication.      Aortic valve stenosis, unspecified etiology  Assessment: Patient with chronic aortic stenosis, on chart review patient has had a 4 out of 6 murmur.  Last echocardiogram in 2016 shows ejection fraction at 70%.   Grade 1 grade 1 early diastolic dysfunction.  Plan: Continue to monitor, echocardiogram tomorrow.      History of TIA (transient ischemic attack) and stroke  Assessment: History noted on chart review  Plan: Monitor overnight       Diet: Regular  DVT Prophylaxis: Obs  Sears Catheter: not present  Code Status: Full Code    Disposition Plan   Expected discharge: 1-2 days, recommended to TCU vs home once adequate pain management/ tolerating PO medications, safe disposition plan/ TCU bed available and medically ready for discharge. .  Entered: Mae Duncan CNP 01/19/2020, 5:39 PM     The patient's care was discussed with the Attending Physician, Dr. Swenson, Bedside Nurse, Care Coordinator/ and Patient.    Mae Duncan CNP  [facility]    C: NEGATIVE for fever, chills, change in weight   ENT: NEGATIVE for ear, mouth and throat problems   RESP: NEGATIVE for significant SOB, POSITIVE for cough  CV: NEGATIVE for chest pain, palpitations or peripheral edema   GI: NEGATIVE for nausea, abdominal pain, heartburn, or change in bowel habits   : Voiding   PSYCH: NEGATIVE for memory loss or depression, pleasant mood   ROS: All other systems negative    Total time spent 45 minutes with over 50% on coordination of care, chart review, documentation.     Dictation Disclaimer: Some of this Note has been completed with voice-recognition dictation software. Although errors are generally corrected real-time, there is the potential for a rare error to be present in the completed chart.    ______________________________________________________________________    Chief Complaint   Cough, Weakness    History is obtained from the patient, electronic health record and emergency department physician    History of Present Illness   Linn Sanon is a 89 year old female who presents to the emergency department today via EMS with complaints of a productive cough for the last week.   Patient reports feeling more weak as of yesterday.  Patient denies any significant shortness of breath, chest pain, vomiting.  Patient has had diarrhea intermittently for the last 2 days.  Patient has not taken any medications for her symptoms, nothing really makes her symptoms better or worse.  In the emergency department evaluation shows normal chest x-ray, mildly elevated BNP, trace peripheral edema.  Patient has aortic valve stenosis with 4-6 murmur.  Patient does have overall weakness and unable to return home as she lives independently.  Patient's influenza swab negative, pending viral panel.  Patient's white blood cell 4.6, hemoglobin 10.  Absolute neutrophils consistent with viral etiology.  Lactic acid 1.1.  Sodium mildly decreased at 132.  Patient not hypoxic, not requiring oxygen to maintain saturations above 90%.     Review of Systems    The 10 point Review of Systems is negative other than noted in the HPI or here.    Past Medical History    I have reviewed this patient's medical history and updated it with pertinent information if needed.   Past Medical History:   Diagnosis Date     Closed fracture of navicular (scaphoid) bone of wrist     Wrist fracture     Generalized osteoarthrosis, unspecified site      Lump or mass in breast     Breast Lump or Mass     Measles without mention of complication     Measles     Mumps without mention of complication     Mumps     Pure hypercholesterolemia      Scarlet fever      Unspecified closed fracture of ankle     Ankle fracture     Varicella without mention of complication     Chickenpox       Past Surgical History   I have reviewed this patient's surgical history and updated it with pertinent information if needed.  Past Surgical History:   Procedure Laterality Date     C APPENDECTOMY  age 11     HC EXCISION BREAST LESION W XRAY MARKER, OPEN SINGLE  1995    right     HC REMV CATARACT EXTRACAP,INSERT LENS, W/O ECP  9/18/2008    Right eye     HC REMV CATARACT  EXTRACAP,INSERT LENS, W/O ECP  10/16/2008    Left eye     LASER YAG CAPSULOTOMY Left 9/18/2014    Procedure: LASER YAG CAPSULOTOMY;  Surgeon: Rafi Ramos MD;  Location:  OR       Social History   I have reviewed this patient's social history and updated it with pertinent information if needed.  Social History     Tobacco Use     Smoking status: Never Smoker     Smokeless tobacco: Never Used   Substance Use Topics     Alcohol use: No     Drug use: No       Family History   I have reviewed this patient's family history and updated it with pertinent information if needed.   Family History   Problem Relation Age of Onset     Diabetes Father      Cardiovascular Father      Respiratory Brother         asthma     Respiratory Sister         asthma     Arthritis Sister      Heart Disease Paternal Grandfather      Thyroid Disease Sister        Prior to Admission Medications   Prior to Admission Medications   Prescriptions Last Dose Informant Patient Reported? Taking?   ALPRAZolam (XANAX) 0.5 MG tablet 1/19/2020 at 1400  No Yes   Sig: Take 1 tablet (0.5 mg) by mouth 3 times daily as needed for anxiety   Calcium Carb-Cholecalciferol (CALCIUM 600+D) 600-800 MG-UNIT TABS Past Week at Unknown time  Yes Yes   Sig: Take 1 tablet by mouth daily   Flaxseed, Linseed, (GROUND FLAX SEEDS PO) Past Week at Unknown time  Yes Yes   Sig: Two tablespoons daily with cereal   IBUPROFEN PO 1/19/2020 at 0800  Yes Yes   Sig: Take 200 mg by mouth 3 times daily   Multiple Vitamin (ONE-A-DAY ESSENTIAL) TABS 1/18/2020 at 0800  Yes Yes   Sig: Take  by mouth.   acetaminophen 500 MG CAPS Past Week at Unknown time  Yes Yes   Sig: Take 500 mg by mouth every 6 hours as needed for pain   aspirin 81 MG EC tablet 1/19/2020 at 0800  Yes Yes   Sig: Take 1 tablet by mouth daily.   losartan (COZAAR) 100 MG tablet 1/19/2020 at 0800  No Yes   Sig: Take 1 tablet (100 mg) by mouth daily   mupirocin (BACTROBAN) 2 % external ointment   No No   Sig: Apply  topically 3 times daily   simvastatin (ZOCOR) 20 MG tablet 1/19/2020 at 2200  No Yes   Sig: Take 1 tablet (20 mg) by mouth At Bedtime      Facility-Administered Medications: None     Allergies   Allergies   Allergen Reactions     No Known Drug Allergies        Physical Exam   Vital Signs: Temp: 98.7  F (37.1  C) Temp src: Oral BP: 125/80 Pulse: 82 Heart Rate: 85 Resp: 18 SpO2: 91 % O2 Device: None (Room air)    Weight: 188 lbs 0 oz    Constitutional: fatigued, alert, mild distress, pale and flushed  Respiratory: tachypneic, Mild respiratory distress, decreased air exchange and crackles diffuse  Cardiovascular: Normal apical impulse, regular rate and rhythm   GI:  normal bowel sounds, soft, non-distended, non-tender   Skin: no bruising or bleeding and normal skin color, texture, turgor  Musculoskeletal: There is no redness, warmth, or swelling of the joints.  Full range of motion noted.   Neurologic: Awake, alert, oriented to name, place and time.  Cranial nerves II-XII are grossly intact.      Data   Data reviewed today: I reviewed all medications, new labs and imaging results over the last 24 hours.     Recent Labs   Lab 01/19/20  1509   WBC 4.6   HGB 10.0*   MCV 93   *   *   POTASSIUM 4.0   CHLORIDE 100   CO2 25   BUN 20   CR 0.89   ANIONGAP 7   WINSTON 8.5   *   ALBUMIN 3.2*   PROTTOTAL 6.3*   BILITOTAL 0.4   ALKPHOS 42   ALT 17   AST 33     Recent Results (from the past 24 hour(s))   XR Chest 2 Views    Narrative    CHEST TWO VIEWS     1/19/2020 3:27 PM     HISTORY: Cough.    COMPARISON: None available.      Impression    IMPRESSION: PA and lateral views of the chest were obtained. Enlarged  cardiac silhouette. No focal pulmonary opacities. No significant  pleural effusion or pneumothorax.     EMANUEL GARCIA MD

## 2020-01-19 NOTE — ED NOTES
ED Nursing criteria listed below was addressed during verbal handoff:     Abnormal vitals: Yes  Abnormal results: No  Med Reconciliation completed: Yes  Meds given in ED: Yes  Any Overdue Meds: No  Core Measures: Yes  Isolation: Yes  Special needs: Yes  Skin assessment: Yes    Observation Patient  Education provided: Yes

## 2020-01-19 NOTE — ED PROVIDER NOTES
History     Chief Complaint   Patient presents with     Cough     HPI  Linn Sanon is a 89 year old female who presents to the emergency department today via EMS with complaints of a productive cough for the last week.  Patient reports feeling more weak as of yesterday.  Patient denies any significant shortness of breath, chest pain, vomiting.  Patient has had diarrhea intermittently for the last 2 days.  Patient has not taken any medications for her symptoms, nothing really makes her symptoms better or worse.    Allergies:  Allergies   Allergen Reactions     No Known Drug Allergies        Problem List:    Patient Active Problem List    Diagnosis Date Noted     History of TIA (transient ischemic attack) and stroke 03/24/2017     Priority: Medium     Essential hypertension with goal blood pressure less than 140/90 09/09/2016     Priority: Medium     Aortic valve stenosis, unspecified etiology 09/09/2016     Priority: Medium     Health Care Home 12/18/2012     Priority: Medium     Prema Ramirez, RN-PHN  FPA / [facility]   [phone]    DX V65.8 REPLACED WITH 73832 HEALTH CARE HOME (04/08/2013)       Spinal stenosis 07/05/2011     Priority: Medium     HYPERLIPIDEMIA LDL GOAL <130 10/31/2010     Priority: Medium     Anxiety state 12/05/2006     Priority: Medium     Problem list name updated by automated process. Provider to review          Past Medical History:    Past Medical History:   Diagnosis Date     Closed fracture of navicular (scaphoid) bone of wrist      Generalized osteoarthrosis, unspecified site      Lump or mass in breast      Measles without mention of complication      Mumps without mention of complication      Pure hypercholesterolemia      Scarlet fever      Unspecified closed fracture of ankle      Varicella without mention of complication        Past Surgical History:    Past Surgical History:   Procedure Laterality Date     C APPENDECTOMY  age 11     HC EXCISION  BREAST LESION W XRAY MARKER, OPEN SINGLE  1995    right     HC REMV CATARACT EXTRACAP,INSERT LENS, W/O ECP  9/18/2008    Right eye     HC REMV CATARACT EXTRACAP,INSERT LENS, W/O ECP  10/16/2008    Left eye     LASER YAG CAPSULOTOMY Left 9/18/2014    Procedure: LASER YAG CAPSULOTOMY;  Surgeon: Rafi Ramos MD;  Location: [facility] OR       Family History:    Family History   Problem Relation Age of Onset     Diabetes Father      Cardiovascular Father      Respiratory Brother         asthma     Respiratory Sister         asthma     Arthritis Sister      Heart Disease Paternal Grandfather      Thyroid Disease Sister        Social History:  Marital Status:  Single [1]  Social History     Tobacco Use     Smoking status: Never Smoker     Smokeless tobacco: Never Used   Substance Use Topics     Alcohol use: No     Drug use: No        Medications:    acetaminophen 500 MG CAPS  ALPRAZolam (XANAX) 0.5 MG tablet  aspirin 81 MG EC tablet  Calcium Carb-Cholecalciferol (CALCIUM 600+D) 600-800 MG-UNIT TABS  Flaxseed, Linseed, (GROUND FLAX SEEDS PO)  IBUPROFEN PO  losartan (COZAAR) 100 MG tablet  Multiple Vitamin (ONE-A-DAY ESSENTIAL) TABS  mupirocin (BACTROBAN) 2 % external ointment  simvastatin (ZOCOR) 20 MG tablet          Review of Systems   All other systems reviewed and are negative.      Physical Exam   BP: 120/62  Heart Rate: 85  Temp: 98.7  F (37.1  C)  Resp: 18  Weight: 85.3 kg (188 lb)  SpO2: 94 %      Physical Exam  Constitutional:       General: She is not in acute distress.     Appearance: Normal appearance. She is not ill-appearing or diaphoretic.   HENT:      Head: Normocephalic.      Nose: Nose normal.      Mouth/Throat:      Mouth: Mucous membranes are dry.   Eyes:      Extraocular Movements: Extraocular movements intact.   Neck:      Musculoskeletal: Normal range of motion.   Cardiovascular:      Rate and Rhythm: Normal rate.      Pulses: Normal pulses.      Heart sounds: Murmur (4/6 Systolic) present.       Comments: Trace peripheral lower extremity edema, non pitting  Pulmonary:      Effort: Pulmonary effort is normal.      Breath sounds: Rhonchi (bilateral lower lobes) present.   Abdominal:      General: Bowel sounds are normal.      Palpations: Abdomen is soft.   Musculoskeletal: Normal range of motion.   Skin:     General: Skin is warm.      Capillary Refill: Capillary refill takes less than 2 seconds.   Neurological:      General: No focal deficit present.      Mental Status: She is alert.   Psychiatric:         Mood and Affect: Mood normal.         ED Course        Procedures      Results for orders placed or performed during the hospital encounter of 01/19/20 (from the past 24 hour(s))   CBC with platelets differential   Result Value Ref Range    WBC 4.6 4.0 - 11.0 10e9/L    RBC Count 3.30 (L) 3.8 - 5.2 10e12/L    Hemoglobin 10.0 (L) 11.7 - 15.7 g/dL    Hematocrit 30.6 (L) 35.0 - 47.0 %    MCV 93 78 - 100 fl    MCH 30.3 26.5 - 33.0 pg    MCHC 32.7 31.5 - 36.5 g/dL    RDW 14.0 10.0 - 15.0 %    Platelet Count 127 (L) 150 - 450 10e9/L    Diff Method Automated Method     % Neutrophils 68.3 %    % Lymphocytes 15.1 %    % Monocytes 16.0 %    % Eosinophils 0.0 %    % Basophils 0.2 %    % Immature Granulocytes 0.4 %    Nucleated RBCs 0 0 /100    Absolute Neutrophil 3.1 1.6 - 8.3 10e9/L    Absolute Lymphocytes 0.7 (L) 0.8 - 5.3 10e9/L    Absolute Monocytes 0.7 0.0 - 1.3 10e9/L    Absolute Basophils 0.0 0.0 - 0.2 10e9/L    Abs Immature Granulocytes 0.0 0 - 0.4 10e9/L    Absolute Nucleated RBC 0.0    Comprehensive metabolic panel   Result Value Ref Range    Sodium 132 (L) 133 - 144 mmol/L    Potassium 4.0 3.4 - 5.3 mmol/L    Chloride 100 94 - 109 mmol/L    Carbon Dioxide 25 20 - 32 mmol/L    Anion Gap 7 3 - 14 mmol/L    Glucose 113 (H) 70 - 99 mg/dL    Urea Nitrogen 20 7 - 30 mg/dL    Creatinine 0.89 0.52 - 1.04 mg/dL    GFR Estimate 56 (L) >60 mL/min/[1.73_m2]    GFR Estimate If Black 65 >60 mL/min/[1.73_m2]    Calcium  8.5 8.5 - 10.1 mg/dL    Bilirubin Total 0.4 0.2 - 1.3 mg/dL    Albumin 3.2 (L) 3.4 - 5.0 g/dL    Protein Total 6.3 (L) 6.8 - 8.8 g/dL    Alkaline Phosphatase 42 40 - 150 U/L    ALT 17 0 - 50 U/L    AST 33 0 - 45 U/L   Lactic acid whole blood   Result Value Ref Range    Lactic Acid 1.1 0.7 - 2.0 mmol/L   CRP inflammation   Result Value Ref Range    CRP Inflammation 103.0 (H) 0.0 - 8.0 mg/L   Nt probnp inpatient   Result Value Ref Range    N-Terminal Pro BNP Inpatient 1,638 0 - 1,800 pg/mL   XR Chest 2 Views    Narrative    CHEST TWO VIEWS     1/19/2020 3:27 PM     HISTORY: Cough.    COMPARISON: None available.      Impression    IMPRESSION: PA and lateral views of the chest were obtained. Enlarged  cardiac silhouette. No focal pulmonary opacities. No significant  pleural effusion or pneumothorax.     EMANUEL GARCIA MD   UA with Microscopic   Result Value Ref Range    Color Urine Yellow     Appearance Urine Clear     Glucose Urine Negative NEG^Negative mg/dL    Bilirubin Urine Negative NEG^Negative    Ketones Urine Negative NEG^Negative mg/dL    Specific Gravity Urine 1.021 1.003 - 1.035    Blood Urine Negative NEG^Negative    pH Urine 5.0 5.0 - 7.0 pH    Protein Albumin Urine Negative NEG^Negative mg/dL    Urobilinogen mg/dL 0.0 0.0 - 2.0 mg/dL    Nitrite Urine Negative NEG^Negative    Leukocyte Esterase Urine Negative NEG^Negative    Source Catheterized Urine     WBC Urine 1 0 - 5 /HPF    RBC Urine <1 0 - 2 /HPF    Mucous Urine Present (A) NEG^Negative /LPF   Influenza A/B antigen   Result Value Ref Range    Influenza A/B Agn Specimen Nasopharyngeal     Influenza A Negative NEG^Negative    Influenza B Negative NEG^Negative       Medications   0.9% sodium chloride BOLUS (0 mLs Intravenous Stopped 1/19/20 8624)       Assessments & Plan (with Medical Decision Making)  Viral syndrome and generalized weakness and 89-year-old female.  Chest x-ray does not show an infiltrate or effusion, her BNP is only mildly elevated  and she has no other signs of heart failure other than trace peripheral edema.  Patient does have significant aortic valve stenosis with murmur on exam.  Patient is not safe to return home independently given her weakness.  Her influenza was negative but certainly with her diarrhea and upper respiratory symptoms this does correlate with viral etiology.  Patient's urine is not infected, the rest of her blood work is fairly reassuring, ABC returns with a white count of 4.6, hemoglobin is 10 which is down a couple points from last hemoglobin, absolute lymphocytes are low consistent with viral etiology, lactic acid is normal at 1.1.  CMP returns with a creatinine of 0.89, GFR 56, glucose of 113, sodium of 132.  Blood and urine cultures are pending.  CRP is significantly elevated at 103.  Hypoxic but room air saturation is in the low 90s and she does display of frequent productive cough.  At this point we will bring patient in for observation admission, physical therapy, occupational therapy can evaluate her in the morning for disposition planning.  I discussed patient's case with Dr. Sanon who is accepted patient for admission.  Patient staffed in the emergency room with Dr. Alvares.  Patient admitted in stable condition.  Patient and her son agreeable with plan of care.     I have reviewed the nursing notes.    I have reviewed the findings, diagnosis, plan and need for follow up with the patient.    New Prescriptions    No medications on file       Final diagnoses:   Viral syndrome   Generalized weakness       1/19/2020   [facility] EMERGENCY DEPARTMENT     Nayana Pink, ASHLEY CNP  01/19/20 0472

## 2020-01-20 PROBLEM — B34.8 PARAINFLUENZA TYPE 1 INFECTION: Status: ACTIVE | Noted: 2020-01-01

## 2020-01-20 NOTE — CONSULTS
CARE TRANSITION SOCIAL WORK INITIAL ASSESSMENT:      Met with: Family.    DATA  Principal Problem:    Viral upper respiratory tract infection  Active Problems:    Anxiety state    Essential hypertension with goal blood pressure less than 140/90    Aortic valve stenosis, unspecified etiology    History of TIA (transient ischemic attack) and stroke    Weakness    Acute respiratory failure with hypoxia (H)       Primary Care Clinic Name: [facility]  Primary Care MD Name: Dr. Yoseph Kumar    ASSESSMENT  Cognitive Status: awake and alert.       Resources List: Skilled Nursing Facility      Description of Support System: Involved, Supportive   Who is your support system?: Children   Support Assessment: Adequate family and caregiver support   Insurance Concerns: No Insurance issues identified  Living Arrangements: house    This writer met with pt's son, Gaurav, introduced self and role. Discussed discharge planning and medicare guidelines in regards to home care and SNF benefits. Pt/family was given the Medicare Compare list for SNF, with associated star ratings to assist with choice for referrals/discharge planning No    Education was given to pt/family that star ratings are updated/maintained by Medicare and can be reviewed by visiting www.medicare.gov No    Les declined list of choices, as he is very familiar with local TCUs and wants referrals sent to [facility] (Main Phone: [phone] Admissions Phone: [phone] Fax: [phone]) and [facility] (Admissions: [phone] Main Phone: [phone] Fax: [phone]).  Referrals sent.      Patient lives alone in her own home.  [place].  Patient only has a land line to be reached by her son.  [employer] does not work in her area either.  Patient usually does well on her own, however, family is concerned as her son lives far away and can not get there quickly if something  happens.  At baseline, patient walker with a walker.  It is normal that she drags her right leg, as a result of some type of spinal condition, per Les.  Had long discussion with Les about TCU coverage, [facility], LTC options.    PLAN    TCU - referrals pending    CHARMAINE Ashby  [facility] [phone]/ [facility] [phone]

## 2020-01-20 NOTE — PLAN OF CARE
"  Problem: Adult Inpatient Plan of Care  Goal: Readiness for Transition of Care  Outcome: No Change     Problem: Gas Exchange Impaired  Goal: Optimal Gas Exchange  Outcome: No Change     Problem: Adult Inpatient Plan of Care  Goal: Plan of Care Review  Outcome: Improving  Goal: Absence of Hospital-Acquired Illness or Injury  Outcome: Improving  Goal: Optimal Comfort and Wellbeing  Outcome: Improving     Problem: Hypertension Comorbidity  Goal: Blood Pressure in Desired Range  Outcome: Improving     Problem: Adult Inpatient Plan of Care  Goal: Plan of Care Review  Outcome: Improving  Goal: Absence of Hospital-Acquired Illness or Injury  Outcome: Improving  Goal: Optimal Comfort and Wellbeing  Outcome: Improving     Problem: Hypertension Comorbidity  Goal: Blood Pressure in Desired Range  Outcome: Improving       Pt presents very anxious this shift and has continuous concerns about receiving the right blood pressure medication in the morning. Pt has been reassured that her Cozaar is ordered and will be given.  Pt does present intermittently forgetful.  Pt does follow commands and responds appropriately.   Oxygen saturations remain stable on 2LPM via nasal canula.   Scd's remain in place.  Iv is patent and infusing.   Pt ambulates safely with stand by assist of one and a walker.  Pt does use call light appropriately.  Vitals remain stable.  /47 (BP Location: Left arm)   Pulse 84   Temp 97.2  F (36.2  C) (Oral)   Resp 20   Ht 1.575 m (5' 2\")   Wt 78.6 kg (173 lb 4.5 oz)   SpO2 91%   BMI 31.69 kg/m    Will continue to monitor and promote comfort as care continues.        "

## 2020-01-20 NOTE — PROGRESS NOTES
SPIRITUAL HEALTH SERVICES  SPIRITUAL ASSESSMENT Progress Note  [facility]      During Rounding,  introduced himself to Linn Sanon & her son, Gaurav, and informed them of his availability.    Skyler Chance M.Div., [facility]  Staff   Office tel: [phone]

## 2020-01-20 NOTE — PROGRESS NOTES
PT was placed on HHFNC at 40lpm and 30% FiO2.  She was placed on HHFNC for CPAP/PS.      RT Jesus on 1/20/2020 at 5:50 PM

## 2020-01-20 NOTE — PROGRESS NOTES
Simple o2 challenge done with walk to bathroom on Room air.   Pt stable with saturations in the low 90's with walk to bathroom.   Upon return to bed pt was at 79 percent on room air. At this time 5LPM placed via Nasal canula to recover.   Recovery time about 2 min at 5lpm , then at 95 percent , then weaned back down to 2.5LPM with stable saturations in the mid to low 90's.  Charge nurse updated.

## 2020-01-20 NOTE — PROGRESS NOTES
"   01/20/20 1000   Quick Adds   Type of Visit Initial PT Evaluation   Living Environment   Lives With alone   Living Arrangements house   Home Accessibility other (see comments)  (Ramp to enter home, bed and bath on main level.)   Transportation Anticipated family or friend will provide   Living Environment Comment Has home health aide who helps bathe, Son calls daily, Priya also helps. [place] and chrise coork and make sure she has things to microwave.   Self-Care   Usual Activity Tolerance fair   Regular Exercise No   Equipment Currently Used at Home grab bar, tub/shower;grab bar, toilet;shower chair;walker, rolling   Functional Level Prior   Ambulation 1-->assistive equipment   Transferring 1-->assistive equipment   Toileting 1-->assistive equipment   Bathing 3-->assistive equipment and person   Communication 0-->understands/communicates without difficulty   Swallowing 0-->swallows foods/liquids without difficulty   Cognition 0 - no cognition issues reported   Fall history within last six months yes   Number of times patient has fallen within last six months 1   Which of the above functional risks had a recent onset or change? ambulation;fall history   Prior Functional Level Comment As above. Also: fell once at home recently see RN note about bruising. Notes that she has been bumping into walls and catching herself more recently.   General Information   Onset of Illness/Injury or Date of Surgery - Date 01/19/20   Referring Physician Dr Swenson   Patient/Family Goals Statement Wants to return home   Pertinent History of Current Problem (include personal factors and/or comorbidities that impact the POC) Pt states that she feels weak. She states that she has not really felt good in at least a couple of weeks. She is currently requiring 2.5L O2 and desaturates with activity per EMR.  Admitted 1/19 via ED. Per H&P. \" presents to the emergency department today via EMS with complaints of a productive cough for the last week.  " "Patient reports feeling more weak as of yesterday.  Patient denies any significant shortness of breath, chest pain, vomiting.  Patient has had diarrhea intermittently for the last 2 days.  Patient has not taken any medications for her symptoms, nothing really makes her symptoms better or worse.  In the emergency department evaluation shows normal chest x-ray, mildly elevated BNP, trace peripheral edema.  Patient has aortic valve stenosis with 4-6 murmur.  Patient does have overall weakness and unable to return home as she lives independently.  Patient's influenza swab negative, pending viral panel.  Patient's white blood cell 4.6, hemoglobin 10.  Absolute neutrophils consistent with viral etiology. \"    Precautions/Limitations fall precautions  (droplet)   General Observations ER R LE in standing and sitting   General Info Comments Anxious   Cognitive Status Examination   Orientation orientation to person, place and time   Level of Consciousness alert   Follows Commands and Answers Questions 100% of the time   Personal Safety and Judgment intact   Memory intact   Cognitive Comment Appears grossly intact   Posture    Posture Forward head position;Protracted shoulders;Kyphosis   Posture Comments ER R LE Scoliosis with L hip higher than R in standing.   Range of Motion (ROM)   ROM Comment Grossly WFL   Strength   Strength Comments Functional strength is fair. REquires 2 attempts to swing L LE up into bed when performing sit <> supine.  SIt<> stand requires (B) UE assist.   Bed Mobility   Bed Mobility Comments Supine <> sit with min A and cues. Pt required 2 attempts to get her legs up onto the bed. cues to bridge and to postion herself in the bed   Transfer Skills   Transfer Comments Sit <> stand with min A and cues for  hand placement    Gait   Gait Gait Skill;Gait Analysis   Gait Comments ER of R LE, requires cues to lift the leg and advance it periodically   Gait Skills   Level of [place]: Gait minimum assist " "(75% patients effort)   Physical Assist/Nonphysical Assist: Gait supervision;verbal cues;nonverbal cues (demo/gestures)   Weight-Bearing Restrictions: Gait weight-bearing as tolerated   Assistive Device for Transfer: Gait rolling walker   Gait Distance   (10')   Gait Analysis   Gait Pattern Used 3-point gait   Gait Deviations Noted decreased elle;increased time in double stance;decreased velocity of limb motion;decreased step length;decreased stride length;decreased toe-to-floor clearance;decreased weight-shifting ability   Impairments Contributing to Gait Deviations impaired balance;decreased strength   Balance   Balance Comments Sitting balance is good.  Standing balance is fair.  Dynamic balance not formally tested.    General Therapy Interventions   Planned Therapy Interventions gait training;balance training;strengthening;transfer training;neuromuscular re-education   Clinical Impression   Criteria for Skilled Therapeutic Intervention yes, treatment indicated   PT Diagnosis Decreased strength,  decreased enurance and poor oxygenation.   Influenced by the following impairments upper respiratory infection. Weakness   Functional limitations due to impairments decreased independence with fucntional mobility and giat for ADLS and IADLS   Clinical Presentation Evolving/Changing   Clinical Presentation Rationale Currently requiring 2.5L O2 and still desaturates with brief activity.   Clinical judgement   Clinical Decision Making (Complexity) Moderate complexity   Therapy Frequency Daily   Predicted Duration of Therapy Intervention (days/wks) 3 days   Anticipated Equipment Needs at Discharge   (Defer to TCU)   Anticipated Discharge Disposition Transitional Care Facility   Risk & Benefits of therapy have been explained Yes   Patient, Family & other staff in agreement with plan of care Yes   Clinical Impression Comments See care plan   [facility] AM-PAC TM \"6 Clicks\"   2016, Trustees of [facility], under " "license to [employer].  All rights reserved.   6 Clicks Short Forms Basic Mobility Inpatient Short Form   [facility] AM-PAC  \"6 Clicks\" V.2 Basic Mobility Inpatient Short Form   1. Turning from your back to your side while in a flat bed without using bedrails? 3 - A Little   2. Moving from lying on your back to sitting on the side of a flat bed without using bedrails? 3 - A Little   3. Moving to and from a bed to a chair (including a wheelchair)? 3 - A Little   4. Standing up from a chair using your arms (e.g., wheelchair, or bedside chair)? 3 - A Little   5. To walk in hospital room? 3 - A Little   6. Climbing 3-5 steps with a railing? 2 - A Lot   Basic Mobility Raw Score (Score out of 24.Lower scores equate to lower levels of function) 17   Total Evaluation Time   Total Evaluation Time (Minutes) 15     "

## 2020-01-20 NOTE — PROGRESS NOTES
When nursing ambulated patient, she became quite dyspneic with sats dropping into the 70's. With oxygen took about 5 minutes to recover. Will change over to IP status, will likely take more than 2 nites before discharge.  Electronically signed by JENNIFER Swenson on January 19, 2020

## 2020-01-20 NOTE — PLAN OF CARE
Discharge Planner PT   Patient plan for discharge: TCU  Current status: Patient is a 89 year old year old female. Prior to admission, patient was living alone in a farm house with ramp to enter, using WW for mobility, and requiring min assist for ADLs.  A HH aide comes to assist with bathing. PT's son calls daily and Son and Niece both help cooking and leaving her food that she can simply microwave. She has housekeeping help too. Currently, patient is requiring min A assistance for functional mobility and Min A for ambulation using WW. Desaturates to 87% with amb 5' on 2.5L O2. Does not move R LE well (weak cues for clearance).     Barriers to return to prior living situation: Barriers to return to previous living situation include Level of assist, lives alone, Not previously on O2.     Recommendations for discharge: Patient equipment needs at discharge include none.  Recommend discharge to TCU with family vs. wheelchair transport.  depending on O2 status etc.  Rationale for recommendations: PT lives alone in an accessible home but has HH help for bathing.  She will benefit from continued skilled PT to improve her activity tolerance to return her to her PLOF.        Entered by: Freya Puentes 01/20/2020 10:39 AM

## 2020-01-21 NOTE — PLAN OF CARE
VSS.  Was on 2.5 L O2 this AM, currently has been weaned to RA to maintain O2 sats WDL.  Coarse, congested nonproductive cough.  Deep breathing done independently.  Pt now alert and oriented x4.  Early AM pt was anxious and disoriented to place, time and situation.  Pt now is pleasant, calling out appropriately.  Using bedside commode, up with assist of 1 staff.  Good appetite at dinner, at 75% of meal.  Pt denies pain.  Resting comfortably in between cares.  Will continue to monitor.

## 2020-01-21 NOTE — PROGRESS NOTES
[facility]    Medicine Progress Note - Hospitalist Service       Date of Admission:  1/19/2020  Assessment & Plan   Patient is a 89-year-old female who presents with a one-week history of worsening upper respiratory infection symptoms and increasing shortness of breath who has been admitted for management of acute respiratory failure with hypoxia and has now been found to be positive for parainfluenza.  She also has moderate to severe aortic stenosis and some evidence of small pleural effusions.  Has responded modestly positively to gentle diuresis and use of a flutter valve.  Slight decrease in her oxygen requirement noted today.    Principal Problem:    Parainfluenza type 1 infection    Assessment: Discovered on viral respiratory panel, likely etiology for the upper respiratory infection symptoms as well as the worsening lower respiratory symptoms, likely bronchiolitis given lack of pneumonia on x-ray.  Procalcitonin has returned and is negative.    Plan: Reviewed extensive discussion with pulmonology done by Dr. Sanon.  We will continue supportive measures.  Will try to avoid use of steroids to minimize the risk of steroid-induced psychosis.  If not responding adequately to nebulizer treatment and continue gentle diuresis, would consider cautious use of low-dose steroids.    Active Problems:    Weakness    Assessment: Progressively worsening in the week prior to admission, likely secondary to acute illness as above    Plan: Proceed with ongoing treatment as above, anticipate TCU at time of discharge      Acute respiratory failure with hypoxia (H)    Assessment: Secondary to parainfluenza as above.  Also may be worsened secondary to pulmonary edema from her moderate to severe aortic stenosis.    Plan: Continue with supportive treatment.  Continue gentle diuresis and cautious monitoring.  Would consider transitioning to high flow nasal cannula if patient continues to oxygenate  well but have increased work of breathing as increased pressure support may also help decrease patient's work of breathing      Anxiety state    Assessment: Chronic, on Xanax as needed    Plan: Continue home regimen without change      Essential hypertension with goal blood pressure less than 140/90    Assessment: Chronic and stable, on losartan 100 mg daily    Plan: Continue home regimen without change      Aortic valve stenosis, unspecified etiology    Assessment: Echocardiogram showed moderate to severe aortic stenosis.    Plan: Hold fluids and cautiously diurese at this time.      History of TIA (transient ischemic attack) and stroke    Assessment: Previous history    Plan: Continue to monitor but no evidence of focal neurological deficit at this time      Portions of this note were completed using Dragon dictation software.  Although reviewed, there may be typographical and other inadvertent errors that remain.         Diet: Regular Diet Adult    DVT Prophylaxis: Pneumatic Compression Devices  Sears Catheter: not present  Code Status: Full Code      Disposition Plan   Expected discharge: 2 - 3 days, recommended to transitional care unit once mental status at baseline, O2 use less than 1 liters/minute, safe disposition plan/ TCU bed available and SIRS/Sepsis treated.  Entered: Kyle Adame MD, MD 01/21/2020, 3:32 PM       The patient's care was discussed with the Patient and Patient's Family.    Kyle Adame MD, MD  Hospitalist Service  [facility]    ______________________________________________________________________    Interval History   Patient reports very modest improvement in her shortness of breath overnight.  She does have some continued wheezes.  She also reports some low back and right knee pain.  These are chronic.  Not significantly worsened lately.  She denies any other pain or other complaints at this time.    Data reviewed today: I reviewed all  medications, new labs and imaging results over the last 24 hours. I personally reviewed no images or EKG's today.    Physical Exam   Vital Signs: Temp: 96.3  F (35.7  C) Temp src: Oral BP: (!) 146/73 Pulse: 64 Heart Rate: 60 Resp: 20 SpO2: 95 % O2 Device: None (Room air) Oxygen Delivery: 1 LPM  Weight: 173 lbs 4.5 oz  Constitutional: awake, alert, cooperative, no apparent distress, and appears stated age  Hematologic / Lymphatic: no cervical lymphadenopathy  Respiratory: Bilateral wheezes.  No focal findings.  Cardiovascular: Slight jugular venous distention, regular rate and rhythm.  GI: No scars, normal bowel sounds, soft, non-distended, non-tender, no masses palpated, no hepatosplenomegally  Musculoskeletal: Trace lower extremity edema.  Medial and lateral joint line tenderness of the right knee with some swelling of the right knee noted.  No tenderness of her back.    Data   Recent Labs   Lab 01/21/20  0644 01/20/20  0553 01/19/20  1509   WBC 5.6 6.1 4.6   HGB 10.3* 10.0* 10.0*   MCV 93 93 93   * 110* 127*    134 132*   POTASSIUM 4.3 4.3 4.0   CHLORIDE 110* 104 100   CO2 24 26 25   BUN 16 14 20   CR 0.73 0.73 0.89   ANIONGAP 4 4 7   WINSTON 8.5 8.1* 8.5   * 112* 113*   ALBUMIN  --   --  3.2*   PROTTOTAL  --   --  6.3*   BILITOTAL  --   --  0.4   ALKPHOS  --   --  42   ALT  --   --  17   AST  --   --  33     Recent Results (from the past 24 hour(s))   XR Chest Port 1 View    Narrative    XR PORTABLE CHEST ONE VIEW   1/20/2020 9:58 PM     HISTORY: Worsening oxygenation needs, known parainfluenza infection  but new diagnosis of moderate to severe aortic stenosis. Evaluate for  pulmonary congestion.    COMPARISON: Chest x-ray 1/19/2020.      Impression    IMPRESSION: Portable view of the chest is performed. Patient is  rotated to the right. Right lung base infiltrate is present. Probable  small bilateral pleural effusions are suspected. Left lung base  infiltrate is also noted. No pneumothorax. Heart  is normal in size.    RON BLACK MD     Medications       [START ON 1/22/2020] furosemide  10 mg Intravenous Once     guaiFENesin  600 mg Oral BID     losartan  100 mg Oral Daily     simvastatin  20 mg Oral At Bedtime

## 2020-01-21 NOTE — PROGRESS NOTES
Chest x-ray has been performed - official report is pending but there is now increased right lower lung changes with pleural effusion noted and concern for B line on the right, small pleural effusion on the left as well.  Will proceed with Lasix 10 mg x1 now and have nurse start [place] to help keep track of patient's output and decrease risk of respiratory worsening due to frequent urination.  Will have nursing staff monitor blood pressures hourly for the next few hours to ensure tolerance of low dose Lasix and no evidence of hypotension developing.  Dr. Martinez, night time hospitalist, is aware of these changes and will continue to monitor and consider repeat Lasix dosing going forward based on results to the dose above.  Attempted to call patient's son, Gaurav, regarding the change in aortic stenosis and cautious diuresis which will be performed as above, however there is no answer and no identification of name was present on the voicemail, therefore generic message was left.      Electronically Signed:  Freya Sanon MD

## 2020-01-21 NOTE — PLAN OF CARE
Discharge Planner PT   Patient plan for discharge: TCU  Current status: Patient agreeable to PT treatment this morning, however appears confused and is shivering from being cold. New chest xrays showing suspected bilateral pleural effusion. Patient exhibits labored breathing throughout treatment. Receiving 2.5 L O2 via NC.  Patient transfers sit to stand with min A & 2WW. Ambulates x 3 ft total with 2WW & CGA, exhibiting slow elle, frequent rest breaks, and cuing for navigation required. Patient demonstrates poor descent from stand to sit at EOB, min A required. Patient fatigued and SOB. Patient requires mod A for sit to supine transfer and mod A x 2 for bed mobility.   Barriers to return to prior living situation: Level of assist, lives alone, O2 management  Recommendations for discharge: TCU  Rationale for recommendations: Patient exhibits decreased activity tolerance, functional mobility and strength, and requiring O2 management. Patient would benefit from continued skilled therapeutic intervention in order to progress towards her desired level of function.         Entered by: Angélica Warner 01/21/2020 10:21 AM       Thank you for your referral.     Angélica Warner, PT, DPT    [facility]    O: [phone]  E: [email or web address]

## 2020-01-21 NOTE — PLAN OF CARE
Problem: Adult Inpatient Plan of Care  Goal: Plan of Care Review  Outcome: No Change     Problem: Adult Inpatient Plan of Care  Goal: Optimal Comfort and Wellbeing  Outcome: No Change     Problem: Gas Exchange Impaired  Goal: Optimal Gas Exchange  Outcome: No Change   Patient was with dyspnea this morning with activity, up to the bathroom several times with loose stools, incontinent of both bowel and bladder. Barrier ointment applied to her bottom as it was getting red with the frequency of stools.  Patient seemed to get more labored breathing as the day went on and this nurse requested RT to come do a nebulizer treatment around lunch time as patient was audibly wheezy from the door of her room.  Patient came back with Parainfluenza diagnosis from [facility] lab.  Patient more confused as the day went on, reoriented her with every interaction in the late afternoon and evening hours.  High flow nasal cannula started after supper, and within an hour, patient was resting comfortably in bed with easier breathing noted. Did decide to put the commode at bedside as was getting too weak and tired to walk into the bathroom when needed.  Bed alarm on for safety.

## 2020-01-21 NOTE — PROGRESS NOTES
Echocardiogram results were reviewed an patient has new moderate to severe aortic stenosis (had been mild to moderate at most recent echocardiogram in 2016) - mean AoV pressure gradient has increased from 12.3 up to 23.0.  Patient did receive a fluid bolus in the emergency department and has been receiving 100 ml/hr of IV fluids since time of admission.  Given this worsening diagnosis, there is concern the that increased work of breathing noted over the course of the day may be due to pulmonary congestion caused by aortic stenosis in combination with ongoing maintenance IV fluids.  Will saline lock IV fluids for now, proceed with STAT chest x-ray for re-evaluation of lungs and any signs of volume overload.  If present, would proceed with very cautious diuresis and monitor closely for blood pressure tolerance and wean O2 supplementation as tolerated.    Electronically Signed:  Freya Sanon MD

## 2020-01-21 NOTE — PLAN OF CARE
"Vitally stable. Pt oriented at beginning of shift but became increasingly confused during the night (see previous note). IV saline locked. Pt weaned down to 3L O2 nasal canula and has been sating in the low 90's. Lung sounds are diminished. Pt has dyspnea on exertion. Pt incontinent of bowel. Frequent soft bowel movements last evening. Pt Voided 500ml overnight after lasix. Son called this morning and given update of pts night.     /47 (BP Location: Right arm)   Pulse 61   Temp 97  F (36.1  C) (Oral)   Resp 16   Ht 1.575 m (5' 2\")   Wt 78.6 kg (173 lb 4.5 oz)   SpO2 91%   BMI 31.69 kg/m      "

## 2020-01-21 NOTE — PROGRESS NOTES
"At around 0200 pt woke up with increased anxiety. Pt pulled off high flow nasal canula and pulse oximetry. Pt continually tried to get out of bed and stated that she \"was in a dream\". Pt did not respond with any reorientation. MD notified and 25mg of Seroquel ordered.   "

## 2020-01-21 NOTE — PROGRESS NOTES
Patient has been accepted at [facility] (Main Phone: [phone] Admissions Phone: [phone] Fax: [phone]) when medically ready for discharge.    CHARMAINE Ashby  [facility] [phone]/ [place] [phone]

## 2020-01-22 NOTE — PROGRESS NOTES
[facility]    Medicine Progress Note - Hospitalist Service       Date of Admission:  1/19/2020  Assessment & Plan   Patient is a 89-year-old female who presents with a one-week history of worsening upper respiratory infection symptoms and increasing shortness of breath who has been admitted for management of acute respiratory failure with hypoxia and has now been found to be positive for parainfluenza.  She also has moderate to severe aortic stenosis and some evidence of small pleural effusions.  Has responded modestly positively to gentle diuresis and use of a flutter valve.  No longer requiring oxygen.    Principal Problem:    Parainfluenza type 1 infection    Assessment: Discovered on viral respiratory panel, likely etiology for the upper respiratory infection symptoms as well as the worsening lower respiratory symptoms, likely bronchiolitis given lack of pneumonia on x-ray.  Procalcitonin has returned and is negative.    Plan: Reviewed extensive discussion with pulmonology done by Dr. Sanon.  We will continue supportive measures.  Will try to avoid use of oral steroids to minimize the risk of steroid-induced psychosis.  Started inhaled steroids and continued nebs today.  If not responding adequately to this, would start oral steroids tomorrow.  If good response, should be able to discharge to TCU tomorrow.    Active Problems:    Weakness    Assessment: Progressively worsening in the week prior to admission, likely secondary to acute illness as above    Plan: Proceed with ongoing treatment as above, anticipate TCU at time of discharge      Acute respiratory failure with hypoxia (H)    Assessment: Secondary to parainfluenza as above.  Also may be worsened secondary to pulmonary edema from her moderate to severe aortic stenosis.    Plan: Continue with supportive treatment.  Clinically, patient does not require any further diuresis.      Anxiety state    Assessment: Chronic, on  Xanax as needed    Plan: Continue home regimen without change      Essential hypertension with goal blood pressure less than 140/90    Assessment: Chronic and stable, on losartan 100 mg daily    Plan: Continue home regimen without change      Aortic valve stenosis, unspecified etiology    Assessment: Echocardiogram showed moderate to severe aortic stenosis.    Plan: No IV fluids.  Monitor clinically.  No further diuresis plan at this time.  May need low-dose diuretics in the nursing home.      History of TIA (transient ischemic attack) and stroke    Assessment: Previous history    Plan: Continue to monitor but no evidence of focal neurological deficit at this time      Portions of this note were completed using Dragon dictation software.  Although reviewed, there may be typographical and other inadvertent errors that remain.         Diet: Regular Diet Adult    DVT Prophylaxis: Pneumatic Compression Devices  Sears Catheter: not present  Code Status: Full Code      Disposition Plan   Expected discharge: Tomorrow, recommended to transitional care unit once mental status at baseline, O2 use less than 1 liters/minute and safe disposition plan/ TCU bed available.  Entered: Kyle Adame MD, MD 01/22/2020, 4:01 PM       The patient's care was discussed with the Bedside Nurse, Patient and Patient's Family.    Kyle Adame MD, MD  Hospitalist Service  [facility]    ______________________________________________________________________    Interval History   Patient slept reasonably well.  She reports continued cough and some shortness of breath.  She is very concerned about how weak she is.  She does not feel she is ready to go to the TCU.    Data reviewed today: I reviewed all medications, new labs and imaging results over the last 24 hours. I personally reviewed no images or EKG's today.    Physical Exam   Vital Signs: Temp: 97.6  F (36.4  C) Temp src: Oral BP: 115/77 Pulse:  103 Heart Rate: 112 Resp: 18 SpO2: 92 % O2 Device: None (Room air)    Weight: 173 lbs 4.5 oz     Intake/Output Summary (Last 24 hours) at 1/22/2020 1602  Last data filed at 1/22/2020 1330  Gross per 24 hour   Intake 360 ml   Output 1950 ml   Net -1590 ml       Constitutional: awake, alert, cooperative, no apparent distress, and appears stated age  Hematologic / Lymphatic: no cervical lymphadenopathy  Respiratory: Bilateral wheezing and slight crackles at bases.  These wheezes were diminished after nebulizer treatment.  Cardiovascular: Normal apical impulse, regular rate and rhythm, normal S1 and S2, no S3 or S4, and no murmur noted  GI: No scars, normal bowel sounds, soft, non-distended, non-tender, no masses palpated, no hepatosplenomegally  Musculoskeletal: no lower extremity pitting edema present  full range of motion noted.  Swollen and slightly tender right knee.    Data   Recent Labs   Lab 01/22/20  0609 01/21/20  0644 01/20/20  0553 01/19/20  1509   WBC 4.4 5.6 6.1 4.6   HGB 10.5* 10.3* 10.0* 10.0*   MCV 92 93 93 93   * 120* 110* 127*    138 134 132*   POTASSIUM 4.5 4.3 4.3 4.0   CHLORIDE 107 110* 104 100   CO2 25 24 26 25   BUN 16 16 14 20   CR 0.71 0.73 0.73 0.89   ANIONGAP 4 4 4 7   WINSTON 8.7 8.5 8.1* 8.5   GLC 98 112* 112* 113*   ALBUMIN  --   --   --  3.2*   PROTTOTAL  --   --   --  6.3*   BILITOTAL  --   --   --  0.4   ALKPHOS  --   --   --  42   ALT  --   --   --  17   AST  --   --   --  33     No results found for this or any previous visit (from the past 24 hour(s)).  Medications       fluticasone  1 puff Inhalation Daily     guaiFENesin  600 mg Oral BID     losartan  100 mg Oral Daily     simvastatin  20 mg Oral At Bedtime

## 2020-01-22 NOTE — PLAN OF CARE
"Discharge Planner PT   Patient plan for discharge: TCU  Current status: Son present and engaged throughout his mother's treatment, he refused intervention for the patient however PT strongly encouraged participation. Patient agreed with ease. IND supine to/from sitting EOB through trunk flexion and what appeared to be a modified log roll from legs off EOB. IND short sitting balance. SBA sit to/from stand with 2WW, ambulated 12 ft in room with SBA, 2WW. Forward trunk flexion, short of air, narrow base of support and significant reliance on the walker. Patient on room air throughout assessment, expressed shortness of air however oxygen sats maintained above 92% throughout all activities. Son making excuses for his mother's performance \"the deformed right knee\" \"significant spinal stenosis\" \"she is so sick\" \"you won't believe how badly she moves\". Son was educated throughout the session of PT's role, the purpose of the intervention, expectations at rehab and the purpose of each intervention. Patient completed short sitting LE strengthening 2 sets of each tasks with 5 repetitions, also completed 5 sit to/from stands.  Barriers to return to prior living situation: Lives alone, medical status, decrased activity tolerance, impaired mobility, increased risk of falling  Recommendations for discharge: TCU  Rationale for recommendations: Patient is agreeable to intervention and highly motivated to participate in PT intervention. Patient demonstrates improved mobility from previously treatments, evidence of potential to make greater progress towards baseline mobility with TCU placement and continued skilled PT intervention.        Entered by: Matilde Caicedo 01/22/2020 3:46 PM      Thank you for your referral.  Matilde Caicedo, PT, DPT, ATC    [facility]  O: [phone]  E: [email or web address]        "

## 2020-01-22 NOTE — PROGRESS NOTES
Writer spoke with son, Chantal, regarding discharge planning to [facility] once patient is medically ready.  Indicated that this could potentially be Thursday or Friday.      Son is requesting the [employer] for transport.  He is aware of the private cost of $35.00 for the transport.      Care Transitions will continue to follow for discharge planning.

## 2020-01-22 NOTE — PLAN OF CARE
"Vitally stable. Oxygen saturation 92-93% on room air. Lung sounds are coarse throughout. Pt has good, congested cough. Pt complained of some abdominal cramping this morning, Ibuprofen given. Some mild to moderate edema in bilateral legs, ankles and feet. Disoriented to place and situation. Up to bedside commode with 1 assist, walker and gait belt. Pt given xanax before bed but still woke up and had anxious episodes during the night. Will continue to monitor according to care plan.     /73 (BP Location: Right arm)   Pulse 100   Temp 97.8  F (36.6  C) (Oral)   Resp 20   Ht 1.575 m (5' 2\")   Wt 78.6 kg (173 lb 4.5 oz)   SpO2 92%   BMI 31.69 kg/m      "

## 2020-01-23 NOTE — PLAN OF CARE
S-(situation): Physical Therapy treatment per plan of care    B-(background): Patient is a 89 year old female admitted 1/19/20 found to have parainfluenza infection.     A-(assessment): Patient discharged to TCU prior to scheduled PT treatment.     R-(recommendations): Discharge in patient plan of care. Resume skilled PT intervention to address functional mobility impairments, deconditioning and safety prior to return home.    Physical Therapy Discharge Summary    Reason for therapy discharge:    Discharged to transitional care facility.    Progress towards therapy goal(s). See goals on Care Plan in [facility] electronic health record for goal details.  Goals partially met.  Barriers to achieving goals:   discharge from facility.    Therapy recommendation(s):    Continued therapy is recommended.  Rationale/Recommendations:  see above.     Thank you for your referral.  Matilde Caicedo, PT, DPT, ATC    [facility]  O: [phone]  E: [email or web address]

## 2020-01-23 NOTE — PROGRESS NOTES
Linn Sanon  Gender: female  : 1926  [address]  [place] [id number] [phone] (home)     Medical Record: [id number]  Pharmacy:    [facility] - [id number] - [place], [place] - [address] [address] - [place], [place] - [address]  Primary Care Provider: Yoseph Kumar    Parent's names are: Data Unavailable (mother) and Data Unavailable (father).      [facility]  2020     Discharge Phone Call:  Discharge to  [place]

## 2020-01-23 NOTE — DISCHARGE SUMMARY
[facility]  Hospitalist Discharge Summary       Date of Admission:  1/19/2020  Date of Discharge:  1/23/2020  Discharging Provider: Kyle Adame MD, MD      Discharge Diagnoses   Principal Problem:    Parainfluenza type 1 infection  Active Problems:    Anxiety state    Essential hypertension with goal blood pressure less than 140/90    Aortic valve stenosis, moderate to severe (increase from mild in 2016)    History of TIA (transient ischemic attack) and stroke    Weakness    Acute respiratory failure with hypoxia (H)       Follow-ups Needed After Discharge   Follow-up Appointments     Follow Up and recommended labs and tests      Follow up with assisted physician.  The following labs/tests are   recommended: CBC, BMP.             Unresulted Labs Ordered in the Past 30 Days of this Admission     Date and Time Order Name Status Description    1/19/2020 1711 Blood culture Preliminary       These results will be followed up by PCP    Discharge Disposition   Discharged to short-term care facility  Condition at discharge: Stable    Hospital Course   Principal Problem:    Parainfluenza type 1 infection  Active Problems:    Anxiety state    Essential hypertension with goal blood pressure less than 140/90    Aortic valve stenosis, moderate to severe (increase from mild in 2016)    History of TIA (transient ischemic attack) and stroke    Weakness    Acute respiratory failure with hypoxia (H)     Patient admitted with acute respiratory failure with hypoxia secondary to parainfluenza type I infection.  She required supplemental oxygen for several days.  She had significant wheezing and weakness associated with this.  She had very gradual improvement of her respiratory symptoms with supportive therapy.  Antibiotics and oral steroids were not indicated.  Due to persistent wheezing despite bronchodilators, inhaled steroids were started the day prior to discharge.  Patient had  significant improvement of her symptoms with this.    Patient was also noted to have progression of her aortic valve stenosis during this hospitalization.  Some of her respiratory failure was felt to be due to fluid overload.  Gentle diuresis with Lasix was performed during her stay.  She was felt to be euvolemic at the time of discharge.  This will need to be monitored at the TCU.    Patient was very weak during her stay, and this was slowly improving during her visit.    She did have significant anxiety, but this improved throughout the day as her clinical picture also improved.    Portions of this note were completed using Dragon dictation software.  Although reviewed, there may be typographical and other inadvertent errors that remain.           Consultations This Hospital Stay   SOCIAL WORK IP CONSULT  PHYSICAL THERAPY ADULT IP CONSULT  PHYSICAL THERAPY ADULT IP CONSULT  OCCUPATIONAL THERAPY ADULT IP CONSULT    Code Status   Full Code    Time Spent on this Encounter   I, Kyle Adame MD, MD, personally saw the patient today and spent greater than 30 minutes discharging this patient.       Kyle Adame MD, MD  [facility]  ______________________________________________________________________    Physical Exam   Vital Signs: Temp: 97.2  F (36.2  C) Temp src: Oral BP: 116/59 Pulse: 79 Heart Rate: 104 Resp: 20 SpO2: 93 % O2 Device: None (Room air) Oxygen Delivery: 1 LPM  Weight: 173 lbs 4.5 oz  Constitutional: awake, alert, cooperative, no apparent distress, and appears stated age  Hematologic / Lymphatic: no cervical lymphadenopathy  Respiratory: Globally decreased breath sounds, but much less wheezing than yesterday.  Very slight wheezes still present.  Cardiovascular: Slightly irregular, slight murmur noted.  GI: No scars, normal bowel sounds, soft, non-distended, non-tender, no masses palpated, no hepatosplenomegally  Musculoskeletal: 1+ pitting edema in lower  extremities.       Primary Care Physician   Yoseph Kumar    Discharge Orders      General info for SNF    Length of Stay Estimate: Short Term Care: Estimated # of Days <30  Condition at Discharge: Improving  Level of care:skilled   Rehabilitation Potential: Fair  Admission H&P remains valid and up-to-date: Yes  Recent Chemotherapy: N/A  Use Nursing Home Standing Orders: Yes     Mantoux instructions    Give two-step Mantoux (PPD) Per Facility Policy Yes     Daily weights    Call Provider for weight gain of more than 2 pounds per day or 5 pounds per week.     Activity - Up with nursing assistance     Reason for your hospital stay    Parainfluenza infection     Follow Up and recommended labs and tests    Follow up with halfway physician.  The following labs/tests are recommended: CBC, BMP.     Physical Therapy Adult Consult    Evaluate and treat as clinically indicated.    Reason:  deconditioning     Occupational Therapy Adult Consult    Evaluate and treat as clinically indicated.    Reason:  Deconditioning, mild dementia     Oxygen Adult/Peds    Oxygen Documentation:   I certify that this patient, Linn Sanon has been under my care and that I, or a nurse practitioner or physician's assistant working with me, had a face-to-face encounter that meets face-to-face encounter requirements with this patient on January 23, 2020.    Linn Sanon is now in a chronic stable state and continues to require supplemental oxygen due to continued oxygen desaturation.  This patient has been treated in part, or in whole for the following medical condition(s):  Acute reactive airway disease due to viral infection  Treatments tried and failed or ruled out to treat hypoxemia include inhaled steroids, nebs.  If portability is ordered, is the patient mobile within the home? yes    **Patients who qualify for home O2 coverage under the CMS guidelines require ABG tests or O2 sat readings obtained closest to, but no earlier  than 2 days prior to the discharge, as evidence of the need for home oxygen therapy. Testing must be performed while patient is in the chronic stable state. See notes for O2 sats.**     Advance Diet as Tolerated    Follow this diet upon discharge: Regular       Significant Results and Procedures   Most Recent 3 CBC's:  Recent Labs   Lab Test 01/23/20  0425 01/22/20  0609 01/21/20  0644   WBC 5.1 4.4 5.6   HGB 10.6* 10.5* 10.3*   MCV 92 92 93    145* 120*     Most Recent 3 BMP's:  Recent Labs   Lab Test 01/23/20  0425 01/22/20  0609 01/21/20  0644    136 138   POTASSIUM 4.3 4.5 4.3   CHLORIDE 109 107 110*   CO2 26 25 24   BUN 19 16 16   CR 0.80 0.71 0.73   ANIONGAP 3 4 4   WINSTON 8.8 8.7 8.5   * 98 112*     Most Recent 2 LFT's:  Recent Labs   Lab Test 01/23/20  0425 01/19/20  1509   AST 19 33   ALT 14 17   ALKPHOS 38* 42   BILITOTAL 0.3 0.4     Most Recent 3 BNP's:  Recent Labs   Lab Test 01/19/20  1509   NTBNPI 1,638     Most Recent 6 Bacteria Isolates From Any Culture (See EPIC Reports for Culture Details):  Recent Labs   Lab Test 01/19/20  1558 01/19/20  1509 10/23/15  1010   CULT No growth No growth after 4 days 10,000 to 50,000 colonies/mL Staphylococcus lugdunensis*     Most Recent Urinalysis:  Recent Labs   Lab Test 01/19/20  1558 11/20/15  1229   COLOR Yellow Straw   APPEARANCE Clear Clear   URINEGLC Negative Negative   URINEBILI Negative Negative   URINEKETONE Negative Negative   SG 1.021 <=1.005   UBLD Negative Negative   URINEPH 5.0 7.0   PROTEIN Negative Negative   UROBILINOGEN  --  0.2   NITRITE Negative Negative   LEUKEST Negative Negative   RBCU <1  --    WBCU 1  --      Most Recent ABG:  Recent Labs   Lab Test 10/23/15   PH 7.0     Most Recent ESR & CRP:  Recent Labs   Lab Test 01/23/20  0425   CRP 32.7*   ,   Results for orders placed or performed during the hospital encounter of 01/19/20   XR Chest 2 Views    Narrative    CHEST TWO VIEWS     1/19/2020 3:27 PM     HISTORY:  Cough.    COMPARISON: None available.      Impression    IMPRESSION: PA and lateral views of the chest were obtained. Enlarged  cardiac silhouette. No focal pulmonary opacities. No significant  pleural effusion or pneumothorax.     EMANUEL GARCIA MD   XR Chest Port 1 View    Narrative    XR PORTABLE CHEST ONE VIEW   2020 9:58 PM     HISTORY: Worsening oxygenation needs, known parainfluenza infection  but new diagnosis of moderate to severe aortic stenosis. Evaluate for  pulmonary congestion.    COMPARISON: Chest x-ray 2020.      Impression    IMPRESSION: Portable view of the chest is performed. Patient is  rotated to the right. Right lung base infiltrate is present. Probable  small bilateral pleural effusions are suspected. Left lung base  infiltrate is also noted. No pneumothorax. Heart is normal in size.    RON BLACK MD   Echocardiogram Complete    Narrative    [id number]  [id number]  [id number]  [id number]^SHANIQUE^ZAHRA^TACOS           [facility]  Echocardiography Laboratory  [address] [place] [address]        Name: SCARLET ROMERO  MRN: [id number]  : 1926  Study Date: 2020 09:06 AM  Age: 89 yrs  Gender: Female  Patient Location: [facility]  Reason For Study: SOB, Aortic Valve Disorder  History: HTN,TIA,aortic valve stenosis  Ordering Physician: ZAHRA BAY  Referring Physician: Yoseph Kumar  Performed By: Olya Huynh     BSA: 1.8 m2  Height: 62 in  Weight: 174 lb  HR: 91  BP: 127/56 mmHg  _____________________________________________________________________________  __        Procedure  Complete Portable Echo Adult.  _____________________________________________________________________________  __        Interpretation Summary     1. Normal biventricular size and function. Left ventricular ejection fraction  of 60-65%. No segmental wall motion abnormalities noted.  2. The left atrium is moderately dilated.  3. Moderate to severe valvular aortic  stenosis with KARLY of 1 cm^2 and mean  gradient of 23 mmHg. There is mild to moderate (1-2+) aortic regurgitation.  Pressure half time is 345 ms.  4. There is mild mitral stenosis with a mean mitral valve gradient of 5.6 mmHg  at a HR of 83 bpm.  5. There is mild to moderate (1-2+) tricuspid regurgitation.  6. The right ventricular systolic pressure is approximated at 57.4 mmHg plus  the right atrial pressure.  Compared to the previous echocardiogram on 9/16/2016, aortic stenosis has  progressed. There is now mitral stenosis as well. Pulmonary pressure also  higher.  Technically adequate study.  _____________________________________________________________________________  __        Left Ventricle  The left ventricle is normal in size. There is concentric remodeling present.  Left ventricular systolic function is normal. The visual ejection fraction is  estimated at 60-65%. Diastolic Doppler findings (E/E' ratio and/or other  parameters) suggest left ventricular filling pressures are increased.  Diastolic function not assessed due to significant mitral annular  calcification.     Right Ventricle  The right ventricle is normal in size and function.     Atria  The left atrium is moderately dilated. Right atrium not well visualized.     Mitral Valve  There is severe mitral annular calcification. The mitral valve leaflets are  mildly thickened. There is mild mitral stenosis. The peak mitral valve  gradient is 12.0 mmHg. The mean mitral valve gradient is 5.6 mmHg.        Tricuspid Valve  Normal tricuspid valve. There is mild to moderate (1-2+) tricuspid  regurgitation. The right ventricular systolic pressure is approximated at 57.4  mmHg plus the right atrial pressure. Right ventricular systolic pressure is  elevated, consistent with severe pulmonary hypertension.     Aortic Valve  There is mild to moderate (1-2+) aortic regurgitation. Moderate to severe  valvular aortic stenosis. The calculated aortic valve are is 0.98  cm^2. The  peak AoV pressure gradient is 36.4 mmHg. The mean AoV pressure gradient is  23.6 mmHg.     Pulmonic Valve  The pulmonic valve is not well visualized.     Vessels  Normal size aorta. Normal size ascending aorta. IVC diameter and respiratory  changes fall into an intermediate range suggesting an RA pressure of 8 mmHg.     Pericardium  There is no pericardial effusion.        Rhythm  Sinus rhythm was noted.  _____________________________________________________________________________  __  MMode/2D Measurements & Calculations  IVSd: 1.2 cm     LVIDd: 3.3 cm  LVIDs: 2.2 cm  LVPWd: 1.2 cm  FS: 32.8 %  LV mass(C)d: 124.3 grams  LV mass(C)dI: 69.1 grams/m2  Ao root diam: 3.2 cm  LA dimension: 3.9 cm  asc Aorta Diam: 3.6 cm  LA/Ao: 1.2  LVOT diam: 2.0 cm  LVOT area: 3.0 cm2  RWT: 0.69        Doppler Measurements & Calculations  MV E max cesar: 171.7 cm/sec  MV A max cesar: 130.7 cm/sec  MV E/A: 1.3  MV max P.0 mmHg  MV mean P.6 mmHg  MV V2 VTI: 42.2 cm  MVA(VTI): 1.6 cm2  MV dec time: 0.15 sec  Ao V2 max: 301.0 cm/sec  Ao max P.4 mmHg  Ao V2 mean: 234.9 cm/sec  Ao mean P.6 mmHg  Ao V2 VTI: 69.8 cm  KARLY(I,D): 0.98 cm2  KARLY(V,D): 1.0 cm2  LV V1 max P.1 mmHg  LV V1 max: 101.0 cm/sec  LV V1 VTI: 22.5 cm  SV(LVOT): 68.1 ml  SI(LVOT): 37.9 ml/m2  TR max cesar: 378.9 cm/sec  TR max P.4 mmHg     AV Cesar Ratio (DI): 0.34  KARLY Index (cm2/m2): 0.54  E/E' av.2  Lateral E/e': 18.4  Medial E/e': 26.0           _____________________________________________________________________________  __           Report approved by: Malia Barron 2020 04:49 PM            Discharge Medications   Current Discharge Medication List      START taking these medications    Details   fluticasone (ARNUITY ELLIPTA) 200 MCG/ACT inhaler Inhale 1 puff into the lungs daily    Associated Diagnoses: Viral syndrome; Cough; Parainfluenza type 1 infection; Acute respiratory failure with hypoxia (H)      guaiFENesin  (MUCINEX) 600 MG 12 hr tablet Take 1 tablet (600 mg) by mouth 2 times daily    Associated Diagnoses: Viral syndrome; Cough      ipratropium - albuterol 0.5 mg/2.5 mg/3 mL (DUONEB) 0.5-2.5 (3) MG/3ML neb solution Take 1 vial (3 mLs) by nebulization every 4 hours as needed for wheezing or shortness of breath / dyspnea    Associated Diagnoses: Viral syndrome; Cough; Parainfluenza type 1 infection; Acute respiratory failure with hypoxia (H)         CONTINUE these medications which have CHANGED    Details   ibuprofen (ADVIL/MOTRIN) 600 MG tablet Take 1 tablet (600 mg) by mouth every 6 hours as needed for other (mild pain)    Associated Diagnoses: Spinal stenosis of lumbar region without neurogenic claudication         CONTINUE these medications which have NOT CHANGED    Details   acetaminophen 500 MG CAPS Take 500 mg by mouth every 6 hours as needed for pain      ALPRAZolam (XANAX) 0.5 MG tablet Take 1 tablet (0.5 mg) by mouth 3 times daily as needed for anxiety  Qty: 270 tablet, Refills: 3    Comments: Profile  Associated Diagnoses: Anxiety state      aspirin 81 MG EC tablet Take 1 tablet by mouth daily.      Calcium Carb-Cholecalciferol (CALCIUM 600+D) 600-800 MG-UNIT TABS Take 1 tablet by mouth daily      Flaxseed, Linseed, (GROUND FLAX SEEDS PO) Two tablespoons daily with cereal      losartan (COZAAR) 100 MG tablet Take 1 tablet (100 mg) by mouth daily  Qty: 90 tablet, Refills: 3    Comments: Profile  Associated Diagnoses: Essential hypertension with goal blood pressure less than 140/90      Multiple Vitamin (ONE-A-DAY ESSENTIAL) TABS Take  by mouth.  Qty: 30 tablet      simvastatin (ZOCOR) 20 MG tablet Take 1 tablet (20 mg) by mouth At Bedtime  Qty: 90 tablet, Refills: 3    Associated Diagnoses: Hyperlipidemia LDL goal <130         STOP taking these medications       mupirocin (BACTROBAN) 2 % external ointment Comments:   Reason for Stopping:             Allergies   Allergies   Allergen Reactions     No Known Drug  Allergies

## 2020-01-23 NOTE — PLAN OF CARE
"Pt slept well through the night. Still remains anxious while awake. Flagged for sepsis protocol, lactic acid level of 1.1. Pt complains of having pain in her stomach that feels like she needs to have a bowel movement, ibuprofen given with some relief. Up to the commode, voiding well. Using call light appropriately. Will continue to monitor according to care plan.     /71   Pulse 83   Temp 97.3  F (36.3  C) (Oral)   Resp 18   Ht 1.575 m (5' 2\")   Wt 78.6 kg (173 lb 4.5 oz)   SpO2 96%   BMI 31.69 kg/m      "

## 2020-01-23 NOTE — PLAN OF CARE
"Feeling better than she did upon admission. Room air sats of 92-95%. Up in chair for meals and used commode with assistance to void and had a BM. SCD's on when in bed. Oriented x4, but anxious; asked if there was going to be a nurse in the hospital overnight. Was reassured. Has denied pain. Lung sounds coarse with small amount of light yellow sputum. Afebrile and CRP was 61.3. 1-2+ edema in BLE. Hesitant about leaving the hospital tomorrow as stated \"I like you girls here.\" Good output after IV Lasix this morning. Ate well. Bed and chair alarms on. Will continue to monitor lung sounds, sats, activity tolerance, edema, labs, safety.  "

## 2020-01-23 NOTE — PLAN OF CARE
S-(situation): Patient discharged to [facility] for rehab via wheelchair with [facility].    B-(background): Viral Syndrome; Parainfluenza Type 1    A-(assessment): Lungs less coarse. Afebrile. Sats 94% on room air. Voiding and had BM. Tolerating regular diet. Has denied pain. Up with one assist, gait belt, and walker. Oriented x4.  Last bowel movement: 1-23-20    R-(recommendations):Report called to EDGAR Pyle . Listed belongings gathered and sent with patient.     Discharge Nursing Criteria:     Care Plan and Patient education resolved: Yes    Core Measures   Core Measures applicable during stay (Stroke/TIA, MI, Pneumonia and SSI): none      Vaccines  Influenza status verified at discharge:  Yes      MISC  Home and hospital aquired medications returned to patient: Yes  Medication Bin checked and emptied on discharge Yes  All paperwork sent with patient/Copy of AVS given to patient or family Yes.

## 2020-01-23 NOTE — PROGRESS NOTES
Name: Linn Sanon    MRN#: [id number]    Reason for Hospitalization: Viral syndrome [B34.9]  Generalized weakness [R53.1]    Discharge Date: 1/23/2020    Patient / Family response to discharge plan: in agreement    Other Providers (Care Coordinator, County Services, PCA services etc): No    CTS Hand Off Completed: Yes    PAS #: [id number]    CHATO Score: Average    Future Appointments:   Future Appointments   Date Time Provider Department Center   1/23/2020  2:45 PM Matilde Caicedo, [facility]   1/29/2020  2:30 PM Yoseph Kumar MD [facility]       Discharge Disposition: transitional care unit  - [facility] (Main Phone: [phone] Admissions Phone: [phone] Fax: [phone]).  [facility] transport at 1330.    Discharge Planner   Discharge Plans in progress: TCU  Barriers to discharge plan: None  Follow up plan: TCU and PCP    CHARMAINE Ashby  [facility] [phone]/ [place] [phone]         Entered by: Jessica Stallworth 01/23/2020 11:49 AM

## 2020-01-24 NOTE — LETTER
1/24/2020        RE: Linn Sanon  [address]  [facility] [address]        [place] [facility]  PRIMARY CARE PROVIDER AND CLINIC:  Yoseph Kumar MD, [address] [facility] [address]  Chief Complaint   Patient presents with     Hospital F/U     [facility] Medical Record Number:  [id number]  Place of Service where encounter took place:  [facility] ([facility] [[id number]]    Linn Sanon  is a 89 year old  (11/13/1926), admitted to the above facility from  [facility]. Hospital stay 1/19/20 through 1/23/20..  Admitted to this facility for  rehab, medical management and nursing care.    HPI:    HPI information obtained from: facility chart records, facility staff, patient report and [facility] chart review.   Brief Summary of Hospital Course:   Patient is a 89-year-old female who presented to the ED with acute respiratory failure with hypoxia and was found to have parainfluenza type I infection.  She had significant wheezing and weakness and was treated supportively.  She did have some fluid overload which was felt contributory to acute respiratory failure with hypoxia and was diuresed.  Hospitalization was c/b noted progression of her aortic valve stenosis on ECHO.  Again, she had gentle diuresis and recommendation to monitor while in TCU.  She also had significant anxiety, which is chronic and PRN Xanax was continued while in-patient. She was transferred to TCU for strengthening, nursing cares and medical management.     Med Changes:  - Hosp: (new) fluticasone 200 mcg/act daily, Mucinex 600 mg BID (no stop date), DuoNebs PRN  - Hosp: PTA Ibuprofen 200 mg TID --> 600 mg q6 hours PRN    Updates on Status Since Skilled nursing Admission:   Patient sedated in the TCU room where she endorses productive cough with clear sputum however believes this is reducing slowly.  She denies shortness of breath, CP, HA, lightheadedness, heartburn, upset  "stomach.  She does endorse some constipation after some diarrhea in the hospital, and reports she has not had a BM for \"a few days.\"  Her son has asked for a laxative.  She reports she uses flax seed at home. I updated her that we do not have that here in the TCU.    Nursing reports patient is very anxious about things and tends to get fixated on certain things/medications/circumstances and it is difficult to redirect her off of these thoughts.      CODE STATUS/ADVANCE DIRECTIVES DISCUSSION:   CPR/Full code   Patient's living condition: lives alone  ALLERGIES: No known drug allergies  PAST MEDICAL HISTORY:  has a past medical history of Closed fracture of navicular (scaphoid) bone of wrist, Generalized osteoarthrosis, unspecified site, Lump or mass in breast, Measles without mention of complication, Mumps without mention of complication, Pure hypercholesterolemia, Scarlet fever, Unspecified closed fracture of ankle, and Varicella without mention of complication.  PAST SURGICAL HISTORY:   has a past surgical history that includes APPENDECTOMY (age 11); EXCISION BREAST LESION W XRAY MARKER, OPEN SINGLE (1995); REMV CATARACT EXTRACAP,INSERT LENS, W/O ECP (9/18/2008); REMV CATARACT EXTRACAP,INSERT LENS, W/O ECP (10/16/2008); and Laser YAG capsulotomy (Left, 9/18/2014).  FAMILY HISTORY: family history includes Arthritis in her sister; Cardiovascular in her father; Diabetes in her father; Heart Disease in her paternal grandfather; Respiratory in her brother and sister; Thyroid Disease in her sister.  SOCIAL HISTORY:   reports that she has never smoked. She has never used smokeless tobacco. She reports that she does not drink alcohol or use drugs.    Post Discharge Medication Reconciliation Status: discharge medications reconciled and changed, per note/orders (see AVS)    Current Outpatient Medications   Medication Sig Dispense Refill     acetaminophen 500 MG CAPS Take 500 mg by mouth every 6 hours as needed for pain       " ALPRAZolam (XANAX) 0.5 MG tablet Take 1 tablet (0.5 mg) by mouth 3 times daily as needed for anxiety 60 tablet 0     aspirin 81 MG EC tablet Take 1 tablet by mouth daily.       Calcium Carb-Cholecalciferol (CALCIUM 600+D) 600-800 MG-UNIT TABS Take 1 tablet by mouth daily       Flaxseed, Linseed, (GROUND FLAX SEEDS PO) Two tablespoons daily with cereal       fluticasone (ARNUITY ELLIPTA) 200 MCG/ACT inhaler Inhale 1 puff into the lungs daily       guaiFENesin (MUCINEX) 600 MG 12 hr tablet Take 1 tablet (600 mg) by mouth 2 times daily       ibuprofen (ADVIL/MOTRIN) 600 MG tablet Take 1 tablet (600 mg) by mouth every 6 hours as needed for other (mild pain)       ipratropium - albuterol 0.5 mg/2.5 mg/3 mL (DUONEB) 0.5-2.5 (3) MG/3ML neb solution Take 1 vial (3 mLs) by nebulization every 4 hours as needed for wheezing or shortness of breath / dyspnea       losartan (COZAAR) 100 MG tablet Take 1 tablet (100 mg) by mouth daily 90 tablet 3     Multiple Vitamin (ONE-A-DAY ESSENTIAL) TABS Take  by mouth. 30 tablet      simvastatin (ZOCOR) 20 MG tablet Take 1 tablet (20 mg) by mouth At Bedtime 90 tablet 3       ROS:  10 point ROS of systems including Constitutional, Eyes, Respiratory, Cardiovascular, Gastroenterology, Genitourinary, Integumentary, Musculoskeletal, Psychiatric were all negative except for pertinent positives noted in my HPI.    Vitals:  /63   Pulse 80   Temp 96.2  F (35.7  C)   Resp 20   Wt 78.5 kg (173 lb)   SpO2 92%   BMI 31.64 kg/m     Exam:  GENERAL APPEARANCE:  Alert, in no distress but anxious, elderly woman   RESP:  respiratory effort and palpation of chest normal, auscultation of lungs with bilat expiratory wheezes, no respiratory distress, on RA  CV:  Palpation and auscultation of heart done , rate and rhythm irregular, no murmur, mild to moderate LE peripheral edema  ABDOMEN:  normal bowel sounds, soft, nontender, no hepatosplenomegaly or other masses  M/S:   Gait and station with w/c for  mobility at this time, Digits and nails with arthritic changes, reduced muscle mass  SKIN:  Inspection and Palpation of skin and subcutaneous tissue pale, intact  PSYCH:  insight and judgement, memory with mild impairment, affect and mood anxious and worsened as we visited, has ability to follow commands readily         Lab/Diagnostic data:  CBC RESULTS:   Recent Labs   Lab Test 01/24/20  0720 01/23/20  0425   WBC 5.3 5.1   RBC 3.68* 3.48*   HGB 11.2* 10.6*   HCT 34.2* 32.1*   MCV 93 92   MCH 30.4 30.5   MCHC 32.7 33.0   RDW 14.1 14.1    188       Last Basic Metabolic Panel:  Recent Labs   Lab Test 01/24/20  0720 01/23/20  0425    138   POTASSIUM 4.3 4.3   CHLORIDE 110* 109   WINSTON 8.9 8.8   CO2 25 26   BUN 12 19   CR 0.66 0.80   * 101*       Liver Function Studies -   Recent Labs   Lab Test 01/23/20 0425 01/19/20  1509   PROTTOTAL 5.8* 6.3*   ALBUMIN 2.7* 3.2*   BILITOTAL 0.3 0.4   ALKPHOS 38* 42   AST 19 33   ALT 14 17       TSH   Date Value Ref Range Status   11/17/2008 2.40 0.4 - 5.0 mU/L Final   10/19/2007 2.25 0.4 - 5.0 mU/L Final   ]    Lab Results   Component Value Date    A1C 5.6 09/09/2016    A1C 6.3 09/01/2015         ASSESSMENT/PLAN:  Parainfluenza type 1 infection  Bronchiolitis  Acute respiratory failure with hypoxia (H)  Acute respiratory failure with hypoxia thought 2/2 parainfluenza virus, bronchiolitis and fluid overload   Antibiotics not indicated  Treated supportively with inhaled steroids, bronchodilators, expectorants.   Goal to avoid po steroids d/t anxiety state.     On (new) fluicasone 200 mcg/act 1 puff daily, Mucinex 600 mg BID (no stop date), DuoNebs q4 hours PRN.     Patient denies SOB; endorses productive cough with clear sputum that she thinks is getting better.  No cough during our visit today.   LS with exp wheezes bilat  Sats 91-92% on RA  Afebrile    PLAN:  - (new) Fluticasone daily, monitor for abilty to take deep enough breath for this, may need to change to  Nebs  - (new) Mucinex, monitor for appropriate stop date  - (new) DuoNebs PRN, monitor fo rneed to schedule.  - avoid po steroids if able d/t anxiety  - monitor Resp status  - aggressive pulmonary toileting   - follow weights for fluid status     Anxiety state  Chronic  On PTA alprazolam 0.5 mg TID PRN - nursing needing Rx, can send.   At our visit today patient very fixated on bowel meds. Nursing reported earlier patient fixated on Xanax med.   No amount of reassurance or redirection was helpful and in fact, the longer our visit went on, the more anxious she became despite frequent encouragement and reassurance.      PLAN:  - frequent reassurance and calm approach  - avoid po steroids as stated above, if able  - continue PTA Xanax PRN     CKD3  BL Creat seemingly 0.7-0.8  Last few BMPs:   1/19/20: BUN 20, Creat 0.89, GFR 56  1/23/20: BUN 19, Creat 0.80, GFR 63  1/24/20: BUN 12, Creat 0.66, GFR 76    Gentle diuresis while in-patient with recommendation for monitoring for diuretic need in TCU.   Not currently on diuretics.     PLAN:  - BMP in one week to monitor fluid status  - daily weights  - monitor for need to add diuretics.       Aortic valve stenosis, moderate to severe (increase from mild in 2016)  ECHO showing worsening AV stenosis, now moderate to severe.  She had diuresis for fluid overload with acute respiratory failure. IVF stopped and diuresis halted. Recommendation to monitor in TCU.    LS with wheezes today but no crackles or rhonchi  LE edema mild to moderate  We spoke about Teds extensively; patient reluctantly will try.     PLAN:  - daily weights  - TEDs for LE edema  - monitor fluid status.   - CMP for monitoring.     Essential hypertension with goal blood pressure less than 140/90  On PTA losartan 100 mg daily   BPs 130s/60-80s  Patient denies CP, HA, lightheadedness     PLAN:  - PTA losartan  - BP goals are <150/90 mm Hg.This is higher than ACC and AHA recommendations due to risk for hypotension,  "risk of dizziness and falls, risk of tissue/cerebral hypoperfusion and frailty.     History of TIA (transient ischemic attack) and stroke  On PTA ASA 81 mg daily and simvastatin 20 mg q HS  Continue    Slow Transit Constipation  Patient uses flax seed for constipation.  Pharmacy not able to supply here.   If family wishes to bring in and use own supply, this would be fine. Otherwise will discontinue until she discharges home.    Patient reporting she had functional bowel incontinence while in-patient; scared this will happen again.  We had a very long discussion about bowel meds and prune juice.    Ultimately, will do Senna S 1 tab daily.  Patient can take prune juice if she wishes but will order to \"hold for loose stools\" for Senna    PLAN:  - (new) Senna S 1 tab po daily. Hold for loose stools  - discontinue Flax seed - not available at TCU  - monitor for titration needs     Weakness  2/2 advanced age, frailty, hospitalization, etc.    PLAN:  - Physical Therapy, Occupational Therapy for strengthening, rehab, mobility, stability, etc.        Orders written by provider at facility  1. Daily weights  2. BMP, CBC in one week. Dx: HTN, AV stenosis, recent parainfluenza, CKD, resp failure  3. discontinue Ground flax seed - not available  4. Dx for Xanax - Anxiety A/E/B excessive worry, ruminating/racing thoughts  5. Senna S 1 tab po daily. Dx: constipation  6. Knee high Teds, on in AM, off in PM. Discontinue: edema     Total time spent with patient visit at the skilled nursing facility was >40 min including patient visit, review of past records and coordination of care with nursing. Greater than 50% of total time spent with counseling and coordinating care due to review of records, patient visit with extensive discussion about her medications, respiratory status including wheezing and new meds, supportive treatments including pulmonary toileting and therapy, bowel medications, reassurance of stable health status and " bowels, labs to be ordered for monitoring, etc.     Electronically signed by:  ASHLEY Loredo CNP                         Sincerely,        ASHLEY Loredo CNP

## 2020-01-24 NOTE — PROGRESS NOTES
[place] [facility]  PRIMARY CARE PROVIDER AND CLINIC:  Yoseph Kumar MD, [address] [facility] [address]  Chief Complaint   Patient presents with     Hospital F/U     [facility] Medical Record Number:  [id number]  Place of Service where encounter took place:  [facility] [place] [facility] ([facility] [[id number]]    Linn Sanon  is a 89 year old  (11/13/1926), admitted to the above facility from  [facility]. Hospital stay 1/19/20 through 1/23/20..  Admitted to this facility for  rehab, medical management and nursing care.    HPI:    HPI information obtained from: facility chart records, facility staff, patient report and [facility] chart review.   Brief Summary of Hospital Course:   Patient is a 89-year-old female who presented to the ED with acute respiratory failure with hypoxia and was found to have parainfluenza type I infection.  She had significant wheezing and weakness and was treated supportively.  She did have some fluid overload which was felt contributory to acute respiratory failure with hypoxia and was diuresed.  Hospitalization was c/b noted progression of her aortic valve stenosis on ECHO.  Again, she had gentle diuresis and recommendation to monitor while in TCU.  She also had significant anxiety, which is chronic and PRN Xanax was continued while in-patient. She was transferred to TCU for strengthening, nursing cares and medical management.     Med Changes:  - Hosp: (new) fluticasone 200 mcg/act daily, Mucinex 600 mg BID (no stop date), DuoNebs PRN  - Hosp: PTA Ibuprofen 200 mg TID --> 600 mg q6 hours PRN    Updates on Status Since Skilled nursing Admission:   Patient sedated in the TCU room where she endorses productive cough with clear sputum however believes this is reducing slowly.  She denies shortness of breath, CP, HA, lightheadedness, heartburn, upset stomach.  She does endorse some constipation after some diarrhea in the hospital, and reports  "she has not had a BM for \"a few days.\"  Her son has asked for a laxative.  She reports she uses flax seed at home. I updated her that we do not have that here in the TCU.    Nursing reports patient is very anxious about things and tends to get fixated on certain things/medications/circumstances and it is difficult to redirect her off of these thoughts.      CODE STATUS/ADVANCE DIRECTIVES DISCUSSION:   CPR/Full code   Patient's living condition: lives alone  ALLERGIES: No known drug allergies  PAST MEDICAL HISTORY:  has a past medical history of Closed fracture of navicular (scaphoid) bone of wrist, Generalized osteoarthrosis, unspecified site, Lump or mass in breast, Measles without mention of complication, Mumps without mention of complication, Pure hypercholesterolemia, Scarlet fever, Unspecified closed fracture of ankle, and Varicella without mention of complication.  PAST SURGICAL HISTORY:   has a past surgical history that includes APPENDECTOMY (age 11); EXCISION BREAST LESION W XRAY MARKER, OPEN SINGLE (1995); REMV CATARACT EXTRACAP,INSERT LENS, W/O ECP (9/18/2008); REMV CATARACT EXTRACAP,INSERT LENS, W/O ECP (10/16/2008); and Laser YAG capsulotomy (Left, 9/18/2014).  FAMILY HISTORY: family history includes Arthritis in her sister; Cardiovascular in her father; Diabetes in her father; Heart Disease in her paternal grandfather; Respiratory in her brother and sister; Thyroid Disease in her sister.  SOCIAL HISTORY:   reports that she has never smoked. She has never used smokeless tobacco. She reports that she does not drink alcohol or use drugs.    Post Discharge Medication Reconciliation Status: discharge medications reconciled and changed, per note/orders (see AVS)    Current Outpatient Medications   Medication Sig Dispense Refill     acetaminophen 500 MG CAPS Take 500 mg by mouth every 6 hours as needed for pain       ALPRAZolam (XANAX) 0.5 MG tablet Take 1 tablet (0.5 mg) by mouth 3 times daily as needed for " anxiety 60 tablet 0     aspirin 81 MG EC tablet Take 1 tablet by mouth daily.       Calcium Carb-Cholecalciferol (CALCIUM 600+D) 600-800 MG-UNIT TABS Take 1 tablet by mouth daily       Flaxseed, Linseed, (GROUND FLAX SEEDS PO) Two tablespoons daily with cereal       fluticasone (ARNUITY ELLIPTA) 200 MCG/ACT inhaler Inhale 1 puff into the lungs daily       guaiFENesin (MUCINEX) 600 MG 12 hr tablet Take 1 tablet (600 mg) by mouth 2 times daily       ibuprofen (ADVIL/MOTRIN) 600 MG tablet Take 1 tablet (600 mg) by mouth every 6 hours as needed for other (mild pain)       ipratropium - albuterol 0.5 mg/2.5 mg/3 mL (DUONEB) 0.5-2.5 (3) MG/3ML neb solution Take 1 vial (3 mLs) by nebulization every 4 hours as needed for wheezing or shortness of breath / dyspnea       losartan (COZAAR) 100 MG tablet Take 1 tablet (100 mg) by mouth daily 90 tablet 3     Multiple Vitamin (ONE-A-DAY ESSENTIAL) TABS Take  by mouth. 30 tablet      simvastatin (ZOCOR) 20 MG tablet Take 1 tablet (20 mg) by mouth At Bedtime 90 tablet 3       ROS:  10 point ROS of systems including Constitutional, Eyes, Respiratory, Cardiovascular, Gastroenterology, Genitourinary, Integumentary, Musculoskeletal, Psychiatric were all negative except for pertinent positives noted in my HPI.    Vitals:  /63   Pulse 80   Temp 96.2  F (35.7  C)   Resp 20   Wt 78.5 kg (173 lb)   SpO2 92%   BMI 31.64 kg/m    Exam:  GENERAL APPEARANCE:  Alert, in no distress but anxious, elderly woman   RESP:  respiratory effort and palpation of chest normal, auscultation of lungs with bilat expiratory wheezes, no respiratory distress, on RA  CV:  Palpation and auscultation of heart done , rate and rhythm irregular, no murmur, mild to moderate LE peripheral edema  ABDOMEN:  normal bowel sounds, soft, nontender, no hepatosplenomegaly or other masses  M/S:   Gait and station with w/c for mobility at this time, Digits and nails with arthritic changes, reduced muscle mass  SKIN:   Inspection and Palpation of skin and subcutaneous tissue pale, intact  PSYCH:  insight and judgement, memory with mild impairment, affect and mood anxious and worsened as we visited, has ability to follow commands readily         Lab/Diagnostic data:  CBC RESULTS:   Recent Labs   Lab Test 01/24/20  0720 01/23/20 0425   WBC 5.3 5.1   RBC 3.68* 3.48*   HGB 11.2* 10.6*   HCT 34.2* 32.1*   MCV 93 92   MCH 30.4 30.5   MCHC 32.7 33.0   RDW 14.1 14.1    188       Last Basic Metabolic Panel:  Recent Labs   Lab Test 01/24/20  0720 01/23/20 0425    138   POTASSIUM 4.3 4.3   CHLORIDE 110* 109   WINSTON 8.9 8.8   CO2 25 26   BUN 12 19   CR 0.66 0.80   * 101*       Liver Function Studies -   Recent Labs   Lab Test 01/23/20 0425 01/19/20  1509   PROTTOTAL 5.8* 6.3*   ALBUMIN 2.7* 3.2*   BILITOTAL 0.3 0.4   ALKPHOS 38* 42   AST 19 33   ALT 14 17       TSH   Date Value Ref Range Status   11/17/2008 2.40 0.4 - 5.0 mU/L Final   10/19/2007 2.25 0.4 - 5.0 mU/L Final   ]    Lab Results   Component Value Date    A1C 5.6 09/09/2016    A1C 6.3 09/01/2015         ASSESSMENT/PLAN:  Parainfluenza type 1 infection  Bronchiolitis  Acute respiratory failure with hypoxia (H)  Acute respiratory failure with hypoxia thought 2/2 parainfluenza virus, bronchiolitis and fluid overload   Antibiotics not indicated  Treated supportively with inhaled steroids, bronchodilators, expectorants.   Goal to avoid po steroids d/t anxiety state.     On (new) fluicasone 200 mcg/act 1 puff daily, Mucinex 600 mg BID (no stop date), DuoNebs q4 hours PRN.     Patient denies SOB; endorses productive cough with clear sputum that she thinks is getting better.  No cough during our visit today.   LS with exp wheezes bilat  Sats 91-92% on RA  Afebrile    PLAN:  - (new) Fluticasone daily, monitor for abilty to take deep enough breath for this, may need to change to Nebs  - (new) Mucinex, monitor for appropriate stop date  - (new) Paul PRN, monitor fo  rneed to schedule.  - avoid po steroids if able d/t anxiety  - monitor Resp status  - aggressive pulmonary toileting   - follow weights for fluid status     Anxiety state  Chronic  On PTA alprazolam 0.5 mg TID PRN - nursing needing Rx, can send.   At our visit today patient very fixated on bowel meds. Nursing reported earlier patient fixated on Xanax med.   No amount of reassurance or redirection was helpful and in fact, the longer our visit went on, the more anxious she became despite frequent encouragement and reassurance.      PLAN:  - frequent reassurance and calm approach  - avoid po steroids as stated above, if able  - continue PTA Xanax PRN     CKD3  BL Creat seemingly 0.7-0.8  Last few BMPs:   1/19/20: BUN 20, Creat 0.89, GFR 56  1/23/20: BUN 19, Creat 0.80, GFR 63  1/24/20: BUN 12, Creat 0.66, GFR 76    Gentle diuresis while in-patient with recommendation for monitoring for diuretic need in TCU.   Not currently on diuretics.     PLAN:  - BMP in one week to monitor fluid status  - daily weights  - monitor for need to add diuretics.       Aortic valve stenosis, moderate to severe (increase from mild in 2016)  ECHO showing worsening AV stenosis, now moderate to severe.  She had diuresis for fluid overload with acute respiratory failure. IVF stopped and diuresis halted. Recommendation to monitor in TCU.    LS with wheezes today but no crackles or rhonchi  LE edema mild to moderate  We spoke about Teds extensively; patient reluctantly will try.     PLAN:  - daily weights  - TEDs for LE edema  - monitor fluid status.   - CMP for monitoring.     Essential hypertension with goal blood pressure less than 140/90  On PTA losartan 100 mg daily   BPs 130s/60-80s  Patient denies CP, HA, lightheadedness     PLAN:  - PTA losartan  - BP goals are <150/90 mm Hg.This is higher than ACC and AHA recommendations due to risk for hypotension, risk of dizziness and falls, risk of tissue/cerebral hypoperfusion and frailty.  "    History of TIA (transient ischemic attack) and stroke  On PTA ASA 81 mg daily and simvastatin 20 mg q HS  Continue    Slow Transit Constipation  Patient uses flax seed for constipation.  Pharmacy not able to supply here.   If family wishes to bring in and use own supply, this would be fine. Otherwise will discontinue until she discharges home.    Patient reporting she had functional bowel incontinence while in-patient; scared this will happen again.  We had a very long discussion about bowel meds and prune juice.    Ultimately, will do Senna S 1 tab daily.  Patient can take prune juice if she wishes but will order to \"hold for loose stools\" for Senna    PLAN:  - (new) Senna S 1 tab po daily. Hold for loose stools  - discontinue Flax seed - not available at TCU  - monitor for titration needs     Weakness  2/2 advanced age, frailty, hospitalization, etc.    PLAN:  - Physical Therapy, Occupational Therapy for strengthening, rehab, mobility, stability, etc.        Orders written by provider at facility  1. Daily weights  2. BMP, CBC in one week. Dx: HTN, AV stenosis, recent parainfluenza, CKD, resp failure  3. discontinue Ground flax seed - not available  4. Dx for Xanax - Anxiety A/E/B excessive worry, ruminating/racing thoughts  5. Senna S 1 tab po daily. Dx: constipation  6. Knee high Teds, on in AM, off in PM. Discontinue: edema     Total time spent with patient visit at the skilled nursing facility was >40 min including patient visit, review of past records and coordination of care with nursing. Greater than 50% of total time spent with counseling and coordinating care due to review of records, patient visit with extensive discussion about her medications, respiratory status including wheezing and new meds, supportive treatments including pulmonary toileting and therapy, bowel medications, reassurance of stable health status and bowels, labs to be ordered for monitoring, etc.     Electronically signed by:  N[place] " ASHLEY Bianchi CNP

## 2020-01-27 NOTE — PROGRESS NOTES
[facility]  [place] Medical Record Number:  [id number]  Place of Service where encounter took place:  [facility] [[id number]]  Chief Complaint   Patient presents with     Nursing Home Acute       HPI:    Linn Saonn  is a 89 year old (11/13/1926), who is being seen today for an episodic care visit.  HPI information obtained from: facility chart records, facility staff, patient report and [facility] chart review. Today's concern is:     Primary osteoarthritis of right knee  Other chronic pain  Anxiety state       Patient has history of multiple steroid injections in her right knee for DJD by Primary Care Provider.  Last injection >3 months.    Previous imagining showing moderate-severe lateral compartment, moderate medial and mild patellofemoral compartment DJD in right knee.     Patient reports today that she has pain that limits her ability to perform ADLs, ambulate, work with therapy, etc.  She is in agreement to injection and requesting it.       Past Medical and Surgical History reviewed in Epic today.    MEDICATIONS:  Current Outpatient Medications   Medication Sig Dispense Refill     acetaminophen 500 MG CAPS Take 500 mg by mouth every 6 hours as needed for pain       ALPRAZolam (XANAX) 0.5 MG tablet Take 1 tablet (0.5 mg) by mouth 3 times daily as needed for anxiety 60 tablet 0     aspirin 81 MG EC tablet Take 1 tablet by mouth daily.       Calcium Carb-Cholecalciferol (CALCIUM 600+D) 600-800 MG-UNIT TABS Take 1 tablet by mouth daily       fluticasone (ARNUITY ELLIPTA) 200 MCG/ACT inhaler Inhale 1 puff into the lungs daily       guaiFENesin (MUCINEX) 600 MG 12 hr tablet Take 1 tablet (600 mg) by mouth 2 times daily       ibuprofen (ADVIL/MOTRIN) 600 MG tablet Take 1 tablet (600 mg) by mouth every 6 hours as needed for other (mild pain)       ipratropium - albuterol 0.5 mg/2.5 mg/3 mL (DUONEB) 0.5-2.5 (3) MG/3ML neb solution Take 1 vial (3 mLs) by  "nebulization every 4 hours as needed for wheezing or shortness of breath / dyspnea       losartan (COZAAR) 100 MG tablet Take 1 tablet (100 mg) by mouth daily 90 tablet 3     Multiple Vitamin (ONE-A-DAY ESSENTIAL) TABS Take  by mouth. 30 tablet      SENNA-docusate sodium (SENNA S) 8.6-50 MG tablet Take 1 tablet by mouth At Bedtime       simvastatin (ZOCOR) 20 MG tablet Take 1 tablet (20 mg) by mouth At Bedtime 90 tablet 3     REVIEW OF SYSTEMS:  4 point ROS including Respiratory, CV, GI and , other than that noted in the HPI,  is negative    Objective:  BP (!) 141/76   Pulse 84   Temp 95.5  F (35.3  C)   Resp 18   Ht 1.575 m (5' 2\")   Wt 78.7 kg (173 lb 8 oz)   SpO2 94%   BMI 31.73 kg/m    Exam:  GENERAL APPEARANCE:  Alert, in no distress  RESP:  respiratory effort normal, no respiratory distress  CV:  Moderate to severe LE peripheral edema  ABDOMEN:  nondistended  M/S:   Gait and station with w/c for mobility today, Digits and nails with arthritic changes, reduced muscle mass  SKIN:  Inspection and Palpation of skin and subcutaneous tissue pale, intact  PSYCH:  insight and judgement, memory intact but anxious , affect and mood normal, follows commands readily       Labs:   CBC RESULTS:   Recent Labs   Lab Test 01/24/20  0720 01/23/20 0425   WBC 5.3 5.1   RBC 3.68* 3.48*   HGB 11.2* 10.6*   HCT 34.2* 32.1*   MCV 93 92   MCH 30.4 30.5   MCHC 32.7 33.0   RDW 14.1 14.1    188       Last Basic Metabolic Panel:  Recent Labs   Lab Test 01/24/20  0720 01/23/20  0425    138   POTASSIUM 4.3 4.3   CHLORIDE 110* 109   WINSTON 8.9 8.8   CO2 25 26   BUN 12 19   CR 0.66 0.80   * 101*       Liver Function Studies -   Recent Labs   Lab Test 01/23/20  0425 01/19/20  1509   PROTTOTAL 5.8* 6.3*   ALBUMIN 2.7* 3.2*   BILITOTAL 0.3 0.4   ALKPHOS 38* 42   AST 19 33   ALT 14 17       TSH   Date Value Ref Range Status   11/17/2008 2.40 0.4 - 5.0 mU/L Final   10/19/2007 2.25 0.4 - 5.0 mU/L Final   ]    Lab Results "   Component Value Date    A1C 5.6 09/09/2016    A1C 6.3 09/01/2015         ASSESSMENT/PLAN:     Primary osteoarthritis of right knee  Other chronic pain  Anxiety state     Risks vs benefits of injection discussed including but not limited to infection, contents of injection, expected results of injection.  Patient elected to proceed.  Using sterile technique, the right knee was prepped with alcohol.  A 25 gauge needle was used to inject 40 mg DepoMedrol and 4 cc of 1% lidocaine into right knee, using lateral approach.  Slight aspiration before injection of steroids/lidocaine assured no blood vessel involvement. Patient tolerated the procedure well and without complications. No bleeding noted but band-aid applied.                Electronically signed by:  ASHLEY Loredo CNP

## 2020-01-28 NOTE — LETTER
1/28/2020        RE: Linn Sanon  [address]  [facility] [address]        [facility]  [place] Medical Record Number:  [id number]  Place of Service where encounter took place:  [facility] ([facility]) [[id number]]  Chief Complaint   Patient presents with     Nursing Home Acute       HPI:    Linn Sanon  is a 89 year old (11/13/1926), who is being seen today for an episodic care visit.  HPI information obtained from: facility chart records, facility staff, patient report and [facility] chart review. Today's concern is:     Primary osteoarthritis of right knee  Other chronic pain  Anxiety state       Patient has history of multiple steroid injections in her right knee for DJD by Primary Care Provider.  Last injection >3 months.    Previous imagining showing moderate-severe lateral compartment, moderate medial and mild patellofemoral compartment DJD in right knee.     Patient reports today that she has pain that limits her ability to perform ADLs, ambulate, work with therapy, etc.  She is in agreement to injection and requesting it.       Past Medical and Surgical History reviewed in Epic today.    MEDICATIONS:  Current Outpatient Medications   Medication Sig Dispense Refill     acetaminophen 500 MG CAPS Take 500 mg by mouth every 6 hours as needed for pain       ALPRAZolam (XANAX) 0.5 MG tablet Take 1 tablet (0.5 mg) by mouth 3 times daily as needed for anxiety 60 tablet 0     aspirin 81 MG EC tablet Take 1 tablet by mouth daily.       Calcium Carb-Cholecalciferol (CALCIUM 600+D) 600-800 MG-UNIT TABS Take 1 tablet by mouth daily       fluticasone (ARNUITY ELLIPTA) 200 MCG/ACT inhaler Inhale 1 puff into the lungs daily       guaiFENesin (MUCINEX) 600 MG 12 hr tablet Take 1 tablet (600 mg) by mouth 2 times daily       ibuprofen (ADVIL/MOTRIN) 600 MG tablet Take 1 tablet (600 mg) by mouth every 6 hours as needed for other (mild pain)       ipratropium - albuterol  "0.5 mg/2.5 mg/3 mL (DUONEB) 0.5-2.5 (3) MG/3ML neb solution Take 1 vial (3 mLs) by nebulization every 4 hours as needed for wheezing or shortness of breath / dyspnea       losartan (COZAAR) 100 MG tablet Take 1 tablet (100 mg) by mouth daily 90 tablet 3     Multiple Vitamin (ONE-A-DAY ESSENTIAL) TABS Take  by mouth. 30 tablet      SENNA-docusate sodium (SENNA S) 8.6-50 MG tablet Take 1 tablet by mouth At Bedtime       simvastatin (ZOCOR) 20 MG tablet Take 1 tablet (20 mg) by mouth At Bedtime 90 tablet 3     REVIEW OF SYSTEMS:  4 point ROS including Respiratory, CV, GI and , other than that noted in the HPI,  is negative    Objective:  BP (!) 141/76   Pulse 84   Temp 95.5  F (35.3  C)   Resp 18   Ht 1.575 m (5' 2\")   Wt 78.7 kg (173 lb 8 oz)   SpO2 94%   BMI 31.73 kg/m     Exam:  GENERAL APPEARANCE:  Alert, in no distress  RESP:  respiratory effort normal, no respiratory distress  CV:  Moderate to severe LE peripheral edema  ABDOMEN:  nondistended  M/S:   Gait and station with w/c for mobility today, Digits and nails with arthritic changes, reduced muscle mass  SKIN:  Inspection and Palpation of skin and subcutaneous tissue pale, intact  PSYCH:  insight and judgement, memory intact but anxious , affect and mood normal, follows commands readily       Labs:   CBC RESULTS:   Recent Labs   Lab Test 01/24/20 0720 01/23/20 0425   WBC 5.3 5.1   RBC 3.68* 3.48*   HGB 11.2* 10.6*   HCT 34.2* 32.1*   MCV 93 92   MCH 30.4 30.5   MCHC 32.7 33.0   RDW 14.1 14.1    188       Last Basic Metabolic Panel:  Recent Labs   Lab Test 01/24/20 0720 01/23/20  0425    138   POTASSIUM 4.3 4.3   CHLORIDE 110* 109   WINSTON 8.9 8.8   CO2 25 26   BUN 12 19   CR 0.66 0.80   * 101*       Liver Function Studies -   Recent Labs   Lab Test 01/23/20 0425 01/19/20  1509   PROTTOTAL 5.8* 6.3*   ALBUMIN 2.7* 3.2*   BILITOTAL 0.3 0.4   ALKPHOS 38* 42   AST 19 33   ALT 14 17       TSH   Date Value Ref Range Status   11/17/2008 " 2.40 0.4 - 5.0 mU/L Final   10/19/2007 2.25 0.4 - 5.0 mU/L Final   ]    Lab Results   Component Value Date    A1C 5.6 09/09/2016    A1C 6.3 09/01/2015         ASSESSMENT/PLAN:     Primary osteoarthritis of right knee  Other chronic pain  Anxiety state     Risks vs benefits of injection discussed including but not limited to infection, contents of injection, expected results of injection.  Patient elected to proceed.  Using sterile technique, the right knee was prepped with alcohol.  A 25 gauge needle was used to inject 40 mg DepoMedrol and 4 cc of 1% lidocaine into right knee, using lateral approach.  Slight aspiration before injection of steroids/lidocaine assured no blood vessel involvement. Patient tolerated the procedure well and without complications. No bleeding noted but band-aid applied.                Electronically signed by:  ASHLEY Loredo CNP               Sincerely,        ASHLEY Loredo CNP

## 2020-01-29 NOTE — TELEPHONE ENCOUNTER
Reason for Call:  Form, our goal is to have forms completed with 72 hours, however, some forms may require a visit or additional information.    Type of letter, form or note:  Home Health Certification    Who is the form from?: Home care    Where did the form come from: form was faxed in    What clinic location was the form placed at?: [facility]    Where the form was placed: Given to MA/RN    What number is listed as a contact on the form?: [phone]       Additional comments:     Call taken on 1/29/2020 at 3:36 PM by Luanne Waite

## 2020-01-30 NOTE — LETTER
1/30/2020        RE: Linn Sanon  [address]  [facility] [address]        [place] [facility]  [place] Medical Record Number:  [id number]  Place of Service where encounter took place:  [facility] [place] [facility] [[id number]]  Chief Complaint   Patient presents with     Nursing Home Acute       HPI:    Linn Sanon  is a 89 year old (11/13/1926), who is being seen today for an episodic care visit.  HPI information obtained from: facility chart records, facility staff, patient report and [facility] chart review. Today's concern is:    1. Parainfluenza type 1 infection    2. Weakness    3. Essential hypertension with goal blood pressure less than 140/90    4. Generalized anxiety disorder      Came to see Linn today as she was in therapies.  Figure it was in a public area that she could not retain this NP for extended period of time.  Staff are noting that she will not them leave her room as she is so anxiety ridden despite xanax given 3x day.    This NP has known Linn for some time and now she is a patient here for rehab after having parainfluenza type 1 infection.  Her spouse was here over 10 years ago and has had many relative here as well.    She was happy to talk.  Worried about her bowels but otherwise mood seems bright.  Did have injection in right knee but did not say anything about it today.      Past Medical and Surgical History reviewed in Epic today.    MEDICATIONS:  Current Outpatient Medications   Medication Sig Dispense Refill     ALPRAZolam (XANAX) 0.5 MG tablet Take 0.5 mg by mouth 3 times daily       aspirin 81 MG EC tablet Take 1 tablet by mouth daily.       Calcium Carb-Cholecalciferol (CALCIUM 600+D) 600-800 MG-UNIT TABS Take 1 tablet by mouth daily       fluticasone (ARNUITY ELLIPTA) 200 MCG/ACT inhaler Inhale 1 puff into the lungs daily       guaiFENesin (MUCINEX) 600 MG 12 hr tablet Take 1 tablet (600 mg) by mouth 2 times daily       losartan  "(COZAAR) 100 MG tablet Take 1 tablet (100 mg) by mouth daily 90 tablet 3     Multiple Vitamin (ONE-A-DAY ESSENTIAL) TABS Take  by mouth. 30 tablet      SENNA-docusate sodium (SENNA S) 8.6-50 MG tablet Take 1 tablet by mouth At Bedtime       simvastatin (ZOCOR) 20 MG tablet Take 1 tablet (20 mg) by mouth At Bedtime 90 tablet 3     acetaminophen 500 MG CAPS Take 500 mg by mouth every 6 hours as needed for pain       ALPRAZolam (XANAX) 0.5 MG tablet Take 1 tablet (0.5 mg) by mouth 3 times daily as needed for anxiety 60 tablet 0     ibuprofen (ADVIL/MOTRIN) 600 MG tablet Take 1 tablet (600 mg) by mouth every 6 hours as needed for other (mild pain)       ipratropium - albuterol 0.5 mg/2.5 mg/3 mL (DUONEB) 0.5-2.5 (3) MG/3ML neb solution Take 1 vial (3 mLs) by nebulization every 4 hours as needed for wheezing or shortness of breath / dyspnea         REVIEW OF SYSTEMS:  4 point ROS including Respiratory, CV, GI and , other than that noted in the HPI,  is negative    Objective:  BP (!) 149/69   Pulse 84   Temp 96.8  F (36  C)   Resp 20   Ht 1.575 m (5' 2\")   Wt 79.2 kg (174 lb 8 oz)   SpO2 96%   BMI 31.92 kg/m     Exam:  GENERAL APPEARANCE:  Alert, appears healthy, oriented, anxious, cooperative  EYES:  EOM, conjunctivae, lids, pupils and irises normal, PERRL, wears corrective lenses  RESP:  respiratory effort and palpation of chest normal, lungs clear to auscultation , no respiratory distress  CV:  Palpation and auscultation of heart done , regular rate and rhythm, no murmur, rub, or gallop  ABDOMEN:  normal bowel sounds, soft, nontender, no hepatosplenomegaly or other masses, no guarding or rebound  M/S:   Gait and station abnormal uses w/c around unit, uses a walker for ambulation, staff assist with transfers  SKIN:  pale, warm and dry  PSYCH:  oriented X 3, worried about her health and needs reassurance, anxious    Labs:   Will be having a CBC and BMP tomorrow    ASSESSMENT/PLAN:  Parainfluenza type 1 " infection  Weakness  Resolved/ing.  No use of oxygen.  Occasion congested cough heard.    Attending therapies due to how weak she became.    Son is around periodically to oversee her care.  He is her primary caregiver despite not living with her.      Essential hypertension with goal blood pressure less than 140/90  Vitals daily.  B/P's range from 110-140's/60-70's.  Remains on Cozaar 100mg daily  No changes.    Generalized anxiety disorder  Is on Xanax 0.5mg three times a day.  Still worries.  Hope that when she is more comfortable she will feel more confident in making decisions and not having to run everything past everyone for reassurance.  No changes at this time.  Goal is short term yet and so do not wish to change her medication around as she knows what do dol      Orders written by provider at facility  No new orders today      Electronically signed by:  ASHLEY Maldonado CNP               Sincerely,        ASHLEY Maldonado CNP

## 2020-02-04 NOTE — PROGRESS NOTES
[place] [facility]  [place] Medical Record Number:  [id number]  Place of Service where encounter took place:  [facility] ([facility]) [[id number]]  Chief Complaint   Patient presents with     Nursing Home Acute       HPI:    Linn Sanon  is a 89 year old (11/13/1926), who is being seen today for an episodic care visit.  HPI information obtained from: facility chart records, facility staff, patient report and family/first contact son report. Today's concern is:    1. Generalized anxiety disorder    2. Parainfluenza type 1 infection    3. Essential hypertension with goal blood pressure less than 140/90      Nursing approached this NP today as they have been discussing Linn on occasion.  Son has it in his mind that Linn may not be able to return to her community home due to her anxiety and age.  Staff see Linn on high anxiety.  She can not make a decision without checking with her son or getting reassurance from staff.  She will keep staff for an excessive amount of time.    Son and staff want to see something ordered more long term than having xanax three times a day.    Past Medical and Surgical History reviewed in Epic today.    MEDICATIONS:  Current Outpatient Medications   Medication Sig Dispense Refill     aspirin 81 MG EC tablet Take 1 tablet by mouth daily.       Calcium Carb-Cholecalciferol (CALCIUM 600+D) 600-800 MG-UNIT TABS Take 1 tablet by mouth daily       citalopram (CELEXA) 10 MG tablet Take 1 tablet (10 mg) by mouth daily 30 tablet 3     fluticasone (ARNUITY ELLIPTA) 200 MCG/ACT inhaler Inhale 1 puff into the lungs daily       guaiFENesin (MUCINEX) 600 MG 12 hr tablet Take 1 tablet (600 mg) by mouth 2 times daily       losartan (COZAAR) 100 MG tablet Take 1 tablet (100 mg) by mouth daily 90 tablet 3     Multiple Vitamin (ONE-A-DAY ESSENTIAL) TABS Take  by mouth. 30 tablet      oseltamivir (TAMIFLU) 75 MG capsule Take 75 mg by mouth daily       SENNA-docusate sodium  "(SENNA S) 8.6-50 MG tablet Take 1 tablet by mouth At Bedtime       simvastatin (ZOCOR) 20 MG tablet Take 1 tablet (20 mg) by mouth At Bedtime 90 tablet 3     ALPRAZolam (XANAX) 0.5 MG tablet Take 1 tablet (0.5 mg) by mouth 3 times daily 30 tablet 0       REVIEW OF SYSTEMS:  4 point ROS including Respiratory, CV, GI and , other than that noted in the HPI,  is negative    Objective:  /68   Pulse 75   Temp 96  F (35.6  C)   Resp 18   Ht 1.575 m (5' 2\")   Wt 79.2 kg (174 lb 8 oz)   SpO2 97%   BMI 31.92 kg/m    Exam:  GENERAL APPEARANCE:  Alert, in no distress, oriented, cooperative, being pushed back from therapies and so stopped to talk with her  RESP:  respiratory effort and palpation of chest normal, lungs clear to auscultation , no respiratory distress  CV:  Palpation and auscultation of heart done , regular rate and rhythm, no murmur, rub, or gallop  ABDOMEN:  normal bowel sounds, soft, nontender, no hepatosplenomegaly or other masses, no guarding or rebound  M/S:   Gait and station abnormal assist of one with transfers and propelling w/c.  can participate in upper body dressing  SKIN:  Inspection of skin and subcutaneous tissue baseline, Palpation of skin and subcutaneous tissue baseline  PSYCH:  oriented X 3, normal insight but her nerves cloud her acceptance of things.  worries and worries.    Blood pressures range from 110-140's/60-70's.    Labs:   Component      Latest Ref Rng & Units 1/31/2020   Sodium      133 - 144 mmol/L 138   Potassium      3.4 - 5.3 mmol/L 3.9   Chloride      94 - 109 mmol/L 105   Carbon Dioxide      20 - 32 mmol/L 26   Anion Gap      3 - 14 mmol/L 7   Glucose      70 - 99 mg/dL 111 (H)   Urea Nitrogen      7 - 30 mg/dL 11   Creatinine      0.52 - 1.04 mg/dL 0.66   GFR Estimate      >60 mL/min/1.73:m2 76   GFR Estimate If Black      >60 mL/min/1.73:m2 88   Calcium      8.5 - 10.1 mg/dL 9.2     Component      Latest Ref Rng & Units 1/31/2020   WBC      4.0 - 11.0 10e9/L 9.7 "   RBC Count      3.8 - 5.2 10e12/L 3.75 (L)   Hemoglobin      11.7 - 15.7 g/dL 11.3 (L)   Hematocrit      35.0 - 47.0 % 34.7 (L)   MCV      78 - 100 fl 93   MCH      26.5 - 33.0 pg 30.1   MCHC      31.5 - 36.5 g/dL 32.6   RDW      10.0 - 15.0 % 14.3   Platelet Count      150 - 450 10e9/L 403     ASSESSMENT/PLAN:  Generalized anxiety disorder  Linn was very open to starting a medication that would help to her calm down and not feel so jumpy.  Informed her it could take a couple of weeks to see a difference and chance may be to increase the dose if needed.  Could be that the Xanax could be lowered in frequency.  Family appreciative of the medication being added.  - citalopram (CELEXA) 10 MG tablet; Take 1 tablet (10 mg) by mouth daily    Parainfluenza type 1 infection  Resolved.  No medication.  Coughing is much less.  Attending therapies to regain strength.      Essential hypertension with goal blood pressure less than 140/90  Vitals done daily and within limits.  Remains on losartan 100mg daily.      Orders written by provider at facility and transcribed by : Sultana Miller MA  1.  Celexa 10 mg po every day.  Dx: anxiety      Electronically signed by:  ASHLEY Maldonado CNP

## 2020-02-04 NOTE — LETTER
2/4/2020        RE: Linn Sanon  [address]  [facility] [address]        [place] [facility]  [place] Medical Record Number:  [id number]  Place of Service where encounter took place:  [facility] [place] ([facility]) [[id number]]  Chief Complaint   Patient presents with     Nursing Home Acute       HPI:    Linn Sanon  is a 89 year old (11/13/1926), who is being seen today for an episodic care visit.  HPI information obtained from: facility chart records, facility staff, patient report and family/first contact son report. Today's concern is:    1. Generalized anxiety disorder    2. Parainfluenza type 1 infection    3. Essential hypertension with goal blood pressure less than 140/90      Nursing approached this NP today as they have been discussing Linn on occasion.  Son has it in his mind that Linn may not be able to return to her community home due to her anxiety and age.  Staff see Linn on high anxiety.  She can not make a decision without checking with her son or getting reassurance from staff.  She will keep staff for an excessive amount of time.    Son and staff want to see something ordered more long term than having xanax three times a day.    Past Medical and Surgical History reviewed in Epic today.    MEDICATIONS:  Current Outpatient Medications   Medication Sig Dispense Refill     aspirin 81 MG EC tablet Take 1 tablet by mouth daily.       Calcium Carb-Cholecalciferol (CALCIUM 600+D) 600-800 MG-UNIT TABS Take 1 tablet by mouth daily       citalopram (CELEXA) 10 MG tablet Take 1 tablet (10 mg) by mouth daily 30 tablet 3     fluticasone (ARNUITY ELLIPTA) 200 MCG/ACT inhaler Inhale 1 puff into the lungs daily       guaiFENesin (MUCINEX) 600 MG 12 hr tablet Take 1 tablet (600 mg) by mouth 2 times daily       losartan (COZAAR) 100 MG tablet Take 1 tablet (100 mg) by mouth daily 90 tablet 3     Multiple Vitamin (ONE-A-DAY ESSENTIAL) TABS Take  by mouth. 30 tablet       "oseltamivir (TAMIFLU) 75 MG capsule Take 75 mg by mouth daily       SENNA-docusate sodium (SENNA S) 8.6-50 MG tablet Take 1 tablet by mouth At Bedtime       simvastatin (ZOCOR) 20 MG tablet Take 1 tablet (20 mg) by mouth At Bedtime 90 tablet 3     ALPRAZolam (XANAX) 0.5 MG tablet Take 1 tablet (0.5 mg) by mouth 3 times daily 30 tablet 0       REVIEW OF SYSTEMS:  4 point ROS including Respiratory, CV, GI and , other than that noted in the HPI,  is negative    Objective:  /68   Pulse 75   Temp 96  F (35.6  C)   Resp 18   Ht 1.575 m (5' 2\")   Wt 79.2 kg (174 lb 8 oz)   SpO2 97%   BMI 31.92 kg/m     Exam:  GENERAL APPEARANCE:  Alert, in no distress, oriented, cooperative, being pushed back from therapies and so stopped to talk with her  RESP:  respiratory effort and palpation of chest normal, lungs clear to auscultation , no respiratory distress  CV:  Palpation and auscultation of heart done , regular rate and rhythm, no murmur, rub, or gallop  ABDOMEN:  normal bowel sounds, soft, nontender, no hepatosplenomegaly or other masses, no guarding or rebound  M/S:   Gait and station abnormal assist of one with transfers and propelling w/c.  can participate in upper body dressing  SKIN:  Inspection of skin and subcutaneous tissue baseline, Palpation of skin and subcutaneous tissue baseline  PSYCH:  oriented X 3, normal insight but her nerves cloud her acceptance of things.  worries and worries.    Blood pressures range from 110-140's/60-70's.    Labs:   Component      Latest Ref Rng & Units 1/31/2020   Sodium      133 - 144 mmol/L 138   Potassium      3.4 - 5.3 mmol/L 3.9   Chloride      94 - 109 mmol/L 105   Carbon Dioxide      20 - 32 mmol/L 26   Anion Gap      3 - 14 mmol/L 7   Glucose      70 - 99 mg/dL 111 (H)   Urea Nitrogen      7 - 30 mg/dL 11   Creatinine      0.52 - 1.04 mg/dL 0.66   GFR Estimate      >60 mL/min/1.73:m2 76   GFR Estimate If Black      >60 mL/min/1.73:m2 88   Calcium      8.5 - 10.1 " mg/dL 9.2     Component      Latest Ref Rng & Units 1/31/2020   WBC      4.0 - 11.0 10e9/L 9.7   RBC Count      3.8 - 5.2 10e12/L 3.75 (L)   Hemoglobin      11.7 - 15.7 g/dL 11.3 (L)   Hematocrit      35.0 - 47.0 % 34.7 (L)   MCV      78 - 100 fl 93   MCH      26.5 - 33.0 pg 30.1   MCHC      31.5 - 36.5 g/dL 32.6   RDW      10.0 - 15.0 % 14.3   Platelet Count      150 - 450 10e9/L 403     ASSESSMENT/PLAN:  Generalized anxiety disorder  Linn was very open to starting a medication that would help to her calm down and not feel so jumpy.  Informed her it could take a couple of weeks to see a difference and chance may be to increase the dose if needed.  Could be that the Xanax could be lowered in frequency.  Family appreciative of the medication being added.  - citalopram (CELEXA) 10 MG tablet; Take 1 tablet (10 mg) by mouth daily    Parainfluenza type 1 infection  Resolved.  No medication.  Coughing is much less.  Attending therapies to regain strength.      Essential hypertension with goal blood pressure less than 140/90  Vitals done daily and within limits.  Remains on losartan 100mg daily.      Orders written by provider at facility and transcribed by : Sultana Miller MA  1.  Celexa 10 mg po every day.  Dx: anxiety      Electronically signed by:  ASHLEY Maldonado CNP               Sincerely,        ASHLEY Maldonado CNP

## 2020-02-11 PROBLEM — F41.1 GENERALIZED ANXIETY DISORDER: Status: ACTIVE | Noted: 2020-01-01

## 2020-02-18 NOTE — LETTER
2/18/2020        RE: Linn Sanon  [address]  [facility] [address]        [place] [facility]  [place] Medical Record Number:  [id number]  Place of Service where encounter took place:  [facility] [place] ([facility]) [[id number]]  Chief Complaint   Patient presents with     Nursing Home Acute     Anxiety       HPI:    Linn Sanon  is a 89 year old (11/13/1926), who is being seen today for an episodic care visit.  HPI information obtained from: facility chart records, facility staff, patient report and family/first contact son - Gaurav report. Today's concern is:    Generalized anxiety disorder  Weakness  Parainfluenza type 1 infection  Nursing asked about increasing the Celexa and decreasing the Xanax.  Son Gaurav is here and so spoke with him about this as well.  Talked to Gaurav about Linn moving over to [facility] and he is nervous about this as he does not want to move her again.  They both were going to look an apartment over in [place] today.  She gets nervous and calls him for everything and then in turn, he may get nervous as well.    Linn has recovered from her flu and has been attending therapies for strengthening.  Do not feel she could go return to the community in a house by herself but in a [place] she at least has some independence but nursing is there and food is made for her.       Past Medical and Surgical History reviewed in Epic today.    MEDICATIONS:    Current Outpatient Medications   Medication Sig Dispense Refill     ALPRAZolam (XANAX) 0.25 MG tablet Take 1 tablet (0.25 mg) by mouth daily as needed for anxiety 20 tablet 0     ALPRAZolam 0.25 MG PO tablet Take 1 tablet (0.25 mg) by mouth 2 times daily 60 tablet 0     aspirin 81 MG EC tablet Take 1 tablet by mouth daily.       Calcium Carb-Cholecalciferol (CALCIUM 600+D) 600-800 MG-UNIT TABS Take 1 tablet by mouth daily       citalopram 20 MG PO tablet Take 20 mg by mouth daily       fluticasone (ARNUITY ELLIPTA) 200  MCG/ACT inhaler Inhale 1 puff into the lungs daily       guaiFENesin (MUCINEX) 600 MG 12 hr tablet Take 1 tablet (600 mg) by mouth 2 times daily       losartan (COZAAR) 100 MG tablet Take 1 tablet (100 mg) by mouth daily 90 tablet 3     Multiple Vitamin (ONE-A-DAY ESSENTIAL) TABS Take  by mouth. 30 tablet      SENNA-docusate sodium (SENNA S) 8.6-50 MG tablet Take 1 tablet by mouth daily as needed (constipation) 20 tablet 1     simvastatin (ZOCOR) 20 MG tablet Take 1 tablet (20 mg) by mouth At Bedtime 90 tablet 3         REVIEW OF SYSTEMS:  4 point ROS including Respiratory, CV, GI and , other than that noted in the HPI,  is negative    Objective:  /56   Pulse 85   Temp 95.1  F (35.1  C)   Resp 16   SpO2 93%   Exam:  GENERAL APPEARANCE:  Alert, in no distress, appears healthy, oriented, anxious  EYES:  EOM, conjunctivae, lids, pupils and irises normal, wears corrective lenses  RESP:  respiratory effort and palpation of chest normal, lungs clear to auscultation , no respiratory distress  CV:  Palpation and auscultation of heart done , regular rate and rhythm, no murmur, rub, or gallop, trace edema  M/S:   Gait and station abnormal able to ambulate with a walker with therapy but uses a w/c on the unit.  SKIN:  pink, warm and dry  PSYCH:  oriented X 3, normal insight, judgement and memory, needs so much reassurance with everything., anxious    Labs:   Component      Latest Ref Rng & Units 1/31/2020   Sodium      133 - 144 mmol/L 138   Potassium      3.4 - 5.3 mmol/L 3.9   Chloride      94 - 109 mmol/L 105   Carbon Dioxide      20 - 32 mmol/L 26   Anion Gap      3 - 14 mmol/L 7   Glucose      70 - 99 mg/dL 111 (H)   Urea Nitrogen      7 - 30 mg/dL 11   Creatinine      0.52 - 1.04 mg/dL 0.66   GFR Estimate      >60 mL/min/1.73:m2 76   GFR Estimate If Black      >60 mL/min/1.73:m2 88   Calcium      8.5 - 10.1 mg/dL 9.2     Component      Latest Ref Rng & Units 1/31/2020   WBC      4.0 - 11.0 10e9/L 9.7   RBC  Count      3.8 - 5.2 10e12/L 3.75 (L)   Hemoglobin      11.7 - 15.7 g/dL 11.3 (L)   Hematocrit      35.0 - 47.0 % 34.7 (L)   MCV      78 - 100 fl 93   MCH      26.5 - 33.0 pg 30.1   MCHC      31.5 - 36.5 g/dL 32.6   RDW      10.0 - 15.0 % 14.3   Platelet Count      150 - 450 10e9/L 403       ASSESSMENT/PLAN:  1. Generalized anxiety disorder    2. Weakness    3. Parainfluenza type 1 infection      As she is healing, looking forward to leaving the [facility] and it has been decided for sure that she will not go back to her house.  Her son is caring for those issues and hopes he only has to do it once.    All parties agree to increase the Celexa to 20mg po daily to help her mood and anxeity long term.    Will lower her Xanax to twice a day at 0.25mg scheduled and see how she does.      Orders written by provider at facility  Increase Celexa to 20mg po daily  Lower the Xanax to 0.25mg twice a day from [facility] for anxiety.      Electronically signed by:  ASHLEY Maldonado CNP               Sincerely,        ASHLEY Maldonado CNP

## 2020-02-20 NOTE — LETTER
2/20/2020        RE: Linn Sanon  [address]  [facility] [address]        [place] [facility]  PRIMARY CARE PROVIDER AND CLINIC:  Yoseph Kumar MD, [address] [facility] [address]  Chief Complaint   Patient presents with     Hospital F/U     [place] Medical Record Number:  [id number]  Place of Service where encounter took place:  [facility] [place] ([facility]) [[id number]]    Linn Sanon  is a 89 year old  (11/13/1926), admitted to the above facility from  [facility]. Hospital stay 1/19/20 through 1/23/20..  Admitted to this facility for  rehab, medical management and nursing care.    HPI:    HPI information obtained from: facility staff, patient report and [facility] chart review. And GNP  Brief Summary of Hospital Course:   - Pt with PMH notable for- see below, hospitalized for  Acute hypoxic respiratory failure 2/2 parainfluenza type I infection, and some volume overload. Gently diuresed, started on fluticasone inhaler.   - echo revealed progressive AS; significant anxiety.     Updates on Status Since Skilled nursing Admission:   - Patient examined today, reports cough is getting better, still has some whitish phlegm.  Denies fever chills, wheezing or shortness of breath.  Denies chest pain  - Nurse practitioner Reports that patient has acute anxiety increased Celexa to 20 mg and decreased down Xanax to 0.5 mg twice a day.  Also elevation started rehab program and ambulating independently in the room and use a walker in the hallway.  Patient reports therapy is going good.     ===========================================================  CODE STATUS/ADVANCE DIRECTIVES DISCUSSION:   CPR/Full code   Patient's living condition: lives alone  ALLERGIES: No known drug allergies  PAST MEDICAL HISTORY:  has a past medical history of Closed fracture of navicular (scaphoid) bone of wrist, Generalized osteoarthrosis, unspecified site, Lump or mass in  breast, Measles without mention of complication, Mumps without mention of complication, Pure hypercholesterolemia, Scarlet fever, Unspecified closed fracture of ankle, and Varicella without mention of complication.  PAST SURGICAL HISTORY:   has a past surgical history that includes APPENDECTOMY (age 11); EXCISION BREAST LESION W XRAY MARKER, OPEN SINGLE (1995); REMV CATARACT EXTRACAP,INSERT LENS, W/O ECP (9/18/2008); REMV CATARACT EXTRACAP,INSERT LENS, W/O ECP (10/16/2008); and Laser YAG capsulotomy (Left, 9/18/2014).  FAMILY HISTORY: family history includes Arthritis in her sister; Cardiovascular in her father; Diabetes in her father; Heart Disease in her paternal grandfather; Respiratory in her brother and sister; Thyroid Disease in her sister.  SOCIAL HISTORY:   reports that she has never smoked. She has never used smokeless tobacco. She reports that she does not drink alcohol or use drugs.    Post Discharge Medication Reconciliation Status: discharge medications reconciled and changed, per note/orders (see AVS)  Current Outpatient Medications   Medication Sig Dispense Refill     aspirin 81 MG EC tablet Take 1 tablet by mouth daily.       Calcium Carb-Cholecalciferol (CALCIUM 600+D) 600-800 MG-UNIT TABS Take 1 tablet by mouth daily       citalopram 20 MG PO tablet Take 20 mg by mouth daily       fluticasone (ARNUITY ELLIPTA) 200 MCG/ACT inhaler Inhale 1 puff into the lungs daily       guaiFENesin (MUCINEX) 600 MG 12 hr tablet Take 1 tablet (600 mg) by mouth 2 times daily       losartan (COZAAR) 100 MG tablet Take 1 tablet (100 mg) by mouth daily 90 tablet 3     Multiple Vitamin (ONE-A-DAY ESSENTIAL) TABS Take  by mouth. 30 tablet      SENNA-docusate sodium (SENNA S) 8.6-50 MG tablet Take 1 tablet by mouth At Bedtime       simvastatin (ZOCOR) 20 MG tablet Take 1 tablet (20 mg) by mouth At Bedtime 90 tablet 3     ALPRAZolam 0.25 MG PO tablet Take 1 tablet (0.25 mg) by mouth 2 times daily 60 tablet 0     citalopram  "(CELEXA) 10 MG tablet Take 1 tablet (10 mg) by mouth daily 30 tablet 3     ROS:  10 point ROS of systems including Constitutional, Eyes, Respiratory, Cardiovascular, Gastroenterology, Genitourinary, Integumentary, Musculoskeletal, Psychiatric were all negative except for pertinent positives noted in my HPI.    Vitals:  /85   Pulse 115   Temp 95.2  F (35.1  C)   Resp 19   Ht 1.575 m (5' 2\")   Wt 76.9 kg (169 lb 8 oz)   SpO2 94%   BMI 31.00 kg/m     Exam:  GENERAL APPEARANCE:  in no distress, cooperative  ENT:  Mouth and posterior oropharynx normal, moist mucous membranes, oral mucosa moist, no lesion noted.   EYES:  EOMI, Pupil rounded and equal.  RESP:  lungs clear to auscultation, no wheezing.   CV:  S1S2 audible, rapid and irregular HR, no murmur appreciated. Traces peripheral edema.   ABDOMEN:  soft, NT/ND, BS audible. no mass appreciated on palpation.   M/S:   DIP nodules.   SKIN:  No rash.   NEURO:   No NFD appreciated on observation. Hand  5/5 b/l  PSYCH:  normal insight, judgement and memory, affect and mood normal    Lab/Diagnostic data: Reviewed in the chart and EHR.      ASSESSMENT/PLAN:  --------------------------------  Parainfluenza type 1 infection  Bronchiolitis  Acute respiratory failure with hypoxia (H)  - resolved.     Aortic valve stenosis, moderate to severe:  - worsening since 2016. monitor clinically.     Essential hypertension with goal blood pressure less than 140/90  - controlled.     Anxiety, chronic, likley SHARIF: started on Celexa, now on 20 mg, xanax reduced. Reduce xanax very slowly.. stable.   General Weakness: making a progress with rehab, plan is to move transferred to next door [facility].       Electronically signed by:  Cassy Christianson MD                         Sincerely,        Cassy Christianson MD    "

## 2020-02-20 NOTE — PROGRESS NOTES
[place] [facility]  PRIMARY CARE PROVIDER AND CLINIC:  Yoseph Kumar MD, [address] [facility] [address]  Chief Complaint   Patient presents with     Hospital F/U     [place] Medical Record Number:  [id number]  Place of Service where encounter took place:  [facility] [place] ([facility] [[id number]]    Lnin Sanon  is a 89 year old  (11/13/1926), admitted to the above facility from  [facility]. Hospital stay 1/19/20 through 1/23/20..  Admitted to this facility for  rehab, medical management and nursing care.    HPI:    HPI information obtained from: facility staff, patient report and [facility] chart review. And GNP  Brief Summary of Hospital Course:   - Pt with PMH notable for- see below, hospitalized for  Acute hypoxic respiratory failure 2/2 parainfluenza type I infection, and some volume overload. Gently diuresed, started on fluticasone inhaler.   - echo revealed progressive AS; significant anxiety.     Updates on Status Since Skilled nursing Admission:   - Patient examined today, reports cough is getting better, still has some whitish phlegm.  Denies fever chills, wheezing or shortness of breath.  Denies chest pain  - Nurse practitioner Reports that patient has acute anxiety increased Celexa to 20 mg and decreased down Xanax to 0.5 mg twice a day.  Also elevation started rehab program and ambulating independently in the room and use a walker in the hallway.  Patient reports therapy is going good.     ===========================================================  CODE STATUS/ADVANCE DIRECTIVES DISCUSSION:   CPR/Full code   Patient's living condition: lives alone  ALLERGIES: No known drug allergies  PAST MEDICAL HISTORY:  has a past medical history of Closed fracture of navicular (scaphoid) bone of wrist, Generalized osteoarthrosis, unspecified site, Lump or mass in breast, Measles without mention of complication, Mumps without mention of complication,  Pure hypercholesterolemia, Scarlet fever, Unspecified closed fracture of ankle, and Varicella without mention of complication.  PAST SURGICAL HISTORY:   has a past surgical history that includes APPENDECTOMY (age 11); EXCISION BREAST LESION W XRAY MARKER, OPEN SINGLE (1995); REMV CATARACT EXTRACAP,INSERT LENS, W/O ECP (9/18/2008); REMV CATARACT EXTRACAP,INSERT LENS, W/O ECP (10/16/2008); and Laser YAG capsulotomy (Left, 9/18/2014).  FAMILY HISTORY: family history includes Arthritis in her sister; Cardiovascular in her father; Diabetes in her father; Heart Disease in her paternal grandfather; Respiratory in her brother and sister; Thyroid Disease in her sister.  SOCIAL HISTORY:   reports that she has never smoked. She has never used smokeless tobacco. She reports that she does not drink alcohol or use drugs.    Post Discharge Medication Reconciliation Status: discharge medications reconciled and changed, per note/orders (see AVS)  Current Outpatient Medications   Medication Sig Dispense Refill     aspirin 81 MG EC tablet Take 1 tablet by mouth daily.       Calcium Carb-Cholecalciferol (CALCIUM 600+D) 600-800 MG-UNIT TABS Take 1 tablet by mouth daily       citalopram 20 MG PO tablet Take 20 mg by mouth daily       fluticasone (ARNUITY ELLIPTA) 200 MCG/ACT inhaler Inhale 1 puff into the lungs daily       guaiFENesin (MUCINEX) 600 MG 12 hr tablet Take 1 tablet (600 mg) by mouth 2 times daily       losartan (COZAAR) 100 MG tablet Take 1 tablet (100 mg) by mouth daily 90 tablet 3     Multiple Vitamin (ONE-A-DAY ESSENTIAL) TABS Take  by mouth. 30 tablet      SENNA-docusate sodium (SENNA S) 8.6-50 MG tablet Take 1 tablet by mouth At Bedtime       simvastatin (ZOCOR) 20 MG tablet Take 1 tablet (20 mg) by mouth At Bedtime 90 tablet 3     ALPRAZolam 0.25 MG PO tablet Take 1 tablet (0.25 mg) by mouth 2 times daily 60 tablet 0     citalopram (CELEXA) 10 MG tablet Take 1 tablet (10 mg) by mouth daily 30 tablet 3     ROS:  10  "point ROS of systems including Constitutional, Eyes, Respiratory, Cardiovascular, Gastroenterology, Genitourinary, Integumentary, Musculoskeletal, Psychiatric were all negative except for pertinent positives noted in my HPI.    Vitals:  /85   Pulse 115   Temp 95.2  F (35.1  C)   Resp 19   Ht 1.575 m (5' 2\")   Wt 76.9 kg (169 lb 8 oz)   SpO2 94%   BMI 31.00 kg/m    Exam:  GENERAL APPEARANCE:  in no distress, cooperative  ENT:  Mouth and posterior oropharynx normal, moist mucous membranes, oral mucosa moist, no lesion noted.   EYES:  EOMI, Pupil rounded and equal.  RESP:  lungs clear to auscultation, no wheezing.   CV:  S1S2 audible, rapid and irregular HR, no murmur appreciated. Traces peripheral edema.   ABDOMEN:  soft, NT/ND, BS audible. no mass appreciated on palpation.   M/S:   DIP nodules.   SKIN:  No rash.   NEURO:   No NFD appreciated on observation. Hand  5/5 b/l  PSYCH:  normal insight, judgement and memory, affect and mood normal    Lab/Diagnostic data: Reviewed in the chart and EHR.      ASSESSMENT/PLAN:  --------------------------------  Parainfluenza type 1 infection  Bronchiolitis  Acute respiratory failure with hypoxia (H)  - resolved.     Aortic valve stenosis, moderate to severe:  - worsening since 2016. monitor clinically.     Essential hypertension with goal blood pressure less than 140/90  - controlled.     Anxiety, chronic, likley SHAIRF: started on Celexa, now on 20 mg, xanax reduced. Reduce xanax very slowly.. stable.   General Weakness: making a progress with rehab, plan is to move transferred to next door [facility].       Electronically signed by:  Cassy Christianson MD                     "

## 2020-02-20 NOTE — TELEPHONE ENCOUNTER
New dose of xanax and medication order sent to [facility]    Electronically signed by Monique Islas RN, CNP

## 2020-02-21 NOTE — LETTER
2020        RE: Linn Sanon  [address] [facility] [address]          Face to Face and Medical Necessity Statement for DME Provider visit    Demographic Information on Linn Sanon:  Gender: female  : 1926  [address] [facility] [id number] [phone] (home)     Medical Record: [id number]  Social Security Number: [id number]  Primary Care Provider: Yoseph Kumar  Insurance: Payor: [employer] / Plan: [employer] PLATINUM BLUE / Product Type: PPO /     HPI:   Linn Sanon is a 89 year old  (1926), who is being seen today for a face to face provider visit at [facility]; medical necessity statement for DME included. This patient requires the following:  DME Ordered and Medical Necessity Statement       Wheelchair Documentation  Size: 20 x 16  Luis wheelchair  Corresponding cushion: Yes: seat and back  Standard foot rests: Yes  Elevating leg rests: No  Arm rests: Yes: standard  Lap tray: No  Dose the patient use oxygen? No   Is the patient able to propel wheelchair? Yes If no why not?  And is there someone who can?there will be AL staff available  1. The patient has mobility limitations that impairs their ability to participate in one or more mobility related activities: Grooming and Bathing.  The wheelchair is suitable and necessary for use in the patient's home.  2. The patient's mobility limitations cannot be safely resolved by using a cane/walker:Yes    Reason why a cane or walker will not meet the patient's needs. (ie: balance, tolerance, level of assistance) balance and endurance  3. The patients home has adequate access to use a manual wheelchair:Yes  4. The use of a manual wheelchair on a regular basis will improve the patients ability to participate in mobility related ADL's at home:Yes  5. The patient is willing to use a manual wheelchair at home:Yes  6. The patient has adequate upper body strength and the mental capability to safely use a manual wheelchair  "and/or has a caregiver that is able to assist: Yes  7. Does the patient have a lower extremity injury or edema?Yes  Reason for Type of Wheelchair  Patient weight:  171.5 lbs  Light Weight Wheelchair: Patient is unable to self-propel a standard wheelchair in the home but can self propel a light weight wheelchair. - eric wheelchair    Pt needing above DME with expected length of need of  99  due to medical necessity associated with following diagnosis:     Generalized muscle weakness  Dyspnea, unspecified type  Difficulty walking      PMH   has a past medical history of Closed fracture of navicular (scaphoid) bone of wrist, Generalized osteoarthrosis, unspecified site, Lump or mass in breast, Measles without mention of complication, Mumps without mention of complication, Pure hypercholesterolemia, Scarlet fever, Unspecified closed fracture of ankle, and Varicella without mention of complication.    ROS:4 point ROS including Respiratory, CV, GI and , other than that noted in the HPI,  is negative    EXAM  Vitals: /85   Pulse 115   Temp 95.2  F (35.1  C)   Resp 19   Ht 1.575 m (5' 2\")   Wt 77.8 kg (171 lb 8 oz)   SpO2 94%   BMI 31.37 kg/m   ;BMI= Body mass index is 31.37 kg/m .  Constitutional: healthy, alert, no distress and cooperative   Cardiovascular: negative, PMI normal. No lifts, heaves, or thrills. RRR. No murmurs, clicks gallops or rub  Respiratory: negative, Percussion normal. Good diaphragmatic excursion. Lungs clear  Psychiatric: mentation appears normal, anxious and affect is bright and smiling but worries  Abdomen: Abdomen soft, non-tender. BS normal. No masses, organomegaly  NEURO: Gait abnormal. Reflexes normal and symmetric. Sensation grossly WNL., positive findings: walker used when walking short distances within her room  SKIN: no suspicious lesions or rashes  JOINT/EXTREMITIES: extremities normal- no gross deformities noted, normal muscle tone and walker within limits of her room. "     ASSESSMENT/PLAN:  1. Generalized muscle weakness    2. Dyspnea, unspecified type    3. Difficulty walking      With the use of a eric wheelchair, this will allow her to be independent within her new facility to propel around and get to activities and meals that are longer distances that would challenge her endurance and physical abilities.    Orders:  1. Facility staff/TC to contact DME company to get their order form for provider to fill out    ELECTRONICALLY SIGNED BY SHARON CERTIFIED PROVIDER:  ASHLEY Maldonado CNP   NPI: [id number]  [facility]  [address] [place] [address], [address]  [place] [address]            Sincerely,        ASHLEY Maldonado CNP

## 2020-02-21 NOTE — PROGRESS NOTES
Face to Face and Medical Necessity Statement for DME Provider visit    Demographic Information on Linn Sanon:  Gender: female  : 1926  [address]  [facility] [id number] [phone] (home)     Medical Record: [id number]  Social Security Number: [id number]  Primary Care Provider: Yoseph Kumar  Insurance: Payor: BCBS / Plan: [employer] PLATINUM BLUE / Product Type: PPO /     HPI:   Linn Sanon is a 89 year old  (1926), who is being seen today for a face to face provider visit at [facility]; medical necessity statement for DME included. This patient requires the following:  DME Ordered and Medical Necessity Statement       Wheelchair Documentation  Size: 20 x 16  Luis wheelchair  Corresponding cushion: Yes: seat and back  Standard foot rests: Yes  Elevating leg rests: No  Arm rests: Yes: standard  Lap tray: No  Dose the patient use oxygen? No   Is the patient able to propel wheelchair? Yes If no why not?  And is there someone who can?there will be AL staff available  1. The patient has mobility limitations that impairs their ability to participate in one or more mobility related activities: Grooming and Bathing.  The wheelchair is suitable and necessary for use in the patient's home.  2. The patient's mobility limitations cannot be safely resolved by using a cane/walker:Yes    Reason why a cane or walker will not meet the patient's needs. (ie: balance, tolerance, level of assistance) balance and endurance  3. The patients home has adequate access to use a manual wheelchair:Yes  4. The use of a manual wheelchair on a regular basis will improve the patients ability to participate in mobility related ADL's at home:Yes  5. The patient is willing to use a manual wheelchair at home:Yes  6. The patient has adequate upper body strength and the mental capability to safely use a manual wheelchair and/or has a caregiver that is able to assist: Yes  7. Does the patient have a lower extremity  "injury or edema?Yes  Reason for Type of Wheelchair  Patient weight:  171.5 lbs  Light Weight Wheelchair: Patient is unable to self-propel a standard wheelchair in the home but can self propel a light weight wheelchair. - eric wheelchair    Pt needing above DME with expected length of need of  99  due to medical necessity associated with following diagnosis:     Generalized muscle weakness  Dyspnea, unspecified type  Difficulty walking      PMH   has a past medical history of Closed fracture of navicular (scaphoid) bone of wrist, Generalized osteoarthrosis, unspecified site, Lump or mass in breast, Measles without mention of complication, Mumps without mention of complication, Pure hypercholesterolemia, Scarlet fever, Unspecified closed fracture of ankle, and Varicella without mention of complication.    ROS:4 point ROS including Respiratory, CV, GI and , other than that noted in the HPI,  is negative    EXAM  Vitals: /85   Pulse 115   Temp 95.2  F (35.1  C)   Resp 19   Ht 1.575 m (5' 2\")   Wt 77.8 kg (171 lb 8 oz)   SpO2 94%   BMI 31.37 kg/m  ;BMI= Body mass index is 31.37 kg/m .  Constitutional: healthy, alert, no distress and cooperative   Cardiovascular: negative, PMI normal. No lifts, heaves, or thrills. RRR. No murmurs, clicks gallops or rub  Respiratory: negative, Percussion normal. Good diaphragmatic excursion. Lungs clear  Psychiatric: mentation appears normal, anxious and affect is bright and smiling but worries  Abdomen: Abdomen soft, non-tender. BS normal. No masses, organomegaly  NEURO: Gait abnormal. Reflexes normal and symmetric. Sensation grossly WNL., positive findings: walker used when walking short distances within her room  SKIN: no suspicious lesions or rashes  JOINT/EXTREMITIES: extremities normal- no gross deformities noted, normal muscle tone and walker within limits of her room.     ASSESSMENT/PLAN:  1. Generalized muscle weakness    2. Dyspnea, unspecified type    3. " Difficulty walking      With the use of a eric wheelchair, this will allow her to be independent within her new facility to propel around and get to activities and meals that are longer distances that would challenge her endurance and physical abilities.    Orders:  1. Facility staff/TC to contact DME company to get their order form for provider to fill out    ELECTRONICALLY SIGNED BY SHARON CERTIFIED PROVIDER:  ASHLEY Maldonado CNP   NPI: [id number]  [facility]  [address] [place] [address], [address]  [place] [address]

## 2020-02-25 NOTE — LETTER
2/25/2020        RE: Linn Sanon  [address]  [facility] [address]        [facility] DISCHARGE SUMMARY  PATIENT'S NAME: Linn Sanon  YOB: 1926  MEDICAL RECORD NUMBER:  [id number]  Place of Service where encounter took place:  [facility] [[id number]]    PRIMARY CARE PROVIDER AND CLINIC RESPONSIBLE AFTER TRANSFER:   Yoseph Kumar MD, [address] [facility] [id number]    [facility]     Transferring providers: ASHLEY Maldonado CNP, Cassy Christianson MD  Recent Hospitalization/ED:  [facility] stay 1/19/20 to 1/23/20.  Date of SNF Admission: January / 23 / 2020  Date of SNF (anticipated) Discharge: February / 27 / 2020  Discharged to: new assisted living for patient [facility]  Cognitive Scores: BIMS: 10/15  Physical Function: able to ambulate with walker and can propel her own w/c  DME: Wheelchair    CODE STATUS/ADVANCE DIRECTIVES DISCUSSION:  DNR / DNI   ALLERGIES: No known drug allergies    DISCHARGE DIAGNOSIS/NURSING FACILITY COURSE:   Essential hypertension with goal blood pressure less than 140/90  Blood pressures ranged from 130-150's/60-70's.  Remains on Losartan 100mg daily.      Parainfluenza type 1 infection  Weakness  Resolved and attended therapies for strengthening.  Can ambulate with a walker short distances otherwise did a F2F for a new wheelchair for her to be able to promote independence.    Does have mucinex 12H 600mg twice a day and has an inhaler Arnuity Ellipta 1 puff daily at 200mcg    Generalized anxiety disorder  The biggest issue here during the stay was her anxiety.  Did start her on Celexa 10mg and now up to 20mg daily.  Did lower her Xanax from 0.25mg TID down to BID scheduled but now added a PRN dosing during the day due the high anxiety of her moving and she is thinking they are not going to have her apartment ready etc.    Son is nervous as well and  hopes this move is one of the last for her.  She is unable to live alone anymore.  Will take time to adjust but feels she will like it when she settles in.    - ALPRAZolam (XANAX) 0.25 MG tablet; Take 1 tablet (0.25 mg) by mouth daily as needed for anxiety    Hyperlipidemia LDL goal <130  Remains on simvastatin 20mg in PM.  Monitor this as outpatient.    Aortic valve stenosis, moderate to severe (increase from mild in 2016)  Is on ASA 81mg daily.  Expect that she may have SOB at times.  Wheelchair will be useful then to be able to propel self around.    Slow transit constipation  Very worried about her stools and being too loose with potential of an accident.    Told her that the Senna is moved to [facility] and that set off another words of worry.    - SENNA-docusate sodium (SENNA S) 8.6-50 MG tablet; Take 1 tablet by mouth daily as needed (constipation)    Past Medical History:  has a past medical history of Closed fracture of navicular (scaphoid) bone of wrist, Generalized osteoarthrosis, unspecified site, Lump or mass in breast, Measles without mention of complication, Mumps without mention of complication, Pure hypercholesterolemia, Scarlet fever, Unspecified closed fracture of ankle, and Varicella without mention of complication.    Discharge Medications:    Current Outpatient Medications   Medication Sig Dispense Refill     ALPRAZolam (XANAX) 0.25 MG tablet Take 1 tablet (0.25 mg) by mouth daily as needed for anxiety 20 tablet 0     ALPRAZolam 0.25 MG PO tablet Take 1 tablet (0.25 mg) by mouth 2 times daily 60 tablet 0     aspirin 81 MG EC tablet Take 1 tablet by mouth daily.       Calcium Carb-Cholecalciferol (CALCIUM 600+D) 600-800 MG-UNIT TABS Take 1 tablet by mouth daily       citalopram 20 MG PO tablet Take 20 mg by mouth daily       fluticasone (ARNUITY ELLIPTA) 200 MCG/ACT inhaler Inhale 1 puff into the lungs daily       guaiFENesin (MUCINEX) 600 MG 12 hr tablet Take 1 tablet (600 mg) by mouth 2 times daily    "    losartan (COZAAR) 100 MG tablet Take 1 tablet (100 mg) by mouth daily 90 tablet 3     Multiple Vitamin (ONE-A-DAY ESSENTIAL) TABS Take  by mouth. 30 tablet      SENNA-docusate sodium (SENNA S) 8.6-50 MG tablet Take 1 tablet by mouth daily as needed (constipation) 20 tablet 1     simvastatin (ZOCOR) 20 MG tablet Take 1 tablet (20 mg) by mouth At Bedtime 90 tablet 3       Medication Changes/Rationale:     1/24/20  Daily weights. BMP CBC in one week, discontinue ground flax seed while in TCU, dx for xanax is anxiety e/b worry, ruminating, racing thoughts, senna-S 1 tab daily for constipation.  Hold for loose stools, TEDs knee high on in AM off in PM for edema    1/24/20  OT eval and treat for ADLs, therapeutic exercise and functional mobility    1/24/20  PT to eval and treat for therapeutic exercise, functional activity, gait training and neuro re-ed    1/25/20  Ok to schedule alprazolam 0.5mg TID    1/29/20  Ok to give pneumococcal 23 vaccine., discontinue dx of SOB, discontinue prn tylenol, ibuprofen and nebs    2/4/20  celexa 10mg daily for anxiety    2/18/20  Discontinue TEDS, increase celexa to 20mg daily and lower xanax to 0.25mg BID    2/20/20  Change senna-S to: 1 tab senna-s every other day at hs for constipation    2/24/20  Change Senna-S to 1 tab daily prn for constipation.  OK to discharge to [facility] with medications.    Controlled medications sent with patient:   Medication xanax [facility]  Medication: Xanax , approx 18 tabs tabs given to patient at the time of discharge to take home     ROS:   4 point ROS including Respiratory, CV, GI and , other than that noted in the HPI,  is negative    Physical Exam:   Vitals: BP (!) 143/70   Pulse 77   Temp 95.7  F (35.4  C)   Resp 16   Ht 1.575 m (5' 2\")   Wt 79.8 kg (176 lb)   SpO2 96%   BMI 32.19 kg/m     BMI= Body mass index is 32.19 kg/m .  GENERAL APPEARANCE:  Alert, appears healthy, oriented, " anxious  EYES:  EOM, conjunctivae, lids, pupils and irises normal, PERRL, wears glasses  RESP:  respiratory effort and palpation of chest normal, lungs clear to auscultation , no respiratory distress  CV:  Palpation and auscultation of heart done , no edema, has TEDs on but no order, heart rate regular today  ABDOMEN:  normal bowel sounds, soft, nontender, no hepatosplenomegaly or other masses, no guarding or rebound  M/S:   Gait and station abnormal able to transfer self, walker short distances, w/c in which she can propel  SKIN:  Inspection of skin and subcutaneous tissue baseline, Palpation of skin and subcutaneous tissue baseline  NEURO:   Cranial nerves 2-12 are normal tested and grossly at patient's baseline  PSYCH:  oriented X 3, memory impaired , typically she is sharp with memory but her worries get the best of her, anxious     SNF labs:   Most Recent 3 CBC's:  Recent Labs   Lab Test 01/31/20  0725 01/24/20  0720 01/23/20  0425   WBC 9.7 5.3 5.1   HGB 11.3* 11.2* 10.6*   MCV 93 93 92    249 188     Most Recent 3 BMP's:  Recent Labs   Lab Test 01/31/20  0725 01/24/20  0720 01/23/20  0425    142 138   POTASSIUM 3.9 4.3 4.3   CHLORIDE 105 110* 109   CO2 26 25 26   BUN 11 12 19   CR 0.66 0.66 0.80   ANIONGAP 7 7 3   WINSTON 9.2 8.9 8.8   * 111* 101*         DISCHARGE PLAN:    Follow up labs: No labs orders/due    Medical Follow Up:      Follow up with primary care provider in 1-2 weeks    MT referral needed and placed by this provider: No    Current [place] scheduled appointments:       Discharge Services: no services, moving to [facility] and will be doing own medications    Discharge Instructions Verbalized to Patient at Discharge:     Weight bearing restrictions:  Weight bearing as tolerated.       TOTAL DISCHARGE TIME:   Greater than 30 minutes  Electronically signed by:  ASHLEY Maldonado CNP                         Sincerely,        ASHLEY Maldonado CNP

## 2020-02-26 NOTE — PROGRESS NOTES
[place] [facility] DISCHARGE SUMMARY  PATIENT'S NAME: Linn Sanon  YOB: 1926  MEDICAL RECORD NUMBER:  [id number]  Place of Service where encounter took place:  [facility] ([facility]) [[id number]]    PRIMARY CARE PROVIDER AND CLINIC RESPONSIBLE AFTER TRANSFER:   Yoseph Kumra MD, [address] [facility] / [facility] [address]    G Provider     Transferring providers: ASHLEY Maldonado CNP, Cassy Christianson MD  Recent Hospitalization/ED:  [facility] stay 1/19/20 to 1/23/20.  Date of SNF Admission: January / 23 / 2020  Date of SNF (anticipated) Discharge: February / 27 / 2020  Discharged to: new assisted living for patient [facility]  Cognitive Scores: BIMS: 10/15  Physical Function: able to ambulate with walker and can propel her own w/c  DME: Wheelchair    CODE STATUS/ADVANCE DIRECTIVES DISCUSSION:  DNR / DNI   ALLERGIES: No known drug allergies    DISCHARGE DIAGNOSIS/NURSING FACILITY COURSE:   Essential hypertension with goal blood pressure less than 140/90  Blood pressures ranged from 130-150's/60-70's.  Remains on Losartan 100mg daily.      Parainfluenza type 1 infection  Weakness  Resolved and attended therapies for strengthening.  Can ambulate with a walker short distances otherwise did a F2F for a new wheelchair for her to be able to promote independence.    Does have mucinex 12H 600mg twice a day and has an inhaler Arnuity Ellipta 1 puff daily at 200mcg    Generalized anxiety disorder  The biggest issue here during the stay was her anxiety.  Did start her on Celexa 10mg and now up to 20mg daily.  Did lower her Xanax from 0.25mg TID down to BID scheduled but now added a PRN dosing during the day due the high anxiety of her moving and she is thinking they are not going to have her apartment ready etc.    Son is nervous as well and hopes this move is one of the last for her.  She is unable to live alone anymore.  Will take  time to adjust but feels she will like it when she settles in.    - ALPRAZolam (XANAX) 0.25 MG tablet; Take 1 tablet (0.25 mg) by mouth daily as needed for anxiety    Hyperlipidemia LDL goal <130  Remains on simvastatin 20mg in PM.  Monitor this as outpatient.    Aortic valve stenosis, moderate to severe (increase from mild in 2016)  Is on ASA 81mg daily.  Expect that she may have SOB at times.  Wheelchair will be useful then to be able to propel self around.    Slow transit constipation  Very worried about her stools and being too loose with potential of an accident.    Told her that the Senna is moved to [facility] and that set off another words of worry.    - SENNA-docusate sodium (SENNA S) 8.6-50 MG tablet; Take 1 tablet by mouth daily as needed (constipation)    Past Medical History:  has a past medical history of Closed fracture of navicular (scaphoid) bone of wrist, Generalized osteoarthrosis, unspecified site, Lump or mass in breast, Measles without mention of complication, Mumps without mention of complication, Pure hypercholesterolemia, Scarlet fever, Unspecified closed fracture of ankle, and Varicella without mention of complication.    Discharge Medications:    Current Outpatient Medications   Medication Sig Dispense Refill     ALPRAZolam (XANAX) 0.25 MG tablet Take 1 tablet (0.25 mg) by mouth daily as needed for anxiety 20 tablet 0     ALPRAZolam 0.25 MG PO tablet Take 1 tablet (0.25 mg) by mouth 2 times daily 60 tablet 0     aspirin 81 MG EC tablet Take 1 tablet by mouth daily.       Calcium Carb-Cholecalciferol (CALCIUM 600+D) 600-800 MG-UNIT TABS Take 1 tablet by mouth daily       citalopram 20 MG PO tablet Take 20 mg by mouth daily       fluticasone (ARNUITY ELLIPTA) 200 MCG/ACT inhaler Inhale 1 puff into the lungs daily       guaiFENesin (MUCINEX) 600 MG 12 hr tablet Take 1 tablet (600 mg) by mouth 2 times daily       losartan (COZAAR) 100 MG tablet Take 1 tablet (100 mg) by mouth daily 90 tablet 3      "Multiple Vitamin (ONE-A-DAY ESSENTIAL) TABS Take  by mouth. 30 tablet      SENNA-docusate sodium (SENNA S) 8.6-50 MG tablet Take 1 tablet by mouth daily as needed (constipation) 20 tablet 1     simvastatin (ZOCOR) 20 MG tablet Take 1 tablet (20 mg) by mouth At Bedtime 90 tablet 3       Medication Changes/Rationale:     1/24/20  Daily weights. BMP CBC in one week, discontinue ground flax seed while in TCU, dx for xanax is anxiety e/b worry, ruminating, racing thoughts, senna-S 1 tab daily for constipation.  Hold for loose stools, TEDs knee high on in AM off in PM for edema    1/24/20  OT eval and treat for ADLs, therapeutic exercise and functional mobility    1/24/20  PT to eval and treat for therapeutic exercise, functional activity, gait training and neuro re-ed    1/25/20  Ok to schedule alprazolam 0.5mg TID    1/29/20  Ok to give pneumococcal 23 vaccine., discontinue dx of SOB, discontinue prn tylenol, ibuprofen and nebs    2/4/20  celexa 10mg daily for anxiety    2/18/20  Discontinue TEDS, increase celexa to 20mg daily and lower xanax to 0.25mg BID    2/20/20  Change senna-S to: 1 tab senna-s every other day at hs for constipation    2/24/20  Change Senna-S to 1 tab daily prn for constipation.  OK to discharge to [facility] with medications.    Controlled medications sent with patient:   Medication xanax [facility]  Medication: Xanax , approx 18 tabs tabs given to patient at the time of discharge to take home     ROS:   4 point ROS including Respiratory, CV, GI and , other than that noted in the HPI,  is negative    Physical Exam:   Vitals: BP (!) 143/70   Pulse 77   Temp 95.7  F (35.4  C)   Resp 16   Ht 1.575 m (5' 2\")   Wt 79.8 kg (176 lb)   SpO2 96%   BMI 32.19 kg/m    BMI= Body mass index is 32.19 kg/m .  GENERAL APPEARANCE:  Alert, appears healthy, oriented, anxious  EYES:  EOM, conjunctivae, lids, pupils and irises normal, PERRL, wears glasses  RESP:  " respiratory effort and palpation of chest normal, lungs clear to auscultation , no respiratory distress  CV:  Palpation and auscultation of heart done , no edema, has TEDs on but no order, heart rate regular today  ABDOMEN:  normal bowel sounds, soft, nontender, no hepatosplenomegaly or other masses, no guarding or rebound  M/S:   Gait and station abnormal able to transfer self, walker short distances, w/c in which she can propel  SKIN:  Inspection of skin and subcutaneous tissue baseline, Palpation of skin and subcutaneous tissue baseline  NEURO:   Cranial nerves 2-12 are normal tested and grossly at patient's baseline  PSYCH:  oriented X 3, memory impaired , typically she is sharp with memory but her worries get the best of her, anxious     SNF labs:   Most Recent 3 CBC's:  Recent Labs   Lab Test 01/31/20  0725 01/24/20  0720 01/23/20  0425   WBC 9.7 5.3 5.1   HGB 11.3* 11.2* 10.6*   MCV 93 93 92    249 188     Most Recent 3 BMP's:  Recent Labs   Lab Test 01/31/20  0725 01/24/20  0720 01/23/20  0425    142 138   POTASSIUM 3.9 4.3 4.3   CHLORIDE 105 110* 109   CO2 26 25 26   BUN 11 12 19   CR 0.66 0.66 0.80   ANIONGAP 7 7 3   WINSTON 9.2 8.9 8.8   * 111* 101*         DISCHARGE PLAN:    Follow up labs: No labs orders/due    Medical Follow Up:      Follow up with primary care provider in 1-2 weeks    MTM referral needed and placed by this provider: No    Current [place] scheduled appointments:       Discharge Services: no services, moving to [facility] and will be doing own medications    Discharge Instructions Verbalized to Patient at Discharge:     Weight bearing restrictions:  Weight bearing as tolerated.       TOTAL DISCHARGE TIME:   Greater than 30 minutes  Electronically signed by:  ASHLEY Maldonado CNP

## 2020-02-27 NOTE — PROGRESS NOTES
[place] [facility]  [place] Medical Record Number:  [id number]  Place of Service where encounter took place:  [facility] ([facility]) [[id number]]  Chief Complaint   Patient presents with     Nursing Home Acute     Anxiety       HPI:    Linn Sanon  is a 89 year old (11/13/1926), who is being seen today for an episodic care visit.  HPI information obtained from: facility chart records, facility staff, patient report and family/first contact son - Gaurav report. Today's concern is:    Generalized anxiety disorder  Weakness  Parainfluenza type 1 infection  Nursing asked about increasing the Celexa and decreasing the Xanax.  Son Gaurav is here and so spoke with him about this as well.  Talked to Gaurav about Linn moving over to [facility] and he is nervous about this as he does not want to move her again.  They both were going to look an apartment over in [place] today.  She gets nervous and calls him for everything and then in turn, he may get nervous as well.    Linn has recovered from her flu and has been attending therapies for strengthening.  Do not feel she could go return to the community in a house by herself but in a [place] she at least has some independence but nursing is there and food is made for her.       Past Medical and Surgical History reviewed in Epic today.    MEDICATIONS:    Current Outpatient Medications   Medication Sig Dispense Refill     ALPRAZolam (XANAX) 0.25 MG tablet Take 1 tablet (0.25 mg) by mouth daily as needed for anxiety 20 tablet 0     ALPRAZolam 0.25 MG PO tablet Take 1 tablet (0.25 mg) by mouth 2 times daily 60 tablet 0     aspirin 81 MG EC tablet Take 1 tablet by mouth daily.       Calcium Carb-Cholecalciferol (CALCIUM 600+D) 600-800 MG-UNIT TABS Take 1 tablet by mouth daily       citalopram 20 MG PO tablet Take 20 mg by mouth daily       fluticasone (ARNUITY ELLIPTA) 200 MCG/ACT inhaler Inhale 1 puff into the lungs daily       guaiFENesin (MUCINEX) 600 MG 12 hr  tablet Take 1 tablet (600 mg) by mouth 2 times daily       losartan (COZAAR) 100 MG tablet Take 1 tablet (100 mg) by mouth daily 90 tablet 3     Multiple Vitamin (ONE-A-DAY ESSENTIAL) TABS Take  by mouth. 30 tablet      SENNA-docusate sodium (SENNA S) 8.6-50 MG tablet Take 1 tablet by mouth daily as needed (constipation) 20 tablet 1     simvastatin (ZOCOR) 20 MG tablet Take 1 tablet (20 mg) by mouth At Bedtime 90 tablet 3         REVIEW OF SYSTEMS:  4 point ROS including Respiratory, CV, GI and , other than that noted in the HPI,  is negative    Objective:  /56   Pulse 85   Temp 95.1  F (35.1  C)   Resp 16   SpO2 93%   Exam:  GENERAL APPEARANCE:  Alert, in no distress, appears healthy, oriented, anxious  EYES:  EOM, conjunctivae, lids, pupils and irises normal, wears corrective lenses  RESP:  respiratory effort and palpation of chest normal, lungs clear to auscultation , no respiratory distress  CV:  Palpation and auscultation of heart done , regular rate and rhythm, no murmur, rub, or gallop, trace edema  M/S:   Gait and station abnormal able to ambulate with a walker with therapy but uses a w/c on the unit.  SKIN:  pink, warm and dry  PSYCH:  oriented X 3, normal insight, judgement and memory, needs so much reassurance with everything., anxious    Labs:   Component      Latest Ref Rng & Units 1/31/2020   Sodium      133 - 144 mmol/L 138   Potassium      3.4 - 5.3 mmol/L 3.9   Chloride      94 - 109 mmol/L 105   Carbon Dioxide      20 - 32 mmol/L 26   Anion Gap      3 - 14 mmol/L 7   Glucose      70 - 99 mg/dL 111 (H)   Urea Nitrogen      7 - 30 mg/dL 11   Creatinine      0.52 - 1.04 mg/dL 0.66   GFR Estimate      >60 mL/min/1.73:m2 76   GFR Estimate If Black      >60 mL/min/1.73:m2 88   Calcium      8.5 - 10.1 mg/dL 9.2     Component      Latest Ref Rng & Units 1/31/2020   WBC      4.0 - 11.0 10e9/L 9.7   RBC Count      3.8 - 5.2 10e12/L 3.75 (L)   Hemoglobin      11.7 - 15.7 g/dL 11.3 (L)    Hematocrit      35.0 - 47.0 % 34.7 (L)   MCV      78 - 100 fl 93   MCH      26.5 - 33.0 pg 30.1   MCHC      31.5 - 36.5 g/dL 32.6   RDW      10.0 - 15.0 % 14.3   Platelet Count      150 - 450 10e9/L 403       ASSESSMENT/PLAN:  1. Generalized anxiety disorder    2. Weakness    3. Parainfluenza type 1 infection      As she is healing, looking forward to leaving the TCU and it has been decided for sure that she will not go back to her house.  Her son is caring for those issues and hopes he only has to do it once.    All parties agree to increase the Celexa to 20mg po daily to help her mood and anxeity long term.    Will lower her Xanax to twice a day at 0.25mg scheduled and see how she does.      Orders written by provider at facility  Increase Celexa to 20mg po daily  Lower the Xanax to 0.25mg twice a day from TID for anxiety.      Electronically signed by:  ASHLEY Maldonado CNP

## 2020-03-02 NOTE — TELEPHONE ENCOUNTER
Patient called to schedule an appointment for a hospital follow-up or appeared on a report showing that they were recently discharged from the hospital.    Patient was admitted to :  Geriatric services  Discharged date: 2/27/20  Reason for hospital admission:  Essential Hypertension With Goal Blood Pressure Less Than 140/90  Does patient have future appointment scheduled with provider? No  Date of future appointment:        This information will be used to help the care team plan for the patients upcoming visit.  The triage RN may determine that a follow up call is necessary and reach out to the patient via the phone number listed in the chart.     Please route this message on routine priority to the Triage RN pool.

## 2020-03-02 NOTE — TELEPHONE ENCOUNTER
RN TRIAGE CALL:    Patient Contact    Attempt # 1    Was call answered?  No.  Unable to leave message. Unidentified vm.    Cristel Alvares RN

## 2020-03-03 NOTE — TELEPHONE ENCOUNTER
ED / Discharge Outreach Protocol    Patient Contact    Attempt # 2    Was call answered?  No.  Unable to leave message. Unidentified voicemail.    Melissa Chappell, RN, BSN

## 2020-03-06 NOTE — PROGRESS NOTES
[place] [facility]  PRIMARY CARE PROVIDER AND CLINIC:  Ashly Tubbs, APRN CNP, [address] / [place] [address]  Chief Complaint   Patient presents with     [facility]     [place] Medical Record Number:  [id number]  Place of Service where encounter took place:  [facility] ([facility]) [[id number]]    Linn Sanon  is a 89 year old  (11/13/1926), admitted to the above facility from [facility]..  Admitted to this facility for  rehab, medical management and nursing care.  HPI information obtained from: facility chart records, facility staff, patient report, [facility] chart review and family/first contact son Less  report.    She had a recent hospitalization and subsequent [facility] rehab stay Jan/Feb 2020 for Parainfluenza. Anxiety  And IBD was problematic at SNF. Son today notes anxiety is life long but IBD appears to be in the last several years - can't identify a food problem. Noted bowels explosive diarrhea and occ constipation. No abd pain in between.   Celexa started 2/4 for anxiety.   Linn and her son also note she doesn't sleep well at night   And has had an increase in lower ext edema  Since [place] admission.     CODE STATUS/ADVANCE DIRECTIVES DISCUSSION:   DNR / DNI  Patient's living condition: lives in an assisted living facility  ALLERGIES: No known drug allergies  PAST MEDICAL HISTORY:  has a past medical history of Closed fracture of navicular (scaphoid) bone of wrist, Generalized osteoarthrosis, unspecified site, Lump or mass in breast, Measles without mention of complication, Mumps without mention of complication, Pure hypercholesterolemia, Scarlet fever, Unspecified closed fracture of ankle, and Varicella without mention of complication.  PAST SURGICAL HISTORY:   has a past surgical history that includes APPENDECTOMY (age 11); EXCISION BREAST LESION W XRAY MARKER, OPEN SINGLE (1995); REMV CATARACT EXTRACAP,INSERT LENS, W/O ECP (9/18/2008); REMV CATARACT  EXTRACAP,INSERT LENS, W/O ECP (10/16/2008); and Laser YAG capsulotomy (Left, 9/18/2014).  FAMILY HISTORY: family history includes Arthritis in her sister; Cardiovascular in her father; Diabetes in her father; Heart Disease in her paternal grandfather; Respiratory in her brother and sister; Thyroid Disease in her sister.  SOCIAL HISTORY:   reports that she has never smoked. She has never used smokeless tobacco. She reports that she does not drink alcohol or use drugs.    Post Discharge Medication Reconciliation Status: discharge medications reconciled, continue medications without change    Current Outpatient Medications   Medication Sig Dispense Refill     ALPRAZolam (XANAX) 0.25 MG tablet 1 TABLET BY MOUTH TWICE DAILY;1 TABLET BY MOUTH ONCE A DAY AS NEEDED 90 tablet 5     aspirin 81 MG EC tablet Take 1 tablet by mouth daily.       Calcium Carb-Cholecalciferol (CALCIUM 600+D) 600-800 MG-UNIT TABS Take 1 tablet by mouth daily       citalopram 20 MG PO tablet Take 20 mg by mouth daily       guaiFENesin (MUCINEX) 600 MG 12 hr tablet Take 1 tablet (600 mg) by mouth 2 times daily       losartan (COZAAR) 100 MG tablet Take 1 tablet (100 mg) by mouth daily 90 tablet 3     Multiple Vitamin (ONE-A-DAY ESSENTIAL) TABS Take  by mouth. 30 tablet      SENNA-docusate sodium (SENNA S) 8.6-50 MG tablet Take 1 tablet by mouth daily as needed (constipation) 20 tablet 1     simvastatin (ZOCOR) 20 MG tablet Take 1 tablet (20 mg) by mouth At Bedtime 90 tablet 3     fluticasone (ARNUITY ELLIPTA) 200 MCG/ACT inhaler Inhale 1 puff into the lungs daily         ROS:  4 point ROS including Respiratory, CV, GI and , other than that noted in the HPI,  is negative    Vitals:  /78   Pulse 108   Resp 22   SpO2 91%   Exam:  GENERAL APPEARANCE:  Alert, in no distress  RESP:  respiratory effort and palpation of chest normal, auscultation of lungs clear - noted upper airway wheeze when she is anxious to visit , no respiratory distress  CV:   Palpation and auscultation of heart done , rate and rhythm tachy by regular , 4/6 murmur, +2 flavio lower ext pittng peripheral edema - even noted right above knee/karime line  ABDOMEN:  normal bowel sounds, soft, nontender, no hepatosplenomegaly or other masses  M/S:   Gait and station indep with 2 ww, Digits and nails intact  SKIN:  Inspection and Palpation of skin and subcutaneous tissue intact  NEURO: 2-12 in normal limits and at patient's baseline  PSYCH:  insight and judgement, memory intact with notable STM loss , affect and mood Pleasant but anxious    Lab/Diagnostic data:  Recent labs in [employer] reviewed by me today.     ASSESSMENT/PLAN:  Diarrhea, unspecified type and Slow transit constipation and   IBD (inflammatory bowel disease)  Problematic for her and exacerbating anxiety .  Will try some med reducton with stopping Ca and decreasing statin, but possible will need bentyl or prednisone course -   Starting Fibercon would be my next step    Generalized anxiety disorder  Problematic - likely driving HR will trial RIP  Cont new serotonin specific reuptake inhibitor as just started at 10 on 2/4 - so full dose not yet affective - lowest risk med and therefore cont at 20 mg with goal to keep xanax down given risk.   Discussed this at length with pt and son.     Essential hypertension with goal blood pressure less than 140/90 and Edema and Tachycardia and Nonrheumatic aortic valve stenosis  Suspect tachycardia is incidental due to anxiety - did decrease t/o visit and noted HR in TCU often 80's but 110's x 2. Noted echo was NSR and no HF noted.   However edema does suggest HF given sig murmur (AVS).   Will start lasix to treat current edema and therefore lower ARB given goal to keep sbp 130 - 160    Dyspnea, unspecified type  Fluticasone started - but will try to stop and if needed use a alb INH or LABA    History of TIA (transient ischemic attack) and stroke and Hyperlipidemia  Noted - on ASA and statin, but In  participants older than 74 years without type 2 diabetes, statin treatment was not associated with a reduction in atherosclerotic CVD or in all cause mortality, even when the incidence of atherosclerotic CVD was statistically significantly higher than the risk thresholds proposed for statin use. In the presence of diabetes, statin use was statistically significantly associated with reductions in the incidence of atherosclerotic CVD and in all cause mortality. This effect decreased after age 85 years and disappeared in nonagenarians.(BMJ 2018;362:k3900)    Therefore reduce statin given possible GI s/e to 20 3x/wk until supply up - then reduce to 10 mg 3x/wk.     Orders written by provider at facility    1. discontinue calcium supplement (as may be affecting bowels and low benefit at this time)  2. Decrease Zocor to 20 mg po q MWF HS until current supply is up - then   3. Start decrease to Zocor 10 mg po q MWF HS once 20 mg are used up. - as higher dose may be affecting bowels and low benefit at this time.   4. discontinue fluticasone once current supply is up - not covered by insurance and likely not needed -   5. Start Albuterol INH  2 puffs q 4 hrs PRN SOB/wheezing  6. Start lasix 20 mg po q day x 5 days then q MWF Dx. Edema  7. Decrease Cozaar to 50 mg po q day  8. Check HR/BP 2x/wk x 2 weeks and update me with results after above medication changes.   9. Start Gabapentin 200 mg po q HS Dx. Insomnia/anxiety  10. BMP in 2 weeks. Dx HF.     Next steps:  Monitor bowels after above changes -   Either start Bently or Fibercon depending on symptoms  - consider increasing RIP in day for anixety  Adjust Lasix and ARB as needed.  - use LABA if needed.       Total time with patient visit 60 minutes in the room with pt and son, Les,  answering all their questions and listening to the hx of disease and working on possible treatments/plans  i    Electronically signed by:  ASHLEY Ferris CNP

## 2020-03-19 NOTE — TELEPHONE ENCOUNTER
F/u on pt via [facility]:    03/19/2020 12:16:07 PM - By: Loulou Limon: She still complains of blowouts, but its not liquid, it's soft/formed. She has not been taking QOD Senna.  And she doesn't want it. Can we change it to PRN?  Her anxiety is unchanged. Per her son, she has been taking the Xanax TID for years. Her current order is for 0.25 mg BID with a daily 0.25 PRN. We didn't want to set up a PRN med strip for her, because her having to decide to take it or not would cause her more anxiety.  She tends to dwell on issues. Can we change it to TID? Her son Chantal is in agreement. She is also up in weight slightly 186. Up 8 lbs since admission. VS - 96.4, 90, 20, 142/96, 94% on RA at rest. Lungs clear but diminished at bases. No cough. Noted to have edema in her hands today. SOB with exertion. PT reports her SAT's went to 86-88% with exertion yesterday, then recovered with rest. She is currently on Lasix 20mg M,W,F. Wondering if we should bump it up for a few days to 40?? Maybe issues with her heart valve problem./ Let me know what you want to do.    03/19/2020 12:18:41 PM - By: Loulou Esteves is DNR per [facility] discharge orders. When we have a chance we need to do a POLST with her son and get signed by all.       New orders:  1, change Senna S to 1 tab po q day PRN (this is what I already have in Epic< but facility EHR doesn't)  2. Increase Xanax to 0.25 mg po TID  3. Increase lasix to 40 mg po q MWF x 4 doses, then return to 20 mg po q MWF.   4. Start dicyclomine 20 mg po TID dx IBS  5. Code status DNR/DNI verified.     Message to son with my direct call back # to update on above orders.

## 2020-03-22 NOTE — TELEPHONE ENCOUNTER
Son Chantal reports that mom Linn has a 15 lb weight gain for the past few days, has shortness of breath with exertion, with bowel issues.     Linn is a resident at the [facility]. PCP Ashly Tubbs NP with  Geriatric Services.    Son Chantal needs to speak regarding treatment plan for his mom. [facility] did not triage for this call.    RN paged on call provider Ekaterina Islas NP via  Geriatric Services answering service ([phone]) at 9:20 AM to call Chantal back directly at [phone].      Ruma Witt RN/[place] Nurse Advisor        Additional Information    Nursing judgment    Protocols used: NO PROTOCOL AVAILABLE - INFORMATION ONLY-A-[facility]

## 2020-03-22 NOTE — TELEPHONE ENCOUNTER
Received a call from son that stated that his mom has put on 15 lbs in a few days.  SOB and is a nervous person.      Called the aides and then on call nurse whom knew her story well.  Questioned some calorie weight as well as possible inconsistent scale use.  Facility versus scale in her room that family wanted her to use.      Has been adjustments already with Lasix but only gets it 3x week which could explain why Linn states she does not pee much.  Nursing states she sits in the bathroom often due to obscession of bowels and so who knows the sensation she may have for peeing.    Orders:  Lasix 40mg today and the on call nurse will have the aides help Linn with this.  Regular NP will follow up tomorrow with nursing.    Call placed to son about orders and explained that Lasix has not been given everyday.  He knows about the 40mg today.  Regular NP to follow up tomorrow.    If worse came to worse then she would need to be seen in the ED, but trying to treat everyone in their own place.  Son understood this.    Electronically signed by Monique Islas RN, CNP

## 2020-03-23 PROBLEM — I48.91 NEW ONSET ATRIAL FIBRILLATION (H): Status: ACTIVE | Noted: 2020-01-01

## 2020-03-23 PROBLEM — R60.1 ANASARCA: Status: ACTIVE | Noted: 2020-01-01

## 2020-03-23 PROBLEM — I27.20 MODERATE TO SEVERE PULMONARY HYPERTENSION (H): Status: ACTIVE | Noted: 2020-01-01

## 2020-03-23 PROBLEM — E87.1 HYPONATREMIA: Status: ACTIVE | Noted: 2020-01-01

## 2020-03-23 PROBLEM — R06.2 WHEEZING: Status: ACTIVE | Noted: 2020-01-01

## 2020-03-23 PROBLEM — S82.891A CLOSED RIGHT ANKLE FRACTURE: Status: ACTIVE | Noted: 2020-01-01

## 2020-03-23 PROBLEM — I05.0 RHEUMATIC MITRAL STENOSIS: Status: ACTIVE | Noted: 2020-01-01

## 2020-03-23 PROBLEM — S82.841A CLOSED BIMALLEOLAR FRACTURE OF RIGHT ANKLE: Status: ACTIVE | Noted: 2020-01-01

## 2020-03-23 PROBLEM — R93.89 ABNORMAL CXR: Status: ACTIVE | Noted: 2020-01-01

## 2020-03-23 PROBLEM — J90 BILATERAL PLEURAL EFFUSION: Status: ACTIVE | Noted: 2020-01-01

## 2020-03-23 NOTE — ED PROVIDER NOTES
History     Chief Complaint   Patient presents with     Fall     HPI  Linn San[facility] is a 89 [facility].    She was admitted in January with influenza and discharged to the [facility] for some rehab.  Went back to assisted living at the [facility] the end of February.  She has been battling diarrhea.  Sounds like her weight is up 15 pounds but there is some question if she has been using a different scale and she also has not been taking her Lasix regularly.    Tonight she got up out of bed and was going to go to the bathroom.  She was using her wheelchair to get to the toilet but 1 of her  socks fell off.  While she was trying to get from her wheelchair to the stool, she slid off of the wheelchair and onto the ground.  Her legs ended up around the stool and she was not able to get back up.  She did manage to call for help.  Now has right ankle pain, ecchymosis and was unable to bear weight.  She has had right knee pain for quite some time.    She states she is chronically dyspneic.  Does not have home oxygen.  Medics put her on oxygen on the way in because of the shortness of breath.  Denies any fevers.  No chest pain.    Allergies:  Allergies   Allergen Reactions     No Known Drug Allergies        Problem List:    Patient Active Problem List    Diagnosis Date Noted     Closed right ankle fracture 03/23/2020     Priority: Medium     Generalized anxiety disorder 02/11/2020     Priority: Medium     Parainfluenza type 1 infection 01/19/2020     Priority: Medium     Weakness 01/19/2020     Priority: Medium     Acute respiratory failure with hypoxia (H) 01/19/2020     Priority: Medium     History of TIA (transient ischemic attack) and stroke 03/24/2017     Priority: Medium     Essential hypertension with goal blood pressure less than 140/90 09/09/2016     Priority: Medium     Aortic valve stenosis, moderate to severe (increase from mild in  2016) 09/09/2016     Priority: Medium     Health Care Home 12/18/2012     Priority: Medium     Prema Ramirez RN-PHN  [facility]   [phone]    DX V65.8 REPLACED WITH 14552 HEALTH CARE HOME (04/08/2013)       Spinal stenosis 07/05/2011     Priority: Medium     HYPERLIPIDEMIA LDL GOAL <130 10/31/2010     Priority: Medium        Past Medical History:    Past Medical History:   Diagnosis Date     Closed fracture of navicular (scaphoid) bone of wrist      Generalized osteoarthrosis, unspecified site      Lump or mass in breast      Measles without mention of complication      Mumps without mention of complication      Pure hypercholesterolemia      Scarlet fever      Unspecified closed fracture of ankle      Varicella without mention of complication        Past Surgical History:    Past Surgical History:   Procedure Laterality Date     C APPENDECTOMY  age 11     HC EXCISION BREAST LESION W XRAY MARKER, OPEN SINGLE  1995    right     HC REMV CATARACT EXTRACAP,INSERT LENS, W/O ECP  9/18/2008    Right eye     HC REMV CATARACT EXTRACAP,INSERT LENS, W/O ECP  10/16/2008    Left eye     LASER YAG CAPSULOTOMY Left 9/18/2014    Procedure: LASER YAG CAPSULOTOMY;  Surgeon: Rafi Ramos MD;  Location:  OR       Family History:    Family History   Problem Relation Age of Onset     Diabetes Father      Cardiovascular Father      Respiratory Brother         asthma     Respiratory Sister         asthma     Arthritis Sister      Heart Disease Paternal Grandfather      Thyroid Disease Sister        Social History:  Marital Status:   [5]  Social History     Tobacco Use     Smoking status: Never Smoker     Smokeless tobacco: Never Used   Substance Use Topics     Alcohol use: No     Drug use: No        Medications:    No current outpatient medications on file.        Review of Systems   All other systems reviewed and are negative.      Physical Exam   BP: 127/78  Pulse: 100  Heart Rate:  "93  Temp: 97.8  F (36.6  C)  Resp: 22  Height: 160 cm (5' 3\")  Weight: 95.5 kg (210 lb 8.6 oz)  SpO2: 99 %      Physical Exam  Constitutional:       General: She is in acute distress (mild dypneic).   Musculoskeletal:      Right knee: She exhibits swelling. Tenderness (diffuse) found.      Right ankle: She exhibits swelling and ecchymosis (with superficial abrasions).      Right lower le+ Edema present.      Left lower le+ Edema present.   Neurological:      Mental Status: She is alert.         ED Course  (with Medical Decision Making)    89-year-old lives at the [facility] in assisted living after a short stent at [facility] for rehab following an influenza admission the end of January.  She is been battling some diarrhea.  Does not sound like she has been taking her Lasix on a regular basis and 15 pound reported weight gain recently.  Has baseline shortness of breath but does not think it is much worse than usual.  No fevers.  Tonight she was trying to go to the bathroom and was in her wheelchair and while trying to transfer the stool slid off the wheelchair because 1 of her  socks came off.  She slowly slid to the floor and her legs got caught in behind the stool.  She was unable to get up and somehow twisted her right ankle in the process.  Nursing staff helped her but she was unable to bear weight.  Has ecchymosis of the right ankle along with swelling and some abrasions.  She has 4+ edema of both legs way up into the thighs.  I suspect the 15 pound weight gain is true.    X-rays of the right knee and ankle were obtained along with portable chest x-ray.  We will check a BNP, troponin EKG.  Suspect she will probably need to stay in the hospital because I dont' think she can take care of herself at home and could benefit from aggressive diuresis, even if she did not suffer any broken bones.    Unfortunately, her right ankle x-ray shows a displaced distal fibular fracture with disruption of " the mortise.  Knee x-ray shows degenerative changes but no acute fracture.    Chest x-ray shows cardiac enlargement and shallow inspiration.  Bibasilar atelectasis or infiltrate and bilateral pleural effusions.    Sodium is slightly low at 128.  BNP 1387.  Troponin detectable but in the normal range at 0.031.  White count up slightly 11.5.    I spoke with Dr. Davidson from orthopedics.  He will plan on seeing her in the morning.  Spoke with Dr. ALYSE Meléndez from the hospitalist service.  She assumed her care and wrote orders.    Will need to be diuresed I think before any attempt can be made at surgical repair as she has so much edema be difficult to close surgical wounds right now.  She does have some ecchymosis in a couple of breaks in the skin medially.  Fracture is lateral.    Sugar tong and posterior splint was applied with Ortho-Glass after covering the wounds which were weeping some serosanguineous fluid and padding well.  I was able to effect a decent reduction at least by feel and post reduction x-ray shows improvement in alignment but now there is noted to be a medial malleolar fracture.    I spoke with her son, kalpana who lives down in [place].  Gave him the phone number to the nursing unit.  He will call before driving up here as visitors are quite limited if allowed at all.  If he is not allowed to see her, because of the code situation, he can certainly be in contact by phone.  She also was noted to have a severe aortic stenosis with aortic valve area of 1 cm .  Her prognosis is certainly guarded at best.              Procedures               EKG Interpretation:      Interpreted by Juan Alejandre MD  Time reviewed: 0055  Symptoms at time of EKG: baseline SOA, extreme LE edema   Rhythm: atrial fibrillation - controlled  Rate: 87  Axis: Normal  Ectopy: none  Conduction: normal  ST Segments/ T Waves: Poor R wave progression and diffuse T wave flattening  Q Waves: none  Comparison to prior: The A. fib  is new compared to the EKG from 2/23/2017.  She was noted to be in a sinus rhythm at the time of her echocardiogram on 1/19/2020.    Clinical Impression: Atrial fibrillation at 87 bpm.  Unclear if this is recurrent or new diagnosis.  Not surprising with her severe aortic stenosis.                Critical Care time:  none               Results for orders placed or performed during the hospital encounter of 03/23/20 (from the past 24 hour(s))   CBC with platelets differential   Result Value Ref Range    WBC 11.5 (H) 4.0 - 11.0 10e9/L    RBC Count 3.85 3.8 - 5.2 10e12/L    Hemoglobin 11.0 (L) 11.7 - 15.7 g/dL    Hematocrit 33.8 (L) 35.0 - 47.0 %    MCV 88 78 - 100 fl    MCH 28.6 26.5 - 33.0 pg    MCHC 32.5 31.5 - 36.5 g/dL    RDW 14.6 10.0 - 15.0 %    Platelet Count 263 150 - 450 10e9/L    Diff Method Automated Method     % Neutrophils 71.9 %    % Lymphocytes 12.8 %    % Monocytes 13.8 %    % Eosinophils 0.5 %    % Basophils 0.3 %    % Immature Granulocytes 0.7 %    Nucleated RBCs 0 0 /100    Absolute Neutrophil 8.3 1.6 - 8.3 10e9/L    Absolute Lymphocytes 1.5 0.8 - 5.3 10e9/L    Absolute Monocytes 1.6 (H) 0.0 - 1.3 10e9/L    Absolute Basophils 0.0 0.0 - 0.2 10e9/L    Abs Immature Granulocytes 0.1 0 - 0.4 10e9/L    Absolute Nucleated RBC 0.0    Basic metabolic panel   Result Value Ref Range    Sodium 128 (L) 133 - 144 mmol/L    Potassium 4.6 3.4 - 5.3 mmol/L    Chloride 91 (L) 94 - 109 mmol/L    Carbon Dioxide 31 20 - 32 mmol/L    Anion Gap 6 3 - 14 mmol/L    Glucose 103 (H) 70 - 99 mg/dL    Urea Nitrogen 18 7 - 30 mg/dL    Creatinine 0.82 0.52 - 1.04 mg/dL    GFR Estimate 61 >60 mL/min/[1.73_m2]    GFR Estimate If Black 71 >60 mL/min/[1.73_m2]    Calcium 9.2 8.5 - 10.1 mg/dL   Nt probnp inpatient (BNP)   Result Value Ref Range    N-Terminal Pro BNP Inpatient 1,347 0 - 1,800 pg/mL   Troponin I   Result Value Ref Range    Troponin I ES 0.031 0.000 - 0.045 ug/L   XR Ankle Port Right 2 Views    Addendum: 3/23/2020     Addendum: Fracture of the medial malleolus better visualized on the post reduction images.      Narrative    EXAM: XR ANKLE PORT RT 2 VW  LOCATION: [facility]  DATE/TIME: 3/23/2020 1:16 AM    INDICATION: Pain and swelling. Ecchymosis after fall.  COMPARISON: None.      Impression    IMPRESSION: Soft tissue edema. Displaced fracture of the distal fibula with medial displacement of the proximal fragment. Disruption of ankle mortise with medial displacement of the tibia relative to the talus. Difficult to exclude fracture of the   posterior malleolus. Plantar calcaneal spur. Vascular calcification.   XR Knee Right 3 Views    Narrative    EXAM: XR KNEE RT 3 VW  LOCATION: [facility]  DATE/TIME: 3/23/2020 1:15 AM    INDICATION: Pain after fall  COMPARISON: None.      Impression    IMPRESSION: Lateral view is slightly obliqued. There is a moderate joint effusion without fat/fluid level on crosstable view. Tricompartment degenerative changes are present, severe in the lateral compartment. No acute fracture is visualized. Diffuse   subcutaneous soft tissue edema.   XR Chest Port 1 View    Narrative    EXAM: XR CHEST PORT 1 VW  LOCATION: [facility]  DATE/TIME: 3/23/2020 1:16 AM    INDICATION: Shortness of breath.  COMPARISON: 01/20/2020      Impression    IMPRESSION: Cardiac enlargement and shallow inspiration. Bibasilar atelectasis or infiltrate and bilateral pleural effusions. Atherosclerotic aorta. Degenerative change osseous structures.   Ankle XR, G/E 3 views, right    Narrative    EXAM: XR ANKLE RT G/E 3 VW  LOCATION: [facility]  DATE/TIME: 3/23/2020 3:35 AM    INDICATION: Post splint and reduction.  COMPARISON: None.      Impression    IMPRESSION: Mildly displaced fractures of the medial and lateral malleolus. Medial malleolus fracture not as well seen on the previous. Improvement in alignment. Improvement in ankle mortise alignment. Soft tissue edema..            Assessments & Plan     I have reviewed the nursing notes.    I have reviewed the findings, diagnosis, plan and need for follow up with the patient.       ED to Inpatient Handoff:    Discussed with Agapito at 0200  Patient accepted for Inpatient Stay  Pending studies include:  none  Code Status: Not Addressed       Trauma Disposition  Patient is trauma admission:  Standard Emergency Evaluation  Surgeon/Ortho physician, Dr. Morocho consulted at time: 0238  Imaging reviewed by consultant:  No  Consult order placed into Epic:   Yes          Current Discharge Medication List          Final diagnoses:   Closed fracture of distal end of right fibula, unspecified fracture morphology, initial encounter - post reduction film shows a bilmalleolar fracture   Bilateral leg edema   Hyponatremia - 128   Atrial fibrillation, unspecified type (H)   Severe aortic stenosis - Aortic valve area 1 cm        3/22/2020   [facility] EMERGENCY DEPARTMENT     Juan Alejandre MD  03/23/20 [id number]

## 2020-03-23 NOTE — ED NOTES
Pt reports that staff at [facility] set up her medication in pill packs for her to take. Writer called [facility] to review pts medication. Multiple medication where not on the facilities list including lasix, when questioned staff reports they would contact the nurse on call and have her call the hospital to clarify medications.

## 2020-03-23 NOTE — PROGRESS NOTES
[facility]    Medicine Progress Note - Hospitalist Service       Date of Admission:  3/23/2020  Assessment & Plan    89-year-old woman with history of valvular heart disease and severe pulmonary hypertension admitted early this morning after a fall sustaining bilateral malleolar right ankle fracture with signs of volume overload.  She appears to have severe anasarca and has had 30 to 40 pound weight gain since late February.  She also has atrial fibrillation with rapid ventricular response today which appears to be a new diagnosis and is probably due to her valvular disease including mitral stenosis with left atrial enlargement.  She may have acute heart failure associated with rapid atrial fibrillation although BNP was normal and previous echocardiogram did not demonstrate systolic dysfunction or definite diastolic dysfunction.  Alternatively, anasarca could be due to a combination of severe pulmonary hypertension and hypoalbuminemia.  She presented with acute hypoxic respiratory failure probably due to volume overload from pulmonary hypertension and/or heart failure.  Although she has moderate to severe aortic stenosis, it seems less likely that aortic stenosis is the primary cause for anasarca and heart failure.  Wheezing is probably due to atelectasis associate with pleural effusions and possibly pulmonary edema.  She is moderately hyponatremic at admission which appears to be new and is probably due to anasarca and severe pulmonary hypertension.    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    Wheezing    Hyponatremia-admit Na 128    Essential hypertension with goal blood pressure less than 140/90    History of TIA (transient ischemic attack) and stroke    Start telemetry monitoring  Treat rapid atrial fibrillation with a combination of IV digoxin load and metoprolol and titrate dosing of digoxin and metoprolol to achieve adequate rate control, may need to reconsider more aggressive intervention if she fails to respond to those therapies  Start IV Lasix for treatment of severe volume overload and monitor potassium and renal function closely  Replace potassium and magnesium according to respective protocols to optimize those levels in the context of atrial fibrillation  Pursue additional diagnostic evaluation for causes of atrial fibrillation and anasarca including TSH, urinalysis, urine protein, and serum albumin  Titrate oxygen to keep saturations 90% and higher, reconsider noninvasive ventilation with high flow nasal cannula oxygen or BiPAP if necessary  Use ipratropium for bronchodilator effect but avoiding albuterol because of atrial fibrillation with rapid ventricular response  Because of valvular atrial fibrillation, long-term anticoagulation would be recommended.  For now, will start prophylactic dosing of Lovenox while investigating options for operative repair of bilateral malleolar ankle fracture.  Case discussed with orthopedic surgeon Dr. Morocho today who advised nonweightbearing on the right leg and who anticipates operative repair at some point although it may be delayed until after discharge once her leg edema has improved.  At this time, because she is not medically stable, advised postponing surgical intervention for treatment of bimalleolar fracture until her medical status has been optimized.    Diet: Combination Diet Low Saturated Fat Na <2400mg Diet    DVT Prophylaxis: Enoxaparin (Lovenox) SQ  Sears Catheter: not present  Code Status: DNR/DNI       Disposition Plan   Expected discharge: 4 - 7 days, recommended to transitional care unit once Heart rate is adequately controlled and volume status has improved.  Entered: Rocco Melton MD 03/23/2020, 10:06 AM       The patient's care was discussed with the Bedside Nurse, Care Coordinator/ and Patient.    Rocco Melton MD  Hospitalist Service  [facility]    ______________________________________________________________________    Interval History   Patient says that she feels short of breath.  She has been feeling short of breath for a while particularly with activity.  She denies any chest pain or palpitations.  She has been noticing swelling in her legs that is worsened over the last several weeks.  She denies any previous history of atrial fibrillation.  She has been afebrile.  She has become increasingly tachycardic this morning.  Blood pressure is elevated.  She has been hypoxic requiring oxygen supplementation.  Pain from her ankle fracture has been controlled with splinting and pain medications.    Data reviewed today: I reviewed all medications, new labs and imaging results over the last 24 hours. I personally reviewed the EKG tracing showing Atrial fibrillation with controlled ventricular response in the emergency room.  Telemetry this morning demonstrates atrial fibrillation with rapid ventricular response with heart rates ranging from 120-160.    Physical Exam   Vital Signs: Temp: 97  F (36.1  C) Temp src: Oral BP: 124/57 Pulse: 148 Heart Rate: 139 Resp: 24 SpO2: 98 % O2 Device: Nasal cannula Oxygen Delivery: 1 LPM  Weight: 210 lbs 8.63 oz     Wt Readings from Last 4 Encounters:   03/23/20 95.5 kg (210 lb 8.6 oz)   02/26/20 79.8 kg (176 lb)   02/21/20 77.8 kg (171 lb 8 oz)   02/20/20 76.9 kg (169 lb 8 oz)     General Appearance: Ill-appearing elderly woman with signs of mild respiratory distress although able to speak in full sentences  Respiratory:  Tachypneic but not using accessory muscles of respiration, diminished breath sounds throughout, scattered inspiratory crackles, no wheezing currently  Cardiovascular: Tachycardic with irregularly irregular rhythm, good radial pulse, normal capillary refill  GI: Nondistended abdomen, soft  Skin: Pale color, right lower leg is presently wrapped in splint and with Ace wrap from the knee to the toes, toes of the right foot are pink with normal capillary refill  Other: Severe pitting edema present in the left lower leg    Data   Recent Labs   Lab 03/23/20  0102 03/20/20  0645   WBC 11.5*  --    HGB 11.0*  --    MCV 88  --      --    * 126*   POTASSIUM 4.6 4.1   CHLORIDE 91* 92*   CO2 31 28   BUN 18 14   CR 0.82 0.59   ANIONGAP 6 6   WINSTON 9.2 8.8   * 134*   TROPI 0.031  --      BNP 1347 with 1800 the upper limit of normal    Old records were reviewed for comparison.  On March 20, serum sodium was 126.  On January 31, serum sodium was 138.    Recent Results (from the past 24 hour(s))   XR Ankle Port Right 2 Views    Addendum: 3/23/2020    Addendum: Fracture of the medial malleolus better visualized on the post reduction images.      Narrative    EXAM: XR ANKLE PORT RT 2 VW  LOCATION: [facility]  DATE/TIME: 3/23/2020 1:16 AM    INDICATION: Pain and swelling. Ecchymosis after fall.  COMPARISON: None.      Impression    IMPRESSION: Soft tissue edema. Displaced fracture of the distal fibula with medial displacement of the proximal fragment. Disruption of ankle mortise with medial displacement of the tibia relative to the talus. Difficult to exclude fracture of the   posterior malleolus. Plantar calcaneal spur. Vascular calcification.   XR Knee Right 3 Views    Narrative    EXAM: XR KNEE RT 3 VW  LOCATION: [facility]  DATE/TIME: 3/23/2020 1:15 AM    INDICATION: Pain after fall  COMPARISON: None.      Impression    IMPRESSION: Lateral view is slightly obliqued. There is a moderate  joint effusion without fat/fluid level on crosstable view. Tricompartment degenerative changes are present, severe in the lateral compartment. No acute fracture is visualized. Diffuse   subcutaneous soft tissue edema.   XR Chest Port 1 View    Narrative    EXAM: XR CHEST PORT 1 VW  LOCATION: [facility]  DATE/TIME: 3/23/2020 1:16 AM    INDICATION: Shortness of breath.  COMPARISON: 01/20/2020      Impression    IMPRESSION: Cardiac enlargement and shallow inspiration. Bibasilar atelectasis or infiltrate and bilateral pleural effusions. Atherosclerotic aorta. Degenerative change osseous structures.   Ankle XR, G/E 3 views, right    Narrative    EXAM: XR ANKLE RT G/E 3 VW  LOCATION: [facility]  DATE/TIME: 3/23/2020 3:35 AM    INDICATION: Post splint and reduction.  COMPARISON: None.      Impression    IMPRESSION: Mildly displaced fractures of the medial and lateral malleolus. Medial malleolus fracture not as well seen on the previous. Improvement in alignment. Improvement in ankle mortise alignment. Soft tissue edema..     Medications     - MEDICATION INSTRUCTIONS -         ALPRAZolam  0.25 mg Oral TID     aspirin  81 mg Oral Daily     citalopram  20 mg Oral Daily     dicyclomine  20 mg Oral TID AC     enoxaparin ANTICOAGULANT  40 mg Subcutaneous Q24H     furosemide  40 mg Intravenous Once     ipratropium  0.5 mg Nebulization Q4H     metoprolol  5 mg Intravenous Q6H     simvastatin  20 mg Oral At Bedtime     sodium chloride (PF)  3 mL Intracatheter Q8H

## 2020-03-23 NOTE — CONSULTS
Impression:  Closed right ankle bimalleolar fracture in a 89-year-old female with multiple medical mobilities    Plan:  Splint was changed-we will check an x-ray to assure no position change.  Recommend nonweightbearing.  Recommend elevation above her heart.  Will defer care to the hospitalist team at this point.  Would consider surgical treatment in approximately 2 weeks if soft tissues improved.    Full consult:[id number]    Carlos Morocho

## 2020-03-23 NOTE — LETTER
Transition Communication Hand-off for Care Transitions to Next Level of Care Provider    Name: Linn Sanon  : 1926  MRN #: [id number]  Primary Care Provider: Ashly Tubbs  Primary Care MD Name: Dr. Yoseph Kumar  Primary Clinic: [address] [place] [id number]  Primary Care Clinic Name: [facility]  Reason for Hospitalization:  Hyponatremia [E87.1]  Bilateral leg edema [R60.0]  Closed fracture of distal end of right fibula, unspecified fracture morphology, initial encounter [S82.831A]  Admit Date/Time: 3/23/2020 12:00 AM  Discharge Date: 20  Payor Source: Payor: BCBS / Plan: BCBS PLATINUM BLUE / Product Type: PPO /     Readmission Assessment Measure (CHATO) Risk Score/category: Elevated    Reason for Communication Hand-off Referral: Fragility    Discharge Plan: patient [facility].  She will initially receive therapies due to her ankle fracture     Concern for non-adherence with plan of care:   Y/N : No    Discharge Needs Assessment:  Needs      Most Recent Value   Equipment Currently Used at Home  grab bar, tub/shower, grab bar, toilet, shower chair, walker, rolling, wheelchair, manual   # of Referrals Placed by [facility]  Internal Clinic Care Coordination   Other Resources  Transportation Services   Transportation Agency  [facility]   Skilled Nursing [facility] [phone], Fax: [phone]   PAS Number  [id number]        Follow-up plan:  No future appointments.    Any outstanding tests or procedures:        Referrals     Future Labs/Procedures    Physical Therapy Adult Consult     Comments:    Evaluate and treat as clinically indicated.    Reason:  Right ankle bimalleolar fracture, non-weight bearing right foot and lower leg            CHARMAINE Ashby  [facility] [phone]/ [facility] of [phone]    AVS/Discharge Summary is the source of truth; this is a helpful guide for improved communication of patient story

## 2020-03-23 NOTE — PLAN OF CARE
S. End of shift report    B. displaced right ankle with fracture/CHF    A. BP controlled this shift @ 120's/50-70's, HR 's patient was in RVR when writer came on shift provider notified and ordered Dig. Lopressors. And lasix. Ortho came in and re-splinted the ankle patient is non-weight bearing and PT is ordered. Patient has a good appetite. Intermittent confusion. Lungs were wheezy this am with nebs ordered and are now diminished. Left foot has good CMS and pulse, leg is elevated with ice to the groin area.  UOP this shift 700cc's with the [place] in use Patient had 2 BM's this shift.  Med rec has not [place] completed, Awaiting med list to be faxed by [facility].     R. Will continue to monitor per POC.

## 2020-03-23 NOTE — PROGRESS NOTES
"Brief evaluation-formal consult to follow.    \"Slid to the ground\" sustained a bimalleolar ankle fracture.  Close reduction in the ED.  Was noted to have some superficial abrasions along the medial side of her ankle.  She also has significant fluid retention and history of aortic stenosis.  Spoke with Dr. Melton.  She would need cardiology evaluation if considering general anesthesia.  Post reduction ankle x-rays show good alignment at this point.  Will reevaluate with a splint change to evaluate the wounds and determine further course of treatment.    Keep nonweightbearing.  Strict elevation and ice with edema control.  "

## 2020-03-23 NOTE — PROGRESS NOTES
Writer came on shift  this am and patient was in RVR AF. -150's. Provider notified with dig and lopressor ordered. A new IV was placed in the patients left arm and the medications were given. Patient is now in controlled rate with AF.

## 2020-03-23 NOTE — ED TRIAGE NOTES
Pt was discharge from hospital about 2 weeks ago with dx influenza and at [facility] for PT and rehab, was in bathroom trying to sit down to have BM but was slipping and 'lowered' self to ground, did not hit head and denies any bottom/back pain. Injury to right knee this morning and also after incident this evening c/o worsening right ankle pain and unable to bear weight.

## 2020-03-23 NOTE — PROGRESS NOTES
S-(situation): Patient arrives to room 268 via cart from ED.    B-(background): Fall-Right ankle fx    A-(assessment): VSS, afebrile, alert/orientated, anxious, LS clear/diminished, right leg splint in place, bilateral edema in legs 2 plus.    R-(recommendations): Orders reviewed with pt. Will monitor patient per MD orders.     Inpatient nursing criteria listed below were met:    Health care directives status obtained and documented: Yes  SCD's Documented: No  Skin issues/needs documented:Yes  Isolation needs addressed, if appropriate: NA  Fall Prevention: Care plan updated, Education given and documented Yes  MRSA swab completed for ICU admissions only: NA  Care Plan initiated: Yes  Education Assessment documented:Yes  Education Documented (Pre-existing chronic infection such as, MRSA/VRE need education on admission): Yes  Admission Medication Reconciliation completed: Yes  New medication patient education completed and documented (Possible Side Effects of Common Medications handout): Yes  Home medications if not able to send immediately home with family stored here: NA  Reminder note placed in discharge instructions: NA  Discharge planning review completed (admission navigator) Yes

## 2020-03-23 NOTE — H&P
[facility]    History and Physical  Hospitalist       Date of Admission:  3/23/2020    Assessment & Plan   Linn Sanon is a 89 year old female who presents with     Aortic stenosis (moderate to severe) resulting in low output cardiac function with subsequent bilateral pulmonary effusions and anasarca:   Noted during a 01/2020 hospitalization in which patient was found to have respiratory failure.  She was diuresed gently during her stay.  She was discharged to rehab where she was noted to be short of breath with activity.  No diuretics were given until her discharge to [facility].  03/06, patient was given lasix MWF.  She continued to gain weight.  On 03/19, patient noted to have an 8 pound increase within one month.  She states she is eating poorly and having diarrhea.  Her lasix was increased to 40mg po TID.  When [facility] was contacted, they did not have a record of lasix on her medication list.  Is it possible that patient was not receiving the medication??  OR did the severity of her aortic stenosis prohibit adequate diuresis.  Unfortunately patient has shortness of breath with activity, hypoxia witnessed during PT sessions, fortunately she would rebound, and worsening edema.  This is due to aortic stenosis which is likely causing damage to .  I am also concerned for possible atrial fibrillation that has not been diagnosed.  --diurese gently with IV lasix in the morning.  Her blood pressure is soft, indicating a low output aortic stenosis.   Patient may not be able to tolerate lasix.  IF this is indeed the case, we may need cardiology consult and end of life discussion.  --There is no sign of CHF exacerbation at this time.  --place on telemetry  --monitor electrolytes  --will stop cozaar 100mg po daily in order to allow for more blood pressure for diuresis    Possible atrial fibrillation (new onset):  PER my review of EKG, patient appears to be in rate controlled atrial  fibrillation.  Per SNF notes, she is noted to have occasional tachycardia and shortness of breath with movement.  I wonder if she was having afib with RVR in the setting of bilateral pleural effusions from aortic stenosis??  --place on telemetry      Ankle fracture:  Patient has significant displaced fibular fracture of the right ankle.  Orthopedics has been contacted.  Given her multiple comorbidities, I am not sure patient would be an adequate surgical candidate.  Would need cardiology input.    Right knee effusion: This is likely chronic.     H/o anxiety:  This may have been exacerbated by aortic stenosis induced pleural effusions and anasarca.  --continue prn xanax  --if patient chooses hospice, we may administer morphine to limit air hunger    Occasional wheezing:  Likely due to bilateral pleural effusions/cardiac asthma  --will attempt to diurese    Essential hypertension:  Blood pressures are low normal likely due to lasix    Hyponatremia:  Possibly due to lasix    DVT Prophylaxis: none due to possible surgery  Code Status: DNR / DNI  Expected discharge: 4 - 7 days,    Inez Altman MD    Primary Care Physician   Ashly Tubbs    Chief Complaint   fall    History is obtained from the patient    History of Present Illness   Linn Sanon is a 89 year old female h/o aortic valve stenosis and edema  who presents with s/p fall resulting in right fibular fracture.  Patient was trying to sit down onto the toilet. Due to chronic right knee pain patient did not make it onto the toilet.  She slid down onto the floor.  One leg wrapped around the toilet stool and the other went into another direction.  She denies any head injury, nausea, dizziness, LOC, head trauma.  Patient was unable to get up on her own.  She came to ED and was found to have right fibular fracture.        Patient has edema in both her legs and has been steadily getting worse.   Her symptoms of shortness of breath was noted   Breathing has gotten more labored with activity.  A workup was initiated during JAnuary 2020 hospitalization, patient was found to have mod to severe aortic stenosis 1cm^2. And 23mmHg gradient.  Since that diagnosis she continues to gain weight, experience SIMMONS, and have anasarca.     No orthopnea  15 pound weight gain  No chest pain  Occasional abdominal pain; none currently  No constipation  +diarrhea  No melena  No hematochezia  Occasional specks of blood on TP since patient is wiping so frequently  No palpitations  No headaches   no dizziness          Past Medical History    I have reviewed this patient's medical history and updated it with pertinent information if needed.   Past Medical History:   Diagnosis Date     Closed fracture of navicular (scaphoid) bone of wrist     Wrist fracture     Generalized osteoarthrosis, unspecified site      Lump or mass in breast     Breast Lump or Mass     Measles without mention of complication     Measles     Mumps without mention of complication     Mumps     Pure hypercholesterolemia      Scarlet fever      Unspecified closed fracture of ankle     Ankle fracture     Varicella without mention of complication     Chickenpox   primary osteoarthritis of right knee.           Past Surgical History   I have reviewed this patient's surgical history and updated it with pertinent information if needed.  Past Surgical History:   Procedure Laterality Date     C APPENDECTOMY  age 11     HC EXCISION BREAST LESION W XRAY MARKER, OPEN SINGLE  1995    right     HC REMV CATARACT EXTRACAP,INSERT LENS, W/O ECP  9/18/2008    Right eye     HC REMV CATARACT EXTRACAP,INSERT LENS, W/O ECP  10/16/2008    Left eye     LASER YAG CAPSULOTOMY Left 9/18/2014    Procedure: LASER YAG CAPSULOTOMY;  Surgeon: Rafi Ramos MD;  Location: [facility] OR       Prior to Admission Medications   Prior to Admission Medications   Prescriptions Last Dose Informant Patient Reported? Taking?   ALPRAZolam (XANAX) 0.25  MG tablet   No No   Sig: Take 1 tablet (0.25 mg) by mouth 3 times daily   SENNA-docusate sodium (SENNA S) 8.6-50 MG tablet   No No   Sig: Take 1 tablet by mouth daily as needed (constipation)   albuterol (PROAIR HFA/PROVENTIL HFA/VENTOLIN HFA) 108 (90 Base) MCG/ACT inhaler   No No   Sig: Inhale 2 puffs into the lungs every 4 hours as needed for shortness of breath / dyspnea or wheezing   aspirin 81 MG EC tablet   Yes No   Sig: Take 1 tablet by mouth daily.   citalopram 20 MG PO tablet   Yes No   Sig: Take 20 mg by mouth daily   dicyclomine (BENTYL) 20 MG tablet   No No   Sig: Take 1 tablet (20 mg) by mouth 3 times daily   furosemide (LASIX) 20 MG tablet   No No   Sig: Take 1 tablet (20 mg) by mouth daily   gabapentin (NEURONTIN) 100 MG capsule   No No   Sig: Take 2 capsules (200 mg) by mouth At Bedtime   guaiFENesin (MUCINEX) 600 MG 12 hr tablet   No No   Sig: Take 1 tablet (600 mg) by mouth 2 times daily   losartan (COZAAR) 50 MG tablet   No No   Sig: Take 1 tablet (50 mg) by mouth daily   simvastatin (ZOCOR) 10 MG tablet   No No   Sig: Take 1 tablet (10 mg) by mouth three times a week      Facility-Administered Medications: None     Allergies   Allergies   Allergen Reactions     No Known Drug Allergies        Social History   I have reviewed this patient's social history and updated it with pertinent information if needed. Linn Sanon  reports that she has never smoked. She has never used smokeless tobacco. She reports that she does not drink alcohol or use drugs.    Family History   I have reviewed this patient's family history and updated it with pertinent information if needed.   Family History   Problem Relation Age of Onset     Diabetes Father      Cardiovascular Father      Respiratory Brother         asthma     Respiratory Sister         asthma     Arthritis Sister      Heart Disease Paternal Grandfather      Thyroid Disease Sister        Review of Systems   The 10 point Review of Systems is negative  other than noted in the HPI or here.     Physical Exam   Temp: 97.8  F (36.6  C) Temp src: Oral BP: 103/63 Pulse: 85 Heart Rate: 93 Resp: 22 SpO2: 100 % O2 Device: Nasal cannula Oxygen Delivery: 1 LPM  Vital Signs with Ranges  Temp:  [97.8  F (36.6  C)] 97.8  F (36.6  C)  Pulse:  [] 85  Heart Rate:  [93] 93  Resp:  [22] 22  BP: (103-127)/(63-96) 103/63  SpO2:  [89 %-100 %] 100 %  0 lbs 0 oz    Constitutional: awake, alert, oriented X3, NAD  HEENT:  MMM, NCAT, no carotid bruit  Respiratory: poor breath sounds in Left lung; crackles in right base; occasional wheezing; no respiratory distress  Cardiovascular: irreg irreg S1+, S2+, 3/6 holosytolic murmur; diasotlic murmur  GI: BS+ S/ND/NT no masses, no hepatosplenomegaly  Lymph/Hematologic:  No LAD  EXT: +pitting edema of bilateral lower ext; edema to lower back and involves upper ext;   diminished peripheral pulses  Skin: weeping of skin in bilateral lower ext; minor abrasions on lateral right leg  Neurologic: CN 2-12 grossly intact, moving all four ext equally, no focal deficits  Psychiatric: pleasant and cooperative, well appearing    Data   Data reviewed today:  I personally reviewed the EKG tracing showing atrial fibrillation that is rate controlled; flattening twaves.  Recent Labs   Lab 03/23/20  0102 03/20/20  0645   WBC 11.5*  --    HGB 11.0*  --    MCV 88  --      --    * 126*   POTASSIUM 4.6 4.1   CHLORIDE 91* 92*   CO2 31 28   BUN 18 14   CR 0.82 0.59   ANIONGAP 6 6   WINSTON 9.2 8.8   * 134*   TROPI 0.031  --        Recent Results (from the past 24 hour(s))   XR Ankle Port Right 2 Views    Narrative    EXAM: XR ANKLE PORT RT 2 VW  LOCATION: [facility]  DATE/TIME: 3/23/2020 1:16 AM    INDICATION: Pain and swelling. Ecchymosis after fall.  COMPARISON: None.      Impression    IMPRESSION: Soft tissue edema. Displaced fracture of the distal fibula with medial displacement of the proximal fragment. Disruption of ankle mortise  with medial displacement of the tibia relative to the talus. Difficult to exclude fracture of the   posterior malleolus. Plantar calcaneal spur. Vascular calcification.   XR Knee Right 3 Views    Narrative    EXAM: XR KNEE RT 3 VW  LOCATION: [facility]  DATE/TIME: 3/23/2020 1:15 AM    INDICATION: Pain after fall  COMPARISON: None.      Impression    IMPRESSION: Lateral view is slightly obliqued. There is a moderate joint effusion without fat/fluid level on crosstable view. Tricompartment degenerative changes are present, severe in the lateral compartment. No acute fracture is visualized. Diffuse   subcutaneous soft tissue edema.   XR Chest Port 1 View    Narrative    EXAM: XR CHEST PORT 1 VW  LOCATION: [facility]  DATE/TIME: 3/23/2020 1:16 AM    INDICATION: Shortness of breath.  COMPARISON: 01/20/2020      Impression    IMPRESSION: Cardiac enlargement and shallow inspiration. Bibasilar atelectasis or infiltrate and bilateral pleural effusions. Atherosclerotic aorta. Degenerative change osseous structures.       01/2020  Echocardiogram    1. Normal biventricular size and function. Left ventricular ejection fraction  of 60-65%. No segmental wall motion abnormalities noted.  2. The left atrium is moderately dilated.  3. Moderate to severe valvular aortic stenosis with KARLY of 1 cm^2 and mean  gradient of 23 mmHg. There is mild to moderate (1-2+) aortic regurgitation.  Pressure half time is 345 ms.  4. There is mild mitral stenosis with a mean mitral valve gradient of 5.6 mmHg  at a HR of 83 bpm.  5. There is mild to moderate (1-2+) tricuspid regurgitation.  6. The right ventricular systolic pressure is approximated at 57.4 mmHg plus  the right atrial pressure.  Compared to the previous echocardiogram on 9/16/2016, aortic stenosis has  progressed. There is now mitral stenosis as well. Pulmonary pressure also  higher.

## 2020-03-23 NOTE — CONSULTS
CARE TRANSITION SOCIAL WORK INITIAL ASSESSMENT:      Met with: Family.    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 Yoseph Kumar    ASSESSMENT  Cognitive Status: awake and alert.       Resources List: Skilled Nursing Facility     Description of Support System: Involved, Supportive   Who is your support system?: Children       Insurance Concerns: No Insurance issues identified  Living Arrangements: other (see comments), [facility]    This writer met with her son, Gaurav (by phone), introduced self and role. Discussed discharge planning and medicare guidelines in regards to home care and SNF benefits. Patient had a recent stay at [facility] and had returned to [facility] about 3 weeks ago.  Writer talked with Gaurav and discussed options for discharge.  Gaurav is in agreement with patient's condition, that she will need to return to [facility].  Discussed TCU vs LTC.  Gaurav will think about options and re-discuss in a day or two.      Writer talked with Lopez at [facility].  [facility] will accept patient either into TCU or LTC, whichever is decided is best for patient.    PLAN    [facility] - LTC vs TCU    CHARMAINE Ashby  [facility] [phone]/ [facility] [phone]

## 2020-03-23 NOTE — CONSULTS
"Consult Date:  03/23/2020      ORTHOPEDIC CONSULTATION      REQUESTING PHYSICIAN:  Juan Alejandre MD      REASON FOR CONSULTATION:  Right ankle fracture.      NOTE:  This is a 89-year-old female admitted through the ER after \"sliding to the ground,\" injuring her ankle.  Subsequently had radiographs, which showed a right bimalleolar ankle fracture.  She underwent closed reduction with splinting in the ER.  Multiple medical conditions, including atrial fibrillation, hyponatremia, pulmonary edema.  She reported to have significant weight increase over the last few weeks.  She had dealt with influenza a few weeks ago.  Currently, resting on second floor.  Also history of aortic valve stenosis.  Denies any other injuries.  It was reported that she had some superficial skin injury on the medial side of her ankle/lower leg.     PAST MEDICAL HISTORY:  Includes osteoarthritis, breast lump, mumps, hypercholesterolemia, aortic stenosis.      PAST SURGICAL HISTORY:  Include appendectomy, cataract removal.      PRIOR TO ADMISSION MEDICATIONS:  Include Xanax, Senna, albuterol, aspirin, , Bentyl, Lasix, Neurontin, Mucinex, Cozaar, Zocor.      ALLERGIES:  NO KNOWN DRUG ALLERGIES.      SOCIAL HISTORY:  No tobacco, ETOH or illicits.      FAMILY HISTORY:  Per the chart, includes father with diabetes and cardiovascular, brother with asthma, sister with asthma.      REVIEW OF SYSTEMS:  Ten-point review of systems was obtained, otherwise unremarkable.      PHYSICAL EXAMINATION:   GENERAL:  She is awake, alert to person, place, situation.  Follows commands.   Is pleasant and verbal.   HEENT: Atraumatic  Chest: Equal chest rise and fall.  She does have some dyspnea with conversation  Abdomen soft  Skin: Global swelling  EXTREMITIES:  Focused exam of right lower extremity shows a valgus deformity of the knee.  There is no pain to the knee.  She has significant edema throughout the body.  Has a 3-sided splint on the ankle.  This was " removed showing global edema throughout with ecchymosis.  Approximately 5-6 cm proximal to the medial malleolus, there are 3 linear splits to the skin measuring approximately 3-4 mm.  Here, there is some serosanguineous drainage.  There is no direct wound adjacent to the ankle.  The foot is warm and dry.  There is good capillary refill.  There is no evidence of infection.      IMAGING:  Radiographs reviewed.  Initial radiograph shows what appears to be a bimalleolar ankle fracture-dislocation.  There is a vertical component to the medial malleolus with an oblique lateral malleolus at the level of the plafond.  Initial radiographs show the talus to be approximately 50% translated laterally.  Post-reduction x-rays show excellent improvement in alignment.      IMPRESSION:  This is a 89-year-old female with a closed right ankle bimalleolar fracture with multiple medical comorbidities, including aortic stenosis, atrial fibrillation with global edema.      PLAN:  Her splint was changed out so I could fully evaluate the soft tissue given the superficial areas of abrasion.  Nonstick sterile bandages were applied along the skin breaks.  A 3-sided Eliseo Adame splint was applied with padding, particularly focusing attention along the heel.  She tolerated the splint change well.  I discussed the options.  Given her global edema of soft tissue, I have recommended soft tissue rest.  Strict elevation, ice, nonweightbearing.  We will need to closely follow the splint as she is receiving diuresis, which may cause a splint to be loose.  I would consider surgical treatment at least 2 weeks from now to allow for adequate soft tissue rest and improvement of her global edema.  I talked with Dr. Melton, the hospitalist, regarding this.  At this point, I will defer the care to the hospitalist team.  I do recommend she be nonweightbearing with elevation essentially at all times of that foot and ankle.  We will obtain an x-ray, given that  we did change the splint.         MERYL AREVALO DO             D: 2020   T: 2020   MT: BRYAN      Name:     SCARLET ROMERO   MRN:      [id number]        Account:       [id number]   :      1926           Consult Date:  2020      Document: [id number]       cc: Juan Alejandre MD

## 2020-03-23 NOTE — ED NOTES
Pt requested bed pan, was unable to urinate and did not pass any stool. Pericare completed and a new incontinent pad applied. Pts marcelino area and coccyx red, pitting noted to coccyx area in the shape of the bedpan. Pt c/o right knee pain. Pillow placed under pts right knee and right ankle and she reports feeling more comfortable.

## 2020-03-24 NOTE — PROGRESS NOTES
Alert to self- inconsistently oriented to location and situation. VSS on 1L NC. Shortness of breath with repositioning. LS diminished. Edema 2+ lower extremities. RLE in hard splint, cap refill <3 seconds, denies n/t. Denying pain. PIV left arm SL. Appetite fair. BM x1 this shift. Purewick in place with adequate output. Continue with plan of care.

## 2020-03-24 NOTE — PLAN OF CARE
"Intermittent confusion. Afebrile. BP's soft, lowest was 96/47. Telemetry shows a. Fib. No complaints of pain. 1/2 L of O2 via nasal canula with saturation in low 90's. Moderate edema in bilateral lower legs. CMS intact. Splint to right leg is clean, dry and intact. Leg elevated. Purewick in place with 850 of urine output. 2 formed bowel movements overnight. Weight is down 0.2kg. Potassium recheck of 3.8, will need replacement according to protocol. Magnesium recheck of  2.2. IV saline locked.     /74 (BP Location: Right arm)   Pulse 74   Temp 97.7  F (36.5  C) (Oral)   Resp 18   Ht 1.6 m (5' 3\")   Wt 95.3 kg (210 lb 1.6 oz)   SpO2 93%   BMI 37.22 kg/m      "

## 2020-03-24 NOTE — PLAN OF CARE
VSS. Afebrile. Pt intermittently disorientated to place and situation. Purwick is in place. Pt had medium BM towards end of shift. Good output of 400 ml on  shift. +3 edema to bilateral legs with good CMS and pulses. Tele reads Afib CVR. Will continue to monitor.

## 2020-03-24 NOTE — PROGRESS NOTES
"SPIRITUAL HEALTH SERVICES    [facility] -     Referral Source: Pt or son referral on admission.    - I spoke with son, Gaurav, by phone to assess whether it would be a good time to visit with his mother.  Gaurav stated that she has been \"in-and-out\" of understanding things and he didn't think it would be helpful for his mother at this time.  \"She might just be confused.\"    - I listened as he shared the frustration of being an \"only family member\" and not being able to be there by her side during these difficult days for her. He stated that he realizes that he's \"not the only one\" but that it is still very difficult.  I provided compassionate time for reflection around his feelings and frustrations.    - He is waiting to hear what the plan will be for Linn's ankle fracture and whether she will be returning to her correction or TCU, whichever might be best.  He expressed appreciation for the call and the support.    Plan:  will monitor patient's ongoing need for support during LOS.      Hebert Ramirez M.A., [facility]  Staff Chaplain [facility]  Office: [phone]  Cell: [phone]  Pager [phone]    "

## 2020-03-24 NOTE — PROGRESS NOTES
Pt remains on 1 lpm via nasal canula with oxygen saturations at 96%.  Hr in the 80's, RR 20.  BS have been clear and diminished throughout with occasional upper airway wheezes.  No change post nebulizer.    Jesus William, RT on 3/24/2020 at 5:46 PM

## 2020-03-24 NOTE — PROGRESS NOTES
[facility]    Hospitalist Progress Note      Assessment & Plan   89-year-old woman with history of valvular heart disease and severe pulmonary hypertension admitted early this morning after a fall sustaining bilateral malleolar right ankle fracture with signs of volume overload.  She appears to have severe anasarca and has had 30 to 40 pound weight gain since late February.  She also has atrial fibrillation with rapid ventricular response today which appears to be a new diagnosis and is probably due to her valvular disease including mitral stenosis with left atrial enlargement.  She may have acute heart failure associated with rapid atrial fibrillation although BNP was normal and previous echocardiogram did not demonstrate systolic dysfunction or definite diastolic dysfunction.  Alternatively, anasarca could be due to a combination of severe pulmonary hypertension and hypoalbuminemia.  She presented with acute hypoxic respiratory failure probably due to volume overload from pulmonary hypertension and/or heart failure.  Although she has moderate to severe aortic stenosis, it seems less likely that aortic stenosis is the primary cause for anasarca and heart failure.  Wheezing is probably due to atelectasis associate with pleural effusions and possibly pulmonary edema.  She is moderately hyponatremic at admission which appears to be new and is probably due to anasarca and severe pulmonary hypertension.    Principal Problem:    Acute respiratory failure with hypoxia (H)    Assessment: seem to be combination of fluid overload and severe aortic stenosis. On diuretic. So far negative 1.3 liter fluid balance    Plan: continue diuretic for now.     Active Problems:   New onset atrial fibrillation (H) with RVR, suspect valvular a fib    Assessment: on telemetry. On digoxin. Seem to be rate controlled, on iv lasix.    Plan: continue digoxin for now pt will need anticoagulation but pt is high  risk due to age. Most likely will need coumadin due to AVR issue.       Closed bimalleolar fracture of right ankle    Assessment: ortho consulted, currently put on NWB, possible to do surgery in 2 weeks     Plan: as per anel      Aortic valve stenosis, moderate to severe (increase from mild in 2016)    Assessment: as above,     Plan: as above      History of TIA (transient ischemic attack) and stroke    Assessment: stable    Plan: monitor only       Bilateral pleural effusion    Assessment: on lasix 40mg q6h    Plan: continue the same      Severe pulmonary hypertension (H) per Echo Jan 2020    Assessment: on diuretic    Plan: continue diuretic for now      Anasarca    Assessment: due to fluid overload,     Plan: continue diuretic as above      Wheezing    Assessment: on ipratropium, avoid albuterol due to afib rvr    Plan: continue ipratorpum      Hyponatremia-admit Na 128    Assessment: stable    Plan: monitor only      Essential hypertension with goal blood pressure less than 140/90    Assessment: stable, on metoprolol.     Plan: continue current regiment      # Pain Assessment:  Current Pain Score 3/23/2020   Patient currently in pain? yes   Pain score (0-10) -   Pain descriptors Aching;Intermittent   - Linn is experiencing pain due to ankle fracture . Pain management was discussed and the plan was created in a collaborative fashion.  Linn's response to the current recommendations: engaged  - Opioid regimen: morphine prn  - Response to opioid medications: Reduction of symptoms   - Bowel regimen: miralax        DVT Prophylaxis: Enoxaparin (Lovenox) SQ  Code Status: DNR/DNI    Disposition: Expected discharge in 3-5 days days once pt medical condition stable.    Betty Nicole    Interval History   Pt still edematous. Negative fluid balance of 1.3liter today.     -Data reviewed today: I reviewed all new labs and imaging results over the last 24 hours. I personally reviewed no images or EKG's today.  Recent  Results (from the past 168 hour(s))   Basic metabolic panel    Collection Time: 03/20/20  6:45 AM   Result Value Ref Range    Sodium 126 (L) 133 - 144 mmol/L    Potassium 4.1 3.4 - 5.3 mmol/L    Chloride 92 (L) 94 - 109 mmol/L    Carbon Dioxide 28 20 - 32 mmol/L    Anion Gap 6 3 - 14 mmol/L    Glucose 134 (H) 70 - 99 mg/dL    Urea Nitrogen 14 7 - 30 mg/dL    Creatinine 0.59 0.52 - 1.04 mg/dL    GFR Estimate 79 >60 mL/min/[1.73_m2]    GFR Estimate If Black >90 >60 mL/min/[1.73_m2]    Calcium 8.8 8.5 - 10.1 mg/dL   CBC with platelets differential    Collection Time: 03/23/20  1:02 AM   Result Value Ref Range    WBC 11.5 (H) 4.0 - 11.0 10e9/L    RBC Count 3.85 3.8 - 5.2 10e12/L    Hemoglobin 11.0 (L) 11.7 - 15.7 g/dL    Hematocrit 33.8 (L) 35.0 - 47.0 %    MCV 88 78 - 100 fl    MCH 28.6 26.5 - 33.0 pg    MCHC 32.5 31.5 - 36.5 g/dL    RDW 14.6 10.0 - 15.0 %    Platelet Count 263 150 - 450 10e9/L    Diff Method Automated Method     % Neutrophils 71.9 %    % Lymphocytes 12.8 %    % Monocytes 13.8 %    % Eosinophils 0.5 %    % Basophils 0.3 %    % Immature Granulocytes 0.7 %    Nucleated RBCs 0 0 /100    Absolute Neutrophil 8.3 1.6 - 8.3 10e9/L    Absolute Lymphocytes 1.5 0.8 - 5.3 10e9/L    Absolute Monocytes 1.6 (H) 0.0 - 1.3 10e9/L    Absolute Basophils 0.0 0.0 - 0.2 10e9/L    Abs Immature Granulocytes 0.1 0 - 0.4 10e9/L    Absolute Nucleated RBC 0.0    Basic metabolic panel    Collection Time: 03/23/20  1:02 AM   Result Value Ref Range    Sodium 128 (L) 133 - 144 mmol/L    Potassium 4.6 3.4 - 5.3 mmol/L    Chloride 91 (L) 94 - 109 mmol/L    Carbon Dioxide 31 20 - 32 mmol/L    Anion Gap 6 3 - 14 mmol/L    Glucose 103 (H) 70 - 99 mg/dL    Urea Nitrogen 18 7 - 30 mg/dL    Creatinine 0.82 0.52 - 1.04 mg/dL    GFR Estimate 61 >60 mL/min/[1.73_m2]    GFR Estimate If Black 71 >60 mL/min/[1.73_m2]    Calcium 9.2 8.5 - 10.1 mg/dL   Nt probnp inpatient (BNP)    Collection Time: 03/23/20  1:02 AM   Result Value Ref Range     N-Terminal Pro BNP Inpatient 1,347 0 - 1,800 pg/mL   Troponin I    Collection Time: 03/23/20  1:02 AM   Result Value Ref Range    Troponin I ES 0.031 0.000 - 0.045 ug/L   Lactic acid level STAT    Collection Time: 03/23/20  8:38 AM   Result Value Ref Range    Lactate for Sepsis Protocol 1.0 0.7 - 2.0 mmol/L   UA with Microscopic    Collection Time: 03/23/20 12:35 PM   Result Value Ref Range    Color Urine Yellow     Appearance Urine Clear     Glucose Urine Negative NEG^Negative mg/dL    Bilirubin Urine Negative NEG^Negative    Ketones Urine Negative NEG^Negative mg/dL    Specific Gravity Urine 1.011 1.003 - 1.035    Blood Urine Negative NEG^Negative    pH Urine 5.0 5.0 - 7.0 pH    Protein Albumin Urine Negative NEG^Negative mg/dL    Urobilinogen mg/dL 0.0 0.0 - 2.0 mg/dL    Nitrite Urine Negative NEG^Negative    Leukocyte Esterase Urine Negative NEG^Negative    Source Unspecified Urine     WBC Urine 0 0 - 5 /HPF    RBC Urine 0 0 - 2 /HPF    Mucous Urine Present (A) NEG^Negative /LPF   Protein  random urine with Creat Ratio    Collection Time: 03/23/20 12:35 PM   Result Value Ref Range    Protein Random Urine 0.06 g/L    Protein Total Urine g/gr Creatinine 0.11 0 - 0.2 g/g Cr   Creatinine urine calculation only    Collection Time: 03/23/20 12:35 PM   Result Value Ref Range    Creatinine Urine 57 mg/dL   Basic metabolic panel    Collection Time: 03/23/20  6:03 PM   Result Value Ref Range    Sodium 128 (L) 133 - 144 mmol/L    Potassium 4.2 3.4 - 5.3 mmol/L    Chloride 92 (L) 94 - 109 mmol/L    Carbon Dioxide 30 20 - 32 mmol/L    Anion Gap 6 3 - 14 mmol/L    Glucose 145 (H) 70 - 99 mg/dL    Urea Nitrogen 17 7 - 30 mg/dL    Creatinine 0.79 0.52 - 1.04 mg/dL    GFR Estimate 65 >60 mL/min/[1.73_m2]    GFR Estimate If Black 75 >60 mL/min/[1.73_m2]    Calcium 8.5 8.5 - 10.1 mg/dL   Magnesium    Collection Time: 03/23/20  6:03 PM   Result Value Ref Range    Magnesium 1.8 1.6 - 2.3 mg/dL   Albumin level    Collection Time:  03/23/20  6:03 PM   Result Value Ref Range    Albumin 2.9 (L) 3.4 - 5.0 g/dL   Magnesium    Collection Time: 03/24/20  6:17 AM   Result Value Ref Range    Magnesium 2.2 1.6 - 2.3 mg/dL   Digoxin level    Collection Time: 03/24/20  6:17 AM   Result Value Ref Range    Digoxin Level 1.2 0.5 - 2.0 ug/L   Basic metabolic panel    Collection Time: 03/24/20  6:17 AM   Result Value Ref Range    Sodium 128 (L) 133 - 144 mmol/L    Potassium 3.8 3.4 - 5.3 mmol/L    Chloride 92 (L) 94 - 109 mmol/L    Carbon Dioxide 32 20 - 32 mmol/L    Anion Gap 4 3 - 14 mmol/L    Glucose 108 (H) 70 - 99 mg/dL    Urea Nitrogen 18 7 - 30 mg/dL    Creatinine 0.77 0.52 - 1.04 mg/dL    GFR Estimate 66 >60 mL/min/[1.73_m2]    GFR Estimate If Black 77 >60 mL/min/[1.73_m2]    Calcium 8.5 8.5 - 10.1 mg/dL   TSH    Collection Time: 03/24/20  6:17 AM   Result Value Ref Range    TSH 4.35 (H) 0.40 - 4.00 mU/L   T4 free    Collection Time: 03/24/20  6:17 AM   Result Value Ref Range    T4 Free 1.17 0.76 - 1.46 ng/dL        Physical Exam   Temp: 98  F (36.7  C) Temp src: Oral BP: 112/52 Pulse: 95 Heart Rate: 74 Resp: 18 SpO2: 96 % O2 Device: Nasal cannula Oxygen Delivery: 1/2 LPM  Vitals:    03/23/20 0418 03/24/20 0433   Weight: 95.5 kg (210 lb 8.6 oz) 95.3 kg (210 lb 1.6 oz)     Vital Signs with Ranges  Temp:  [97  F (36.1  C)-98  F (36.7  C)] 98  F (36.7  C)  Pulse:  [74-95] 95  Heart Rate:  [74-93] 74  Resp:  [18-24] 18  BP: ()/(47-76) 112/52  SpO2:  [90 %-98 %] 96 %  I/O last 3 completed shifts:  In: 1143 [P.O.:1040; I.V.:103]  Out: 2350 [Urine:2350]    Constitutional: Alert, oriented, moderate distress.   Respiratory: Clear to auscultation bilaterally, no wheezing noted.   Cardiovascular: irregular irregular rate of 70s  GI: supple, non tender non distended, normal bowel sound  Skin/Integumen: no rash or bruise, edema noted on the bilateral lower extremity   Other:      Medications     - MEDICATION INSTRUCTIONS -         ALPRAZolam  0.25 mg Oral  TID     aspirin  81 mg Oral Daily     citalopram  20 mg Oral Daily     dicyclomine  20 mg Oral TID AC     digoxin  125 mcg Oral Daily     enoxaparin ANTICOAGULANT  40 mg Subcutaneous Q24H     furosemide  40 mg Intravenous Q6H     ipratropium  0.5 mg Nebulization Q4H     metoprolol tartrate  25 mg Oral BID     potassium chloride  10 mEq Oral BID     simvastatin  20 mg Oral At Bedtime     sodium chloride (PF)  3 mL Intracatheter Q8H       Recent Results (from the past 24 hour(s))   XR Ankle Right G/E 3 Views    Narrative    RIGHT ANKLE THREE VIEWS March 23, 2020 1:29 PM    HISTORY: Fracture reduction.    COMPARISON: Radiographs earlier today at 3:51 AM.      Impression    IMPRESSION: There has been interval closed reduction of the previously  seen mildly displaced fractures of the medial and lateral malleoli.  There has been improvement in position and alignment although fine  bony detail is obscured by overlying cast material. No other change or  abnormality is seen.     ARCHANA BARKSDALE MD

## 2020-03-24 NOTE — PROGRESS NOTES
Garor spoke with patient's son, Gaurav, by phone.  Gaurav has decided to take the LTC bed at [facility] (Main Phone: [phone] Admissions Phone: [phone] Fax: [phone]).  Gaurav has talked with [place], at [facility], and [facility]  Patient will receive rehab on the LTC unit.  Patient will need van transport at discharge.   talked with Lopez at [facility], to update on LTC.  Lopez will plan to admit patient to LTC with therapies when patient is medically stable.    CHARMAINE Ashby  [facility] [phone]/ [facility] [phone]

## 2020-03-25 NOTE — PLAN OF CARE
"Vitally stable. Intermittent confusion. O2 94% on 1L nasal canula. Telemetry shows a. Fib. CMS intact. Moderate edema in bilateral lower legs, ankles and feet. Purewick in place with 250 ml of urine output.     /51 (BP Location: Right arm)   Pulse 76   Temp 97.4  F (36.3  C) (Oral)   Resp 20   Ht 1.6 m (5' 3\")   Wt 95.3 kg (210 lb 1.6 oz)   SpO2 94%   BMI 37.22 kg/m      "

## 2020-03-25 NOTE — PROGRESS NOTES
Pt yelling out and moaning, pt stated she wants her rocking chairs and her bedspread, pt stated she knows she is in a different room and needs to get back in her other room, pt anxious and states she has some pain, repositioned and gave IV prn morphine 2mg.

## 2020-03-25 NOTE — PROGRESS NOTES
S- Respiratory Care Consultation    Linn Sanon    Age: 89 year old      Date of Admission:  3/23/2020    Reason for consult:  Afib, Hypoxia               Level of consult: Consult, follow and place orders           Chief complaint:   Assessment:   Atelectasis   , Hypoxia, crackles, wheezing           History of Present Illness:   This patient with a significant past medical history of Atrial fibrillation who presents with the following condition requiring a Respiratory Care Consultation:      Hypoxia History:  The last exacerbation occurred 1 month ago. Typical exacerbations consist of dyspnea and usually last 3 days.  Effective treatment for exacerbations in the past has included oxygen, diuresis.       Assessment:   Lungs:   Mild respiratory distress and good air exchange   RESPIRATORY:  positive for  dyspnea and wheezing  Oxygenation = O2 saturation 92% on room air    Does patient have home oxygen No  Home oxygen deliver device :none     Cough: none         MRI:  MRI Thoracic Spine with and without Contrast No results found for this or any previous visit.  X-Ray:  X-Ray Chest   Results for orders placed during the hospital encounter of 01/19/20   XR Chest 2 Views    Narrative CHEST TWO VIEWS     1/19/2020 3:27 PM     HISTORY: Cough.    COMPARISON: None available.      Impression IMPRESSION: PA and lateral views of the chest were obtained. Enlarged  cardiac silhouette. No focal pulmonary opacities. No significant  pleural effusion or pneumothorax.     EMANUEL GARCIA MD       ABG:  pH Arterial (no units)   Date Value   10/23/2015 7.0     No results found for: PO2  No results found for: PCO2  No results found for: HCO3    Pulmonary function testing:  No flowsheet data found.    Results: none                  Past Medical History:     Past Medical History:   Diagnosis Date     Closed fracture of navicular (scaphoid) bone of wrist     Wrist fracture     Generalized osteoarthrosis, unspecified site       Lump or mass in breast     Breast Lump or Mass     Measles without mention of complication     Measles     Mumps without mention of complication     Mumps     Pure hypercholesterolemia      Scarlet fever      Unspecified closed fracture of ankle     Ankle fracture     Varicella without mention of complication     Chickenpox             Past Surgical History:     Past Surgical History:   Procedure Laterality Date     C APPENDECTOMY  age 11     HC EXCISION BREAST LESION W XRAY MARKER, OPEN SINGLE  1995    right     HC REMV CATARACT EXTRACAP,INSERT LENS, W/O ECP  9/18/2008    Right eye     HC REMV CATARACT EXTRACAP,INSERT LENS, W/O ECP  10/16/2008    Left eye     LASER YAG CAPSULOTOMY Left 9/18/2014    Procedure: LASER YAG CAPSULOTOMY;  Surgeon: Rafi Ramos MD;  Location:  OR             Social History:     Social History     Socioeconomic History     Marital status:      Spouse name: Chris     Number of children: 1     Years of education: 12     Highest education level: Not on file   Occupational History     Occupation: Retired   Social Needs     Financial resource strain: Not on file     Food insecurity     Worry: Not on file     Inability: Not on file     Transportation needs     Medical: Not on file     Non-medical: Not on file   Tobacco Use     Smoking status: Never Smoker     Smokeless tobacco: Never Used   Substance and Sexual Activity     Alcohol use: No     Drug use: No     Sexual activity: Never   Lifestyle     Physical activity     Days per week: Not on file     Minutes per session: Not on file     Stress: Not on file   Relationships     Social connections     Talks on phone: Not on file     Gets together: Not on file     Attends Restorationism service: Not on file     Active member of club or organization: Not on file     Attends meetings of clubs or organizations: Not on file     Relationship status: Not on file     Intimate partner violence     Fear of current or ex partner: Not on file      Emotionally abused: Not on file     Physically abused: Not on file     Forced sexual activity: Not on file   Other Topics Concern      Service No     Blood Transfusions No     Caffeine Concern No     Occupational Exposure No     Hobby Hazards No     Sleep Concern No     Stress Concern No     Weight Concern No     Special Diet No     Back Care Yes     Comment: Chiropractor     Exercise No     Bike Helmet Not Asked     Comment: NA     Seat Belt Yes     Self-Exams Yes     Parent/sibling w/ CABG, MI or angioplasty before 65F 55M? No   Social History Narrative     Not on file     Exposures:  none         Family History:     Family History   Problem Relation Age of Onset     Diabetes Father      Cardiovascular Father      Respiratory Brother         asthma     Respiratory Sister         asthma     Arthritis Sister      Heart Disease Paternal Grandfather      Thyroid Disease Sister      Family history              Allergies:   This patient is allergic to is allergic to no known drug allergies.          Medications:     Current Facility-Administered Medications   Medication     acetaminophen (TYLENOL) tablet 650 mg     ALPRAZolam (XANAX) tablet 0.25 mg     aspirin EC tablet 81 mg     citalopram (celeXA) tablet 20 mg     Contraindications to both pharmacological and mechanical prophylaxis (must document contraindications for both in this order)     dicyclomine (BENTYL) capsule 20 mg     digoxin (LANOXIN) tablet 125 mcg     enoxaparin ANTICOAGULANT (LOVENOX) injection 40 mg     furosemide (LASIX) injection 40 mg     ipratropium (ATROVENT) 0.02 % neb solution 0.5 mg     levalbuterol (XOPENEX) neb solution 1.25 mg     lidocaine (LMX4) kit     lidocaine 1 % 0.1-1 mL     magnesium sulfate 2 g in water intermittent infusion     magnesium sulfate 4 g in 100 mL sterile water (premade)     melatonin tablet 1 mg     metoprolol tartrate (LOPRESSOR) tablet 25 mg     morphine (PF) injection 2-4 mg     naloxone (NARCAN) injection  0.1-0.4 mg     ondansetron (ZOFRAN-ODT) ODT tab 4 mg    Or     ondansetron (ZOFRAN) injection 4 mg     polyethylene glycol (MIRALAX) Packet 17 g     potassium chloride (KLOR-CON) Packet 20-40 mEq     potassium chloride 10 mEq in 100 mL intermittent infusion with 10 mg lidocaine     potassium chloride 10 mEq in 100 mL sterile water intermittent infusion (premix)     potassium chloride 20 mEq in 50 mL intermittent infusion     potassium chloride ER (KLOR-CON M) CR tablet 10 mEq     potassium chloride ER (KLOR-CON M) CR tablet 20-40 mEq     simvastatin (ZOCOR) tablet 20 mg     sodium chloride (PF) 0.9% PF flush 3 mL     sodium chloride (PF) 0.9% PF flush 3 mL                  Recommendations:   Recommendations:                       Oxygen                Result data:     Lab Results   Component Value Date    PH 7.0 10/23/2015    PH 7.0 08/27/2002             Kristina Riedel, RT R- RCP will follow

## 2020-03-25 NOTE — PLAN OF CARE
S-(situation): Physical Therapy evaluation orders acknolwedged    B-(background): Patient is a 89 year old female, status post right distal fibula fracture with disruption of mortise. Patient also demonstrates respiratory failure with hypoxia. Patient with a previous medical history of anxiety, TIA in 2017, HTN, aortic valve stenosis, spinal stenosis, hyperlipidemia, OA, AFib and pulmonary HTN.     A-(assessment): Patient is currently from [facility] and per chart review using wheelchair for mobility. She will discharge to a Long Term Care bed at [facility] once stable with operative follow up in two weeks. Per discussion with CHARMAINE Ashby, patient will require therapy assessments prior to discharge. Per rounding 3/25/20 patient demonstrates severe anasarca in acute to respiratory failure and is not appropriate for PT intervention at this time.     R-(recommendations): Hold assessment 3/25/20, assess if medically appropriate for intervention 3/26/20.    Thank you for your referral.  Matilde Caicedo, PT, DPT, ATC    [facility]  O: [phone]  E: [email or web address]

## 2020-03-25 NOTE — PROGRESS NOTES
[facility]    Hospitalist Progress Note      Assessment & Plan   89-year-old woman with history of valvular heart disease and severe pulmonary hypertension admitted early this morning after a fall sustaining bilateral malleolar right ankle fracture with signs of volume overload.  She appears to have severe anasarca and has had 30 to 40 pound weight gain since late February.  She also has atrial fibrillation with rapid ventricular response today which appears to be a new diagnosis and is probably due to her valvular disease including mitral stenosis with left atrial enlargement.  She may have acute heart failure associated with rapid atrial fibrillation although BNP was normal and previous echocardiogram did not demonstrate systolic dysfunction or definite diastolic dysfunction.  Alternatively, anasarca could be due to a combination of severe pulmonary hypertension and hypoalbuminemia.  She presented with acute hypoxic respiratory failure probably due to volume overload from pulmonary hypertension and/or heart failure.  Although she has moderate to severe aortic stenosis, it seems less likely that aortic stenosis is the primary cause for anasarca and heart failure.  Wheezing is probably due to atelectasis associate with pleural effusions and possibly pulmonary edema.  She is moderately hyponatremic at admission which appears to be new and is probably due to anasarca and severe pulmonary hypertension. Pt was put on aggressive diuresis with 40 mg iv lasix q6 hour starting 3/23/20, so far pt has been -3 liter fluid balance     Principal Problem:    Acute respiratory failure with hypoxia (H)    Assessment: seem to be combination of fluid overload and severe aortic stenosis. On lasix 40mg iv q6h So far negative 3 liter fluid balance    Plan: continue diuretic for now. With same lasix dose     Active Problems:   New onset atrial fibrillation (H) with RVR, suspect valvular a fib     Assessment: on telemetry. On digoxin. Seem to be rate controlled, on iv lasix.    Plan: continue digoxin for now pt will need anticoagulation but pt is high risk due to age. Most likely will need coumadin due to AVR issue.       Closed bimalleolar fracture of right ankle    Assessment: ortho consulted, currently put on NWB, possible to do surgery in 2 weeks     Plan: as per ortho. Plan to do surgery once leg edema improve       Aortic valve stenosis, moderate to severe (increase from mild in 2016)    Assessment: as above,     Plan: as above      History of TIA (transient ischemic attack) and stroke    Assessment: stable    Plan: monitor only       Bilateral pleural effusion    Assessment: on lasix 40mg q6h    Plan: continue the same      Severe pulmonary hypertension (H) per Echo Jan 2020    Assessment: on diuretic    Plan: continue diuretic for now      Anasarca    Assessment: due to fluid overload,     Plan: continue diuretic as above      Wheezing    Assessment: on ipratropium, avoid albuterol due to afib rvr    Plan: continue ipratorpum, will add xopenex prn       Hyponatremia-admit Na 128    Assessment: stable, today is 132    Plan: monitor only      Essential hypertension with goal blood pressure less than 140/90    Assessment: stable, on metoprolol.     Plan: continue current regiment      # Pain Assessment:  Current Pain Score 3/25/2020   Patient currently in pain? denies   Pain score (0-10) -   Pain descriptors -   - Linn is experiencing pain due to ankle fracture . Pain management was discussed and the plan was created in a collaborative fashion.  Linn's response to the current recommendations: engaged  - Opioid regimen: morphine prn  - Response to opioid medications: Reduction of symptoms   - Bowel regimen: miralax        DVT Prophylaxis: Enoxaparin (Lovenox) SQ  Code Status: DNR/DNI    Disposition: Expected discharge in 3-5 days days once pt medical condition stable.    Betty Cole  History   Pt still edematous. Negative fluid balance of 3.1 liter today. Still confused,     -Data reviewed today: I reviewed all new labs and imaging results over the last 24 hours. I personally reviewed no images or EKG's today.  Recent Results (from the past 168 hour(s))   Basic metabolic panel    Collection Time: 03/20/20  6:45 AM   Result Value Ref Range    Sodium 126 (L) 133 - 144 mmol/L    Potassium 4.1 3.4 - 5.3 mmol/L    Chloride 92 (L) 94 - 109 mmol/L    Carbon Dioxide 28 20 - 32 mmol/L    Anion Gap 6 3 - 14 mmol/L    Glucose 134 (H) 70 - 99 mg/dL    Urea Nitrogen 14 7 - 30 mg/dL    Creatinine 0.59 0.52 - 1.04 mg/dL    GFR Estimate 79 >60 mL/min/[1.73_m2]    GFR Estimate If Black >90 >60 mL/min/[1.73_m2]    Calcium 8.8 8.5 - 10.1 mg/dL   CBC with platelets differential    Collection Time: 03/23/20  1:02 AM   Result Value Ref Range    WBC 11.5 (H) 4.0 - 11.0 10e9/L    RBC Count 3.85 3.8 - 5.2 10e12/L    Hemoglobin 11.0 (L) 11.7 - 15.7 g/dL    Hematocrit 33.8 (L) 35.0 - 47.0 %    MCV 88 78 - 100 fl    MCH 28.6 26.5 - 33.0 pg    MCHC 32.5 31.5 - 36.5 g/dL    RDW 14.6 10.0 - 15.0 %    Platelet Count 263 150 - 450 10e9/L    Diff Method Automated Method     % Neutrophils 71.9 %    % Lymphocytes 12.8 %    % Monocytes 13.8 %    % Eosinophils 0.5 %    % Basophils 0.3 %    % Immature Granulocytes 0.7 %    Nucleated RBCs 0 0 /100    Absolute Neutrophil 8.3 1.6 - 8.3 10e9/L    Absolute Lymphocytes 1.5 0.8 - 5.3 10e9/L    Absolute Monocytes 1.6 (H) 0.0 - 1.3 10e9/L    Absolute Basophils 0.0 0.0 - 0.2 10e9/L    Abs Immature Granulocytes 0.1 0 - 0.4 10e9/L    Absolute Nucleated RBC 0.0    Basic metabolic panel    Collection Time: 03/23/20  1:02 AM   Result Value Ref Range    Sodium 128 (L) 133 - 144 mmol/L    Potassium 4.6 3.4 - 5.3 mmol/L    Chloride 91 (L) 94 - 109 mmol/L    Carbon Dioxide 31 20 - 32 mmol/L    Anion Gap 6 3 - 14 mmol/L    Glucose 103 (H) 70 - 99 mg/dL    Urea Nitrogen 18 7 - 30 mg/dL    Creatinine 0.82  0.52 - 1.04 mg/dL    GFR Estimate 61 >60 mL/min/[1.73_m2]    GFR Estimate If Black 71 >60 mL/min/[1.73_m2]    Calcium 9.2 8.5 - 10.1 mg/dL   Nt probnp inpatient (BNP)    Collection Time: 03/23/20  1:02 AM   Result Value Ref Range    N-Terminal Pro BNP Inpatient 1,347 0 - 1,800 pg/mL   Troponin I    Collection Time: 03/23/20  1:02 AM   Result Value Ref Range    Troponin I ES 0.031 0.000 - 0.045 ug/L   Lactic acid level STAT    Collection Time: 03/23/20  8:38 AM   Result Value Ref Range    Lactate for Sepsis Protocol 1.0 0.7 - 2.0 mmol/L   UA with Microscopic    Collection Time: 03/23/20 12:35 PM   Result Value Ref Range    Color Urine Yellow     Appearance Urine Clear     Glucose Urine Negative NEG^Negative mg/dL    Bilirubin Urine Negative NEG^Negative    Ketones Urine Negative NEG^Negative mg/dL    Specific Gravity Urine 1.011 1.003 - 1.035    Blood Urine Negative NEG^Negative    pH Urine 5.0 5.0 - 7.0 pH    Protein Albumin Urine Negative NEG^Negative mg/dL    Urobilinogen mg/dL 0.0 0.0 - 2.0 mg/dL    Nitrite Urine Negative NEG^Negative    Leukocyte Esterase Urine Negative NEG^Negative    Source Unspecified Urine     WBC Urine 0 0 - 5 /HPF    RBC Urine 0 0 - 2 /HPF    Mucous Urine Present (A) NEG^Negative /LPF   Protein  random urine with Creat Ratio    Collection Time: 03/23/20 12:35 PM   Result Value Ref Range    Protein Random Urine 0.06 g/L    Protein Total Urine g/gr Creatinine 0.11 0 - 0.2 g/g Cr   Creatinine urine calculation only    Collection Time: 03/23/20 12:35 PM   Result Value Ref Range    Creatinine Urine 57 mg/dL   Basic metabolic panel    Collection Time: 03/23/20  6:03 PM   Result Value Ref Range    Sodium 128 (L) 133 - 144 mmol/L    Potassium 4.2 3.4 - 5.3 mmol/L    Chloride 92 (L) 94 - 109 mmol/L    Carbon Dioxide 30 20 - 32 mmol/L    Anion Gap 6 3 - 14 mmol/L    Glucose 145 (H) 70 - 99 mg/dL    Urea Nitrogen 17 7 - 30 mg/dL    Creatinine 0.79 0.52 - 1.04 mg/dL    GFR Estimate 65 >60  mL/min/[1.73_m2]    GFR Estimate If Black 75 >60 mL/min/[1.73_m2]    Calcium 8.5 8.5 - 10.1 mg/dL   Magnesium    Collection Time: 03/23/20  6:03 PM   Result Value Ref Range    Magnesium 1.8 1.6 - 2.3 mg/dL   Albumin level    Collection Time: 03/23/20  6:03 PM   Result Value Ref Range    Albumin 2.9 (L) 3.4 - 5.0 g/dL   Magnesium    Collection Time: 03/24/20  6:17 AM   Result Value Ref Range    Magnesium 2.2 1.6 - 2.3 mg/dL   Digoxin level    Collection Time: 03/24/20  6:17 AM   Result Value Ref Range    Digoxin Level 1.2 0.5 - 2.0 ug/L   Basic metabolic panel    Collection Time: 03/24/20  6:17 AM   Result Value Ref Range    Sodium 128 (L) 133 - 144 mmol/L    Potassium 3.8 3.4 - 5.3 mmol/L    Chloride 92 (L) 94 - 109 mmol/L    Carbon Dioxide 32 20 - 32 mmol/L    Anion Gap 4 3 - 14 mmol/L    Glucose 108 (H) 70 - 99 mg/dL    Urea Nitrogen 18 7 - 30 mg/dL    Creatinine 0.77 0.52 - 1.04 mg/dL    GFR Estimate 66 >60 mL/min/[1.73_m2]    GFR Estimate If Black 77 >60 mL/min/[1.73_m2]    Calcium 8.5 8.5 - 10.1 mg/dL   TSH    Collection Time: 03/24/20  6:17 AM   Result Value Ref Range    TSH 4.35 (H) 0.40 - 4.00 mU/L   T4 free    Collection Time: 03/24/20  6:17 AM   Result Value Ref Range    T4 Free 1.17 0.76 - 1.46 ng/dL   CBC with platelets    Collection Time: 03/25/20  6:14 AM   Result Value Ref Range    WBC 8.9 4.0 - 11.0 10e9/L    RBC Count 3.29 (L) 3.8 - 5.2 10e12/L    Hemoglobin 9.2 (L) 11.7 - 15.7 g/dL    Hematocrit 28.6 (L) 35.0 - 47.0 %    MCV 87 78 - 100 fl    MCH 28.0 26.5 - 33.0 pg    MCHC 32.2 31.5 - 36.5 g/dL    RDW 14.6 10.0 - 15.0 %    Platelet Count 235 150 - 450 10e9/L   Basic metabolic panel    Collection Time: 03/25/20  6:14 AM   Result Value Ref Range    Sodium 132 (L) 133 - 144 mmol/L    Potassium 3.7 3.4 - 5.3 mmol/L    Chloride 94 94 - 109 mmol/L    Carbon Dioxide 34 (H) 20 - 32 mmol/L    Anion Gap 4 3 - 14 mmol/L    Glucose 90 70 - 99 mg/dL    Urea Nitrogen 19 7 - 30 mg/dL    Creatinine 0.77 0.52 -  1.04 mg/dL    GFR Estimate 66 >60 mL/min/[1.73_m2]    GFR Estimate If Black 77 >60 mL/min/[1.73_m2]    Calcium 8.6 8.5 - 10.1 mg/dL        Physical Exam   Temp: 97  F (36.1  C) Temp src: Oral BP: (!) 140/57 Pulse: 83 Heart Rate: 76 Resp: 16 SpO2: 93 % O2 Device: None (Room air) Oxygen Delivery: 1 LPM  Vitals:    03/23/20 0418 03/24/20 0433 03/25/20 0300   Weight: 95.5 kg (210 lb 8.6 oz) 95.3 kg (210 lb 1.6 oz) 93.6 kg (206 lb 5.6 oz)     Vital Signs with Ranges  Temp:  [97  F (36.1  C)-97.9  F (36.6  C)] 97  F (36.1  C)  Pulse:  [76-87] 83  Heart Rate:  [76] 76  Resp:  [16-20] 16  BP: (125-140)/(51-63) 140/57  SpO2:  [93 %-97 %] 93 %  I/O last 3 completed shifts:  In: 480 [P.O.:480]  Out: 2315 [Urine:2315]    Constitutional: Awake but confused, moderate distress.   Respiratory: Clear to auscultation bilaterally, no wheezing noted.   Cardiovascular: irregular irregular rate of 70s  GI: supple, non tender non distended, normal bowel sound  Skin/Integumen: no rash or bruise, edema noted on the bilateral lower extremity   Other:      Medications     - MEDICATION INSTRUCTIONS -         ALPRAZolam  0.25 mg Oral TID     aspirin  81 mg Oral Daily     citalopram  20 mg Oral Daily     dicyclomine  20 mg Oral TID AC     digoxin  125 mcg Oral Daily     enoxaparin ANTICOAGULANT  40 mg Subcutaneous Q24H     furosemide  40 mg Intravenous Q6H     ipratropium  0.5 mg Nebulization Q4H     metoprolol tartrate  25 mg Oral BID     potassium chloride  10 mEq Oral BID     simvastatin  20 mg Oral At Bedtime     sodium chloride (PF)  3 mL Intracatheter Q8H       No results found for this or any previous visit (from the past 24 hour(s)).

## 2020-03-25 NOTE — PLAN OF CARE
Vital signs stable.  Afebrile. Lung sounds expiratory wheezes.  Had slightly harder time breathing at the beginning of shift.  Charge nurse updated.   On  1L O2 per NC.  A&O x1 disoriented to place, time and situation.  Pt denies pain. Bowel sounds normoactive. Has been repositioned overnight. Voiding adequately with purewick in place.  Has had 1065 of output.  IV site asymptomatic.  Able to make needs known and uses call light appropriately. Continue to monitor per care plan.

## 2020-03-25 NOTE — PLAN OF CARE
Pt alert/confused, pure wick changed and in place, 500cc UO, good appetite, LS clear/diminished, removed nasal cannula around 1300, pt on room air, oxygen sats 92%, replaced potassium this am, recheck tomorrow am, K+ 3.7, right leg/foot splint in place, good CMS, plan to discharge to [facility] when ready.

## 2020-03-26 NOTE — PROGRESS NOTES
Plan remains for patient to admit to [facility] (Main Phone: [phone] Admissions Phone: [phone] Fax: [phone]). Possible discharge Saturday/Sunday.  Patient will need [employer] transport arranged at discharge.  Left voicemail for patient's son, Jesenia Stallworth, [facility] [phone]/ [facility] [phone]    1430 - Writer talked with Lopez, at [facility], regarding patient possibly being ready for discharge over the weekend.  Lopez stated they are NOT able to accommodate a weekend admission due to staffing issues with COVID-19.

## 2020-03-26 NOTE — PROGRESS NOTES
Patient's son, Gaurav, called for update.  Spoke with son about patient's vitals (BP, wt, etc) and approximate length of hospital stay per provider note.  Reassured family, and reinforced that family would be called if there were any changes.  Son appreciative and in agreement with plan.
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
## 2020-03-26 NOTE — PLAN OF CARE
Vital signs stable.  Afebrile. Lung sounds diminished.  Is on RA and has maintained O2 at 92%.  A&O x1 disoriented to place, time and situation.  Pt complaining of abdominal pain.  Tender when pressing on upper abdomen.  Has not had a bowel movement since 3/23/20.  Did give PRN Miralax and tylenol. Tele A-Fib. Bowel active. Voiding adequately with purewick in place. Has had 850 out this shift.  IV site asymptomatic.  Continue to monitor per care plan.

## 2020-03-26 NOTE — PLAN OF CARE
Major Shift Events:  No acute events this shift.   Patient continues to be forgetful. Up to chair this am with PT using mechanical lift. Remains on room air with breaths even and unlabored. Had BM X 2. Purewick changed x 2.     Plan: Continue POC.   For vital signs and complete assessments, please see documentation flowsheets.     Scar Ritter RN

## 2020-03-26 NOTE — PROGRESS NOTES
[facility]    Hospitalist Progress Note      Assessment & Plan   89-year-old woman with history of valvular heart disease and severe pulmonary hypertension admitted early this morning after a fall sustaining bilateral malleolar right ankle fracture with signs of volume overload.  She appears to have severe anasarca and has had 30 to 40 pound weight gain since late February.  She also has atrial fibrillation with rapid ventricular response today which appears to be a new diagnosis and is probably due to her valvular disease including mitral stenosis with left atrial enlargement.  She may have acute heart failure associated with rapid atrial fibrillation although BNP was normal and previous echocardiogram did not demonstrate systolic dysfunction or definite diastolic dysfunction.  Alternatively, anasarca could be due to a combination of severe pulmonary hypertension and hypoalbuminemia.  She presented with acute hypoxic respiratory failure probably due to volume overload from pulmonary hypertension and/or heart failure.  Although she has moderate to severe aortic stenosis, it seems less likely that aortic stenosis is the primary cause for anasarca and heart failure.  Wheezing is probably due to atelectasis associate with pleural effusions and possibly pulmonary edema.  She is moderately hyponatremic at admission which appears to be new and is probably due to anasarca and severe pulmonary hypertension. Pt was put on aggressive diuresis with 40 mg iv lasix q6 hour starting 3/23/20, so far pt has lost about 7 pound from admit.      Principal Problem:    Acute respiratory failure with hypoxia (H)    Assessment: seem to be combination of fluid overload and severe aortic stenosis. On lasix 40mg iv q6h So far negative 3 liter fluid balance    Plan: continue diuretic for now. With same lasix dose     Active Problems:   New onset atrial fibrillation (H) with RVR, suspect valvular a fib     Assessment: on telemetry. On digoxin. Seem to be rate controlled, on iv lasix.    Plan: continue digoxin for now pt will need anticoagulation but pt is high risk due to age. Most likely will need coumadin due to AVR issue. Will consider later once surgery is decided       Closed bimalleolar fracture of right ankle    Assessment: ortho consulted, currently put on NWB, possible to do surgery once leg is less swolled    Plan: as per ortho. Plan to do surgery once leg edema improve       Aortic valve stenosis, moderate to severe (increase from mild in 2016)    Assessment: as above,     Plan: as above      History of TIA (transient ischemic attack) and stroke    Assessment: stable    Plan: monitor only       Bilateral pleural effusion    Assessment: on lasix 40mg q6h    Plan: continue the same      Severe pulmonary hypertension (H) per Echo Jan 2020    Assessment: on diuretic    Plan: continue diuretic for now      Anasarca    Assessment: due to fluid overload,     Plan: continue diuretic as above      Wheezing    Assessment: on ipratropium, avoid albuterol due to afib rvr    Plan: continue ipratorpum, will add xopenex prn       Hyponatremia-admit Na 128    Assessment: stable, today is 129    Plan: monitor only      Essential hypertension with goal blood pressure less than 140/90    Assessment: stable, on metoprolol.     Plan: continue current regiment      # Pain Assessment:  Current Pain Score 3/26/2020   Patient currently in pain? yes   Pain score (0-10) -   Pain descriptors Aching   - Linn is experiencing pain due to ankle fracture . Pain management was discussed and the plan was created in a collaborative fashion.  Linn's response to the current recommendations: engaged  - Opioid regimen: morphine prn  - Response to opioid medications: Reduction of symptoms   - Bowel regimen: miralax        DVT Prophylaxis: Enoxaparin (Lovenox) SQ  Code Status: DNR/DNI    Disposition: Expected discharge in 3-5 days days once pt  medical condition stable.    [facility]. [facility]    Interval History   Pt still edematous. Negative fluid balance of 3.1 liter today. Still confused,     -Data reviewed today: I reviewed all new labs and imaging results over the last 24 hours. I personally reviewed no images or EKG's today.  Recent Results (from the past 168 hour(s))   Basic metabolic panel    Collection Time: 03/20/20  6:45 AM   Result Value Ref Range    Sodium 126 (L) 133 - 144 mmol/L    Potassium 4.1 3.4 - 5.3 mmol/L    Chloride 92 (L) 94 - 109 mmol/L    Carbon Dioxide 28 20 - 32 mmol/L    Anion Gap 6 3 - 14 mmol/L    Glucose 134 (H) 70 - 99 mg/dL    Urea Nitrogen 14 7 - 30 mg/dL    Creatinine 0.59 0.52 - 1.04 mg/dL    GFR Estimate 79 >60 mL/min/[1.73_m2]    GFR Estimate If Black >90 >60 mL/min/[1.73_m2]    Calcium 8.8 8.5 - 10.1 mg/dL   CBC with platelets differential    Collection Time: 03/23/20  1:02 AM   Result Value Ref Range    WBC 11.5 (H) 4.0 - 11.0 10e9/L    RBC Count 3.85 3.8 - 5.2 10e12/L    Hemoglobin 11.0 (L) 11.7 - 15.7 g/dL    Hematocrit 33.8 (L) 35.0 - 47.0 %    MCV 88 78 - 100 fl    MCH 28.6 26.5 - 33.0 pg    MCHC 32.5 31.5 - 36.5 g/dL    RDW 14.6 10.0 - 15.0 %    Platelet Count 263 150 - 450 10e9/L    Diff Method Automated Method     % Neutrophils 71.9 %    % Lymphocytes 12.8 %    % Monocytes 13.8 %    % Eosinophils 0.5 %    % Basophils 0.3 %    % Immature Granulocytes 0.7 %    Nucleated RBCs 0 0 /100    Absolute Neutrophil 8.3 1.6 - 8.3 10e9/L    Absolute Lymphocytes 1.5 0.8 - 5.3 10e9/L    Absolute Monocytes 1.6 (H) 0.0 - 1.3 10e9/L    Absolute Basophils 0.0 0.0 - 0.2 10e9/L    Abs Immature Granulocytes 0.1 0 - 0.4 10e9/L    Absolute Nucleated RBC 0.0    Basic metabolic panel    Collection Time: 03/23/20  1:02 AM   Result Value Ref Range    Sodium 128 (L) 133 - 144 mmol/L    Potassium 4.6 3.4 - 5.3 mmol/L    Chloride 91 (L) 94 - 109 mmol/L    Carbon Dioxide 31 20 - 32 mmol/L    Anion Gap 6 3 - 14 mmol/L    Glucose 103 (H) 70 -  99 mg/dL    Urea Nitrogen 18 7 - 30 mg/dL    Creatinine 0.82 0.52 - 1.04 mg/dL    GFR Estimate 61 >60 mL/min/[1.73_m2]    GFR Estimate If Black 71 >60 mL/min/[1.73_m2]    Calcium 9.2 8.5 - 10.1 mg/dL   Nt probnp inpatient (BNP)    Collection Time: 03/23/20  1:02 AM   Result Value Ref Range    N-Terminal Pro BNP Inpatient 1,347 0 - 1,800 pg/mL   Troponin I    Collection Time: 03/23/20  1:02 AM   Result Value Ref Range    Troponin I ES 0.031 0.000 - 0.045 ug/L   Lactic acid level STAT    Collection Time: 03/23/20  8:38 AM   Result Value Ref Range    Lactate for Sepsis Protocol 1.0 0.7 - 2.0 mmol/L   UA with Microscopic    Collection Time: 03/23/20 12:35 PM   Result Value Ref Range    Color Urine Yellow     Appearance Urine Clear     Glucose Urine Negative NEG^Negative mg/dL    Bilirubin Urine Negative NEG^Negative    Ketones Urine Negative NEG^Negative mg/dL    Specific Gravity Urine 1.011 1.003 - 1.035    Blood Urine Negative NEG^Negative    pH Urine 5.0 5.0 - 7.0 pH    Protein Albumin Urine Negative NEG^Negative mg/dL    Urobilinogen mg/dL 0.0 0.0 - 2.0 mg/dL    Nitrite Urine Negative NEG^Negative    Leukocyte Esterase Urine Negative NEG^Negative    Source Unspecified Urine     WBC Urine 0 0 - 5 /HPF    RBC Urine 0 0 - 2 /HPF    Mucous Urine Present (A) NEG^Negative /LPF   Protein  random urine with Creat Ratio    Collection Time: 03/23/20 12:35 PM   Result Value Ref Range    Protein Random Urine 0.06 g/L    Protein Total Urine g/gr Creatinine 0.11 0 - 0.2 g/g Cr   Creatinine urine calculation only    Collection Time: 03/23/20 12:35 PM   Result Value Ref Range    Creatinine Urine 57 mg/dL   Basic metabolic panel    Collection Time: 03/23/20  6:03 PM   Result Value Ref Range    Sodium 128 (L) 133 - 144 mmol/L    Potassium 4.2 3.4 - 5.3 mmol/L    Chloride 92 (L) 94 - 109 mmol/L    Carbon Dioxide 30 20 - 32 mmol/L    Anion Gap 6 3 - 14 mmol/L    Glucose 145 (H) 70 - 99 mg/dL    Urea Nitrogen 17 7 - 30 mg/dL     Creatinine 0.79 0.52 - 1.04 mg/dL    GFR Estimate 65 >60 mL/min/[1.73_m2]    GFR Estimate If Black 75 >60 mL/min/[1.73_m2]    Calcium 8.5 8.5 - 10.1 mg/dL   Magnesium    Collection Time: 03/23/20  6:03 PM   Result Value Ref Range    Magnesium 1.8 1.6 - 2.3 mg/dL   Albumin level    Collection Time: 03/23/20  6:03 PM   Result Value Ref Range    Albumin 2.9 (L) 3.4 - 5.0 g/dL   Magnesium    Collection Time: 03/24/20  6:17 AM   Result Value Ref Range    Magnesium 2.2 1.6 - 2.3 mg/dL   Digoxin level    Collection Time: 03/24/20  6:17 AM   Result Value Ref Range    Digoxin Level 1.2 0.5 - 2.0 ug/L   Basic metabolic panel    Collection Time: 03/24/20  6:17 AM   Result Value Ref Range    Sodium 128 (L) 133 - 144 mmol/L    Potassium 3.8 3.4 - 5.3 mmol/L    Chloride 92 (L) 94 - 109 mmol/L    Carbon Dioxide 32 20 - 32 mmol/L    Anion Gap 4 3 - 14 mmol/L    Glucose 108 (H) 70 - 99 mg/dL    Urea Nitrogen 18 7 - 30 mg/dL    Creatinine 0.77 0.52 - 1.04 mg/dL    GFR Estimate 66 >60 mL/min/[1.73_m2]    GFR Estimate If Black 77 >60 mL/min/[1.73_m2]    Calcium 8.5 8.5 - 10.1 mg/dL   TSH    Collection Time: 03/24/20  6:17 AM   Result Value Ref Range    TSH 4.35 (H) 0.40 - 4.00 mU/L   T4 free    Collection Time: 03/24/20  6:17 AM   Result Value Ref Range    T4 Free 1.17 0.76 - 1.46 ng/dL   CBC with platelets    Collection Time: 03/25/20  6:14 AM   Result Value Ref Range    WBC 8.9 4.0 - 11.0 10e9/L    RBC Count 3.29 (L) 3.8 - 5.2 10e12/L    Hemoglobin 9.2 (L) 11.7 - 15.7 g/dL    Hematocrit 28.6 (L) 35.0 - 47.0 %    MCV 87 78 - 100 fl    MCH 28.0 26.5 - 33.0 pg    MCHC 32.2 31.5 - 36.5 g/dL    RDW 14.6 10.0 - 15.0 %    Platelet Count 235 150 - 450 10e9/L   Basic metabolic panel    Collection Time: 03/25/20  6:14 AM   Result Value Ref Range    Sodium 132 (L) 133 - 144 mmol/L    Potassium 3.7 3.4 - 5.3 mmol/L    Chloride 94 94 - 109 mmol/L    Carbon Dioxide 34 (H) 20 - 32 mmol/L    Anion Gap 4 3 - 14 mmol/L    Glucose 90 70 - 99 mg/dL     Urea Nitrogen 19 7 - 30 mg/dL    Creatinine 0.77 0.52 - 1.04 mg/dL    GFR Estimate 66 >60 mL/min/[1.73_m2]    GFR Estimate If Black 77 >60 mL/min/[1.73_m2]    Calcium 8.6 8.5 - 10.1 mg/dL   CBC with platelets    Collection Time: 03/26/20  6:46 AM   Result Value Ref Range    WBC 7.7 4.0 - 11.0 10e9/L    RBC Count 3.15 (L) 3.8 - 5.2 10e12/L    Hemoglobin 8.9 (L) 11.7 - 15.7 g/dL    Hematocrit 27.3 (L) 35.0 - 47.0 %    MCV 87 78 - 100 fl    MCH 28.3 26.5 - 33.0 pg    MCHC 32.6 31.5 - 36.5 g/dL    RDW 14.7 10.0 - 15.0 %    Platelet Count 255 150 - 450 10e9/L   Basic metabolic panel    Collection Time: 03/26/20  6:46 AM   Result Value Ref Range    Sodium 129 (L) 133 - 144 mmol/L    Potassium 3.8 3.4 - 5.3 mmol/L    Chloride 91 (L) 94 - 109 mmol/L    Carbon Dioxide 34 (H) 20 - 32 mmol/L    Anion Gap 4 3 - 14 mmol/L    Glucose 103 (H) 70 - 99 mg/dL    Urea Nitrogen 20 7 - 30 mg/dL    Creatinine 0.75 0.52 - 1.04 mg/dL    GFR Estimate 69 >60 mL/min/[1.73_m2]    GFR Estimate If Black 80 >60 mL/min/[1.73_m2]    Calcium 8.3 (L) 8.5 - 10.1 mg/dL        Physical Exam   Temp: 95.6  F (35.3  C) Temp src: Axillary BP: 118/57 Pulse: 74 Heart Rate: 87 Resp: 20 SpO2: 94 % O2 Device: None (Room air) Oxygen Delivery: 1 LPM(pt on 1 lpm pre neb)  Vitals:    03/24/20 0433 03/25/20 0300 03/26/20 0312   Weight: 95.3 kg (210 lb 1.6 oz) 93.6 kg (206 lb 5.6 oz) 92.4 kg (203 lb 11.3 oz)     Vital Signs with Ranges  Temp:  [95.6  F (35.3  C)-97.4  F (36.3  C)] 95.6  F (35.3  C)  Pulse:  [65-81] 74  Heart Rate:  [74-87] 87  Resp:  [18-20] 20  BP: (104-127)/(47-58) 118/57  SpO2:  [92 %-97 %] 94 %  I/O last 3 completed shifts:  In: 700 [P.O.:700]  Out: 1500 [Urine:1500]    Constitutional: Awake but confused, moderate distress.   Respiratory: Clear to auscultation bilaterally, no wheezing noted.   Cardiovascular: irregular irregular rate of 70s  GI: supple, non tender non distended, normal bowel sound  Skin/Integumen: no rash or bruise, edema noted on  the bilateral lower extremity slightly improve from yesterday  Other:      Medications     - MEDICATION INSTRUCTIONS -         ALPRAZolam  0.25 mg Oral TID     aspirin  81 mg Oral Daily     citalopram  20 mg Oral Daily     dicyclomine  20 mg Oral TID AC     digoxin  125 mcg Oral Daily     enoxaparin ANTICOAGULANT  40 mg Subcutaneous Q24H     furosemide  40 mg Intravenous Q6H     ipratropium  0.5 mg Nebulization Q4H     metoprolol tartrate  25 mg Oral BID     potassium chloride  10 mEq Oral BID     simvastatin  20 mg Oral At Bedtime     sodium chloride (PF)  3 mL Intracatheter Q8H       No results found for this or any previous visit (from the past 24 hour(s)).
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
## 2020-03-26 NOTE — PROGRESS NOTES
S- Respiratory Therapy  B- Afib  A- Patient has been on room air through out the day SpO2 = 95%.  Breath sounds clear and diminished with fine crackles in lower lobes.  Occasional loose congested non productive cough.  R- RCP will follow

## 2020-03-27 PROBLEM — E87.70 VOLUME OVERLOAD: Status: ACTIVE | Noted: 2020-01-01

## 2020-03-27 NOTE — TELEPHONE ENCOUNTER
Writer returned call to patients nurse to triage symptoms. Waiting return call.       Angélica Dooley RN on 3/27/2020 at 3:00 PM

## 2020-03-27 NOTE — PROGRESS NOTES
Patient was briefly seen for splint check as she is been undergoing diuresing treatments.  Patient splint still appears to be in good condition and with an appropriate fit to provide stability.  We do not want to disrupt patient's current reduction.  Will continue with the current splint with re-evaluation in the splint by x-ray in approximately 2 weeks.  As long as ankle has remained stable, we would continue conservative treatment.

## 2020-03-27 NOTE — PLAN OF CARE
Major Shift Events: Continues with intermittent confusion.   Neuro: Alert and oriented X 4 with intermittent confusion and forgetfulness.   Pulmonary: LS clear, diminished in bases on room air.   CV: Remains in a-fib on monitor.  GI/: Purewick in place.   Skin: Moisture related breakdown d/t incontinence.   IV: L AC piv.   Gtts; Lasix 5ml/hr initiated.     Plan: continue POC.   For vital signs and complete assessments, please see documentation flowsheets.     Scar Ritter RN

## 2020-03-27 NOTE — PROGRESS NOTES
"Did obtain results of echocardiogram at this time and it shows new right systolic heart dysfunction in addition to worsening tricuspid regurgitation in addition to ongoing severe aortic stenosis with concern for \"low output, severe aortic stenosis\" despite low AV gradients.  Patient has diuresed much better since lasix drip was initated - 1,100 out  Since 0700 and blood pressures are tolerating it very well.      Given changes in the echo, did discuss case with Dr. Barger, cardiologist on call for [facility] and reviewed current management plan.  He feels ongoing lasix drip is appropriate with close monitoring of blood pressure to ensure hemodynamic stability.  He also agrees with outpatient cardiology follow-up in the near future to further evaluate and discuss options going forward.  At this time he does not feel transfer is needed has lungs patient is able to tolerate diuresis without hemodynamic instability.    Electronically Signed:  Freya Sanon MD    "

## 2020-03-27 NOTE — TELEPHONE ENCOUNTER
Reason for Call:  Other call back    Detailed comments: R ankle/ Med surg said that Rashawn wanted to see her not [facility]. Do you want to see her? tel enc?  Going to [facility]     Phone Number Patient can be reached at: Contact [facility] at [phone]    Best Time: Na    Can we leave a detailed message on this number? YES    Call taken on 3/27/2020 at 2:22 PM by Alisha Mendoza

## 2020-03-27 NOTE — DISCHARGE INSTRUCTIONS
Ortho Team will be calling to set up an appointment to re-evaluate surgery.    If you do not hear from them   please call the clinic.  [phone]

## 2020-03-27 NOTE — PROGRESS NOTES
Addendum:  PAVEL received return phone call from pt's son, Gaurav, who informed this writer that he knows where the navy & white bedspread is located & ensured this writer that the spread will be at [facility] when pt arrives.  Gaurav asked this writer to explain that to the pt that he will bring the bedspread to [facility].  PAVEL spoke with pt to inform of the bedspread & together, we called Gaurav from the pt's room.    Gaurav requests a follow up call on Sunday morning to discuss discharge plan of care.    CHARMAINE Flaherty at March 27, 2020  2:52 PM    Reason for Follow up: Discharge planning.  Per IDT rounds & conversation with MD, pt may not be medically stable to discharge for 3-5 days.    Pt has been accepted to [facility] (Main Phone: [phone] Admissions Phone: [phone] Fax: [phone]) for LTC with therapies when medically stable.    Patient will need Handi-Van transport arranged at discharge.  Pt's son, Gaurav, requests to be informed of time of transport so that he can be present to see the pt boarding the handi-van.     Anticipated discharge needs: LTC with therapies when medically stable.    Next steps: CTS to follow    Pavel was asked by care team to assist pt in finding a blue/white bedspread.  PAVEL called Lopez at [facility] and he is unaware of pt leaving this at the facility.  PAVEL then placed call to pt's son, Chantal, left a message requesting a call back to discuss discharge plan of care & information about the bedspread.  Awaiting return call.    Discharge Planner   Discharge Plans in progress: LTC with therapies  Barriers to discharge plan: Medical stability  Follow up plan: CTS to follow       Entered by: Alix Chaudhry 03/27/2020 1:08 PM         CHARMAINE Flaherty  [facility] [phone]   [facility]  [phone]

## 2020-03-27 NOTE — PLAN OF CARE
VSS, afebrile.  Sats 92% on room air.  Lungs diminished t/o.  Pt is alert but confused on the time of the day.  Is aware she is in the hospital because she broke her ankle.  Bed alarm on for safety but patient does use call light to express her needs.  RLE has good CMS and pulse, elevated on ramp with ice pack applied.  Denies pain.  2+ edema.  Tele A fib.  Small BM.  Purewick in place and had 350 ml of urine prior to lasix dose.

## 2020-03-27 NOTE — PLAN OF CARE
Temp: 97.2  F (36.2  C) Temp src: Oral BP: 121/61 Pulse: 72 Heart Rate: 89 Resp: 20 SpO2: 93 % O2 Device: None (Room air)   Pt is confused, alert.  Disoriented to place, time and situation.  Reorientation provided.  Phone updated provided to pt's son, Gaurav.  Pt denies pain.  RLE has 2+ pitting edema in foot.  Leg is elevated with ramp, ice pack applied.  CMS: pale, dry, intact.  Purewick draining yellow urine, external catheter replaced this evening after pt incontinent of stool (soft, green).  Ally cares provided.  Continuing to monitor.

## 2020-03-27 NOTE — PLAN OF CARE
Discharge Planner PT   Patient plan for discharge: Home, patient confused regarding current situation  Current status: Verbal cues to scoot forward in chair, significant effort and many attempts. Mod assist x 2 with walker anterior sit to stand, patient moderately compliant with NWB, placing foot on ground with weight bearing primarily on left. Unable to stand erect with significant posterior weight shift, CGA assist x 2 to return to sitting. Completed bilat LE and UE AROM tasks with shortness of breath however patient demonstrates no alarm to this. Improved UE AROM given sitting posture and goor RLE tolerance to seated tasks.  Barriers to return to prior living situation: Medical status, ankle fracture with future surgery, level of assistance for cares  Recommendations for discharge: TCU with agency transport.   Rationale for recommendations: Patient remains pleasant and eager to participate in tasks. Patient would benefit from TCU placement to allow for safe disposition as well as skilled PT intervention to progress her towards optimal strength and mobility prior to her operative procedure for optimal outcomes. Future PT will address transfer training, strengthening and increased independence prior to discharge to facility.       Entered by: Matilde Caicedo 03/27/2020 3:43 PM     Thank you for your referral.  Matilde Caicedo, PT, DPT, ATC    [facility]  O: [phone]  E: [email or web address]

## 2020-03-27 NOTE — PROGRESS NOTES
[facility]    Medicine Progress Note - Hospitalist Service       Date of Admission:  3/23/2020  Assessment & Plan    Patient is a 89-year-old female with a known history of moderate to severe aortic stenosis and severe pulmonary hypertension who was admitted on 3/23/2020 after falling and sustaining a bilateral malleoli her right ankle fracture but on further evaluation was found to be in atrial fibrillation with RVR with a 37 pounds weight gain in the past 2 months with diffuse anasarca noted on exam as well as acute respiratory failure with hypoxia with a newly developed hyponatremia felt secondary to volume overload.  Orthopedic surgery evaluated the patient with appropriate splinting performed and it was recommended that surgery be delayed until volume overload could be further evaluation and addressed thereby allowing soft tissue rest and improving surgical outcomes, and patient has been seen by cardiology on an outpatient basis for surgical clearance.  Patient was hospitalized and has been undergoing diuresis with Lasix 40 mg every 6 hours and a 7 pound weight loss to date.  She has had some improvement in her respiratory symptoms and hyponatremia but continues to have severe anasarca and is 30 pounds above her previous dry weight.  Etiology for volume overload continues to be unclear and may be secondary to her atrial fibrillation with RVR  although an echocardiogram has not been performed during this stay to evaluate for systolic or diastolic dysfunction secondary to this new heart pattern.  Patient does also have known moderate to severe aortic stenosis as well as severe pulmonary hypertension and hypoalbuminemia which all may be playing a role in volume overload as well.    Principal Problem:    Volume overload    Assessment: Patient currently receiving bolus Lasix with 7 pound total weight loss but no weight loss in the past 24 hours.  Renal function continues to be normal  and blood pressures are tolerating diuresis without difficulty    Plan: We will transition patient to a Lasix drip starting at 5 mg/h but increasing as appropriate to achieve adequate net output and weight loss.  Continue to monitor renal function closely and will check VBG tomorrow to evaluate for any metabolic impairment that may occur with ongoing aggressive diuresis.  We will also perform limited echocardiogram to evaluate ejection fraction and aortic valve status which may be contributing to current clinical picture.    Active Problems:    Acute respiratory failure with hypoxia (H)    Assessment: Present at time of admission but is starting to resolve with ongoing diuresis with patient on room air at rest over the past 24 hours.  continues to have increased shortness of breath with even minimal activity with transient hypoxemia noted    Plan: Transition to Lasix drip as above and monitor closely for ongoing clinical improvement as diuresis occurs      New onset atrial fibrillation (H) with RVR, suspect valvular a fib    Assessment: Present at time of admission and possibly contributing to volume overload as outlined above    Plan: We will perform limited echocardiogram for assessment of ejection fraction following this new onset atrial fibrillation to obtain a better overall picture of current cardiac status.  Continue with digoxin and metoprolol and monitor for ongoing adequate heart rate control.  We will continue with Lovenox for anticoagulation at this time however if A. fib is persistent, patient would benefit from anticoagulation going forward following surgical intervention.      Aortic valve stenosis, moderate to severe (increase from mild in 2016)    Assessment: Noted to be a moderate to severe in January of this year, may be contributing to volume overload picture and increases patient's risk of hypotension with ongoing aggressive diuresis    Plan: We will reevaluate valve status and limited  echocardiogram performed today.  Transition to Lasix drip but will need to monitor blood pressure closely for tolerance going forward.      Closed bimalleolar fracture of right ankle    Assessment: Occurring prior to this hospitalization, patient is currently nonweightbearing and orthopedic surgery has been consulted and is hoping to do surgery in 1 to 2 weeks after the anasarca has improved and patient has received cardiology clearance for surgical intervention     Plan: Continue with current splint, nonweightbearing status.  Patient will be discharging to TCU at time of discharge with outpatient follow-up with cardiology and orthopedic surgery      Anasarca    Assessment: secondary to volume overload as above with patient continuing to have edema in bilateral legs, abdomen, and bilateral arm on exam today    Plan:  Transition to Lasix drip and monitor closely as outlined above      Hyponatremia-admit Na 128    Assessment: Suspected secondary to volume overload with mary of 126 during the stay and sodium has been slowly increasing with ongoing diuresis.  It is 131 this morning.      Plan: Continue with diuretic efforts as outlined above and monitor for ongoing resolution.      Essential hypertension with goal blood pressure less than 140/90    Assessment: Was controlled prior to admission with losartan which has been held during this hospitalization and patient has been initiated on metoprolol for A. fib as well as aggressive diuresis as above.  Blood pressures have been stable in the 1 10-1 30 range systolic over the past 24 hours continue with metoprolol twice daily dosing as well as    Plan: Increasing diuresis efforts as outlined above and monitor for blood pressure stability.  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    Assessment: Chronic and stable    Plan: Continue with diuresis efforts as above      Abnormal CXR-bilateral lower lung opacities, suspect atelectasis    Assessment: Noted ~initially but without concern for infection with patient afebrile and improving following diuresis as outlined above    Plan: Continue to monitor for ongoing adequate respiratory improvement      Wheezing    Assessment: Was present initially and thought likely secondary to volume overload status and atelectasis with patient improving on ipratropium as well as PRN Xopenex in addition to ongoing diuresis efforts    Plan: Continue with current plan of scheduled ipratropium, PRN Xopenex, and ongoing diuresis with transition to Lasix drip as outlined above      History of TIA (transient ischemic attack) and stroke    Assessment: Previous history, no concern for focal neurological deficit    Plan: Continue supportive care and monitor         Diet: Combination Diet Low Saturated Fat Na <2400mg Diet    DVT Prophylaxis: Enoxaparin (Lovenox) SQ  Sears Catheter: not present  Code Status: DNR/DNI      Disposition Plan   Expected discharge: 3-5 days, recommended to transitional care unit once diuresis has been achieved and PO diuretic regimen has been found, follow up with cardiology and orthopedic surgery has been scheduled, safe disposition plan has been identified and found.  Entered: Freya Sanon MD 03/27/2020, 1:23 PM       The patient's care was discussed with the Bedside Nurse, Care Coordinator/ and Patient.  Attempted to call patient's son, Gaurav, with an update but no answer -  message left.     Freya Sanon MD  Hospitalist Service  [facility]    ______________________________________________________________________    Interval History   Patient remains vitally stable, blood pressures have have been stable in the 110-130 range patient has been weaned off O2 supplementation and has been on room air for the past 24 hours.  Patient continues to be very forgetful and perseverates about topics that are not necessarily pertinent to her current medical stay.  Continues to struggle with nonweightbearing status and a Ameya lift was needed today to get patient to safely into the recliner.  Weight is essentially stable in the past 24 hours with ongoing renal tolerance.  No new symptoms or concerns.  Patient continues to be 30 lbs up from previous dry weight.  HR has been in the 70-80s over the past 24 hours.   No new nursing concerns.     Data reviewed today: I reviewed all medications, new labs and imaging results over the last 24 hours.    Physical Exam   Vital Signs: Temp: 97.4  F (36.3  C) Temp src: Axillary BP: 112/49 Pulse: 80 Heart Rate: 82 Resp: 16 SpO2: 93 % O2 Device: None (Room air)    Weight: 203 lbs 14.81 oz  Constitutional: awake, alert, cooperative but oriented only to person and location, no apparent distress, and appears stated age  Respiratory: No increased work of breathing at rest but does become winded when talking at times, decreased air exchange, clear to auscultation bilaterally, no wheezing noted, patient does have mild crackles present in bilateral bases  Cardiovascular: irregularly irregular rhythm with HR in the upper 70s.  GI: bowel sounds present, abdomen soft and non-tender  Skin: patient has ongoing pitting edema in bilateral lower legs - 2+ in the lower leg and trace to 1+ in the upper leg and trace edema present in the abdomen and bilateral arms.    Musculoskeletal: ongoing edema in lower and upper extremities as above.   Right leg is splinted and wrapped in ACE wrap  Neurologic: awake, alert, oriented to person and place but not time or situation.  Perseverates about a comforter that was lost while she was at the TCU two months ago and is requesting that we find it for her because it was so beautiful and the prayer shawl they gave her in trade just isn't the same.      Data   Recent Labs   Lab 03/27/20  0616 03/26/20  0646 03/25/20  0614  03/23/20  1803 03/23/20  0102   WBC 7.5 7.7 8.9  --   --  11.5*   HGB 9.7* 8.9* 9.2*  --   --  11.0*   MCV 87 87 87  --   --  88    255 235  --   --  263   * 129* 132*   < > 128* 128*   POTASSIUM 3.8 3.8 3.7   < > 4.2 4.6   CHLORIDE 92* 91* 94   < > 92* 91*   CO2 37* 34* 34*   < > 30 31   BUN 17 20 19   < > 17 18   CR 0.74 0.75 0.77   < > 0.79 0.82   ANIONGAP 2* 4 4   < > 6 6   WINSTON 8.6 8.3* 8.6   < > 8.5 9.2   * 103* 90   < > 145* 103*   ALBUMIN  --   --   --   --  2.9*  --    TROPI  --   --   --   --   --  0.031    < > = values in this interval not displayed.

## 2020-03-28 PROBLEM — R41.0 DELIRIUM: Status: ACTIVE | Noted: 2020-01-01

## 2020-03-28 NOTE — PLAN OF CARE
"Patient alert, oriented x 4 with disorientation to calender date stating, \"it's Friday the 26th\", verbal re-orientation provided.  Up in chair most of evening. Denies pain/N&T.  Incontinent of urine, pure wick in place, no BM this shift.  RLE elevated on ERLE pillow, bruising noted on posterior side.  LS diminished, infrequent dry non-productive cough, dyspnea reported with activity, desaturation on RA to 89-90%, NC 1 LPM applied.  Telemetry A-fib, denies CP/pain.  Bed alarm on for safety, call light within reach.  Assumed cares from [id number].  "

## 2020-03-28 NOTE — PLAN OF CARE
Patient has been awake most of the night thinking it was time to get up, dressed and eat before the doctor came to see her. She did finally get some sleep early this morning around 5am. No complaints of pain although Tylenol was given x 1. Right lower leg has been elevated throughout the night. Purewick in place with 550ml out. Lasix drip infusing at 5mg/hr. Weight is up 1kg from yesterday. 2-3+ edema remains in lower extremities up through abdomen. Tele has been a fib/flutter. MD notified and is aware of occasional flutter rhythm.

## 2020-03-28 NOTE — PLAN OF CARE
Discharge Planner PT   Patient plan for discharge: TCU  Current status: Pt up in recliner with nursing in room giving meds. Per nursing patient only slept 1-2 hours last night starting at 5am.  Current function: With nursing attempt stand pivot to commode. Pt completed sit <> stand x3 reps with maxAx2. Pt needing max cueing to maintain RLE NWB status and weakness limiting transfer. First rep stood 15 sec, second 2 reps stood about 10 seconds. Pt SOB, anxious, and demonstrates confusion throughout. PT and RN dependent transfer with emmy lift chair to bed pt needing max cues for safety and anxious throughout. Bed mobility mod-max Ax2 for rolling and assisting to bed pan. Upper body, core, and LLE weakness limiting all transfers.  Barriers to return to prior living situation: patient needing significant assist for all mobility and to maintain RLE NWB status at this time  Recommendations for discharge: TCU  Rationale for recommendations: patient declining regarding weakness/activity tolerance today, reporting very little sleep overnight. Ongoing acute PT services appropriate to progress strength, activity tolerance, and functional mobility.       Entered by: Ivet Springer 03/28/2020 10:06 AM

## 2020-03-28 NOTE — PROGRESS NOTES
[facility]    Medicine Progress Note - Hospitalist Service       Date of Admission:  3/23/2020  Assessment & Plan     Patient is a 89-year-old female with a known history of moderate to severe aortic stenosis and severe pulmonary hypertension who was admitted on 3/23/2020 after falling and sustaining a bilateral malleoli her right ankle fracture but on further evaluation was found to be in atrial fibrillation with RVR with a 37 pounds weight gain in the past 2 months with diffuse anasarca noted on exam as well as acute respiratory failure with hypoxia with a newly developed hyponatremia felt secondary to volume overload.  Orthopedic surgery evaluated the patient with appropriate splinting performed and it was recommended that surgery be delayed until volume overload could be further evaluation and addressed thereby allowing soft tissue rest and improving surgical outcomes, and patient has been seen by cardiology on an outpatient basis for surgical clearance given her complex cardiac history.  Patient was hospitalized and has been undergoing diuresis - she was transitioned to a Lasix drip on 3/27/2020 with improved urinary output and a 5 lb weight loss in the past 24 hours, down 12 pounds total to date.   She has had some improvement in her respiratory symptoms and hyponatremia but continues to have severe anasarca.      Principal Problem:    Volume overload    Assessment: Patient currently receiving Lasix drip 5 mg/hr with 12 pound total weight loss since admission.  Renal function continues to be normal and blood pressures are tolerating diuresis without difficulty    Plan: Continue Lasix drip starting at 5 mg/h and monitor renal function and VBG daily to evaluate for any renal intolerance or metabolic impairment that may occur with ongoing aggressive diuresis.     Active Problems:    Acute respiratory failure with hypoxia (H)    Assessment: Present at time of admission but improving  with ongoing diuresis.  Patient needed 1L NC oxygen overnight but has been weaned to RA this morning    Plan: Continue with Lasix drip as above and monitor closely for ongoing clinical improvement as diuresis occurs      New onset atrial fibrillation (H) with RVR, suspect valvular a fib    Assessment: Present at time of admission and possibly contributing to volume overload as outlined above, currently rate controlled on metoprolol and digoxin.  Echocardiogram shows normal left sided EF function    Plan:   Continue with digoxin and metoprolol and monitor for ongoing adequate heart rate control.  We will continue with Lovenox for anticoagulation at this time however if A. fib is persistent, patient would benefit from anticoagulation going forward following surgical intervention.      Aortic valve stenosis, moderate to severe (increase from mild in 2016)    Assessment: Noted to be a moderate to severe in January of this year, may be contributing to volume overload picture and increases patient's risk of hypotension with ongoing aggressive diuresis    Plan: We will reevaluate valve status and limited echocardiogram performed today.  Transition to Lasix drip but will need to monitor blood pressure closely for tolerance going forward.      Delirium    Assessment: patient has mild baseline confusion prior to this stay however in the past few days she has not been sleeping well, has been mixing up her days and nights and is starting to have fluctuating levels of consciousness consistent with delirium development.      Plan:  Discussed with her son and decision maker, Gaurav and will start scheduled seroquel at bedtime as well as the remainder of the delirium protocol including prn seroquel for any agitation that may occur going forward.  Discussed the diagnosis of delirium and the implications of this both short-term and long-term.       Closed bimalleolar fracture of right ankle    Assessment: Occurring prior to this  hospitalization, patient is currently nonweightbearing and orthopedic surgery has been consulted and is hoping to do surgery in 1 to 2 weeks after the anasarca has improved and patient has received cardiology clearance for surgical intervention.  They are monitoring every few days to assess splint status as diuresis is achieved      Plan: Continue with current splint, nonweightbearing status.  Patient will be discharging to TCU at time of discharge with outpatient follow-up with cardiology and orthopedic surgery      Severe pulmonary hypertension (H) per Echo Jan 2020    Assessment: Noted on echocardiogram 2 months ago, may be contributing to volume overload and respiratory failure as outlined above as well as the worsening right sided heart failure and tricuspid regurgitation noted on echo during this stay    Plan:  Continue with Lasix drip and monitor closely as above      Right sided congestive heart failure    Assessment:  Noted on echocardiogram during this hospitalization and likely contributing to anasarca and volume overload    Plan:  Continue with diuresis efforts as above      Severe tricuspid regurgitation    Assessment:  Noted on echo during this stay, was mild in 1/2020 echocardiogram    Plan:  Continue with diuresis efforts as above, outpatient follow up with cardiology as above      Anasarca    Assessment: secondary to volume overload as above with patient continuing to have edema in bilateral legs, abdomen, and bilateral arm on exam today    Plan:  Continue with Lasix drip and monitor closely as outlined above      Hyponatremia-admit Na 128    Assessment: Suspected secondary to volume overload with mary of 126 during the stay and sodium has been slowly increasing with ongoing diuresis.  It is 130 this morning.      Plan: Continue with diuretic efforts as outlined above and monitor for ongoing resolution.      Essential hypertension with goal blood pressure less than 140/90    Assessment: Was  controlled prior to admission with losartan which has been held during this hospitalization and patient has been initiated on metoprolol for A. fib as well as aggressive diuresis as above.  Blood pressures have been stable in the 110-140 range systolic over the past 24 hours continue with metoprolol twice daily dosing as well as    Plan: Continue with aggressive diuresis efforts and metoprolol as outlined above and monitor for blood pressure stability.  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    Assessment: Chronic and stable    Plan: Continue with diuresis efforts as above      Abnormal CXR-bilateral lower lung opacities, suspect atelectasis    Assessment: Noted initially but without concern for infection with patient afebrile and improving following diuresis as outlined above    Plan: Continue to monitor for ongoing adequate respiratory improvement with ongoing diuresis       Wheezing    Assessment: Was present initially and thought likely secondary to volume overload status and atelectasis with patient improving on ipratropium as well as PRN Xopenex in addition to ongoing diuresis efforts    Plan: Continue with current plan of scheduled ipratropium, PRN Xopenex, and ongoing diuresis with transition to Lasix drip as outlined above      History of TIA (transient ischemic attack) and stroke    Assessment: Previous history, no concern for focal neurological deficit    Plan: Continue supportive care and monitor       Diet: Combination Diet Low Saturated Fat Na <2400mg Diet    DVT Prophylaxis: Enoxaparin (Lovenox) SQ  Sears Catheter: not present  Code Status: DNR/DNI      Disposition Plan   Expected discharge: 3-5 days, recommended to transitional care unit once  diuresis has been completed and patient's weight has been stable on PO diuretics, outpatient follow up with cardiology and orthopedic surgery has been scheduled, TCU has been found.  Entered: Freya Sanon MD 03/28/2020, 10:51 AM       The patient's care was discussed with the Bedside Nurse, Care Coordinator/, Patient and Patient's Family.    Freya Sanon MD  Hospitalist Service  [facility]    ______________________________________________________________________    Interval History   Patient remains vitally stable - continues to tolerate aggressive diuresis well with blood pressures in the 110-140 range over the past 24 hours.  Did require 1L NC overnight while sleeping but weaned back to RA this morning without difficulty.  Patient continues to have difficulty sleeping and did not feel sleep until approximately 5 AM and is only sleeping in 1 to 3 hours ligament.  She is having increased frequency of her fluctuating level of consciousness and so far is been pleasant and redirectable but confusion appears to be worse    Data reviewed today: I reviewed all medications, new labs and imaging results over the last 24 hours.    Physical Exam   Vital Signs: Temp: 96.7  F (35.9  C) Temp src: Oral BP: (!) 142/62 Pulse: 84 Heart Rate: 73 Resp: 20 SpO2: 93 % O2 Device: None (Room air) Oxygen Delivery: 1 LPM  Weight: 198 lbs 13.68 oz  Constitutional: awake, alert, cooperative, no apparent distress, and appears stated age  Respiratory: No increased work of breathing, decreased air exchange, clear to auscultation bilaterally, no crackles or wheezing  Cardiovascular: irregularly irregular rhythm with HR in the 70-80s currently  GI: bowel sounds present, abdomen soft and non-tender  Musculoskeletal: patient has ongoing pitting edema from her ankles up into her abdomen - 2+ at the ankles and trace in the abdomen however there is no pitting edema in her arms on  exam today which is improved from yesterday.  Splint still in place on the right foot  Neurologic: awake, alert, oriented to person and place currently but not time or situation     Data   Recent Labs   Lab 03/28/20  0559 03/27/20  0616 03/26/20  0646 03/25/20  0614  03/23/20  1803 03/23/20  0102   WBC  --  7.5 7.7 8.9  --   --  11.5*   HGB 8.9* 9.7* 8.9* 9.2*  --   --  11.0*   MCV  --  87 87 87  --   --  88   PLT  --  279 255 235  --   --  263   * 131* 129* 132*   < > 128* 128*   POTASSIUM 3.5 3.8 3.8 3.7   < > 4.2 4.6   CHLORIDE 91* 92* 91* 94   < > 92* 91*   CO2 37* 37* 34* 34*   < > 30 31   BUN 17 17 20 19   < > 17 18   CR 0.75 0.74 0.75 0.77   < > 0.79 0.82   ANIONGAP 2* 2* 4 4   < > 6 6   WINSTON 8.5 8.6 8.3* 8.6   < > 8.5 9.2   * 106* 103* 90   < > 145* 103*   ALBUMIN  --   --   --   --   --  2.9*  --    TROPI  --   --   --   --   --   --  0.031    < > = values in this interval not displayed.

## 2020-03-28 NOTE — PLAN OF CARE
Patient up with ceiling lift, lung sounds clear/diminished, telemetry afib stable, right ankle in splint, CMS intact with +1-2 edema, bowel movement x1 this shift per bedpan, vitals stable, purewick in place with intermittent soiled briefs, alert to patient and place

## 2020-03-29 NOTE — PROGRESS NOTES
[facility]    Medicine Progress Note - Hospitalist Service       Date of Admission:  3/23/2020  Assessment & Plan     Patient is a 89-year-old female with a known history of moderate to severe aortic stenosis and severe pulmonary hypertension who was admitted on 3/23/2020 after falling and sustaining a bilateral malleoli her right ankle fracture but on further evaluation was found to be in atrial fibrillation with RVR with a 37 pounds weight gain in the past 2 months with diffuse anasarca noted on exam as well as acute respiratory failure with hypoxia with a newly developed hyponatremia felt secondary to volume overload.  Orthopedic surgery evaluated the patient with appropriate splinting performed and it was recommended that surgery be delayed until volume overload could be further evaluation and addressed thereby allowing soft tissue rest and improving surgical outcomes, and patient has been seen by cardiology on an outpatient basis for surgical clearance given her complex cardiac history.  Patient was hospitalized and has been undergoing diuresis - she was transitioned to a Lasix drip on 3/27/2020 with improved urinary output and a 5 lb weight loss in the past 24 hours, down 16 pounds total to date.   She continues to have improvement in her respiratory symptoms and hyponatremia but continues to have severe anasarca/edema in her legs.      Principal Problem:    Volume overload    Assessment: Patient currently receiving Lasix drip 5 mg/hr with 16 pound total weight loss since admission.  Renal function continues to be normal and blood pressures are tolerating diuresis without difficulty    Plan: Continue Lasix drip starting at 5 mg/h and monitor renal function and VBG daily to evaluate for any renal intolerance or metabolic impairment that may occur with ongoing aggressive diuresis.     Active Problems:    Acute respiratory failure with hypoxia (H)    Assessment: Present at time of  admission but improving with ongoing diuresis.  Patient not requiring any oxygen support over the past 24 hours.    Plan: Continue with Lasix drip as above and monitor closely for ongoing clinical improvement as diuresis occurs      New onset atrial fibrillation (H) with RVR, suspect valvular a fib    Assessment: Present at time of admission and possibly contributing to volume overload as outlined above, currently rate controlled on metoprolol and digoxin.  Echocardiogram shows normal left sided EF function    Plan:   Continue with digoxin and metoprolol and monitor for ongoing adequate heart rate control.  We will continue with Lovenox for anticoagulation at this time however if A. fib is persistent, patient may benefit from alternative anticoagulation going forward following surgical intervention.      Urinary retention    Assessment:  Patient had progressively worsening abdominal pain starting last evening with bladder scan confirming over 1000 mL.  Sears catheter has been placed and patient has had 1.6 L out immediately as well as an additional 600 mL out in the past few hours.  Her abdominal pain is now resolved    Plan: We will continue with Sears catheter in place during time of ongoing aggressive diuresis.  Will attempt a trial of voiding prior to discharge after aggressive diuresis can be discontinued.      Aortic valve stenosis, moderate to severe (increase from mild in 2016)    Assessment: Noted to be a moderate to severe in January of this year, may be contributing to volume overload picture and increases patient's risk of hypotension with ongoing aggressive diuresis    Plan: No acute intervention is needed but continue with cautious diuresis as above and monitor for any signs of blood pressure instability.  Patient will need outpatient cardiology evaluation going forward      Delirium    Assessment: patient has mild baseline confusion prior to this stay however in the first few days of her stay she had  not been sleeping well, been mixing up her days and nights and is starting to have fluctuating levels of consciousness consistent with delirium development the evening of 3/27/2020.  She was started on scheduled Seroquel at bedtime as well as PRN Seroquel on 3/28/2020 and she slept very soundly after Sears placement last night and continues to be groggy this morning    Plan:  Will decrease scheduled bedtime dose to 12.5 mg daily and prn dose to 6.25 mg every 6 hours prn and continue with supportive cares and encouragement of appropriate wake-sleep cycle.        Closed bimalleolar fracture of right ankle    Assessment: Occurring prior to this hospitalization, patient is currently nonweightbearing and orthopedic surgery has been consulted and is hoping to do surgery in 1 to 2 weeks after the anasarca has improved and patient has received cardiology clearance for surgical intervention.  They are monitoring every few days to assess splint status as diuresis is achieved      Plan: Continue with current splint, nonweightbearing status.  Patient will be discharging to TCU at time of discharge with outpatient follow-up with cardiology and orthopedic surgery      Severe pulmonary hypertension (H) per Echo Jan 2020    Assessment: Noted on echocardiogram 2 months ago, may be contributing to volume overload and respiratory failure as outlined above as well as the worsening right sided heart failure and tricuspid regurgitation noted on echo during this stay    Plan:  Continue with Lasix drip and monitor closely as above      Right sided congestive heart failure    Assessment:  Noted on echocardiogram during this hospitalization and likely contributing to anasarca and volume overload    Plan:  Continue with diuresis efforts as above      Severe tricuspid regurgitation    Assessment:  Noted on echo during this stay, was mild in 1/2020 echocardiogram    Plan:  Continue with diuresis efforts as above, outpatient follow up with  cardiology as above      Anasarca    Assessment: secondary to volume overload as above with patient continuing to have edema in bilateral legs but has resolved in the mid and upper abdomen abdomen and arms    Plan:  Continue with Lasix drip and monitor closely as outlined above      Hyponatremia-admit Na 128    Assessment: Suspected secondary to volume overload with mary of 126 during the stay and sodium has been slowly increasing with ongoing diuresis.  It is 130 this morning.      Plan: Continue with diuretic efforts as outlined above and monitor for ongoing resolution.      Essential hypertension with goal blood pressure less than 140/90    Assessment: Was controlled prior to admission with losartan which has been held during this hospitalization and patient has been initiated on metoprolol for A. fib as well as aggressive diuresis with Lasix drip as above.  Blood pressures have been stable in the 110-140 range systolic over the past 24 hours     Plan: continue with metoprolol twice daily dosing and Lasix drip and monitor for blood pressure stability.  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    Assessment: Chronic and stable    Plan: Continue with diuresis efforts as above      Abnormal CXR-bilateral lower lung opacities, suspect atelectasis    Assessment: Noted initially but without concern for infection with patient afebrile and improving following diuresis as outlined above    Plan: Continue to monitor for ongoing adequate respiratory improvement with ongoing diuresis       Wheezing    Assessment: Was present initially and thought likely secondary to volume overload status and atelectasis with patient improving on ipratropium as well as PRN Xopenex in  addition to ongoing diuresis efforts    Plan: Continue with current plan of scheduled ipratropium, PRN Xopenex, and ongoing diuresis with transition to Lasix drip as outlined above      History of TIA (transient ischemic attack) and stroke    Assessment: Previous history, no concern for focal neurological deficit    Plan: Continue supportive care and monitor     Diet: Combination Diet Low Saturated Fat Na <2400mg Diet    DVT Prophylaxis: Enoxaparin (Lovenox) SQ  Pack Catheter: in place, indication: Retention  Code Status: DNR/DNI      Disposition Plan   Expected discharge: 3-5 days, recommended to Long term care facility with TCU services once diuresis is acheived and maintained on PO diuretics, trial of voiding has been attempted after aggressive diuresis completed.  Entered: Freya Sanon MD 03/29/2020, 7:22 AM       The patient's care was discussed with the Bedside Nurse, Patient and Patient's Family.    Freya Sanon MD  Hospitalist Service  [facility]    ______________________________________________________________________    Interval History   Patient has remained vitally stable in the past 24 hours and was able to go without oxygen overnight.  Weight is down another 4 lbs for total weight loss of 16 lbs since admission.  Patient had worsening abdominal discomfort last evening and was found to have urinary retention with over 1,600 mL output when pack catheter was placed - continues to have good output after placement and pain has now resolved.  Patient was able to sleep deeply after placement and after bedtime seroquel given.  Slightly groggy this morning and having a difficult time waking for breakfast but reports feeling so much better and relieved she can get some good sleep.  No new patient concerns this morning.  No new nursing concerns.     Data reviewed today: I reviewed all medications, new labs and imaging results over the last 24  hours.    Physical Exam   Vital Signs: Temp: 96.7  F (35.9  C) Temp src: Oral BP: 117/52 Pulse: 78 Heart Rate: 82 Resp: 17 SpO2: 92 % O2 Device: None (Room air)    Weight: 194 lbs 10.66 oz  Constitutional: sleeping on entry into the room but awakens to voice and maintains wakefulness for several minutes before nodding off to sleep again, no acute distress, cooperative with exam.    Respiratory: No increased work of breathing, decreased air exchange, clear to auscultation bilaterally, no crackles or wheezing  Cardiovascular: irregularly irregular rhythm with heart rate in the 70-80s currently, ongoing 2/6 systolic murmur noted  GI: bowel sound present, abdomen soft and non-distended, no tenderness on palpation   Musculoskeletal: patient continues to have pitting edema in bilateral legs up to the upper thighs but no further pitting edema is noted in her mid and upper abdomen or her arms with her arms having skin wrinkles present today which is new from yesterday.  Pitting in legs is lessening - 1-2+ in ankle and trace in upper thighs.  Right foot still in appropriate splint  Neurologic: sleeping but awakens to voice and is oriented currently to person, place, and year and somewhat to situation - remembers having the abdominal pain last evening and getting good sleep    Data   Recent Labs   Lab 03/29/20  0528 03/28/20  0559 03/27/20  0616 03/26/20  0646  03/23/20  1803 03/23/20  0102   WBC 7.0  --  7.5 7.7   < >  --  11.5*   HGB 9.1* 8.9* 9.7* 8.9*   < >  --  11.0*   MCV 88  --  87 87   < >  --  88     --  279 255   < >  --  263   * 130* 131* 129*   < > 128* 128*   POTASSIUM 3.9 3.5 3.8 3.8   < > 4.2 4.6   CHLORIDE  --  91* 92* 91*   < > 92* 91*   CO2  --  37* 37* 34*   < > 30 31   BUN  --  17 17 20   < > 17 18   CR 0.78 0.75 0.74 0.75   < > 0.79 0.82   ANIONGAP  --  2* 2* 4   < > 6 6   WINSTON  --  8.5 8.6 8.3*   < > 8.5 9.2   GLC  --  129* 106* 103*   < > 145* 103*   ALBUMIN  --   --   --   --   --  2.9*  --     TROPI  --   --   --   --   --   --  0.031    < > = values in this interval not displayed.

## 2020-03-29 NOTE — PLAN OF CARE
Pt has been very sleepy today. Up to chair during mealtimes. Requiring to be fed for meals, needing much encouragement to be alert. Poor appetite due to sleepiness, has coughed x2 after sips of liquids. Taking meds crushed in applesauce without incident. Sears catheter output of 850ml. Will continue with POC.

## 2020-03-29 NOTE — PROGRESS NOTES
Reason for Follow up: Discharge planning.  Pt to discharge to [facility] (Main Phone: [phone] Admissions Phone: [phone] Fax: [phone]) to the long term care unit but will receive therapies as indicated.    Per IDT rounds & conversation with MD, pt may not be medically stable to discharge for 3-5 days.    SW placed call to pt's son, Gaurav, to inform of above and answered questions about discharge needs.    Les, requests to be informed of time of transport a day before so that he can be present to see the pt boarding the handi-van.     Anticipated discharge needs: [facility] for long term care    Next steps: Arrange transportation and IMM need to be completed.    Discharge Planner   Discharge Plans in progress: [facility]  Barriers to discharge plan: Medical stability  Follow up plan: CTS to follow       Entered by: Alix Chaudhry 03/29/2020 9:23 AM      PAS-RR    Per DHS regulation, CTS team completed and submitted PAS-RR to [place] [employer] Direct Connect via the [employer]. CTS team advised SNF and they are aware a PAS-RR has been submitted.     CTS team reviewed with pt or health care agent that they may be contacted for a follow up appointment within 10 days of hospital discharge if SNF stay is <30 days. Contact information for [employer] was also provided.     Pt or health care agent verbalized understanding.     PAS-RR # [id number]    CHARMAINE Flaherty  [facility] [phone]   [facility]  [phone]

## 2020-03-29 NOTE — PROVIDER NOTIFICATION
Patient started complaining of abdominal pain around 1900. Patient was offered morphine and declined and was given xanax and seroquel and miralax. Patient then slept for a little while and reported feeling a little better after the xanax.     At 2345 patient started calling out with severe abdominal pain. Bladder scan showed >999 in bladder. Patient is on lasix drip and has had 400 mL out for days and 300 mL out for evenings.    MD was notified. Pack catheter was ordered for retention. Patient had 1650 mL out right after placement. Patient fell asleep shortly after pack placement.

## 2020-03-29 NOTE — PLAN OF CARE
Discharge Planner PT   Patient plan for discharge: pt agreeable to TCU  Current status: Pt reclined in chair upon arrival, slumped to her right and sleepy. Arouses to verbal only and agrees to PT. With orienting chair to upright patient reports dizziness and demonstrating very slow speech, given water via straw and pt coughing after, slow to respond to verbal questions. /42 mmHg, HR 65 bpm. Nursing notified of pt's presentation. Pt given more water and cued for ankle pumps and knee flexion/extension, max A from PT for these. Upon return to reclined position /46 mmHg, 63bpm, O2 90-92% on room air, HR 63-70 bpm. With assist of nursing patient emmy lifted to bed, max A for repositioning in bed. LLE elevated with pillow in place to prevent RLE from ER to assist with upright trunk posture, slightly elevated HOB. BP rechecked and 105/50mmHg, 65 bpm, O2 92%. Exercises on hold due to presentation.  Barriers to return to prior living situation: patient requiring significant assist for all mobility at this time, medical status  Recommendations for discharge: TCU  Rationale for recommendations: patient declining in functional mobility, lethargic, sleepy, and variable vitals signs reporting lightheadedness. Ongoing acute PT services appropriate to progress strength, activity tolerance, and functional mobility.       Entered by: Ivet Springer 03/29/2020 10:13 AM

## 2020-03-29 NOTE — PROGRESS NOTES
S-(situation): shift note    B-(background): shortness of breath    A-(assessment): pt confused, oriented to self only. Pt was lethargic after Seroquel given. Took only bites for supper due to sleepiness. Urine output of 650ml. No BM today. Up to recliner chair with ceiling lift for all meals today.    R-(recommendations): has spoken to son via phone with updates.  will continue with POC.

## 2020-03-29 NOTE — PROGRESS NOTES
"S-(situation): End of shift note    B-(background): Rt ankle fracture    A-(assessment): Vital signs stable. BP (!) 141/70 (BP Location: Right arm)   Pulse 78   Temp 97  F (36.1  C) (Oral)   Resp 20   Ht 1.6 m (5' 3\")   Wt 90.2 kg (198 lb 13.7 oz)   SpO2 96%   BMI 35.23 kg/m  .  Afebrile. Lung sounds CTA diminished. On RA. Confused, orient to self, place and situation.  Pt complaining of abdominal pain. Tylenol and miralax given.  Tele Afib.     R-(recommendations): Continue to monitor per care plan.     "

## 2020-03-29 NOTE — PROVIDER NOTIFICATION
Patient woke and began crying out in severe pain stating she had abdominal pain and thought she needed to have a stool. Pt has had recent stools so writer did not feel she was likely constipated. Abdomen very firm and severely tender to the touch. Purewick in place with output however, after review, it was noted pt has only had 700ml of documented output throughout the entire day. Pt was bladder scanned for greater than 999ml. MD notified for straight cath order but wanted a pack placed. Immediate return of 1650ml of urine. Pt had relief immediately and fell asleep almost instantly.

## 2020-03-30 NOTE — PROGRESS NOTES
CLINICAL NUTRITION SERVICES    Reviewed nutrition risk factors due to LOS. Pt is tolerating diet, eating well per nursing documentation (% meals). No nutrition issues identified at this time. RD will follow via rounds at this time, unless consulted.    Matilde Desir RD, LD  Clinical Dietitian  [facility]: [phone]  [facility]: [phone]

## 2020-03-30 NOTE — PROVIDER NOTIFICATION
Cardiac monitor showed A.Flutter for a short period of time. Patient was trying to have a bowel movement at this time. Asymptomatic. Heart rate in the 60s. Converted back to A.Fib quickly.     MD notified. No new orders at this time, continue to monitor.

## 2020-03-30 NOTE — PROGRESS NOTES
[facility]    Medicine Progress Note - Hospitalist Service       Date of Admission:  3/23/2020  Assessment & Plan     Patient is a 89-year-old female with a known history of moderate to severe aortic stenosis and severe pulmonary hypertension who was admitted on 3/23/2020 after falling and sustaining a bilateral malleoli her right ankle fracture but on further evaluation was found to be in atrial fibrillation with RVR with a 37 pounds weight gain in the past 2 months with diffuse anasarca noted on exam as well as acute respiratory failure with hypoxia with a newly developed hyponatremia felt secondary to volume overload.  Orthopedic surgery evaluated the patient with appropriate splinting performed and it was recommended that surgery be delayed until volume overload could be further evaluation and addressed thereby allowing soft tissue rest and improving surgical outcomes, and patient has been seen by cardiology on an outpatient basis for surgical clearance given her complex cardiac history.  Patient was hospitalized and has been undergoing diuresis - she was transitioned to a Lasix drip on 3/27/2020 with improved urinary output and a 4 lb weight loss in the past 24 hours, down 20 pounds total to date.  She continues to tolerate ongoing aggressive diuresis without evidence of renal strain.  She continues to have improvement in her respiratory symptoms and hyponatremia but continues to have severe anasarca/edema in her legs.      Principal Problem:    Volume overload    Assessment: Patient currently receiving Lasix drip 5 mg/hr with 20 pound total weight loss since admission.  Renal function continues to be normal and blood pressures are tolerating diuresis without difficulty    Plan: Continue Lasix drip starting at 5 mg/h and monitor renal function and VBG daily to evaluate for any renal intolerance or metabolic impairment that may occur with ongoing aggressive diuresis.     Active  Problems:    Acute respiratory failure with hypoxia (H)    Assessment: Present at time of admission but improving with ongoing diuresis.  Patient not requiring any oxygen support over the few days.    Plan: Continue with Lasix drip as above and monitor closely for ongoing clinical improvement as diuresis occurs      New onset atrial fibrillation (H) with RVR, suspect valvular a fib    Assessment: Present at time of admission and possibly contributing to volume overload as outlined above, currently rate controlled on metoprolol and digoxin.  Echocardiogram shows normal left sided EF function    Plan:   Continue with digoxin and metoprolol and monitor for ongoing adequate heart rate control.  We will continue with Lovenox for anticoagulation at this time however if A. fib is persistent, patient may benefit from alternative anticoagulation going forward following surgical intervention.      Urinary retention    Assessment:  Patient had progressively worsening abdominal pain on 2/28/2020 with bladder scan confirming over 1000 mL.  Sears catheter was placed and patient had 1.6 L out immediately  with resolution of her abdominal pain.  Sears continues to be in place    Plan: We will continue with Sears catheter in place during time of ongoing aggressive diuresis.  Will attempt a trial of voiding prior to discharge after aggressive diuresis can be discontinued.      Aortic valve stenosis, moderate to severe (increase from mild in 2016)    Assessment: Noted to be a moderate to severe in January of this year, may be contributing to volume overload picture and increases patient's risk of hypotension with ongoing aggressive diuresis    Plan: No acute intervention is needed but continue with cautious diuresis as above and monitor for any signs of blood pressure instability.  Patient will need outpatient cardiology evaluation going forward      Delirium    Assessment: patient has mild baseline confusion prior to this stay  however in the first few days of her stay she had not been sleeping well, been mixing up her days and nights and is starting to have fluctuating levels of consciousness consistent with delirium development the evening of 3/27/2020.  She was started on scheduled Seroquel at bedtime as well as PRN Seroquel on 3/28/2020 with improvement of her sleep pattern and adjustment of her dosing as appropriate    Plan:   Continue with cheduled bedtime dose to 12.5 mg at bedtime and prn dose to 6.25 mg every 6 hours prn and continue with supportive cares and encouragement of appropriate wake-sleep cycle.        Closed bimalleolar fracture of right ankle    Assessment: Occurring prior to this hospitalization, patient is currently nonweightbearing and orthopedic surgery has been consulted and is hoping to do surgery in 1 to 2 weeks after the anasarca has improved and patient has received cardiology clearance for surgical intervention.  They are monitoring every few days to assess splint status as diuresis is achieved      Plan: Continue with current splint, nonweightbearing status.  Orthopedic surgery  is planning on seeing today and potentially changing splint as nursing staff has been noticing it is becoming loose.  Patient will be discharging to TCU at time of discharge with outpatient follow-up with cardiology and orthopedic surgery      Severe pulmonary hypertension (H) per Echo Jan 2020    Assessment: Noted on echocardiogram 2 months ago, may be contributing to volume overload and respiratory failure as outlined above as well as the worsening right sided heart failure and tricuspid regurgitation noted on echo during this stay    Plan:  Continue with Lasix drip and monitor closely as above      Right sided congestive heart failure    Assessment:  Noted on echocardiogram during this hospitalization and likely contributing to anasarca and volume overload    Plan:  Continue with diuresis efforts as above      Severe tricuspid  regurgitation    Assessment:  Noted on echo during this stay, was mild in 1/2020 echocardiogram    Plan:  Continue with diuresis efforts as above, outpatient follow up with cardiology as above      Anasarca    Assessment: secondary to volume overload as above with patient continuing to have edema in bilateral legs but has resolved in the mid and upper abdomen abdomen and arms    Plan:  Continue with Lasix drip and monitor closely as outlined above      Hyponatremia-admit Na 128    Assessment: Suspected secondary to volume overload with mary of 126 during the stay and sodium has been slowly increasing with ongoing diuresis.  It is 133 this morning.      Plan: Continue with diuretic efforts as outlined above and monitor for ongoing resolution.      Essential hypertension with goal blood pressure less than 140/90    Assessment: Was controlled prior to admission with losartan which has been held during this hospitalization and patient has been initiated on metoprolol for A. fib as well as aggressive diuresis with Lasix drip as above.  Blood pressures have been stable in the 110-140 range systolic over the past 24 hours     Plan: continue with metoprolol twice daily dosing and Lasix drip and monitor for blood pressure stability.  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    Assessment: Chronic and stable    Plan: Continue with diuresis efforts as above      Abnormal CXR-bilateral lower lung opacities, suspect atelectasis    Assessment: Noted initially but without concern for infection with patient afebrile and improving following diuresis as outlined above    Plan: Continue to monitor for ongoing adequate respiratory improvement with ongoing diuresis       Wheezing     Assessment: Was present initially and thought likely secondary to volume overload status and atelectasis with patient improving on ipratropium as well as PRN Xopenex in addition to ongoing diuresis efforts    Plan: Continue with current plan of scheduled ipratropium, PRN Xopenex, and ongoing diuresis with transition to Lasix drip as outlined above      History of TIA (transient ischemic attack) and stroke    Assessment: Previous history, no concern for focal neurological deficit    Plan: Continue supportive care and monitor     Diet: Combination Diet Low Saturated Fat Na <2400mg Diet    DVT Prophylaxis: Enoxaparin (Lovenox) SQ  Sears Catheter: in place, indication: Retention  Code Status: DNR/DNI      Disposition Plan   Expected discharge: 2-4 days, recommended to Long-term care facility with rehab services once Diuresis has been completed and patient has transition to oral diuretics without difficulty.  Entered: Freya Sanon MD 03/30/2020, 7:28 AM       The patient's care was discussed with the Patient and Patient's Family nursing staff,, social work, and orthopedic specialist    Freya Sanon MD  Hospitalist Service  [facility]    ______________________________________________________________________    Interval History   Patient continues to do well.  Heart rate and blood pressure are adequate and patient has remained off O2 supplementation.  She continues to be fluctuating in her level of consciousness in her and standing of the current situation but is sleeping better and is more alert today and able to eat and interact with therapy better.  She is not agitated or uncomfortable.  Patient's main focus is what will happen to her table and chairs if she has to change rooms at her current living facility.    Data reviewed today: I reviewed all medications, new labs and imaging results over the last 24 hours.    Physical Exam   Vital Signs: Temp: 97.2  F  (36.2  C) Temp src: Oral BP: 123/55 Pulse: 65 Heart Rate: 68 Resp: 20 SpO2: 92 % O2 Device: None (Room air)    Weight: 190 lbs 11.17 oz  Constitutional: awake, alert, cooperative, no apparent distress, and appears stated age  Respiratory: No increased work of breathing, good air exchange, clear to auscultation bilaterally, no crackles or wheezing  Cardiovascular: Irregularly irregular rhythm with ongoing 2 out of 6 systolic murmur unchanged from previous  GI: Bowel sounds present, abdomen is soft and nondistended, no tenderness on palpation   Musculoskeletal: patient has ongoing pitting edema throughout her entire legs up to her lower abdomen but continues to improve from yesterday with only 1+ edema in her lower legs and trace pitting edema in her thighs and lower abdomen  Neurologic: Awake, alert, oriented to person but not place, time, or situation.  Is calm, pleasant, and cooperative    Data   Recent Labs   Lab 03/30/20  0656 03/29/20  0528 03/28/20  0559 03/27/20  0616 03/26/20  0646   WBC  --  7.0  --  7.5 7.7   HGB 9.3* 9.1* 8.9* 9.7* 8.9*   MCV  --  88  --  87 87   PLT  --  246  --  279 255    130* 130* 131* 129*   POTASSIUM 3.6 3.9 3.5 3.8 3.8   CHLORIDE 95  --  91* 92* 91*   CO2 33*  --  37* 37* 34*   BUN 14  --  17 17 20   CR 0.69 0.78 0.75 0.74 0.75   ANIONGAP 5  --  2* 2* 4   WINSTON 8.3*  --  8.5 8.6 8.3*   GLC 97  --  129* 106* 103*

## 2020-03-30 NOTE — PROGRESS NOTES
Note that VSS, pt is oriented but confused at time.  Eating fairly good.  Good urine output.  CMS in right foot is good.  Cont on a lasix gtt and pack in  Place with good urine out put.  Please review system assessment and VS.

## 2020-03-30 NOTE — PROGRESS NOTES
Writer spoke with patient's son, Gaurav, to update on patient's plan of care.    CHARMAINE Ashby  [facility] [phone]/ [facility] [phone]

## 2020-03-30 NOTE — PROGRESS NOTES
Reviewed xray and repeat xray.   On lateral view no evidence of posterior or superior subluxation of the talus.  Difficult to fully evaluate AP and mortise due to some rotation.  No significant changes to alignment.    Will continue with current splint.  May need new splint placement later in the week with continued diuresis     Norman Randle APRN, CNP  Orthopedic Surgery

## 2020-03-30 NOTE — PLAN OF CARE
"S-(situation): End of shift note    B-(background): fractured rt ankle, respiratory failure due to fluid overload with CHF     A-(assessment): Vital signs stable.  /56 (BP Location: Right arm)   Pulse 65   Temp 97.2  F (36.2  C) (Oral)   Resp 18   Ht 1.6 m (5' 3\")   Wt 88.3 kg (194 lb 10.7 oz)   SpO2 92%   BMI 34.48 kg/m  . Afebrile. Lung sounds CTA B/L, diminished. Infrequent cough.  On RA.  A&O to self, very confused tonight.  Uses call light frequently.  Tele rhythm [place]/ MD Travis notified.  NWB, lift used for transfers. Voiding adequately with Sears in place.  IV site asymptomatic, bruised. Able to make needs known and uses call light appropriately.     R-(recommendations): Continue to monitor per care plan.    "

## 2020-03-30 NOTE — PROGRESS NOTES
Medicine continues to diurese her.  It was noted that the splint was slightly loose today.  Upon evaluation it was.  She continues to be pleasantly confused.    Splint was removed.  Previous superficial open areas have healed over.  There is no further drainage.  Significantly decrease in swelling.  Ecchymosis throughout the ankle.  Palpable dorsalis pedis pulse.  The toes are well perfused.  There is no evidence of pressure sores.  No gross deformity.    A new Eliseo Adame splint was applied today.  Heel is well-padded.  She tolerated the procedure well.    Continue elevation.  Nonweightbearing.      Post splinting x-rays are pending.        I spoke with the patient's son Gaurav.  I updated him about her condition.  With her medical comorbidities, current ankle alignment, current activity (per the son limited ambulation with a walker-unsteady prior to this due to her underlying knee arthritis and spine issues) I am recommending conservative care.  This would include splinting/casting as needed.  We would not perform surgical treatment.  He understands this.   potential risks with splinting include skin breakdown was reviewed.  Currently no skin issues.  He is agreeable to plan.      We also discussed future expectations.  While hopeful that she will be able to place some weight and subsequently walk as it relates to the ankle fracture I am not overly optimistic about it given her continued medical problems and generalized deconditioning.

## 2020-03-30 NOTE — PLAN OF CARE
Discharge Planner PT   Patient plan for discharge: Home  Current status: Patient oriented only to self, calling out to all passing her room. Fixated on having a bowel movement due to medications provided to slow her diarrhea. Patient completed bilat UE AROM tasks reclined in bed, short of breath and asking for her inhaler. Bilat LE AROM  Tasks required MOD-MAX assist to complete task. Patient unwilling to mobilize due to need to have a BM, PT will attempt functional mobility later this date as appropriate. Patient's RLE splint poorly fit, able to visualize movement of the shank within the proximal dressing and patient able to move midfoot within distal segment.  Barriers to return to prior living situation: Medical status, level of assistance for cares, NWB RLE.  Recommendations for discharge: TCU  Rationale for recommendations: Patient alert and participatory this date, however limited by her confusion and fixations. Given patient's cognition, future operative procedure and impaired mobility she would benefit from TCU placement to progress strength and function to optimal levels prior to surgery.        Entered by: Matilde Caicedo 03/30/2020 10:18 AM     Thank you for your referral.  Matilde Caicedo, PT, DPT, ATC    [facility]  O: [phone]  E: [email or web address]

## 2020-03-31 NOTE — PROGRESS NOTES
[facility]    Medicine Progress Note - Hospitalist Service       Date of Admission:  3/23/2020  Assessment & Plan     Patient is a 89-year-old female with a known history of moderate to severe aortic stenosis and severe pulmonary hypertension who was admitted on 3/23/2020 after falling and sustaining a bilateral malleoli her right ankle fracture but on further evaluation was found to be in atrial fibrillation with RVR with a 37 pounds weight gain in the past 2 months with diffuse anasarca noted on exam as well as acute respiratory failure with hypoxia with a newly developed hyponatremia felt secondary to volume overload.  Orthopedic surgery evaluated the patient with appropriate splinting performed and it was recommended that surgery be delayed until volume overload could be further evaluation and addressed thereby allowing soft tissue rest and improving surgical outcomes, and patient has been seen by cardiology on an outpatient basis for surgical clearance given her complex cardiac history.  Patient was hospitalized and has been undergoing diuresis - she was transitioned to a Lasix drip on 3/27/2020 with improved urinary output down 19 pounds total to date - of note is up 1 lb from yesterday however I/O volumes continue to go down appropriately and visible edema is less today.  Renal function continues to be normal.  She continues to have improvement in her respiratory symptoms and hyponatremia but continues to have severe anasarca/edema in her legs.      Principal Problem:    Volume overload    Assessment: Patient currently receiving Lasix drip 5 mg/hr with 19 pound total weight loss since admission.  Renal function continues to be normal and blood pressures are tolerating diuresis without difficulty    Plan: Continue Lasix drip starting at 5 mg/h and monitor renal function and VBG daily to evaluate for any renal intolerance or metabolic impairment that may occur with ongoing  aggressive diuresis.     Active Problems:    Acute respiratory failure with hypoxia (H)    Assessment: Present at time of admission but resolved with ongoing diuresis.      Plan: Continue with Lasix drip as above and monitor closely for ongoing clinical improvement as diuresis occurs      New onset atrial fibrillation (H) with RVR, suspect valvular a fib    Assessment: Present at time of admission and possibly contributing to volume overload as outlined above, currently rate controlled on metoprolol and digoxin.  Echocardiogram shows normal left sided EF function    Plan:   Continue with digoxin and metoprolol and monitor for ongoing adequate heart rate control.  We will continue with Lovenox for anticoagulation at this time however if A. fib is persistent, patient may benefit from alternative anticoagulation.        Urinary retention    Assessment:  Patient had progressively worsening abdominal pain on 2/28/2020 with bladder scan confirming over 1000 mL.  Sears catheter was placed and patient had 1.6 L out immediately  with resolution of her abdominal pain.  Sears continues to be in place given ongoing aggressive diuresis    Plan: We will continue with Seras catheter in place during time of ongoing aggressive diuresis.  Will attempt a trial of voiding prior to discharge after aggressive diuresis can be discontinued.      Aortic valve stenosis, moderate to severe (increase from mild in 2016)    Assessment: Noted to be a moderate to severe in January of this year, may be contributing to volume overload picture and increases patient's risk of hypotension with ongoing aggressive diuresis    Plan: No acute intervention is needed but continue with cautious diuresis as above and monitor for any signs of blood pressure instability.  Patient will need outpatient cardiology evaluation going forward      Delirium    Assessment: patient has mild baseline confusion prior to this stay however in the first few days of her stay  she had not been sleeping well, been mixing up her days and nights and is starting to have fluctuating levels of consciousness consistent with delirium development the evening of 3/27/2020.  She was started on scheduled Seroquel at bedtime as well as PRN Seroquel on 3/28/2020 with improvement of her sleep pattern and adjustment of her dosing as appropriate    Plan:   Continue with cheduled bedtime dose to 12.5 mg at bedtime and prn dose to 6.25 mg every 6 hours prn and continue with supportive cares and encouragement of appropriate wake-sleep cycle.        Closed bimalleolar fracture of right ankle    Assessment: Occurring prior to this hospitalization, patient is currently nonweightbearing and orthopedic surgery has evaluated patient during this stay and following conversation with the patient's son/power of  decision has been made to attempt none surgical intervention going forward.  They will continue to be involved during this hospital stay intermittently to assist with splint management if ongoing aggressive diuresis resulted in significant weight loss.    Plan: Continue with current splint, nonweightbearing status and ongoing changing of the splint as deemed necessary by orthopedic team.  Patient will need outpatient follow-up with orthopedic surgery to evaluate for appropriate healing with nonsurgical intervention.      Severe pulmonary hypertension (H) per Echo Jan 2020    Assessment: Noted on echocardiogram 2 months ago, may be contributing to volume overload and respiratory failure as outlined above as well as the worsening right sided heart failure and tricuspid regurgitation noted on echo during this stay    Plan:  Continue with Lasix drip and monitor closely as above      Right sided congestive heart failure    Assessment:  Noted on echocardiogram during this hospitalization and likely contributing to anasarca and volume overload    Plan:  Continue with diuresis efforts as above      Severe  tricuspid regurgitation    Assessment:  Noted on echo during this stay, was mild in 1/2020 echocardiogram    Plan:  Continue with diuresis efforts as above, outpatient follow up with cardiology as above      Anasarca    Assessment: secondary to volume overload as above with patient continuing to have edema in bilateral legs but has resolved in the abdomen and arms    Plan:  Continue with Lasix drip and monitor closely as outlined above      Hyponatremia-admit Na 128    Assessment: Suspected secondary to volume overload with mary of 126 during the stay and sodium has been slowly increasing with ongoing diuresis.  It is 130 this morning.      Plan: Continue with diuretic efforts as outlined above and monitor for ongoing resolution.      Essential hypertension with goal blood pressure less than 140/90    Assessment: Was controlled prior to admission with losartan which has been held during this hospitalization and patient has been initiated on metoprolol for A. fib as well as aggressive diuresis with Lasix drip as above.  Blood pressures have been stable in the 110-140 range systolic over the past 24 hours     Plan: continue with metoprolol twice daily dosing and Lasix drip and monitor for blood pressure stability.  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    Assessment: Chronic and stable    Plan: Continue with diuresis efforts as above      Abnormal CXR-bilateral lower lung opacities, suspect atelectasis    Assessment: Noted initially but without concern for infection with patient afebrile and improving following diuresis as outlined above    Plan: Continue to monitor for ongoing adequate respiratory improvement with ongoing diuresis       Wheezing    Assessment:  Was present initially and thought likely secondary to volume overload status and atelectasis with patient improving on ipratropium as well as PRN Xopenex in addition to ongoing diuresis efforts    Plan: Continue with current plan of scheduled ipratropium, PRN Xopenex, and ongoing diuresis with transition to Lasix drip as outlined above      History of TIA (transient ischemic attack) and stroke    Assessment: Previous history, no concern for focal neurological deficit    Plan: Continue supportive care and monitor     Diet: Combination Diet Low Saturated Fat Na <2400mg Diet    DVT Prophylaxis: Enoxaparin (Lovenox) SQ  Sears Catheter: in place, indication: Retention  Code Status: DNR/DNI      Disposition Plan   Expected discharge: 3-4 days, recommended to long term care facility once Aggressive diuresis is completed.  Entered: Freya Sanon MD 03/31/2020, 10:27 AM       The patient's care was discussed with the Bedside Nurse, Care Coordinator/, Patient and Patient's Family.    Freya Sanon MD  Hospitalist Service  [facility]    ______________________________________________________________________    Interval History   Patient continues to be vitally stable and tolerated diuresis without difficulty with good urinary output Via Sears catheter.  She slept well last night and is oriented x4 this morning and is eating much better.  Weight is up just over a pound compared to yesterday.  Patient requested oxygen last evening as she felt it was necessary, however oxygen saturations were normal and patient was getting more more anxious therefore nursing staff put the nasal cannula in her nose but did not turn the oxygen on.  With just this alone the patient's anxiety lessened and she no longer felt like she was oxygen deprived.  This morning as the nasal cannula has been removed she has needed frequent reassurance from the nursing staff that her oxygen  levels are normal but does not show any obvious signs of respiratory distress.  No new concerns.    Data reviewed today: I reviewed all medications, new labs and imaging results over the last 24 hours.    Physical Exam   Vital Signs: Temp: 95.7  F (35.4  C) Temp src: Oral BP: 139/59 Pulse: 72 Heart Rate: 64 Resp: 18 SpO2: 97 %   Weight: 192 lbs .33 oz  Constitutional: awake, alert, cooperative, no apparent distress, and appears stated age  Respiratory: No increased work of breathing, good air exchange, clear to auscultation bilaterally, no crackles or wheezing  Cardiovascular: Irregularly irregular rhythm with ongoing 2 out of 6 systolic murmur heard best in the left upper sternal border  GI: Bowel sounds are present, abdomen is soft and nontender.  There is no pitting edema on palpation of the abdominal tissue  Skin: Patient continues to have pitting edema of bilateral legs up to the mid thighs however the upper thighs are much improved compared to yesterday with skin wrinkles now seen  Musculoskeletal:  Edema as above, right lower leg in split with bruising noted in region just above splint  Neurologic: Awake, alert, oriented to name, place and year and somewhat to situation    Data   Recent Labs   Lab 03/31/20  0553 03/30/20  0656 03/29/20  0528 03/28/20  0559 03/27/20  0616 03/26/20  0646   WBC  --   --  7.0  --  7.5 7.7   HGB  --  9.3* 9.1* 8.9* 9.7* 8.9*   MCV  --   --  88  --  87 87   PLT  --   --  246  --  279 255   * 133 130* 130* 131* 129*   POTASSIUM 3.6 3.6 3.9 3.5 3.8 3.8   CHLORIDE 93* 95  --  91* 92* 91*   CO2 33* 33*  --  37* 37* 34*   BUN 16 14  --  17 17 20   CR 0.70 0.69 0.78 0.75 0.74 0.75   ANIONGAP 4 5  --  2* 2* 4   WINSTON 8.4* 8.3*  --  8.5 8.6 8.3*   * 97  --  129* 106* 103*

## 2020-03-31 NOTE — PLAN OF CARE
Problem: Cardiac Disease Comorbidity  Goal: Cardiac Disease  Description: Patient comorbidity will be monitored for signs and symptoms of Cardiac Disease.  Problems will be absent, minimized or managed by discharge/transition of care.  3/31/2020 0343 by Carlos Vance RN  Outcome: No Change  3/30/2020 2157 by Richa Clarke RN  Outcome: Improving     Problem: Adult Inpatient Plan of Care  Goal: Plan of Care Review  3/31/2020 0343 by Carlos Vance RN  Outcome: Improving  3/30/2020 2157 by Richa Clarke RN  Outcome: Improving  Goal: Patient-Specific Goal (Individualization)  3/31/2020 0343 by Carlos Vance RN  Outcome: Improving  3/30/2020 2157 by Richa Clarke RN  Outcome: Improving  Goal: Absence of Hospital-Acquired Illness or Injury  3/31/2020 0343 by Carlos Vance RN  Outcome: Improving  3/30/2020 2157 by Richa Clarke RN  Outcome: Improving  Goal: Optimal Comfort and Wellbeing  3/31/2020 0343 by Carlos Vance RN  Outcome: Improving  3/30/2020 2157 by Richa Clarke RN  Outcome: Improving  Goal: Readiness for Transition of Care  3/30/2020 2157 by Richa Clarke RN  Outcome: Improving  Goal: Rounds/Family Conference  3/30/2020 2157 by Richa Clarke RN  Outcome: Improving     Problem: Heart Failure Comorbidity  Goal: Maintenance of Heart Failure Symptom Control  3/31/2020 0343 by Carlos Vance RN  Outcome: Improving  3/30/2020 2157 by Richa Clarke RN  Outcome: Improving     Problem: Pain Acute  Goal: Optimal Pain Control  3/31/2020 0343 by Carlos Vance RN  Outcome: Improving  3/30/2020 2157 by Richa Clarke RN  Outcome: Improving     Pt sleeping well this shift with no complaints.   Repositioned for comfort.  Bi-lateral lower extremities remain edematous.   Iv is patent and infusing lasix drip.  Sears catheter remains in place with catrachita yellow output in moderate amounts.   Bm this shift.   Purple bruising to right lower extremity.   Vitals remain stable.  "  /44 (BP Location: Left arm)   Pulse 73   Temp 96.1  F (35.6  C) (Oral)   Resp 18   Ht 1.6 m (5' 3\")   Wt 87.8 kg (193 lb 9 oz)   SpO2 93%   BMI 34.29 kg/m    Lung sounds clear and diminished.   Telemetry remains in place in Afib with bi-jeminy and tri-jeminy.   Will continue to monitor and promote comfort as care continues.  "

## 2020-03-31 NOTE — PLAN OF CARE
VSS. Denies pian in RLE at rest. States she does have pain with movement, however, no non-verbal s/s of pain noted with transfers or repositioning. RLE elevated with bone foam. LS clear. Cough is congested and performed well with encouragement. +3 edema to LLE, +2 sacral edema. Skin intact, redness to right groin. 825 cc UOP. Good appetite. x2 loose incontinent BM today. 5mg/hr lasix gtt continues. Continue to monitor.

## 2020-03-31 NOTE — PLAN OF CARE
Discharge Planner PT   Patient plan for discharge: my other room  Current status: Patient complaining of sore under her lower denture plate, dentures removed and pink note left for MD. Supine to sitting EOB with HOB elevated, max assist of upper and lower trunk, able to move RLE across bed. Significant effort to complete and fatgiued. Short sitting balance with fading postural control with heels on ground and UE support. Completed short sitting reaching in all directions with improved trunk control and upright posture. Completed sit to stand x 3 with mod assist for lift off and to maintain upright control, less than 5 seconds for each bout in standing. Stand to sit with poor eccentric control and right trunk lean. Patient with eyes closed during last standing bout and unable to mobilize self back in bed or to supine, dependent sit to supine transfer assist x 1. Dependent bilat LE into and across bed. Unable to sequent scooting up in bed, dependent x 2 boost up in bed. Dependent for positioning of UEs and pillow support to prevent right trunk lean, RLE supported on foam wedge for elevation with good positioning.   Barriers to return to prior living situation: Medical status, non-operative RLE fracture NWB, impaired mobility, cognition and impaired safety insight  Recommendations for discharge: Long term care placement with HHPT, wheelchair transport  Rationale for recommendations: Patient continues to demonstrate slow improvement in participation and functional mobility. Patient would benefit from long term care placement given non-operative treatment of RLE injury, 24/7 assistance needs and to provide the level of physical assistance needed at this time. Patient would benefit from HHPT to continue progressing strength, endurance, and functional mobility as patient tolerates for improved quality of life.       Entered by: Matilde Caicedo 03/31/2020 11:11 AM     Thank you for your referral.  Matilde Caicedo, PT, DPT,  ANGEL[facility]    [facility]  O: [phone]  E: [email or web address]

## 2020-03-31 NOTE — PLAN OF CARE
VSS. Afebrile. Orientated x4 but confused at time. Pt states she has pain in her left groin area. PRN tylenol given x1. Sears in place with good urine output. BM on shift. Pericare complete. Lasix gtt running with NS at 10ml/hr for a carrier. CMS intact in right foot. Will continue to monitor.

## 2020-04-01 PROBLEM — E87.3 METABOLIC ALKALOSIS: Status: ACTIVE | Noted: 2020-01-01

## 2020-04-01 PROBLEM — E87.6 HYPOPOTASSEMIA: Status: ACTIVE | Noted: 2020-01-01

## 2020-04-01 NOTE — PLAN OF CARE
VSS. On room air with sats above 90%. Afebrile. Tylenol given x1 for slight pain in the right leg. Up in chair for dinner. Tolerating diet and good oral intake. 450ml of yellow urine out of pack this shift. Lasix gtt continues with NS carrier. Weight yesterday was 87.7kg down today to 85.6kg. Led elevated with ice pack in place. Will continue to monitor.

## 2020-04-01 NOTE — PROGRESS NOTES
[facility]    Medicine Progress Note - Hospitalist Service       Date of Admission:  3/23/2020  Assessment & Plan    89-year-old woman admitted after presenting with acute right bimalleolar ankle fracture after a fall and noted to have severe volume overload and anasarca probably due to a combination of chronic mitral stenosis and severe pulmonary hypertension.  New atrial fibrillation with initial rapid ventricular response may have also contributed to clinical presentation similar to decompensated heart failure.  Volume status continues to gradually improve.  Aside from metabolic alkalosis and mild hypokalemia, she appears to be tolerating aggressive diuresis fairly well so far.  Rate of atrial fibrillation remains well controlled with current regimen and she is receiving Lovenox anticoagulation prophylactically.  She is a candidate for longer term anticoagulation for valvular atrial fibrillation, and operative repair of her ankle fracture is no longer anticipated.  Initial delirium has improved.  Signs of respiratory failure have also improved.    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    Hyponatremia-admit Na 128    Delirium    Hypopotassemia    Metabolic alkalosis    Essential hypertension with goal blood pressure less than 140/90    History of TIA (transient ischemic attack) and stroke    Wheezing    Continue IV Lasix infusion probably for at least another 24 to 48 hours  Increase potassium chloride supplementation to mitigate metabolic  alkalosis and hypokalemia, use additional potassium chloride supplementation according to protocol when indicated and follow potassium closely  Continue Sears catheter because of aggressive diuresis and transient urinary retention  Wean oxygen as tolerated  Orthopedic surgery has been managing her ankle fracture and splinting thereof  Continue Lovenox bridging and start warfarin anticoagulation    Diet: Combination Diet Low Saturated Fat Na <2400mg Diet    DVT Prophylaxis: Enoxaparin (Lovenox) SQ and Warfarin  Sears Catheter: in place, indication: Retention  Code Status: DNR/DNI      Disposition Plan   Expected discharge: 2 to 4 days, recommended to Long-term care once Volume status has improved.  Entered: Rocco Melton MD 04/01/2020, 12:48 PM       The patient's care was discussed with the Patient.    Rocco Melton MD  Hospitalist Service  [facility]    ______________________________________________________________________    Interval History   She is worried that her leg swelling has not improved.  She had been having more severe toe pain in her right foot that has now improved today after her splint was adjusted by the orthopedic surgery team.  Her shortness of breath has improved.  She has not had fever and has been hemodynamically stable.  Oxygenation is improving and she has weaned off of oxygen supplementation during the day today while awake.  She is tolerating oral intake.  Urine output has been good.  She continues to have intermittent sudden urges to have a bowel movement.  She sometimes does have a bowel movement and other times the urge passes without moving her bowels.  She says her bowel movements have been loose.  She denies any abdominal cramping.  She denies any nausea.    Data reviewed today: I reviewed all medications, new labs and imaging results over the last 24 hours. I personally reviewed telemetry which has not demonstrated any new dysrhythmias over the  last day and rate of atrial fibrillation appears to be adequately controlled.    Physical Exam   Vital Signs: Temp: 97  F (36.1  C) Temp src: Oral BP: 106/43 Pulse: 62 Heart Rate: 62 Resp: 20 SpO2: 97 % O2 Device: None (Room air) Oxygen Delivery: 1 LPM  Weight: 188 lbs 11.42 oz  General Appearance: Appears anxious while lying in bed  Respiratory: Normal respiratory effort, good air movement, clear lungs  Cardiovascular: Irregularly irregular heart rate and rhythm, palpable radial pulse, normal capillary refill, moderate pitting edema present in the lower legs bilaterally  GI: Nondistended abdomen, soft, no abdominal tenderness  Other: Alert and maintains wakefulness and attention, answering questions appropriately    Data   Recent Labs   Lab 04/01/20  0622 03/31/20  0553 03/30/20  0656 03/29/20  0528 03/28/20  0559 03/27/20  0616 03/26/20  0646   WBC  --   --   --  7.0  --  7.5 7.7   HGB  --   --  9.3* 9.1* 8.9* 9.7* 8.9*   MCV  --   --   --  88  --  87 87     --   --  246  --  279 255   * 130* 133 130* 130* 131* 129*   POTASSIUM 3.4 3.6 3.6 3.9 3.5 3.8 3.8   CHLORIDE 94 93* 95  --  91* 92* 91*   CO2 33* 33* 33*  --  37* 37* 34*   BUN 19 16 14  --  17 17 20   CR 0.71 0.70 0.69 0.78 0.75 0.74 0.75   ANIONGAP 5 4 5  --  2* 2* 4   WINSTON 8.6 8.4* 8.3*  --  8.5 8.6 8.3*   GLC 99 101* 97  --  129* 106* 103*     Venous blood gas today pH 7.52, PCO2 43, bicarbonate 35    Medications     - MEDICATION INSTRUCTIONS -       furosemide (LASIX) infusion ADULT STANDARD 5 mg/hr (04/01/20 0454)     sodium chloride 10 mL/hr at 03/31/20 6130       ALPRAZolam  0.25 mg Oral TID     aspirin  81 mg Oral Daily     citalopram  20 mg Oral Daily     dicyclomine  20 mg Oral TID AC     digoxin  125 mcg Oral Daily     enoxaparin ANTICOAGULANT  40 mg Subcutaneous Q24H     metoprolol tartrate  25 mg Oral BID     miconazole   Topical BID     potassium chloride  10 mEq Oral BID     QUEtiapine  12.5 mg Oral At Bedtime     simvastatin  20  mg Oral At Bedtime     sodium chloride (PF)  3 mL Intracatheter Q8H

## 2020-04-01 NOTE — PLAN OF CARE
Discharge Planner PT   Patient plan for discharge: My other room  Current status: Supine to sit EOB with HOB elevated, max assist of upper body and trunk, able to move her legs to EOB.  Sitting balance with L foot on floor and R heel of cast on floor and UE support CGA.  Pt completed sit to stand x 3 holding each for about 5 secs with PT cueing at gluteal region to get pelvis under her and stand straight up.  Pt is not able to maintain NWB once standing up, R heel is touching floor but not demonstrating the R trunk lean today that she did yesterday.  Pt became very fatigued and wanting her brief changed.  Dependent for sit to supine transfer, unable to scoot self up in bed, dependent boost in bed x 2 with bed in trendelenberg position.  Pt did perform rolling side to side in bed with modA x 1 working on trunk control and strengthening. Pt was able to complete Eli of R SLR to get leg up on foam wedge for elevation.  Barriers to return to prior living situation: Medical status, non-operative R LE fracture NWB, impaired mobility, cognition and impaired safety insight  Recommendations for discharge: Long term care placement with HHPT, wheelchair transport  Rationale for recommendations: Pt continues to demonstrate slow improvements with participation and functional mobility.  She managed to help more with supine to sit transfer and able to lift her R LE up more to help get on foam wedge.  Pt would benefit from long term care placement given non-operative treatment of R LE fracture, 24/7 assistance needs and to provided the level of physical assistance needed at this time.  Pt would also benefit from HHPT to continue progressing her strength, endurance, and functional mobility.       Entered by: Ivet Stanford 04/01/2020 10:10 AM     Thank you for your referral.    Ivet Stanford, PT, DPT  [facility]  [phone]

## 2020-04-01 NOTE — PROGRESS NOTES
"Asked to evaluate splints due to concerns with little toe.     S: patient states her little toe is hurting and feels pressure against it. States otherwise the ankle is feeling better. Per nursing has been questionable elevating of the leg throughout the night shift, has been doing strict edema control today. Patient has been complaining of little toe pain this AM..  No new injuries reported. Working some with therapies.      O: /50   Pulse 72   Temp 96.9  F (36.1  C) (Oral)   Resp 18   Ht 1.6 m (5' 3\")   Wt 85.6 kg (188 lb 11.4 oz)   SpO2 96%   BMI 33.43 kg/m    Patient laying in bed in NAD. Patient orient to person, event.  Some confusion.   Leg elevated on bone foam pillow.    Splint and ace wrap CDI and in place.  The foot appears mildly more edematous than yesterday.   Splint is well fitting without being overly tight or loose.   There is a small area on the lateral side of the little toe where the splint is creating a pressure point to the skin at the level of the PIP joint. There is a small amount of indent and is reddened, but not open.  Distal pedal pulse intact, cap refill <2, sensation and movement intact.     Plan:  Trimmed back the splint to proximal of this pressure area. There is now no current pressure area. Placed lambs wool padding between the splint and the skin along the end distal the splint.    CMS unchanged after this. Patient reports immediate pain relief to the little toe with the splint being trimmed back in this one area.  Will continue to monitor.    Recommend strict edema control with the ankle above the level of the heart at all times whether in bed or in the chair except when transferring or ambulating or working with therapies.   Continue NWB.   Icing.       ASHLEY Winters, CNP  Orthopedic Surgery      "

## 2020-04-01 NOTE — PHARMACY-ANTICOAGULATION SERVICE
Clinical Pharmacy - Warfarin Dosing Consult     Pharmacy has been consulted to manage this patient s warfarin therapy.  Indication: Atrial Fibrillation  Therapy Goal: INR 2-3  Warfarin Prior to Admission: No  Recent documented change in oral intake/nutrition: Unknown    INR   Date Value Ref Range Status   02/23/2017 1.00 0.86 - 1.14 Final       Recommend warfarin 2.5 mg today.  Pharmacy will monitor Linn Sanon daily and order warfarin doses to achieve specified goal.      Please contact pharmacy as soon as possible if the warfarin needs to be held for a procedure or if the warfarin goals change.

## 2020-04-01 NOTE — PLAN OF CARE
VSS. RA with O2 sats >90%. [place] NP trimmed cast to relieve pressure to outer toe. LS clear with diminished bases. Good productive cough with encouragement. Up to chair with lift for meals. Unable to stand with walker and A-2 for more than 10 seconds. Forgetful and anxious throughout shift. Needs much reassurance. 900cc UOP. Good oral intake.  x2 bm's. Weight is trending down. Lasix gtt continues at 5mg/hr. RLE needs to be elevated on bone foam at all times. Continue to monitor.

## 2020-04-01 NOTE — PLAN OF CARE
Problem: Adult Inpatient Plan of Care  Goal: Plan of Care Review  4/1/2020 0421 by Carlos Vance RN  Outcome: Improving  3/31/2020 1452 by Melissa Lee RN  Outcome: Improving  Goal: Patient-Specific Goal (Individualization)  4/1/2020 0421 by Carlos Vance RN  Outcome: Improving  3/31/2020 1452 by Melissa Lee RN  Outcome: Improving  Goal: Absence of Hospital-Acquired Illness or Injury  4/1/2020 0421 by Carlos Vance RN  Outcome: Improving  3/31/2020 1452 by Melissa Lee RN  Outcome: Improving  Goal: Optimal Comfort and Wellbeing  4/1/2020 0421 by Carlos Vance RN  Outcome: Improving  3/31/2020 1452 by Melissa Lee RN  Outcome: Improving  Goal: Readiness for Transition of Care  4/1/2020 0421 by Carlos Vance RN  Outcome: Improving  3/31/2020 1452 by Melissa Lee RN  Outcome: Improving  Goal: Rounds/Family Conference  3/31/2020 1452 by Melissa Lee RN  Outcome: Improving     Problem: Cardiac Disease Comorbidity  Goal: Cardiac Disease  Description: Patient comorbidity will be monitored for signs and symptoms of Cardiac Disease.  Problems will be absent, minimized or managed by discharge/transition of care.  4/1/2020 0421 by Carlos Vance RN  Outcome: Improving  3/31/2020 1452 by Melissa Lee RN  Outcome: Improving     Problem: Heart Failure Comorbidity  Goal: Maintenance of Heart Failure Symptom Control  4/1/2020 0421 by Carlos Vance RN  Outcome: Improving  3/31/2020 1452 by Melissa Lee RN  Outcome: Improving     Problem: Pain Acute  Goal: Optimal Pain Control  4/1/2020 0421 by Carlos Vance RN  Outcome: Improving  3/31/2020 1452 by Melissa Lee RN  Outcome: Improving     Pt remains oriented to self and somewhat to situation this shift and has had not complaints of pain and or any other complaints.   Pt continues to present anxious and forgetful.  CMS remains in tact.   Pedal pulses strong.   Iv is patent and infusing.   Sears catheter remains in place with light yellow  "output.   Pt does use the call light as advised.   Pt repositioned for comfort.   Small BM this shift.   Vitals remain stable.   /55 (BP Location: Right arm)   Pulse 65   Temp 96.8  F (36  C) (Oral)   Resp 18   Ht 1.6 m (5' 3\")   Wt 87.7 kg (193 lb 5.5 oz)   SpO2 96%   BMI 34.25 kg/m    Left lower leg remains edematous.   Will continue to monitor and promote comfort as care continues.    "

## 2020-04-02 NOTE — PROGRESS NOTES
[facility]    Medicine Progress Note - Hospitalist Service       Date of Admission:  3/23/2020  Assessment & Plan    89-year-old woman admitted with bimalleolar right ankle fracture and severe volume overload with anasarca and respiratory failure due to valvular heart disease particularly mitral stenosis and severe pulmonary hypertension.  Clinical presentation is consistent with acute heart failure with preserved EF due to valvular heart disease and pulmonary hypertension.  Signs of volume overload are gradually improving with continuous IV Lasix infusion which she has tolerated so far hemodynamically despite significant underlying aortic stenosis.  Contraction metabolic alkalosis from aggressive diuresis is improved today after increasing potassium chloride supplementation yesterday.  Signs of respiratory failure are resolving.  She also has had new atrial fibrillation, but rate has been well controlled on low-dose beta-blocker.  Initial hyponatremia and delirium have improved.    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    Hyponatremia-admit Na 128    Delirium    Hypopotassemia    Metabolic alkalosis    Essential hypertension with goal blood pressure less than 140/90    History of TIA (transient ischemic attack) and stroke    Wheezing    Continue Lasix infusion with ongoing indwelling Sears catheter to monitor urine output and because of urinary retention previously  Continue to follow renal function and potassium closely, replace potassium as  indicated  Continuing Lovenox bridging with warfarin until INR is therapeutic  Discontinue telemetry    Diet: Combination Diet Low Saturated Fat Na <2400mg Diet    DVT Prophylaxis: Enoxaparin (Lovenox) SQ and Warfarin  Sears Catheter: in place, indication: Retention  Code Status: DNR/DNI      Disposition Plan   Expected discharge: 2 - 3 days, recommended to Skilled nursing facility once Volume status has improved.  Entered: Rocco Melton MD 04/02/2020, 11:38 AM       The patient's care was discussed with the Bedside Nurse, Care Coordinator/ and Patient.    Rocco Melton MD  Hospitalist Service  [facility]    ______________________________________________________________________    Interval History   She overall feels about the same today.  She has no new complaints although she does have some questions.  The toe on her right foot continues to hurt although the pain is not as bad since her splint was trimmed yesterday by orthopedics.  She has been afebrile.  Heart rate has ranged from mildly bradycardic to normal.  Blood pressure has been stable.  She has not required oxygen supplementation now for over 24 hours.  Urine output remains good.  She is tolerating oral intake.  She continues to require heavy assistance with any attempt at transferring and is nonambulatory because of her nonweightbearing status on the right foot.    Data reviewed today: I reviewed all medications, new labs and imaging results over the last 24 hours. I personally reviewed telemetry demonstrating atrial fibrillation with controlled ventricular response.    Physical Exam   Vital Signs: Temp: 97.7  F (36.5  C) Temp src: Oral BP: (!) 140/57 Pulse: 77 Heart Rate: 77 Resp: 16 SpO2: 92 % O2 Device: None (Room air)    Weight: 186 lbs 4.62 oz   Vitals:    04/01/20 0312 04/01/20 0748 04/02/20 0238   Weight: 87.7 kg (193 lb 5.5 oz) 85.6 kg (188 lb 11.4 oz) 84.5 kg (186 lb 4.6 oz)     Wt Readings from  Last 4 Encounters:   04/02/20 84.5 kg (186 lb 4.6 oz)   02/26/20 79.8 kg (176 lb)   02/21/20 77.8 kg (171 lb 8 oz)   02/20/20 76.9 kg (169 lb 8 oz)       General Appearance: Appears anxious, no respiratory distress  Respiratory: Normal respiratory effort, clear lungs  Cardiovascular: Irregularly irregular heart rate and rhythm, normal capillary refill, moderate pitting edema in the left lower leg  Skin: Small bruise at the lateral base of the right fifth toe, no open skin lesions seen in that area  Other: Right foot and lower leg are bandaged and splinted    Data   Recent Labs   Lab 04/02/20  0629 04/01/20  0622 03/31/20  0553 03/30/20  0656 03/29/20  0528 03/28/20  0559 03/27/20  0616   WBC  --   --   --   --  7.0  --  7.5   HGB  --   --   --  9.3* 9.1* 8.9* 9.7*   MCV  --   --   --   --  88  --  87   PLT  --  295  --   --  246  --  279   INR 1.10  --   --   --   --   --   --     132* 130* 133 130* 130* 131*   POTASSIUM 3.7 3.4 3.6 3.6 3.9 3.5 3.8   CHLORIDE 95 94 93* 95  --  91* 92*   CO2 35* 33* 33* 33*  --  37* 37*   BUN 16 19 16 14  --  17 17   CR 0.70 0.71 0.70 0.69 0.78 0.75 0.74   ANIONGAP 3 5 4 5  --  2* 2*   WINSTON 8.7 8.6 8.4* 8.3*  --  8.5 8.6   * 99 101* 97  --  129* 106*     Venous blood gas pH 7.45, PCO2 53, bicarbonate 37 today    Medications     - MEDICATION INSTRUCTIONS -       furosemide (LASIX) infusion ADULT STANDARD 5 mg/hr (04/02/20 0234)     sodium chloride 10 mL/hr at 04/02/20 4926     Warfarin Therapy Reminder         ALPRAZolam  0.25 mg Oral TID     aspirin  81 mg Oral Daily     citalopram  20 mg Oral Daily     dicyclomine  20 mg Oral TID AC     digoxin  125 mcg Oral Daily     enoxaparin ANTICOAGULANT  40 mg Subcutaneous Q24H     metoprolol tartrate  25 mg Oral BID     miconazole   Topical BID     potassium chloride  20 mEq Oral TID     QUEtiapine  12.5 mg Oral At Bedtime     simvastatin  20 mg Oral At Bedtime     sodium chloride (PF)  3 mL Intracatheter Q8H     warfarin  ANTICOAGULANT  2.5 mg Oral ONCE at 18:00

## 2020-04-02 NOTE — PLAN OF CARE
Discharge Planner PT   Patient plan for discharge: Home  Current status: Sitting up in chair with RLE elevated on leg rest and pillow. Completed bilat LE and UE AROM tasks to preserve joint mobility and improve antigravity strength. MOD IND scooting forward in recliner for set up. Sit to stand from recliner with verbal cues for hand placement on arm rest, mod assist for lift assist. Unable to place bilat UE on walker due to fear of falling and low height of chair. Completed 3 standing bouts of 5 seconds each 1 standing bout for 3 seconds, MOD assist to maintain posture. Maintained right heel contact on ground, however no weight bearing demonstrated. Poor eccentric control stand to sit. Ameya transfer from recliner to bed assist x 2 with patient compliant with precautions with mobilization. Left in care of nurse for pericares.   Barriers to return to prior living situation: Medical status, level of assistance for cares and mobility, NWB RLE, impaired cognition  Recommendations for discharge: Long term care placement with HHPT and wheelchair transport (ameya transfer into chair)  Rationale for recommendations: Patient with continued impaired insight to her deficits, however completed greater number and duration of standing bouts. Patient agreeable to participation however requires cue to complete tasks properly and fully. Patient would benefit from long term care placement given non-operative treatment of RLE injury, 24/7 assistance needs and to provide the level of physical assistance needed at this time. Patient would benefit from HHPT to continue progressing strength, endurance, and functional mobility as patient tolerates for improved quality of life.       Entered by: Matilde Caicedo 04/02/2020 9:29 AM     Thank you for your referral.  Matilde Caicedo, PT, DPT, ATC    [facility]  O: [phone]  E: [email or web address]

## 2020-04-02 NOTE — PROGRESS NOTES
"Pt sleeping most of the shift with no complaints of pain and or any other complaints.   Right leg remains elevated on the wedge.   CMS remains in tact.   Pt remains disoriented to time and somewhat to situation, and continuously anxious but cooperative with cares.   Sears remains in place with pale yellow out put.   Vitals remain stable.   /61 (BP Location: Right arm)   Pulse 70   Temp 97.3  F (36.3  C) (Oral)   Resp 16   Ht 1.6 m (5' 3\")   Wt 85.6 kg (188 lb 11.4 oz)   SpO2 94%   BMI 33.43 kg/m    Will continue to monitor and promote comfort as care continues.   "

## 2020-04-02 NOTE — PLAN OF CARE
Major Shift Events:  No acute events this shift.   Neuro: alert and oriented, PAINTER, Up to chair with lift.   Resp; Remains on RA with LS clear/dim  CV; Tele discontinued.   GI: tolerating cardiac diet. BM X 3.   : Sears to gravity with adequate output.   Skin: Turned every 2 hours.   Lines and gtts: Lasix drip @ 5mg/hr    Plan: Continue POC.   For vital signs and complete assessments, please see documentation flowsheets.     Scar Ritter RN

## 2020-04-02 NOTE — PHARMACY-ANTICOAGULATION SERVICE
Clinical Pharmacy - Warfarin Dosing Consult     Pharmacy has been consulted to manage this patient s warfarin therapy.  Indication: Atrial Fibrillation  Therapy Goal: INR 2-3  Warfarin Prior to Admission: No  Recent documented change in oral intake/nutrition: Unknown    INR   Date Value Ref Range Status   04/02/2020 1.10 0.86 - 1.14 Final   02/23/2017 1.00 0.86 - 1.14 Final       Recommend warfarin 2.5 mg today.  Pharmacy will monitor Linn Sanon daily and order warfarin doses to achieve specified goal.      Please contact pharmacy as soon as possible if the warfarin needs to be held for a procedure or if the warfarin goals change.

## 2020-04-02 NOTE — PLAN OF CARE
"S-(situation): End of shift note    B-(background): Rt ankle fracture    A-(assessment): Vital signs stable. /57 (BP Location: Right arm)   Pulse 54   Temp 97  F (36.1  C) (Oral)   Resp 16   Ht 1.6 m (5' 3\")   Wt 84.5 kg (186 lb 4.6 oz)   SpO2 92%   BMI 33.00 kg/m  . Afebrile. Down another 2 kg from yesterday. +2 edema in left leg.  Lung sounds CTA B/L.  On RA. Denies SOB.   A&O x 2, self & place. Tele rhythm afib. Bowel sounds normoactive. NWB, lift x 2.  Voiding adequately with pack.  IV site asymptomatic, lasix at 5/hr. Able to make needs known and uses call light appropriately.     R-(recommendations): Continue to monitor per care plan.    "

## 2020-04-02 NOTE — PROGRESS NOTES
SPIRITUAL HEALTH SERVICES  SPIRITUAL ASSESSMENT Progress Note  [facility] -     Referral Source:    I shared a reflective conversation with Linn which incorporated elements of illness and family narrative along with spiritual history.    - Linn was eating as I arrived.  She stated she just wasn't doing very well.  She shared some of health story and a lengthy family narrative around the loss of her , her worries for her son, Gaurav, who has to take care of all her things, her sadness about not being able to enjoy her new apartment with the new furniture that Gaurav bought for her, and other worries and anxieties that she was feeling.  I provided supportive listening.    - As I was going to offer to pray for Linn she started to pray and spent a significant amount of time pouring out her concerns and requests to God.  I concluded the visit with prayer that dovetailed with what she had been saying.    Plan:  will monitor patient's ongoing need for support during LOS.      Hebert Ramirez M.A., [facility]  Staff Chaplain [facility]  Office: [phone]  Cell: [phone]  Pager [phone]

## 2020-04-03 PROBLEM — R33.9 URINARY RETENTION: Status: ACTIVE | Noted: 2020-01-01

## 2020-04-03 PROBLEM — I50.9 ACUTE HEART FAILURE (H): Status: ACTIVE | Noted: 2020-01-01

## 2020-04-03 NOTE — PROGRESS NOTES
[facility]    Medicine Progress Note - Hospitalist Service       Date of Admission:  3/23/2020  Assessment & Plan    89-year-old woman admitted with bimalleolar right ankle fracture and signs of acute heart failure with preserved EF due to mitral stenosis and severe pulmonary hypertension.   Signs of volume overload continue to gradually improve with IV Lasix infusion which she has tolerated so far hemodynamically despite significant underlying aortic stenosis.  Signs of acute respiratory failure have resolved.  Rate of atrial fibrillation has been well controlled on low-dose beta-blocker.  Hyponatremia has slightly worsened over the last day although remains improved compared with admission, and she is borderline hypochloremic today probably due to aggressive loop diuretic therapy.  Previous delirium has improved if not resolved.    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    Aortic valve stenosis, moderate to severe (increase from mild in 2016)    Closed bimalleolar fracture of right ankle    Bilateral pleural effusion    Abnormal CXR-bilateral lower lung opacities, suspect atelectasis    Anasarca    Hyponatremia-admit Na 128    Delirium    Hypopotassemia    Metabolic alkalosis    Urinary retention    Essential hypertension with goal blood pressure less than 140/90    History of TIA (transient ischemic attack) and stroke    Wheezing    Continue Lasix infusion and continue indwelling Sears catheter not only for monitoring urine output but because of previous urinary retention after starting Lasix infusion, anticipate transition to an oral diuretic once her weight is closer to 180 pounds  Increase potassium chloride supplementation    Diet:  Combination Diet Low Saturated Fat Na <2400mg Diet    DVT Prophylaxis: Enoxaparin (Lovenox) SQ and Warfarin  Sears Catheter: in place, indication: Strict 1-2 Hour I&O  Code Status: DNR/DNI      Disposition Plan   Expected discharge: 2 - 3 days, recommended to transitional care unit versus long-term care once Heart failure has improved and diuretic plan has been established.  Entered: Rocco Melton MD 04/03/2020, 1:31 PM       The patient's care was discussed with the Care Coordinator/ and Patient.    Rocco Melton MD  Hospitalist Service  [facility]    ______________________________________________________________________    Interval History   Patient says that the small toe of her right foot again is more sore today.  She denies any worsening shortness of breath.  She is worried that her leg swelling does not seem better.  She has been afebrile with normal heart rate.  Blood pressure has been normal.  She has maintained adequate oxygenation in room air for over 24 hours.  She is tolerating oral intake.  She remains nonweightbearing on the right leg and being assisted by nursing for transfers from bed to chair.  Urine output in Sears catheter has been good.    Data reviewed today: I reviewed all medications, new labs and imaging results over the last 24 hours. I personally reviewed no images or EKG's today.    Physical Exam   Vital Signs: Temp: 98  F (36.7  C) Temp src: Oral BP: 115/54 Pulse: 68 Heart Rate: 65 Resp: 16 SpO2: 96 % O2 Device: None (Room air)    Weight: 184 lbs 1.35 oz  General Appearance: Appears anxious, no other distress while sitting in a chair  Respiratory: Normal respiratory effort, clear lungs  Cardiovascular: Irregularly irregular heart rate and rhythm, grade 3/6 systolic murmur, normal capillary refill, mild to moderate pitting edema in the left lower extremity  Other: Right lower extremity is presently wrapped with Ace bandage over a splint,  fifth toe of the right foot has a very small superficial ecchymosis at the base laterally in that area is presently being contacted by the Ace bandage but there is no open skin ulcer or erythema of that toe    Data   Recent Labs   Lab 04/03/20  0557 04/02/20  0629 04/01/20  0622  03/30/20  0656 03/29/20  0528 03/28/20  0559   WBC  --   --   --   --   --  7.0  --    HGB  --   --   --   --  9.3* 9.1* 8.9*   MCV  --   --   --   --   --  88  --    PLT  --   --  295  --   --  246  --    INR 1.20* 1.10  --   --   --   --   --    * 133 132*   < > 133 130* 130*   POTASSIUM 3.9 3.7 3.4   < > 3.6 3.9 3.5   CHLORIDE 93* 95 94   < > 95  --  91*   CO2 34* 35* 33*   < > 33*  --  37*   BUN 19 16 19   < > 14  --  17   CR 0.74 0.70 0.71   < > 0.69 0.78 0.75   ANIONGAP 4 3 5   < > 5  --  2*   WINSTON 8.9 8.7 8.6   < > 8.3*  --  8.5   * 100* 99   < > 97  --  129*    < > = values in this interval not displayed.       Medications     furosemide (LASIX) infusion ADULT STANDARD 5 mg/hr (04/03/20 0854)     sodium chloride 10 mL/hr at 04/03/20 0900     Warfarin Therapy Reminder         ALPRAZolam  0.25 mg Oral TID     aspirin  81 mg Oral Daily     citalopram  20 mg Oral Daily     dicyclomine  20 mg Oral TID AC     digoxin  125 mcg Oral Daily     enoxaparin ANTICOAGULANT  40 mg Subcutaneous Q24H     metoprolol tartrate  25 mg Oral BID     miconazole   Topical BID     potassium chloride  20 mEq Oral TID     QUEtiapine  12.5 mg Oral At Bedtime     simvastatin  20 mg Oral At Bedtime     sodium chloride (PF)  3 mL Intracatheter Q8H     warfarin ANTICOAGULANT  5 mg Oral ONCE at 18:00

## 2020-04-03 NOTE — PROGRESS NOTES
Major Shift Events:  No acute events this shift. Patient remains on lasix gtt with adequate urine output from pack.     Plan: Continue POC.   For vital signs and complete assessments, please see documentation flowsheets.     Scar Ritter RN

## 2020-04-03 NOTE — PLAN OF CARE
"S-(situation): End of shift note    B-(background): Rt ankle fracture    A-(assessment): Vital signs stable.  /56 (BP Location: Right arm)   Pulse 71   Temp 98.9  F (37.2  C) (Oral)   Resp 20   Ht 1.6 m (5' 3\")   Wt 83.5 kg (184 lb 1.4 oz)   SpO2 92%   BMI 32.61 kg/m  . Afebrile. Lung sounds CTA B/L.  Pt alert. C/o rt ankle pain, controlled well with tylenol x 1.  Bowel sounds normoactive. BM x 1.   Voiding adequately, with pack in place.  IV site asymptomatic. Left ankle and foot +3-4 edema, rt leg +2.  Able to make needs known and uses call light appropriately.     R-(recommendations): Continue to monitor per care plan.    "

## 2020-04-03 NOTE — PLAN OF CARE
Discharge Planner PT   Patient plan for discharge: Home  Current status: Rolling right min assist for cues, IND reaching and pulling on grab bar. Rolling left mod assist with physical guidance of RUE reaching and lower trunk initiation. Patient able to recall procedure for emmy lift without cues. Increased LLE edema throughout. Continued improvement in RLE swelling, no shifting within splint observed. Set up in recliner for meal. Attempted to elevate bilat LE however unable to due to tray proximity. PT returned following meal, completed bilat UE and LE AROM tasks with increased shortness of air, required visual cues for task continuation. Sit to stand with front wheeled walker, mod - max assist x 1 with standing endurance less than 3 seconds x 3 standing bouts. Able to MOD IND scoot forward and back in recliner for set up.   Barriers to return to prior living situation: Medical status, level of assistance for cares and mobility, NWB RLE, impaired cognition  Recommendations for discharge: Long term care placement with HHPT and wheelchair transport (emmy transfer into chair)  Rationale for recommendations: Patient with decline in activity tolerance and strength. Patient remains agreeable to mobility attempts and exercises with increased assistance and cues. Patient would benefit from long term care placement given non-operative treatment of RLE injury, 24/7 assistance needs and to provide the level of physical assistance needed at this time. Patient would benefit from HHPT to continue progressing strength, endurance, and functional mobility as patient tolerates for improved quality of life.       Entered by: Matilde Caicedo 04/03/2020 8:37 AM     Thank you for your referral.  Matilde Caicedo, PT, DPT, ATC    [facility]  O: [phone]  E: [email or web address]

## 2020-04-03 NOTE — PHARMACY-ANTICOAGULATION SERVICE
Clinical Pharmacy - Warfarin Dosing Consult     Pharmacy has been consulted to manage this patient s warfarin therapy.  Indication: Atrial Fibrillation  Therapy Goal: INR 2-3  Warfarin Prior to Admission: No  Recent documented change in oral intake/nutrition: Unknown    INR   Date Value Ref Range Status   04/03/2020 1.20 (H) 0.86 - 1.14 Final   04/02/2020 1.10 0.86 - 1.14 Final       Recommend warfarin 5 mg today.  Pharmacy will monitor Linn Sanon daily and order warfarin doses to achieve specified goal.      Please contact pharmacy as soon as possible if the warfarin needs to be held for a procedure or if the warfarin goals change.

## 2020-04-03 NOTE — PROGRESS NOTES
Plan remains for patient to admit to [facility] (Main Phone: [phone] Admissions Phone: [phone] Fax: [phone]) when medically ready.  Due to staffing, [facility], is NOT able to admit patient over the weekend.  Patient will need [facility] transport at discharge.    CHARMAINE Ashby  [facility] [phone]/ [facility] [phone]

## 2020-04-03 NOTE — PLAN OF CARE
"Pt A/Ox4, forgetful. Lungs dim CTA. Up to recliner chair for meals with ceiling lift,  NWB to RLE. BM x3, soft. Sears output greater [id number] yellow to catrachita urine, had episode of bloody urine in tubing this afternoon, now clear yellow. /50   Pulse 74   Temp 98.7  F (37.1  C) (Oral)   Resp 20   Ht 1.6 m (5' 3\")   Wt 84.5 kg (186 lb 4.6 oz)   SpO2 94%   BMI 33.00 kg/m  will continue with POC.  "

## 2020-04-04 PROBLEM — K52.9 CHRONIC DIARRHEA: Status: ACTIVE | Noted: 2020-01-01

## 2020-04-04 PROBLEM — L89.899 PRESSURE INJURY OF SKIN OF RIGHT FOOT: Status: ACTIVE | Noted: 2020-01-01

## 2020-04-04 NOTE — PLAN OF CARE
S: Shift note    B: Right ankle fracture, CHF exacerbation    A: A&Ox3, disoriented to time and forgetful. VSS on room air. Denying pain. Weight down 3 kg today. CMS and neuros intact. Right leg splint remains CDI. Edema LLE 3+, generalized edema 1+. PIV left arm with Lasix infusing at 5 mg/hr and NS carrier at 10 mL/hr. Seasr remains patent with adequate output. Large incontinent BM this evening, formed. Buttocks/coccyx red and excoriated, barrier cream applied. Repositioned in bed every 2-3 hours overnight. Top and bottom dentures removed and placed in bathroom.     R: Continue with plan of care

## 2020-04-04 NOTE — PLAN OF CARE
Feeling better. Stopped IV fluids and gave Imodium for loose stools; was incontinent of stools x1 and on bedpan x2. Up in chair for  lunch and was up almost 4 hours. Both legs elevated. Writer had noticed that pt was complaining of increased pain to right fifth toe; bruising noted and cast had sharp area there. Charge nurse and hospitalist were notified. Ortho came up and removed soft cast and applied new splint with increased comfort noted per pt. Forgetful and anxious as per baseline. Potassium was 3.8 this morning and replaced orally. Will be rechecked in the morning. INR was 1.23 and Coumadin and Lovenox were administered. Has denied pain with new splint on. Ate about 50% of meals. Sears draining cloudy yellow urine. Bed alarm on. Will continue to monitor CMS of right foot, edema in left leg, potassium, pain, orientation, stools, I&O.

## 2020-04-04 NOTE — PLAN OF CARE
Pt remains on lasix gtt.  Adequate urine output, see F/S.  Tylenol this AM for leg pain.  Potassium replaced, recheck in am.  Up in chair from breakfast til after lunch, tolerated well.  Couple incontinent BM's today, buttocks excoriated and red.  Barrier cream applied.

## 2020-04-04 NOTE — PLAN OF CARE
Discharge Planner PT   Patient plan for discharge: Home  Current status: MOD IND scooting forward and back in recliner. Good short sitting balance with feet on ground. Short of air with all mobility, no cough. Sit to stand x 4 with CGA - Min assist using 2WW and cues for anterior height shift, unable to stand erect maintaining hands on recliner arm rests, however able to maintain NWB RLE with butt lift off. Standing bouts: 3 seconds, 10 seconds, 4 seconds and 5 seconds. Completed short sitting LE and UE AROM tasks with shortness of air however improved LAQ endurance and overhead reach range of motion.   Barriers to return to prior living situation: Medical status, level of assistance for cares and mobility, NWB RLE, impaired cognition  Recommendations for discharge: Long term care placement with HHPT and wheelchair transport (emmy transfer into chair)  Rationale for recommendations: Patient with improved activity tolerance and strength this date. Encouraged staff to maintain bilat LE elevated to improve fluid removal which appears to have helped this date, encouraged to continue. Patient remains agreeable to mobility attempts and exercises with increased assistance and cues. Patient would benefit from long term care placement given non-operative treatment of RLE injury, 24/7 assistance needs and to provide the level of physical assistance needed at this time. Patient would benefit from HHPT to continue progressing strength, endurance, and functional mobility as patient tolerates for improved quality of life.       Entered by: Matilde Caicedo 04/04/2020 11:23 AM     Thank you for your referral.  Matilde Caicedo, PT, DPT, ATC    [facility]  O: [phone]  E: [email or web address]

## 2020-04-04 NOTE — PROGRESS NOTES
Called by Dr. Rocco Melton for questions on ankle pain in 89 years old lady with bimalleolar ankle fracture.  She is in a splint, but having pressure problems from the splint.  No surgery was done.  Patient with a fall at her assisted living on 3/22/20.  She sustained a bimalleolar ankle fracture.  She is recovering from pneumonia and has a chronic heart condition.  X-ray from 3/30/20 show lateral displacement of the fracture with about 7 mms lateral displacement of talus under tibia.  This is not acceptable position for good walking function.  I asked if she could have surgery, but with her heart condition, hospital staff feel she would not be appropriate at [facility] for this.  I asked about transfer, but family has desired to avoid surgery and prefers comfort care.  She has now developed lateral foot pain with a splint in place.  There seems to be a pressure area on lateral foot developing.    To avoid a pressure sore, we will have the splint removed, apply ace or tubigrip if available, then apply aircast ankle stirrup.  She will remain non weight bearing.  We will give this a couple days to see if she can tolerate it.  Then plan ankle x-ray in a couple days.  She is unlikely to be able to walk on this ankle in the future without surgery.

## 2020-04-04 NOTE — PHARMACY-ANTICOAGULATION SERVICE
Clinical Pharmacy - Warfarin Dosing Consult     Pharmacy has been consulted to manage this patient s warfarin therapy.  Indication: Atrial Fibrillation  Therapy Goal: INR 2-3  Warfarin Prior to Admission: No  Recent documented change in oral intake/nutrition: Unknown    INR   Date Value Ref Range Status   04/04/2020 1.23 (H) 0.86 - 1.14 Final   04/03/2020 1.20 (H) 0.86 - 1.14 Final       Recommend warfarin 5 mg today.  Pharmacy will monitor Linn Sanon daily and order warfarin doses to achieve specified goal.      Please contact pharmacy as soon as possible if the warfarin needs to be held for a procedure or if the warfarin goals change.

## 2020-04-04 NOTE — PROGRESS NOTES
[facility]    Medicine Progress Note - Hospitalist Service       Date of Admission:  3/23/2020  Assessment & Plan    89-year-old woman admitted with bimalleolar right ankle fracture and signs of acute heart failure with preserved EF due to mitral stenosis and severe pulmonary hypertension.   Signs of volume overload continue to improve with IV Lasix infusion which she has tolerated so far hemodynamically despite significant underlying aortic stenosis.  Signs of acute respiratory failure have resolved.  Rate of atrial fibrillation has been well controlled on low-dose beta-blocker.  Hyponatremia and hypochloremia have resolved after increasing potassium chloride supplementation yesterday.  Previous delirium has improved if not resolved.  Patient continues to be very concerned about loose bowel habits which, although has been occurring for several weeks, does appear to be new as compared with 2 months ago.  Upon review of her medical chart, it does appear as though diarrhea began after she started citalopram for treatment of anxiety in early February with subsequent increase in dosage in mid-to-late February.  This raises suspicion that diarrhea might be due to citalopram therapy as a side effect.  It is not clear that citalopram has had much clinical benefit for treatment of anxiety.  Today clinical findings in the region where her right lower leg splint contacts the lateral aspect of the right foot and fifth toe are concerning for pressure injury to the skin in that area which appears worsened as compared with 2 days ago.    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    Aortic valve stenosis, moderate to severe (increase from mild in 2016)     Closed bimalleolar fracture of right ankle    Bilateral pleural effusion    Urinary retention    Chronic diarrhea    Pressure injury of skin of right foot-laterally at proximal phalanx 5th toe and distal 5th metatarsal    Essential hypertension with goal blood pressure less than 140/90    History of TIA (transient ischemic attack) and stroke    Abnormal CXR-bilateral lower lung opacities, suspect atelectasis    Anasarca    Wheezing    Hyponatremia-admit Na 128    Delirium    Hypopotassemia    Metabolic alkalosis    Discontinue Lasix infusion today and start oral torsemide, titrate dose of oral torsemide depending upon daily weight and urine output  Continue indwelling Sears catheter today because of previous urinary retention although she is not known to have chronic urinary retention, anticipate discontinuation of Sears catheter tomorrow for trial of spontaneous voiding  Reduce citalopram dose from 20 mg daily to 10 mg daily because of diarrhea and questionable benefit for treatment of anxiety, may continue to gradually taper off of citalopram if side effects are thought to outweigh potential benefit  May treat diarrhea symptomatically with Imodium as needed, discontinue MiraLAX prn order  Concern about suspected pressure injury to the skin of the lateral aspect of the right fifth toe from her splint was discussed with nursing with plan to contact orthopedic surgery to discuss an adjustment in her splint     Diet: Combination Diet Low Saturated Fat Na <2400mg Diet    DVT Prophylaxis: Enoxaparin (Lovenox) SQ  Sears Catheter: in place, indication: Strict 1-2 Hour I&O  Code Status: DNR/DNI      Disposition Plan   Expected discharge: 1-2 days, recommended to transitional care unit once safe disposition plan/ TCU bed available and Stable volume status.  Entered: Rocco Melton MD 04/04/2020, 12:55 PM       The patient's care was discussed with the Bedside Nurse, Care Coordinator/ and Patient.    Rocco PIZANO  MD [place]  Hospitalist Service  [facility]    ______________________________________________________________________    Interval History   Patient continues to be concerned about her bowel movements.  She is having frequent urges to move her bowels.  These urges occur suddenly.  Sometimes, she finds that she will pass gas or even be incontinent of stool without warning.  Nursing reports that the stools have appeared loose, even watery, yellow, and seedy here in the hospital.  She is having multiple stools per day.  This has been going on for at least a month or 2.  She denies any abdominal cramping or nausea.  Patient says she does not normally have any difficulty passing her urine although she did have an episode of urinary retention earlier this hospital stay after starting continuous Lasix infusion.  She has been afebrile and hemodynamically stable over the last day.  Urine output was excellent over the last 24 hours.  Oxygenation remains normal.  She is tolerating oral intake.  She remains nonweightbearing on the right leg.    Old records were reviewed.  Most recent colonoscopy  on December 29, 2003 demonstrated diverticulosis of the sigmoid colon but was otherwise normal.  Discharge summary from release from transitional care February 25, 2020 was reviewed.  For treatment of anxiety, she was started on citalopram 10 mg daily about February 4.  The dose of citalopram was increased to 20 mg daily sometime in mid February such that by the time she was discharged from the TCU in late February she was taking 20 mg daily of citalopram.  Prior to starting citalopram, she had been taking senna regularly for treatment of constipation.  In mid to late February after starting citalopram, her dose of senna was initially reduced and then changed to an as-needed basis rather than a scheduled basis.    Data reviewed today: I reviewed all medications, new labs and imaging results over the last  24 hours. I personally reviewed no images or EKG's today.    Physical Exam   Vital Signs: Temp: 98  F (36.7  C) Temp src: Oral BP: 115/55 Pulse: 76 Heart Rate: 73 Resp: 20 SpO2: 90 % O2 Device: None (Room air)    Weight: 177 lbs 11.05 oz  General Appearance: Appears anxious but otherwise no acute distress while sitting in a chair with her legs elevated  Respiratory: Normal respiratory effort, clear lungs  Cardiovascular: Regular rate and rhythm, normal capillary refill  GI: Nondistended abdomen, normal bowel sounds, soft, no abdominal tenderness  Skin: Yellowish bruising evident in the right knee area above the splint, tips of the toes of the right foot appear pink, approximately 5 mm superficial scabbed abrasion with pink rim evident on the lateral aspect of the right fifth toe at the proximal aspect of the proximal phalanx just distal to the fifth MTP, larger approximately 2 cm bruise evident along the lateral aspect of the distal fifth metatarsal below the splint on the foot in that area and both the abrasion and bruise are tender to touch  Other: Mild pitting edema in the left lower leg    Data   Recent Labs   Lab 04/04/20  0624 04/03/20  0557 04/02/20  0629 04/01/20  0622  03/30/20  0656 03/29/20  0528   WBC  --   --   --   --   --   --  7.0   HGB  --   --   --   --   --  9.3* 9.1*   MCV  --   --   --   --   --   --  88     --   --  295  --   --  246   INR 1.23* 1.20* 1.10  --   --   --   --     131* 133 132*   < > 133 130*   POTASSIUM 3.8 3.9 3.7 3.4   < > 3.6 3.9   CHLORIDE 97 93* 95 94   < > 95  --    CO2 31 34* 35* 33*   < > 33*  --    BUN 19 19 16 19   < > 14  --    CR 0.71 0.74 0.70 0.71   < > 0.69 0.78   ANIONGAP 5 4 3 5   < > 5  --    WINSTON 8.8 8.9 8.7 8.6   < > 8.3*  --    * 107* 100* 99   < > 97  --     < > = values in this interval not displayed.     Magnesium 2.1 today    Medications     Warfarin Therapy Reminder         ALPRAZolam  0.25 mg Oral TID     aspirin  81 mg Oral Daily      citalopram  20 mg Oral Daily     dicyclomine  20 mg Oral TID AC     digoxin  125 mcg Oral Daily     enoxaparin ANTICOAGULANT  40 mg Subcutaneous Q24H     metoprolol tartrate  25 mg Oral BID     miconazole   Topical BID     potassium chloride  40 mEq Oral TID     QUEtiapine  12.5 mg Oral At Bedtime     simvastatin  20 mg Oral At Bedtime     sodium chloride (PF)  3 mL Intracatheter Q8H     torsemide  20 mg Oral Daily     warfarin ANTICOAGULANT  5 mg Oral ONCE at 18:00

## 2020-04-05 NOTE — PLAN OF CARE
Vss. Pt had increase in RLE pain to inner mid calf. Tylenol was not able to be given yet, hospitalist notified and ordered morphine 1mg IV. Pt also repositioned and was able to sleep. Tylenol given at 0500 for pain to RLE, and repositioned again. RLE feel best with pillow completely under length of RLE from under knee, lower leg, and entire foot for support and pt rests well. Pt able to wiggle toes, no tingling or numbness, 2+ pedal pulse. 300mL from pack catheter. Lungs are clear, weight is down 1.4kg from yesterday's weight. Will continue with plan of care, pain control, I and O.

## 2020-04-05 NOTE — PROGRESS NOTES
Writer spoke with hospitalist today and patient is on track for discharge tomorrow.  Writer arranged [facility] for transport at 1015.  Writer called to update patient's son, Gaurav, by phone.  Gaurav expresses that patient may not be ready for discharge tomorrow.  Updated charge nurse.    CHARMAINE Ashby  [facility] [phone]/ [facility] [phone]

## 2020-04-05 NOTE — PLAN OF CARE
"/57 (BP Location: Right arm)   Pulse 77   Temp 97.5  F (36.4  C) (Axillary)   Resp 16   Ht 1.6 m (5' 3\")   Wt 79.2 kg (174 lb 9.7 oz)   SpO2 91%   BMI 30.93 kg/m    Pt receiving oxycodone for pain.  Pt is confused to time this afternoon and quite anxious about it.  Thinks it's Sunday morning and not evening.  Writer attempted to reorient pt multiple times but pt refused to believe writer and demanded to call her son to ask him.  Writer assisted pt to call son.  Pt did calm down some after talking to her son.  Up in the chair for meals with mechanical lift.  To the BSC to attempt to have a BM with no success.    "

## 2020-04-05 NOTE — PHARMACY-ANTICOAGULATION SERVICE
Clinical Pharmacy - Warfarin Dosing Consult     Pharmacy has been consulted to manage this patient s warfarin therapy.  Indication: Atrial Fibrillation  Therapy Goal: INR 2-3  Warfarin Prior to Admission: No  Recent documented change in oral intake/nutrition: Unknown    INR   Date Value Ref Range Status   04/05/2020 1.67 (H) 0.86 - 1.14 Final   04/04/2020 1.23 (H) 0.86 - 1.14 Final       Recommend warfarin 5 mg today.  Pharmacy will monitor Linn Sanon daily and order warfarin doses to achieve specified goal.      Please contact pharmacy as soon as possible if the warfarin needs to be held for a procedure or if the warfarin goals change.

## 2020-04-05 NOTE — PROGRESS NOTES
[facility]    Medicine Progress Note - Hospitalist Service       Date of Admission:  3/23/2020  Assessment & Plan    89-year-old woman admitted with signs of acute heart failure after presenting with bimalleolar right ankle fracture for which nonoperative treatment has been recommended by orthopedic surgery.  Signs of heart failure have continued to improve even after transition from IV Lasix infusion to oral diuretics yesterday.  However, she developed increasing signs of pressure injury to the skin of the lateral right foot from her previous right leg and foot splint, so this was replaced yesterday.  However, she has had worsening ankle pain after transition to this new splint.  Diarrhea has improved after Imodium.  She has had indwelling Sears catheter placed earlier this hospital stay for urinary retention although she had not been having any voiding problems prior to this hospitalization.    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    Aortic valve stenosis, moderate to severe (increase from mild in 2016)    Closed bimalleolar fracture of right ankle    Bilateral pleural effusion    Urinary retention    Chronic diarrhea    Pressure injury of skin of right foot-laterally at proximal phalanx 5th toe and distal 5th metatarsal    Essential hypertension with goal blood pressure less than 140/90    History of TIA (transient ischemic attack) and stroke    Abnormal CXR-bilateral lower lung opacities, suspect atelectasis    Anasarca    Wheezing    Hyponatremia-admit Na 128    Delirium    Hypopotassemia    Metabolic alkalosis    Start scheduled acetaminophen today 1000 mg 3 times a day for pain management and add oxycodone 2.5 mg every 4 hours as  needed, titrate dose of oxycodone up if necessary to optimize pain control  Discontinue Sears catheter today and monitor for voiding function, reconsider bladder straight catheterization or replacement of Sears catheter if necessary  Continue present doses of oral diuretic and potassium supplementation  Continuing combined Lovenox with warfarin until INR is therapeutic    Diet: Combination Diet Low Saturated Fat Na <2400mg Diet    DVT Prophylaxis: Enoxaparin (Lovenox) SQ and Warfarin  Sears Catheter: not present  Code Status: DNR/DNI      Disposition Plan   Expected discharge: Tomorrow, recommended to transitional care unit once adequate pain management/ tolerating PO medications and She either is voiding spontaneously or has an alternate plan for management of urinary retention if it recurs.  Entered: Rocco Melton MD 04/05/2020, 11:45 AM       The patient's care was discussed with the Bedside Nurse, Care Coordinator/ and Patient.    Rocco Melton MD  Hospitalist Service  [facility]    ______________________________________________________________________    Interval History   Patient says she has been having more right ankle pain after her splint was changed yesterday.  Nursing reports that her worsening right ankle pain has been intermittent.  She did receive 1 dose of IV morphine overnight because of increased right ankle pain.  This morning, the patient and PT report that she was able to go through rehab exercises without dramatically worsening right ankle pain and that her right ankle pain is better if her foot and right lower leg are supported appropriately with pillows.  She was also given a dose of acetaminophen prior to exercise this morning which may have helped reduce the pain.  About 20 minutes ago, the patient noted increasing right ankle pain and was given her first dose of oxycodone.  Patient says that her diarrhea has stopped after she received 1  dose of Imodium yesterday.  Patient says that she normally has not had any problems urinating prior to this hospital stay.  She remains afebrile and hemodynamically stable with normal oxygenation.  Urine output was good over the last day.    Data reviewed today: I reviewed all medications, new labs and imaging results over the last 24 hours. I personally reviewed no images or EKG's today.    Physical Exam   Vital Signs: Temp: 98.2  F (36.8  C) Temp src: Oral BP: 130/55 Pulse: 79   Resp: 16 SpO2: 100 % O2 Device: None (Room air)    Weight: 174 lbs 9.67 oz  General Appearance: Anxious but otherwise no acute distress sitting in a chair  Respiratory: Normal respiratory effort, clear lungs  Other: Mild pitting edema in the left lower leg, right lower leg is wrapped with Ace wrap and there is a splint in place over the right ankle    Data   Recent Labs   Lab 04/05/20  0633 04/04/20  0624 04/03/20  0557  04/01/20  0622  03/30/20  0656   HGB  --   --   --   --   --   --  9.3*   PLT  --  311  --   --  295  --   --    INR 1.67* 1.23* 1.20*   < >  --   --   --     133 131*   < > 132*   < > 133   POTASSIUM 4.6 3.8 3.9   < > 3.4   < > 3.6   CHLORIDE 98 97 93*   < > 94   < > 95   CO2 30 31 34*   < > 33*   < > 33*   BUN 16 19 19   < > 19   < > 14   CR 0.64 0.71 0.74   < > 0.71   < > 0.69   ANIONGAP 6 5 4   < > 5   < > 5   WINSTON 8.9 8.8 8.9   < > 8.6   < > 8.3*   * 101* 107*   < > 99   < > 97    < > = values in this interval not displayed.       Medications     Warfarin Therapy Reminder         acetaminophen  1,000 mg Oral TID     ALPRAZolam  0.25 mg Oral TID     aspirin  81 mg Oral Daily     citalopram  10 mg Oral Daily     digoxin  125 mcg Oral Daily     enoxaparin ANTICOAGULANT  40 mg Subcutaneous Q24H     metoprolol tartrate  25 mg Oral BID     miconazole   Topical BID     potassium chloride  40 mEq Oral TID     QUEtiapine  12.5 mg Oral At Bedtime     simvastatin  20 mg Oral At Bedtime     sodium chloride (PF)  3 mL  Intracatheter Q8H     torsemide  20 mg Oral Daily     warfarin ANTICOAGULANT  5 mg Oral ONCE at 18:00

## 2020-04-05 NOTE — PLAN OF CARE
Discharge Planner PT   Patient plan for discharge: Home  Current status: Patient asleep upon PT arrival, awoke to strong verbal and tactile stimulus. Completed short sitting LE AROM with visual and constant verbal cues to complete the proper tasks. Expressed no pain with right ankle pumps. Patient MOD IND scooting forward in chair with verbal cues for task completion. Sit to stand from recliner x 5 with CGA to MOD assist progressed with patient fatigue, using arm rests to press up through. Standing duration for 10 seconds on each bout, able to place bilateral hands on walker x 1 with mod assist for anterior weight shift and postural control. IND scooting back in chair, expressed fatigue. Bilat LE elevated, RLE on wedge.  Barriers to return to prior living situation: Medical status, level of assistance for cares and mobility, NWB RLE, impaired cognition  Recommendations for discharge: Long term care placement with HHPT and wheelchair transport (emmy transfer into chair)  Rationale for recommendations: Patient with increased sleepiness this date, however improved strength and activity tolerance with treatment. Patient remains with impaired insight to her condition however aware of NWB RLE. Patient would benefit from long term care placement given non-operative treatment of RLE injury, 24/7 assistance needs and to provide the level of physical assistance needed at this time. Patient would benefit from HHPT to continue progressing strength, endurance, and functional mobility as patient tolerates for improved quality of life.       Entered by: Matilde Caicedo 04/05/2020 9:46 AM     Thank you for your referral.  Matilde Caicedo, PT, DPT, ATC    [facility]  O: [phone]  E: [email or web address]

## 2020-04-05 NOTE — PLAN OF CARE
VSS. 93% on RA. LS clear. No bms this shift. Sears catheter removed at 1130. Pure wick placed at 1300. DTV. 2.5mg Oxy given for Right ankle pain - effective. Good intake. Up to chair for meals via ceiling lift. Continue to monitor.

## 2020-04-05 NOTE — PROGRESS NOTES
S: Shift note     B: Right ankle fracture, CHF exacerbation     A: A&Ox3, disoriented to time and forgetful. VSS on room air. Increase in pain this evening, PRN tylenol given with some temporary comfort, MD notified that patient may require increase in pain management. CMS and neuros intact. Right leg splint remains CDI. Edema LLE 2+/3+, generalized edema 1+. PIV left arm SL. Sears remains patent with adequate output. Buttocks/coccyx red and excoriated, barrier cream applied. Repositioned in bed every 2 hours. Top and bottom dentures removed and placed in bathroom.      R: Continue with plan of care

## 2020-04-06 NOTE — PHARMACY-ANTICOAGULATION SERVICE
Clinical Pharmacy - Warfarin Dosing Consult     Pharmacy has been consulted to manage this patient s warfarin therapy.  Indication: Atrial Fibrillation  Therapy Goal: INR 2-3  Warfarin Prior to Admission: No  Recent documented change in oral intake/nutrition: Unknown    INR   Date Value Ref Range Status   04/06/2020 2.07 (H) 0.86 - 1.14 Final   04/05/2020 1.67 (H) 0.86 - 1.14 Final       Recommend warfarin 2.5 mg today.  Pharmacy will monitor Linn Sanon daily and order warfarin doses to achieve specified goal.      Please contact pharmacy as soon as possible if the warfarin needs to be held for a procedure or if the warfarin goals change.

## 2020-04-06 NOTE — PLAN OF CARE
Vss. Pt has been unable to void since pack catheter removed yesterday at 1122. Bladder scan last evening for 611mL, straight cathed at 1841=434fN catrachita urine with milky-colored urine at end of straight cath. Pt calling out need to void at 0150, but unable to void. Pt very uncomfortable with urge to void. Bladder gyiy=876mR, straight cathed 225mL cloudy yellow urine with milky color at end of straight cath. No void since, pt has been resting now since prn Oxycodone given at 0200. One soft stool overnight. Lungs are clear. LLE 2-3+edema, R foot and toes 2-3+edema. No tingling or numbness to RLE. Pt forgetful to time, is anxious at times, was more anxious when urge to void but unable to at 0150, now has been resting. Will continue with plan of care, monitor I and O, wt, return of bladder function, pain control, CMS. Plan for pt to discontinue to [facility] today.

## 2020-04-06 NOTE — PROGRESS NOTES
Linn CARREON Fab  Gender: female  : 1926  [facility]  [address]  [place] [facility] [address]  [phone] (home)     Medical Record: [id number]  Pharmacy:    [facility] [place]  [id number] - [place], [place] - [address] - [place], [place] - [address]  [facility] - [place], [place] - [address]  Primary Care Provider: Ashly Tubbs    Parent's names are: Data Unavailable (mother) and Data Unavailable (father).      [facility]  2020     Discharge Phone Call: Discharge to  [place]

## 2020-04-06 NOTE — PROGRESS NOTES
Name: Linn Sanon    MRN#: [id number]    Reason for Hospitalization: Hyponatremia [E87.1]  Bilateral leg edema [R60.0]  Closed fracture of distal end of right fibula, unspecified fracture morphology, initial encounter [S82.565O]    Discharge Date: 4/6/2020    Patient / Family response to discharge plan: in agreement    Other Providers (Care Coordinator, County Services, PCA services etc): No    CTS Hand Off Completed: Yes    PAS #: [id number]    CHATO Score: Elevated    Future Appointments: No future appointments.    Discharge Disposition: transitional care unit - [facility] (Main Phone: [phone] Admissions Phone: [phone] Fax: [phone]).  [facility] transport at 1015. Patient's son, Les, and Jian notified of discharge time.    Discharge Planner   Discharge Plans in progress: Jian - [facility] with therapies  Barriers to discharge plan: None  Follow up plan: LTC    CHARMAINE Ashby  [facility] [phone]/ [place] [phone]         Entered by: Jessica Stallworth 04/06/2020 8:58 AM

## 2020-04-06 NOTE — PLAN OF CARE
S-(situation): Physical Therapy treatment per plan of care    B-(background): 89-year-old woman admitted with signs of acute heart failure after presenting with bimalleolar right ankle fracture for which nonoperative treatment has been recommended by orthopedic surgery.      A-(assessment): Patient discharged to Long term care placement prior to scheduled PT treatment.    R-(recommendations): Discharge inpatient PT plan of care. Resume skilled PT intervention at LTC in order to progress strength, endurance, and functional mobility as patient tolerates for improved quality of life.    Physical Therapy Discharge Summary    Reason for therapy discharge:    Discharged to long term care facility.    Progress towards therapy goal(s). See goals on Care Plan in [facility] electronic health record for goal details.  Goals partially met.  Barriers to achieving goals:   limited tolerance for therapy and discharge from facility.    Therapy recommendation(s):    Continued therapy is recommended.  Rationale/Recommendations:  see above.     Thank you for your referral.  Matilde Caicedo, PT, DPT, ATC    [facility]  O: [phone]  E: [email or web address]

## 2020-04-06 NOTE — DISCHARGE SUMMARY
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    Aortic valve stenosis, moderate to severe (increase from mild in 2016)    Closed bimalleolar fracture of right ankle    Bilateral pleural effusion    Urinary retention    Chronic diarrhea    Pressure injury of skin of right foot-laterally at proximal phalanx 5th toe and distal 5th metatarsal    Essential hypertension with goal blood pressure less than 140/90    History of TIA (transient ischemic attack) and stroke    Abnormal CXR-bilateral lower lung opacities, suspect atelectasis    Anasarca    Wheezing    Hyponatremia-admit Na 128    Delirium    Hypopotassemia    Metabolic alkalosis      Follow-ups Needed After Discharge   Follow-up Appointments     Follow Up and recommended labs and tests      Follow up with long-term physician.  The following labs/tests are   recommended: potassium and INR within 5 days.             Unresulted Labs Ordered in the Past 30 Days of this Admission     Date and Time Order Name Status Description    4/6/2020 0716 UA with Microscopic In process       These results will be followed up by NH provider    Discharge Disposition   Discharged to nursing home  Condition at discharge: Stable    Hospital Course   89-year-old woman with history of hypertension and valvular heart disease presented with right ankle pain after a fall at home.  Due to concerns for bimalleolar right ankle fracture with initial plan for possible operative repair and concerns for volume overload on clinical exam, she  was admitted.    Problem #1 acute heart failure with preserved EF due to mitral stenosis and severe pulmonary hypertension.  Troponin and BNP were normal.  Bilateral pleural effusions were noted radiographically.  EKG demonstrated atrial fibrillation.  Echocardiogram demonstrated findings consistent with mitral stenosis, severe pulmonary hypertension, and moderate to severe aortic stenosis.  Weight on admission was noted to be increased about 40 pounds compared with 1-2 months previously and she had severe lower extremity edema.  She was severely hypoxic with signs of respiratory distress consistent with acute hypoxic respiratory failure.  Orthopedic surgery was consulted and considered operative repair of bimalleolar ankle fracture although recommended deferring it until leg edema and her respiratory status had improved.  Patient was subsequently treated with IV Lasix infusion and weight decreased about 30-35 pounds during her hospital stay with improvement in peripheral edema.  She was then switched to an oral diuretic with recommendations to adjust doses of that oral diuretic or use metolazone every other day as needed for weight gain.  Ongoing monitoring of daily weight after discharge was recommended.  After investigation, clinical presentation was consistent with acute heart failure with preserved EF.  Heart failure was attributed to the combination of mitral stenosis and severe pulmonary hypertension.  Her clinical course and test results were also consistent with acute hypoxic respiratory failure due to heart failure.    Problem #2 bimalleolar right ankle fracture.  She presented with ankle fracture due to trauma for which operative repair was considered.  However, due to her tenuous medical status and concern for significant underlying valvular heart disease with heart failure, operative repair was postponed during her hospital stay.  After further discussion with the patient's son and the patient, they  expressed preference for palliative measures rather than aggressive treatment for her ankle fracture.  Her son did ask questions about eligibility for palliative care or even hospice and was advised to follow-up with the nursing home regarding palliative care after discharge.  She was initially placed in a rigid splint.  However, due to concern for evolving skin pressure injury on the lateral aspect of the right foot, this was switched to a stirrup splint with Ace wrap on April 4.  She did have increasing ankle pain after transition to a less rigid splint.  Ankle pain was adequately controlled with a combination of scheduled acetaminophen and use of oxycodone as needed for breakthrough pain by discharge.  Outpatient follow-up with orthopedics was recommended for management of her right lower leg and ankle splint.    Problem #3 new atrial fibrillation.  She appeared to have atrial fibrillation on EKG and telemetry monitoring with intermittent rapid ventricular rate initially.  She was started on low-dose beta-blocker therapy digoxin and which she tolerated hemodynamically with well-controlled heart rate by discharge.  Due to increased risk for thromboembolism, she was also started on anticoagulation and INR at discharge was therapeutic after starting warfarin during this hospital stay.    Problem #4 urinary retention.  She developed new urinary retention during this hospital stay necessitating placement of a Sears catheter.  An attempt was made to remove the Sears catheter on April 5, but she again developed recurrent urinary retention for which she required straight catheterization of the bladder overnight April 5 to April 6.  Therefore, because of recurrent urinary retention, a Sears catheter was replaced on April 6.  Ongoing Sears catheter was recommended until outpatient evaluation by urology if that were in accordance with her preferences.  Urinary retention appeared to be acute.  Cause was not clear.    Problem  #5 diarrhea.  She had persistent stool urgency and frequency with diarrhea during this hospital stay.  Upon review of her medical chart, diarrhea appeared to have started when she started taking citalopram for treatment of anxiety in early February.  Due to suspicion for diarrhea as a side effect of citalopram, her citalopram dose was decreased from 20 mg daily to 10 mg daily during this hospital stay and it was suggested that citalopram be discontinued in about 2 weeks because it did not seem to be effective for treatment of her anxiety and that was causing significant diarrhea disruptive to the patient.  Diarrhea was controlled with starting Imodium.  If she continues to use opiate analgesics, she will be at risk for constipation as she weans and potentially tapers off of citalopram and may therefore require treatment of constipation at a later date.    Problem #6 hypokalemia.  She had recurrent hypokalemia during her hospital stay while she was being treated with aggressive IV loop diuretics and at a time when she was having diarrhea.  She received potassium supplementation and her daily dosing of potassium supplementation was increased.  Potassium improved and was normal on higher dose potassium supplementation.  However, her doses of diuretics were decreased in the 2 days prior to discharge and diarrhea was subsiding.  Therefore, close follow-up of her potassium and possible adjustment in her potassium chloride supplementation after discharge was recommended.    Problem #7 hyponatremia with delirium.  She presented with serum sodium 128 and was delirious in the early part of her hospital stay.  Hyponatremia resolved even with aggressive diuresis and her delirium also appeared to resolve about the same time.  Hyponatremia due to decompensated heart failure was suspected.  Delirium was probably triggered by or exacerbated by hyponatremia.    Problem #8 anxiety.  Anxiety continued to be problematic for the patient  throughout her hospital stay.  Upon review with the patient and her son, it did not sound as though her symptoms of anxiety had improved after she had started citalopram about 2 months previously.  Because of concerns about side effects of citalopram, a taper off of citalopram was started during this hospitalization.  Her chronic Xanax therapy was continued uninterrupted.  She responded well to frequent reassurance.    Consultations This Hospital Stay   ORTHOPEDIC SURGERY IP CONSULT  CARE TRANSITION RN/SW IP CONSULT  PHYSICAL THERAPY ADULT IP CONSULT  PHARMACY IP CONSULT  PHARMACY TO DOSE WARFARIN  PHYSICAL THERAPY ADULT IP CONSULT    Code Status   DNR/DNI    Time Spent on this Encounter   I, Rocco Melton MD, personally saw the patient today and spent greater than 30 minutes discharging this patient.       Rocco Melton MD  [facility]  ______________________________________________________________________    Physical Exam   Vital Signs: Temp: 96.3  F (35.7  C) Temp src: Oral BP: 133/70 Pulse: 87 Heart Rate: 87 Resp: 20 SpO2: (!) 89 % O2 Device: None (Room air)    Weight: 179 lbs 14.33 oz  General Appearance: Appears anxious but otherwise no acute distress sitting in a chair  Respiratory: Normal respiratory effort, diminished breath sounds at the bases, clear lungs  Cardiovascular: Irregularly irregular rate and rhythm, normal capillary refill, mild pitting edema in the left lower leg        Primary Care Physician   Ashly Tubbs    Discharge Orders      General info for SNF    Length of Stay Estimate: Long Term Care  Condition at Discharge: Stable  Level of care:skilled   Rehabilitation Potential: Poor  Admission H&P remains valid and up-to-date: Yes  Recent Chemotherapy: N/A  Use Nursing Home Standing Orders: Yes     Mantoux instructions    Give two-step Mantoux (PPD) Per Facility Policy Yes     Reason for your hospital stay    Hospitalized due to right ankle  fracture and heart failure.  No surgery advised to treat ankle fracture and heart failure improved.     Daily weights    Call Provider for weight gain of more than 2 pounds per day or 5 pounds per week.     Sears catheter    To straight gravity drainage. Change catheter every 2 weeks and PRN for leaking or decreased uring output with signs of bladder distention. DO NOT change catheter without a specific MD order IF diagnosis of benign prostatic hypertrophy (BPH), neurogenic bladder, or other urological conditions     Additional Discharge Instructions    Splint management of right ankle per Orthopedics     Follow Up and recommended labs and tests    Follow up with care home physician.  The following labs/tests are recommended: potassium and INR within 5 days.     Activity - Up with nursing assistance     Weight bearing status    Non-weight bearing right ankle     DNR/DNI     Physical Therapy Adult Consult    Evaluate and treat as clinically indicated.    Reason:  Right ankle bimalleolar fracture, non-weight bearing right foot and lower leg     Advance Diet as Tolerated    Follow this diet upon discharge: Orders Placed This Encounter      2 gm Na Diet       Significant Results and Procedures   Most Recent 3 CBC's:  Recent Labs   Lab Test 04/04/20  0624 04/01/20  0622 03/30/20  0656 03/29/20  0528 03/28/20  0559 03/27/20  0616 03/26/20  0646   WBC  --   --   --  7.0  --  7.5 7.7   HGB  --   --  9.3* 9.1* 8.9* 9.7* 8.9*   MCV  --   --   --  88  --  87 87    295  --  246  --  279 255     Most Recent 3 BMP's:  Recent Labs   Lab Test 04/05/20  0633 04/04/20  0624 04/03/20  0557    133 131*   POTASSIUM 4.6 3.8 3.9   CHLORIDE 98 97 93*   CO2 30 31 34*   BUN 16 19 19   CR 0.64 0.71 0.74   ANIONGAP 6 5 4   WINSTON 8.9 8.8 8.9   * 101* 107*     Most Recent 2 LFT's:  Recent Labs   Lab Test 01/23/20  0425 01/19/20  1509   AST 19 33   ALT 14 17   ALKPHOS 38* 42   BILITOTAL 0.3 0.4     Most Recent INR's and  Anticoagulation Dosing History:  Anticoagulation Dose History     Recent Dosing and Labs Latest Ref Rng & Units 2/23/2017 4/1/2020 4/2/2020 4/3/2020 4/4/2020 4/5/2020 4/6/2020    Warfarin 2.5 mg - - 2.5 mg 2.5 mg - - - -    Warfarin 5 mg - - - - 5 mg 5 mg 5 mg -    INR 0.86 - 1.14 1.00 - 1.10 1.20(H) 1.23(H) 1.67(H) 2.07(H)        Most Recent 3 Troponin's:  Recent Labs   Lab Test 03/23/20  0102 02/23/17  1410   TROPI 0.031 <0.015  The 99th percentile for upper reference range is 0.045 ug/L.  Troponin values in   the range of 0.045 - 0.120 ug/L may be associated with risks of adverse   clinical events.       Most Recent 3 BNP's:  Recent Labs   Lab Test 03/23/20 0102 01/19/20  1509   NTBNPI 1,347 1,638   ,   Results for orders placed or performed during the hospital encounter of 03/23/20   XR Knee Right 3 Views    Narrative    EXAM: XR KNEE RT 3 VW  LOCATION: [facility]  DATE/TIME: 3/23/2020 1:15 AM    INDICATION: Pain after fall  COMPARISON: None.      Impression    IMPRESSION: Lateral view is slightly obliqued. There is a moderate joint effusion without fat/fluid level on crosstable view. Tricompartment degenerative changes are present, severe in the lateral compartment. No acute fracture is visualized. Diffuse   subcutaneous soft tissue edema.   XR Chest Port 1 View    Narrative    EXAM: XR CHEST PORT 1 VW  LOCATION: [facility]  DATE/TIME: 3/23/2020 1:16 AM    INDICATION: Shortness of breath.  COMPARISON: 01/20/2020      Impression    IMPRESSION: Cardiac enlargement and shallow inspiration. Bibasilar atelectasis or infiltrate and bilateral pleural effusions. Atherosclerotic aorta. Degenerative change osseous structures.   XR Ankle Port Right 2 Views    Addendum: 3/23/2020    Addendum: Fracture of the medial malleolus better visualized on the post reduction images.      Narrative    EXAM: XR ANKLE PORT RT 2 VW  LOCATION: [facility]  DATE/TIME: 3/23/2020 1:16  AM    INDICATION: Pain and swelling. Ecchymosis after fall.  COMPARISON: None.      Impression    IMPRESSION: Soft tissue edema. Displaced fracture of the distal fibula with medial displacement of the proximal fragment. Disruption of ankle mortise with medial displacement of the tibia relative to the talus. Difficult to exclude fracture of the   posterior malleolus. Plantar calcaneal spur. Vascular calcification.   Ankle XR, G/E 3 views, right    Narrative    EXAM: XR ANKLE RT G/E 3 VW  LOCATION: [facility]  DATE/TIME: 3/23/2020 3:35 AM    INDICATION: Post splint and reduction.  COMPARISON: None.      Impression    IMPRESSION: Mildly displaced fractures of the medial and lateral malleolus. Medial malleolus fracture not as well seen on the previous. Improvement in alignment. Improvement in ankle mortise alignment. Soft tissue edema..   XR Ankle Right G/E 3 Views    Narrative    RIGHT ANKLE THREE VIEWS March 23, 2020 1:29 PM    HISTORY: Fracture reduction.    COMPARISON: Radiographs earlier today at 3:51 AM.      Impression    IMPRESSION: There has been interval closed reduction of the previously  seen mildly displaced fractures of the medial and lateral malleoli.  There has been improvement in position and alignment although fine  bony detail is obscured by overlying cast material. No other change or  abnormality is seen.     ARCHANA BARKSDALE MD   X-ray rt ankle G/E 3 views*    Narrative    RIGHT ANKLE THREE OR MORE VIEWS   3/30/2020 12:13 PM     HISTORY:  Status post re-splinting and reduction.    COMPARISON: 3/23/2020      Impression    IMPRESSION: The cast material obscures visualization of fine detail.  There is also diffuse bone demineralization. However, given these  challenges, the distal tibia and fibular fractures appear to be a  little further displaced, and there appears to be new mild lateral  subluxation of the talus in relation to the tibial plafond.    SARIAH SANCHEZ MD   X-ray rt  "ankle G/E 3 views*    Narrative    XR RIGHT ANKLE THREE OR MORE VIEWS   3/30/2020 2:50 PM     HISTORY: Status post reduction and splinting.    COMPARISON: Radiographs from earlier today.      Impression    IMPRESSION: Again, evaluation is limited because of the plaster cast  material and the diffuse bone demineralization. The distal tibia and  fibula fractures and alignment appear grossly unchanged. Again seen is  lateral subluxation of the talus in relation to the tibial plafond. No  change evident since earlier today.    SARIAH SANCHEZ MD   Echocardiogram Limited    Narrative    [id number]  [id number]           [facility]  Echocardiography Laboratory  [address] [place] [address]        Name: SCARLET ROMERO  MRN: [id number]  : 1926  Study Date: 2020 01:07 PM  Age: 89 yrs  Gender: Female  Patient Location: [facility]  Reason For Study: Afib  History: HTN,Atrial Fibrillation,aortic valve stenosis,PHTN, Pleural Effusion,  TIA  Ordering Physician: Freya Romero MD  Referring Physician: Ashly Tubbs  Performed By: Olya Huynh     BSA: 1.9 m2  Height: 63 in  Weight: 203 lb  HR: 77  BP: 112/49 mmHg  _____________________________________________________________________________  __        Procedure  Limited Portable Echo Adult.  _____________________________________________________________________________  __        Interpretation Summary     The left ventricle is normal in size.  There is moderate concentric left ventricular hypertrophy.  Left ventricular systolic function is normal.  The visual ejection fraction is estimated at 60-65%.  The right ventricle is moderately dilated.  Moderately decreased right ventricular systolic function  The left atrium is moderately dilated.  The right atrium is severely dilated.  Low stroke volume index (24 cc/m2) and low KARLY 0.9 cm2 suggest \"low output,  severe aortic stenosis\" despite low AV gradients (15 mmHg " "mean, 28 mmHg peak).  Although the small LVOT diameter may cause underestimation of the KARLY.  There is moderately severe (3+) tricuspid regurgitation.  Right ventricular systolic pressure is elevated, consistent with moderate to  severe pulmonary hypertension.  IVC diameter >2.1 cm collapsing <50% with sniff suggests a high RA pressure  estimated at 15 mmHg or greater.     TR has worsened since the previous study and RV is larger with decreased  systolic function.     _____________________________________________________________________________  __        Left Ventricle  The left ventricle is normal in size. There is moderate concentric left  ventricular hypertrophy. Left ventricular systolic function is normal. The  visual ejection fraction is estimated at 60-65%. Normal left ventricular wall  motion.     Right Ventricle  The right ventricle is moderately dilated. Moderately decreased right  ventricular systolic function.     Atria  The left atrium is moderately dilated. The right atrium is severely dilated.  There is no atrial shunt seen.     Mitral Valve  There is moderate mitral annular calcification. There is trace mitral  regurgitation. There is mild mitral stenosis. The mean mitral valve gradient  is 4.9 mmHg.        Tricuspid Valve  There is moderately severe (3+) tricuspid regurgitation. The right ventricular  systolic pressure is approximated at 46.8 mmHg plus the right atrial pressure.  Right ventricular systolic pressure is elevated, consistent with moderate to  severe pulmonary hypertension. IVC diameter >2.1 cm collapsing <50% with sniff  suggests a high RA pressure estimated at 15 mmHg or greater.     Aortic Valve  There is trace to mild aortic regurgitation. The calculated aortic valve are  is 0.91 cm^2. The peak AoV pressure gradient is 28.3 mmHg. The mean AoV  pressure gradient is 15.1 mmHg. Low stroke volume index (24 cc/m2) and low KARLY  0.9 cm2 suggest \"low output, severe aortic stenosis\" despite " low AV gradients  (15 mmHg mean, 28 mmHg peak). Although the small LVOT diameter may cause  underestimation of the KARLY.     Pulmonic Valve  The pulmonic valve is not well seen, but is grossly normal. There is trace  pulmonic valvular regurgitation.     Vessels  Normal size aorta. Dilation of the inferior vena cava is present with abnormal  respiratory variation in diameter.     Pericardium  There is no pericardial effusion. Large left pleural effusion.     _____________________________________________________________________________  __  MMode/2D Measurements & Calculations  IVSd: 1.5 cm  LVIDd: 3.2 cm  LVIDs: 2.1 cm  LVPWd: 1.1 cm  FS: 35.4 %  LV mass(C)d: 137.7 grams  LV mass(C)dI: 70.7 grams/m2     LA dimension: 4.3 cm  LVOT diam: 1.8 cm  LVOT area: 2.5 cm2  RWT: 0.69        Doppler Measurements & Calculations  MV E max cesar: 167.0 cm/sec  MV max PG: 10.8 mmHg  MV mean P.9 mmHg  MV V2 VTI: 34.8 cm  MVA(VTI): 1.3 cm2  MV P1/2t max cesar: 162.9 cm/sec  MV P1/2t: 65.7 msec  MVA(P1/2t): 3.3 cm2  MV dec slope: 726.0 cm/sec2  MV dec time: 0.23 sec  Ao V2 max: 265.4 cm/sec  Ao max P.3 mmHg  Ao V2 mean: 181.6 cm/sec  Ao mean PG: 15.1 mmHg  Ao V2 VTI: 51.8 cm  KARLY(I,D): 0.91 cm2  KARLY(V,D): 0.91 cm2  LV V1 max PG: 3.9 mmHg  LV V1 max: 98.0 cm/sec  LV V1 VTI: 19.1 cm  SV(LVOT): 46.9 ml  SI(LVOT): 24.1 ml/m2  TR max cesar: 342.1 cm/sec  TR max P.8 mmHg  AV Cesar Ratio (DI): 0.37  KARLY Index (cm2/m2): 0.47              _____________________________________________________________________________  __        Report approved by: Malia Chen 2020 02:56 PM            Discharge Medications   Current Discharge Medication List      START taking these medications    Details   acetaminophen (TYLENOL) 500 MG tablet Take 2 tablets (1,000 mg) by mouth 3 times daily  Qty: 180 tablet, Refills: 0    Associated Diagnoses: Closed bimalleolar fracture of right ankle, initial encounter      digoxin (LANOXIN) 125 MCG tablet Take  1 tablet (125 mcg) by mouth daily  Qty: 30 tablet, Refills: 0    Associated Diagnoses: New onset atrial fibrillation (H)      loperamide (IMODIUM) 2 MG capsule Take 1 capsule (2 mg) by mouth 4 times daily as needed for diarrhea  Qty: 20 capsule, Refills: 0    Associated Diagnoses: Chronic diarrhea      metolazone (ZAROXOLYN) 2.5 MG tablet Take 1 tablet (2.5 mg) by mouth every 48 hours as needed (weight fernie more than 2 pounds in 1 day or 5 pounds in 1 week)  Qty: 15 tablet, Refills: 0    Associated Diagnoses: Anasarca; Acute heart failure, unspecified heart failure type (H); Moderate to severe pulmonary hypertension (H); Rheumatic mitral stenosis      metoprolol tartrate (LOPRESSOR) 25 MG tablet Take 1 tablet (25 mg) by mouth 2 times daily  Qty: 60 tablet, Refills: 0    Associated Diagnoses: New onset atrial fibrillation (H)      miconazole (MICATIN) 2 % external powder Apply topically 2 times daily  Qty: 90 g, Refills: 0    Associated Diagnoses: Yeast dermatitis      oxyCODONE (ROXICODONE) 5 MG tablet Take 0.5 tablets (2.5 mg) by mouth every 4 hours as needed for moderate to severe pain  Qty: 10 tablet, Refills: 0    Associated Diagnoses: Closed bimalleolar fracture of right ankle, initial encounter      potassium chloride ER (KLOR-CON M) 20 MEQ CR tablet Take 2 tablets (40 mEq) by mouth 2 times daily  Qty: 120 tablet, Refills: 0    Associated Diagnoses: Hypopotassemia; Chronic diarrhea      QUEtiapine (SEROQUEL) 25 MG tablet Take 0.5 tablets (12.5 mg) by mouth At Bedtime  Qty: 15 tablet, Refills: 0    Associated Diagnoses: Generalized anxiety disorder      torsemide (DEMADEX) 20 MG tablet Take 1 tablet (20 mg) by mouth daily  Qty: 30 tablet, Refills: 0    Associated Diagnoses: Bilateral pleural effusion; Acute heart failure, unspecified heart failure type (H)      warfarin ANTICOAGULANT (COUMADIN) 2.5 MG tablet Take 2.5 mg on Mon, Wed and Fri and take 5 mg on Tues, Thurs, Sat, and Sun, or take as advised by NH  provider  Qty: 60 tablet, Refills: 0    Associated Diagnoses: New onset atrial fibrillation (H); Closed bimalleolar fracture of right ankle, initial encounter         CONTINUE these medications which have CHANGED    Details   albuterol (PROAIR HFA/PROVENTIL HFA/VENTOLIN HFA) 108 (90 Base) MCG/ACT inhaler Inhale 2 puffs into the lungs every 4 hours as needed for shortness of breath / dyspnea or wheezing  Qty: 1 Inhaler, Refills: 0    Comments: Pharmacy may dispense brand covered by insurance (Proair, or proventil or ventolin or generic albuterol inhaler)  Associated Diagnoses: Dyspnea, unspecified type      ALPRAZolam (XANAX) 0.25 MG tablet Take 1 tablet (0.25 mg) by mouth 3 times daily  Qty: 90 tablet, Refills: 0    Associated Diagnoses: Generalized anxiety disorder      aspirin (ASA) 81 MG EC tablet Take 1 tablet (81 mg) by mouth daily  Qty: 30 tablet, Refills: 0    Associated Diagnoses: New onset atrial fibrillation (H)      calcium carbonate 600 mg-vitamin D 400 units (CALTRATE) 600-400 MG-UNIT per tablet Take 1 tablet by mouth daily  Qty: 30 tablet, Refills: 0    Associated Diagnoses: Closed bimalleolar fracture of right ankle, initial encounter      citalopram (CELEXA) 10 MG tablet Take 1 tablet (10 mg) by mouth daily for 14 days Then stop  Qty: 14 tablet, Refills: 0    Associated Diagnoses: Generalized anxiety disorder      gabapentin (NEURONTIN) 100 MG capsule Take 2 capsules (200 mg) by mouth At Bedtime  Qty: 60 capsule, Refills: 0    Associated Diagnoses: Generalized anxiety disorder      simvastatin (ZOCOR) 20 MG tablet Take 1 tablet (20 mg) by mouth At Bedtime  Qty: 30 tablet, Refills: 0    Associated Diagnoses: Hyperlipidemia LDL goal <130         STOP taking these medications       dicyclomine (BENTYL) 20 MG tablet Comments:   Reason for Stopping:         furosemide (LASIX) 20 MG tablet Comments:   Reason for Stopping:         guaiFENesin (MUCINEX) 600 MG 12 hr tablet Comments:   Reason for Stopping:          losartan (COZAAR) 50 MG tablet Comments:   Reason for Stopping:         SENNA-docusate sodium (SENNA S) 8.6-50 MG tablet Comments:   Reason for Stopping:             Allergies   Allergies   Allergen Reactions     No Known Drug Allergies

## 2020-04-06 NOTE — PLAN OF CARE
S-(situation): Patient discharged to [facility] via W/c with Waldo mehta    B-(background): RLE fx r/t fall. HTN. CHF. Forgetful.    A-(assessment): A+O x4, forgetful. High anxiety. VSS. Denies pain at time of discharge. Oxy effective for RLE pain.    R-(recommendations): Listed belongings gathered and returned to patient.          Discharge Nursing Criteria:     Care Plan and Patient education resolved: Yes    New Medications- pt has been educated about purpose and side effects: Not Applicable    Vaccines  Influenza status verified at discharge:  Yes      Intentionally Retained Items (examples: Drains, Wound packing, ureteral stents, pack, PICC line or IV)  Patient was sent home with Pack catheter left in place.   Information you need to know about your procedure form filled out and copy given to patient: No  Follow up appointment made for removal: No    MISC  Prescriptions if needed, hard copies sent with patient  YES  Home and hospital aquired medications returned to patient: NA  Medication Bin checked and emptied on discharge Yes  Patient reports post-discharge pain management plan is effective: Yes

## 2020-04-07 NOTE — LETTER
"    4/7/2020        RE: Linn Sanon  [facility]  [address]  [place] [address]         [facility]  Linn Sanon is being evaluated via a billable video visit due to the restrictions of the Covid-19 pandemic.   The patient has been notified of following:  \"This video visit will be conducted via a call between you and your provider. We have found that certain health care needs can be provided without the need for an in-person physical exam.  This service lets us provide the care you need with a video conversation. If during the course of the call the provider feels a video visit is not appropriate, you will not be charged for this service.\"   The provider has received verbal consent for a Video Visit from the patient and or first contact? Yes  Patient/facility staff would like the video invitation sent by: Text to cell phone: [phone]  Video Start Time: 10:30am  Which Facility the Patient is at during the time of visit: [facility]     PRIMARY CARE PROVIDER AND CLINIC:  Ashly Tubbs, [facility], [address] [place] [address] [address] / [place] [address]  Chief Complaint   Patient presents with     Video Visit     [facility] Medical Record Number:  [id number]  Linn Sanon  is a 89 year old  (11/13/1926), admitted to the above facility from  [facility]. Hospital stay 03/23/2020 through 04/06/2020..  Admitted to this facility for  rehab, medical management and nursing care.  HPI:    HPI information obtained from: facility chart records, facility staff, patient report and [facility] chart review.   Brief Summary of Hospital Course: Linn presented to the ED after a fall in her AL building and having right ankle pain.  With concern for bimalleolar ankle fracture and need for operative repair, she had volume overload on exam.      Acute heart failure with preservation of ED due to mitral stenosis and severe pulmonary hypertension.  EKG " "demonstrated Atrial Fibrillation.  ECHO demonstrated mitral stenosis, severe pulmonary hypertension and moderate to severe aortic stenosis.  It was figured she was 40 lbs heavier then when she was there earlier this year.  While in hospital she was given IV lasix and then transitioned to oral diuretic with daily weights.  Suggestion of dose adjustment as needed as well as a PRN Metolazone every 48 hours.      Right bimalleolar ankle fracture.  Surgery was considered but with pandemic present and concern for her valvular heart disease with heart failure, operative repair was postponed.  With further discussion with son and patient, the palliative measures were more desired than aggressive treatment.  Currently was to be discharge with stirrup splint with ACE wrap on April 4th.  Linn seemed to be controlled with scheduled Tylenol and PRN oxycodone.  Outpatient follow up with orthopedics was recommended for management.      Linn had new atrial fibrillation.  Started on low dose of beta blocker and digoxin.  Was started on anticoagulation and INR stable at discharge.    Linn developed urinary retention in the hospital with trial of removing catheter on 4/5/2020 with no success and so catheter replaced on the 6th.  Suggestion for urology to see according to her preference.  This appeared to be acute for Linn.      Anxiety was an issue as well for Linn that gave her trouble through her whole stay.  Celexa was scaled back and goal to discontinue due to diarrhea and not seeming to help with her anxiety.    Was discharged with Seroquel at  and [facility]  for her anxiety.    Updates on Status Since Skilled nursing Admission: with the help from  nursing, able to see Linn with video/audio visit.  She was talking to her son when entering her room and able to have them stop the conversation and then nurse called him back after this visit done.  Linn looks \"rough\".  Tired and hard to keep eyes open but " still worried which is her normal.  Not sure if she understands that this is where she will be living.  Basically the room was not decorated with her things from [place] but with the pandemic occurring, her son can not come and move things.  This will be up to the facility to arrange.    At one point when speaking with her she thought that this NP was on the phone and so she held her phone back up to her ear.  Needed to redirect her.      Linn is alert to person, and place.  Time is vague right now.  Nursing states that she had her last BM at 2am in the hospital yesterday.  Bowels are very worrisome to Linn.  No constipation medication but has Imodium if needed.    Came with a pack catheter and unable to be successful without it prior to leaving hospital.  Will give an order to do a trial without next week.  Suspect that if she is using the oxycodone, she may have effects from the narcotic.    Linn is on coumadin and next planned INR is on 4/10/2020    CODE STATUS/ADVANCE DIRECTIVES DISCUSSION:   DNR / DNI  Patient's living condition: lives alone  ALLERGIES: No known drug allergies  PAST MEDICAL HISTORY:  has a past medical history of Closed fracture of navicular (scaphoid) bone of wrist, Generalized osteoarthrosis, unspecified site, Lump or mass in breast, Measles without mention of complication, Mumps without mention of complication, Pure hypercholesterolemia, Scarlet fever, Unspecified closed fracture of ankle, and Varicella without mention of complication.  PAST SURGICAL HISTORY:   has a past surgical history that includes APPENDECTOMY (age 11); EXCISION BREAST LESION W XRAY MARKER, OPEN SINGLE (1995); REMV CATARACT EXTRACAP,INSERT LENS, W/O ECP (9/18/2008); REMV CATARACT EXTRACAP,INSERT LENS, W/O ECP (10/16/2008); and Laser YAG capsulotomy (Left, 9/18/2014).  FAMILY HISTORY: family history includes Arthritis in her sister; Cardiovascular in her father; Diabetes in her father; Heart Disease in her paternal  grandfather; Respiratory in her brother and sister; Thyroid Disease in her sister.  SOCIAL HISTORY:   reports that she has never smoked. She has never used smokeless tobacco. She reports that she does not drink alcohol or use drugs.  Current Outpatient Medications   Medication Sig Dispense Refill     acetaminophen (TYLENOL) 500 MG tablet Take 2 tablets (1,000 mg) by mouth 3 times daily 180 tablet 0     albuterol (PROAIR HFA/PROVENTIL HFA/VENTOLIN HFA) 108 (90 Base) MCG/ACT inhaler Inhale 2 puffs into the lungs every 4 hours as needed for shortness of breath / dyspnea or wheezing 1 Inhaler 0     ALPRAZolam (XANAX) 0.25 MG tablet Take 1 tablet (0.25 mg) by mouth 3 times daily 90 tablet 0     aspirin (ASA) 81 MG EC tablet Take 1 tablet (81 mg) by mouth daily 30 tablet 0     calcium carbonate 600 mg-vitamin D 400 units (CALTRATE) 600-400 MG-UNIT per tablet Take 1 tablet by mouth daily 30 tablet 0     citalopram (CELEXA) 10 MG tablet Take 1 tablet (10 mg) by mouth daily for 14 days Then stop 14 tablet 0     digoxin (LANOXIN) 125 MCG tablet Take 1 tablet (125 mcg) by mouth daily 30 tablet 0     gabapentin (NEURONTIN) 100 MG capsule Take 2 capsules (200 mg) by mouth At Bedtime 60 capsule 0     loperamide (IMODIUM) 2 MG capsule Take 1 capsule (2 mg) by mouth 4 times daily as needed for diarrhea 20 capsule 0     metolazone (ZAROXOLYN) 2.5 MG tablet Take 1 tablet (2.5 mg) by mouth every 48 hours as needed (weight fernie more than 2 pounds in 1 day or 5 pounds in 1 week) 15 tablet 0     metoprolol tartrate (LOPRESSOR) 25 MG tablet Take 1 tablet (25 mg) by mouth 2 times daily 60 tablet 0     miconazole (MICATIN) 2 % external powder Apply topically 2 times daily 90 g 0     oxyCODONE (ROXICODONE) 5 MG tablet Take 0.5 tablets (2.5 mg) by mouth every 4 hours as needed for moderate to severe pain 10 tablet 0     potassium chloride ER (KLOR-CON M) 20 MEQ CR tablet Take 2 tablets (40 mEq) by mouth 2 times daily 120 tablet 0      "QUEtiapine (SEROQUEL) 25 MG tablet Take 0.5 tablets (12.5 mg) by mouth At Bedtime 15 tablet 0     simvastatin (ZOCOR) 20 MG tablet Take 1 tablet (20 mg) by mouth At Bedtime 30 tablet 0     torsemide (DEMADEX) 20 MG tablet Take 1 tablet (20 mg) by mouth daily 30 tablet 0     warfarin ANTICOAGULANT (COUMADIN) 2.5 MG tablet Take 2.5 mg on Mon, Wed and Fri and take 5 mg on Tues, Thurs, Sat, and Sun, or take as advised by NH provider 60 tablet 0        ROS: 4 point ROS including Respiratory, CV, GI and , other than that noted in the HPI,  is negative, limited due to her concentration level.    Vitals:/62   Pulse 78   Temp 99  F (37.2  C)   Resp 20   Ht 1.6 m (5' 3\")   BMI 31.87 kg/m       Limited Visit Exam done given COVID-19 precautions:   nurse assisted with exam    GENERAL APPEARANCE:  anxious, cooperative, alert and able to answer most questions but eyes would half close.  No hearing aids.  yes to dentures.  EYES:  Conjunctiva and lids normal, able to look into the video.  wearing glasses.  during the visit she is pointing to 2 of the 4 walls and talking about her belongings  RESP:  respiratory effort and palpation of chest normal, lungs clear to auscultation , no respiratory distress, no coughing, no oxygen being used  CV:  Palpation and auscultation of heart done , heart rate regular/irregular, +1 edema in lower legs.  Staff feel she may be retaining fluid in her upper extremities.  ABDOMEN:  abdomen is soft.  non-tender to touch.  bowel sounds present, no guarding or rebound  M/S:   Gait and station abnormal non-weight bearing on right leg.  splint and wrapped.  4 point lift for transfers safely  SKIN:  No open areas at this time.  Resolving bruising noted on rt arm due to IV sites from hospital.  PSYCH:  Typically Linn is alert and oriented to most details.  Today can see the anxiousness (typical) but looks tired.  Asked her to allow herself to get some sleep.  Expect issues more so due " to her ankle fracture.      Lab/Diagnostic data:    Component      Latest Ref Rng & Units 4/6/2020   Color Urine       Yellow   Appearance Urine       Slightly Cloudy   Glucose Urine      NEG:Negative mg/dL Negative   Bilirubin Urine      NEG:Negative Negative   Ketones Urine      NEG:Negative mg/dL Negative   Specific Gravity Urine      1.003 - 1.035 1.014   Blood Urine      NEG:Negative Negative   pH Urine      5.0 - 7.0 pH 8.0 (H)   Protein Albumin Urine      NEG:Negative mg/dL Negative   Urobilinogen mg/dL      0.0 - 2.0 mg/dL 0.0   Nitrite Urine      NEG:Negative Negative   Leukocyte Esterase Urine      NEG:Negative Moderate (A)   Source       Catheterized Urine   WBC Urine      0 - 5 / (H)   RBC Urine      0 - 2 /HPF 10 (H)   Squamous Epithelial /HPF Urine      0 - 1 /HPF <1   Amorphous Crystals      NEG:Negative /HPF Few (A)     Component      Latest Ref Rng & Units 4/6/2020   INR      0.86 - 1.14 2.07 (H)     Component      Latest Ref Rng & Units 4/5/2020   Sodium      133 - 144 mmol/L 134   Potassium      3.4 - 5.3 mmol/L 4.6   Chloride      94 - 109 mmol/L 98   Carbon Dioxide      20 - 32 mmol/L 30   Anion Gap      3 - 14 mmol/L 6   Glucose      70 - 99 mg/dL 112 (H)   Urea Nitrogen      7 - 30 mg/dL 16   Creatinine      0.52 - 1.04 mg/dL 0.64   GFR Estimate      >60 mL/min/1.73:m2 77   GFR Estimate If Black      >60 mL/min/1.73:m2 89   Calcium      8.5 - 10.1 mg/dL 8.9     ASSESSMENT/PLAN:  Acute heart failure, unspecified heart failure type (H)  Acute respiratory failure with hypoxia (H)  Did have a longer stay at the hospital as what was heard from facility is that diuresing her had its difficulties.  Today's weight is 176.4 lbs  Does have +1 edema present.  Will need to have her elevate her extremities while she is recovering with her fractured ankle.  Feel this will be a potential for edema if left in a dependent position.  No respiratory distress seen today.      Is to have daily weights and  call if over 2 lbs in a day or 5 pounds in a week.  Will monitor weights.  Currently on demadex 20mg daily, K+ 40meq twice a day and PRN Zaroxolyn 2.5mg every 48 hours prn.  PRN albuterol inhaler available.      From hospital discharge it is recommended to have a K+ level in 5 days.  The way Linn looks today, would rather have a BMP done to make sure she is not dehydrating.  1.  Change to a BMP on 4/10/2020 from K+ level    Closed bimalleolar fracture of right ankle with delayed healing, subsequent encounter  - will be monitored outpatient with orthopedics.  Splint management per orthopedics is the order seen in NH chart.    Will be having therapies for exercises, strengthening and mobility.    Currently taking Tylenol 1000mg TID and oxycodone 2.5mg every 4 hours prn.  Hope that once she settles in, she will not look so rough to make a person feel she is oversedated in appearance.    It appeared to charting that she did have an oxycodone late night and then it helped her sleep.    Severe pulmonary hypertension (H) per Echo Jan 2020  Blood pressures are limited so far.  May be written on their admission paperwork but 100's/50's.    Is on lopressor 25mg twice a day.  Continue to monitor.    New onset atrial fibrillation (H) with RVR, suspect valvular a fib  - is on Digoxin 125mcg daily and coumadin.    Currently on 2.5mg M/W/F and 5mg all other days.    INR on Friday.  1.  Will add a digoxin level as well to make sure she is within therapeutic range.    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  Still have troubles in the hospital and so Celexa discontinued with thought of maybe contributing to her loose stools as a side effect and feeling it was not helping at all.  Now she is started on Neurontin 200mg at HS  "which can help with pain and Seroquel 12.5mg at HS.  Her dose of celexa is being tapered off.      Linn appears to look 'rough' today but can only imagine that her mind is racing with worries and now in a new environment and mobility is limited.      This NP embraces that Neurontin and Seroquel are new since last stay.  Her anxiety is neurotic.  Would like to see the Seroquel calm her racing thoughts that she can not control and allow her some sleep and maybe change her thinking to be calmer.  Neurontin can be a mood stabilizer but also help with pain.    She is on Xanax 0.25mg three times a day.  Have tried a lower dose but she is back to her 3x day.    No dose changes but need to see as she recovers, how her mood plays out.  Goal is not to oversedate her but want her to be comfortable in her \"own skin\".  She calls her son many times and in turn he calls to the nurses.  Would like to see this be more of a healthy relationship with all as she can be very demanding with time.    1.  Discontinue celexa before it is time.    History of TIA (transient ischemic attack) and stroke  Will remain on ASA 81mg daily.  Monitor for neurological signs day to day with interactions.    Neurogenic bladder  Sears catheter present  - currently came with a catheter and so facility will need to put in their orders (protocols) for care of the catheter.    Discussed with  to give the catheter another week - let her adjust as well as staff get to know her.  If she seems more alert than may be more successful.  Do question though if the oxycodone may be causing her bladder issues.    1.  Remove catheter next Wednesday 4/15/2020  2.  PVR every shift and straight cath if over >350cc of urine with PVR.  If needing to do a third time then leave the catheter in to straight drainage and consult NP again.    Diarrhea, unspecified type  Has her PRN loperamide 2mg 4x day prn for diarrhea.    Staff will have to help her to the restroom and " can monitor what her stools appear to be like.  If formed and soft, that is great, bowels can move as she is limited on mobility and taking many medications..  If loose and not formed than can have the Imodium.       Hyperlipidemia LDL goal <130  Remains on Zocor 20mg daily at .      Orders given to the    1.  Turn the K+ lab into a BMP for 4/10/2020  2.  Digoxin level on 4/10/2020 due to atrial Fib  3.  CBC to be done on 4/17/2020  4.  Discontinue celexa.  5.  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  Remove catheter on 4/15/2020 for trial without - neurogenic bladder.  7.  PVR every shift and if over 350cc of urine, may straight cath.  If doing it for the 3rd time then leave catheter in to straight drainage.    Electronically signed by:  ASHLEY Maldonado CNP     Video-Visit Details  Type of service:  Video Visit  Video End Time (time video stopped): 11AM  Distant Location (provider location):  [facility]                 Sincerely,        ASHLEY Maldonado CNP

## 2020-04-07 NOTE — PROGRESS NOTES
" [facility]  Linn Sanon is being evaluated via a billable video visit due to the restrictions of the Covid-19 pandemic.   The patient has been notified of following:  \"This video visit will be conducted via a call between you and your provider. We have found that certain health care needs can be provided without the need for an in-person physical exam.  This service lets us provide the care you need with a video conversation. If during the course of the call the provider feels a video visit is not appropriate, you will not be charged for this service.\"   The provider has received verbal consent for a Video Visit from the patient and or first contact? Yes  Patient/facility staff would like the video invitation sent by: Text to cell phone: [phone]  Video Start Time: 10:30am  Which Facility the Patient is at during the time of visit: [facility]     PRIMARY CARE PROVIDER AND CLINIC:  Ashly Tubbs, APRN CNP, [address] / [place] [address]  Chief Complaint   Patient presents with     Video Visit     [place] Medical Record Number:  [id number]  Linn Sanon  is a 89 year old  (11/13/1926), admitted to the above facility from  [facility]. Hospital stay 03/23/2020 through 04/06/2020..  Admitted to this facility for  rehab, medical management and nursing care.  HPI:    HPI information obtained from: facility chart records, facility staff, patient report and [facility] chart review.   Brief Summary of Hospital Course: Linn presented to the ED after a fall in her AL building and having right ankle pain.  With concern for bimalleolar ankle fracture and need for operative repair, she had volume overload on exam.      Acute heart failure with preservation of ED due to mitral stenosis and severe pulmonary hypertension.  EKG demonstrated Atrial Fibrillation.  ECHO demonstrated mitral stenosis, severe pulmonary hypertension and moderate to severe " "aortic stenosis.  It was figured she was 40 lbs heavier then when she was there earlier this year.  While in hospital she was given IV lasix and then transitioned to oral diuretic with daily weights.  Suggestion of dose adjustment as needed as well as a PRN Metolazone every 48 hours.      Right bimalleolar ankle fracture.  Surgery was considered but with pandemic present and concern for her valvular heart disease with heart failure, operative repair was postponed.  With further discussion with son and patient, the palliative measures were more desired than aggressive treatment.  Currently was to be discharge with stirrup splint with ACE wrap on April 4th.  Linn seemed to be controlled with scheduled Tylenol and PRN oxycodone.  Outpatient follow up with orthopedics was recommended for management.      Linn had new atrial fibrillation.  Started on low dose of beta blocker and digoxin.  Was started on anticoagulation and INR stable at discharge.    Linn developed urinary retention in the hospital with trial of removing catheter on 4/5/2020 with no success and so catheter replaced on the 6th.  Suggestion for urology to see according to her preference.  This appeared to be acute for Linn.      Anxiety was an issue as well for Linn that gave her trouble through her whole stay.  Celexa was scaled back and goal to discontinue due to diarrhea and not seeming to help with her anxiety.    Was discharged with Seroquel at  and Neurontin  for her anxiety.    Updates on Status Since Skilled nursing Admission: with the help from  nursing, able to see Linn with video/audio visit.  She was talking to her son when entering her room and able to have them stop the conversation and then nurse called him back after this visit done.  Linn looks \"rough\".  Tired and hard to keep eyes open but still worried which is her normal.  Not sure if she understands that this is where she will be living.  Basically the room was " not decorated with her things from [facility] but with the pandemic occurring, her son can not come and move things.  This will be up to the facility to arrange.    At one point when speaking with her she thought that this NP was on the phone and so she held her phone back up to her ear.  Needed to redirect her.      Linn is alert to person, and place.  Time is vague right now.  Nursing states that she had her last BM at 2am in the hospital yesterday.  Bowels are very worrisome to Linn.  No constipation medication but has Imodium if needed.    Came with a pack catheter and unable to be successful without it prior to leaving hospital.  Will give an order to do a trial without next week.  Suspect that if she is using the oxycodone, she may have effects from the narcotic.    Linn is on coumadin and next planned INR is on 4/10/2020    CODE STATUS/ADVANCE DIRECTIVES DISCUSSION:   DNR / DNI  Patient's living condition: lives alone  ALLERGIES: No known drug allergies  PAST MEDICAL HISTORY:  has a past medical history of Closed fracture of navicular (scaphoid) bone of wrist, Generalized osteoarthrosis, unspecified site, Lump or mass in breast, Measles without mention of complication, Mumps without mention of complication, Pure hypercholesterolemia, Scarlet fever, Unspecified closed fracture of ankle, and Varicella without mention of complication.  PAST SURGICAL HISTORY:   has a past surgical history that includes APPENDECTOMY (age 11); EXCISION BREAST LESION W XRAY MARKER, OPEN SINGLE (1995); REMV CATARACT EXTRACAP,INSERT LENS, W/O ECP (9/18/2008); REMV CATARACT EXTRACAP,INSERT LENS, W/O ECP (10/16/2008); and Laser YAG capsulotomy (Left, 9/18/2014).  FAMILY HISTORY: family history includes Arthritis in her sister; Cardiovascular in her father; Diabetes in her father; Heart Disease in her paternal grandfather; Respiratory in her brother and sister; Thyroid Disease in her sister.  SOCIAL HISTORY:   reports that she has  never smoked. She has never used smokeless tobacco. She reports that she does not drink alcohol or use drugs.  Current Outpatient Medications   Medication Sig Dispense Refill     acetaminophen (TYLENOL) 500 MG tablet Take 2 tablets (1,000 mg) by mouth 3 times daily 180 tablet 0     albuterol (PROAIR HFA/PROVENTIL HFA/VENTOLIN HFA) 108 (90 Base) MCG/ACT inhaler Inhale 2 puffs into the lungs every 4 hours as needed for shortness of breath / dyspnea or wheezing 1 Inhaler 0     ALPRAZolam (XANAX) 0.25 MG tablet Take 1 tablet (0.25 mg) by mouth 3 times daily 90 tablet 0     aspirin (ASA) 81 MG EC tablet Take 1 tablet (81 mg) by mouth daily 30 tablet 0     calcium carbonate 600 mg-vitamin D 400 units (CALTRATE) 600-400 MG-UNIT per tablet Take 1 tablet by mouth daily 30 tablet 0     citalopram (CELEXA) 10 MG tablet Take 1 tablet (10 mg) by mouth daily for 14 days Then stop 14 tablet 0     digoxin (LANOXIN) 125 MCG tablet Take 1 tablet (125 mcg) by mouth daily 30 tablet 0     gabapentin (NEURONTIN) 100 MG capsule Take 2 capsules (200 mg) by mouth At Bedtime 60 capsule 0     loperamide (IMODIUM) 2 MG capsule Take 1 capsule (2 mg) by mouth 4 times daily as needed for diarrhea 20 capsule 0     metolazone (ZAROXOLYN) 2.5 MG tablet Take 1 tablet (2.5 mg) by mouth every 48 hours as needed (weight fernie more than 2 pounds in 1 day or 5 pounds in 1 week) 15 tablet 0     metoprolol tartrate (LOPRESSOR) 25 MG tablet Take 1 tablet (25 mg) by mouth 2 times daily 60 tablet 0     miconazole (MICATIN) 2 % external powder Apply topically 2 times daily 90 g 0     oxyCODONE (ROXICODONE) 5 MG tablet Take 0.5 tablets (2.5 mg) by mouth every 4 hours as needed for moderate to severe pain 10 tablet 0     potassium chloride ER (KLOR-CON M) 20 MEQ CR tablet Take 2 tablets (40 mEq) by mouth 2 times daily 120 tablet 0     QUEtiapine (SEROQUEL) 25 MG tablet Take 0.5 tablets (12.5 mg) by mouth At Bedtime 15 tablet 0     simvastatin (ZOCOR) 20 MG tablet  "Take 1 tablet (20 mg) by mouth At Bedtime 30 tablet 0     torsemide (DEMADEX) 20 MG tablet Take 1 tablet (20 mg) by mouth daily 30 tablet 0     warfarin ANTICOAGULANT (COUMADIN) 2.5 MG tablet Take 2.5 mg on Mon, Wed and Fri and take 5 mg on Tues, Thurs, Sat, and Sun, or take as advised by NH provider 60 tablet 0        ROS: 4 point ROS including Respiratory, CV, GI and , other than that noted in the HPI,  is negative, limited due to her concentration level.    Vitals:/62   Pulse 78   Temp 99  F (37.2  C)   Resp 20   Ht 1.6 m (5' 3\")   BMI 31.87 kg/m       Limited Visit Exam done given COVID-19 precautions:   nurse assisted with exam    GENERAL APPEARANCE:  anxious, cooperative, alert and able to answer most questions but eyes would half close.  No hearing aids.  yes to dentures.  EYES:  Conjunctiva and lids normal, able to look into the video.  wearing glasses.  during the visit she is pointing to 2 of the 4 walls and talking about her belongings  RESP:  respiratory effort and palpation of chest normal, lungs clear to auscultation , no respiratory distress, no coughing, no oxygen being used  CV:  Palpation and auscultation of heart done , heart rate regular/irregular, +1 edema in lower legs.  Staff feel she may be retaining fluid in her upper extremities.  ABDOMEN:  abdomen is soft.  non-tender to touch.  bowel sounds present, no guarding or rebound  M/S:   Gait and station abnormal non-weight bearing on right leg.  splint and wrapped.  4 point lift for transfers safely  SKIN:  No open areas at this time.  Resolving bruising noted on rt arm due to IV sites from hospital.  PSYCH:  Typically Linn is alert and oriented to most details.  Today can see the anxiousness (typical) but looks tired.  Asked her to allow herself to get some sleep.  Expect issues more so due to her ankle fracture.      Lab/Diagnostic data:    Component      Latest Ref Rng & Units 4/6/2020   Color Urine       Yellow "   Appearance Urine       Slightly Cloudy   Glucose Urine      NEG:Negative mg/dL Negative   Bilirubin Urine      NEG:Negative Negative   Ketones Urine      NEG:Negative mg/dL Negative   Specific Gravity Urine      1.003 - 1.035 1.014   Blood Urine      NEG:Negative Negative   pH Urine      5.0 - 7.0 pH 8.0 (H)   Protein Albumin Urine      NEG:Negative mg/dL Negative   Urobilinogen mg/dL      0.0 - 2.0 mg/dL 0.0   Nitrite Urine      NEG:Negative Negative   Leukocyte Esterase Urine      NEG:Negative Moderate (A)   Source       Catheterized Urine   WBC Urine      0 - 5 / (H)   RBC Urine      0 - 2 /HPF 10 (H)   Squamous Epithelial /HPF Urine      0 - 1 /HPF <1   Amorphous Crystals      NEG:Negative /HPF Few (A)     Component      Latest Ref Rng & Units 4/6/2020   INR      0.86 - 1.14 2.07 (H)     Component      Latest Ref Rng & Units 4/5/2020   Sodium      133 - 144 mmol/L 134   Potassium      3.4 - 5.3 mmol/L 4.6   Chloride      94 - 109 mmol/L 98   Carbon Dioxide      20 - 32 mmol/L 30   Anion Gap      3 - 14 mmol/L 6   Glucose      70 - 99 mg/dL 112 (H)   Urea Nitrogen      7 - 30 mg/dL 16   Creatinine      0.52 - 1.04 mg/dL 0.64   GFR Estimate      >60 mL/min/1.73:m2 77   GFR Estimate If Black      >60 mL/min/1.73:m2 89   Calcium      8.5 - 10.1 mg/dL 8.9     ASSESSMENT/PLAN:  Acute heart failure, unspecified heart failure type (H)  Acute respiratory failure with hypoxia (H)  Did have a longer stay at the hospital as what was heard from facility is that diuresing her had its difficulties.  Today's weight is 176.4 lbs  Does have +1 edema present.  Will need to have her elevate her extremities while she is recovering with her fractured ankle.  Feel this will be a potential for edema if left in a dependent position.  No respiratory distress seen today.      Is to have daily weights and call if over 2 lbs in a day or 5 pounds in a week.  Will monitor weights.  Currently on demadex 20mg daily, K+ 40meq twice a  day and PRN Zaroxolyn 2.5mg every 48 hours prn.  PRN albuterol inhaler available.      From hospital discharge it is recommended to have a K+ level in 5 days.  The way Linn looks today, would rather have a BMP done to make sure she is not dehydrating.  1.  Change to a BMP on 4/10/2020 from K+ level    Closed bimalleolar fracture of right ankle with delayed healing, subsequent encounter  - will be monitored outpatient with orthopedics.  Splint management per orthopedics is the order seen in NH chart.    Will be having therapies for exercises, strengthening and mobility.    Currently taking Tylenol 1000mg TID and oxycodone 2.5mg every 4 hours prn.  Hope that once she settles in, she will not look so rough to make a person feel she is oversedated in appearance.    It appeared to charting that she did have an oxycodone late night and then it helped her sleep.    Severe pulmonary hypertension (H) per Echo Jan 2020  Blood pressures are limited so far.  May be written on their admission paperwork but 100's/50's.    Is on lopressor 25mg twice a day.  Continue to monitor.    New onset atrial fibrillation (H) with RVR, suspect valvular a fib  - is on Digoxin 125mcg daily and coumadin.    Currently on 2.5mg M/W/F and 5mg all other days.    INR on Friday.  1.  Will add a digoxin level as well to make sure she is within therapeutic range.    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  Still have troubles in the hospital and so Celexa discontinued with thought of maybe contributing to her loose stools as a side effect and feeling it was not helping at all.  Now she is started on Neurontin 200mg at HS which can help with pain and Seroquel 12.5mg at HS.  Her dose of celexa is being tapered off.      Linn appears to look 'rough'  "today but can only imagine that her mind is racing with worries and now in a new environment and mobility is limited.      This NP embraces that Neurontin and Seroquel are new since last stay.  Her anxiety is neurotic.  Would like to see the Seroquel calm her racing thoughts that she can not control and allow her some sleep and maybe change her thinking to be calmer.  Neurontin can be a mood stabilizer but also help with pain.    She is on Xanax 0.25mg three times a day.  Have tried a lower dose but she is back to her 3x day.    No dose changes but need to see as she recovers, how her mood plays out.  Goal is not to oversedate her but want her to be comfortable in her \"own skin\".  She calls her son many times and in turn he calls to the nurses.  Would like to see this be more of a healthy relationship with all as she can be very demanding with time.    1.  Discontinue celexa before it is time.    History of TIA (transient ischemic attack) and stroke  Will remain on ASA 81mg daily.  Monitor for neurological signs day to day with interactions.    Neurogenic bladder  Sears catheter present  - currently came with a catheter and so facility will need to put in their orders (protocols) for care of the catheter.    Discussed with  to give the catheter another week - let her adjust as well as staff get to know her.  If she seems more alert than may be more successful.  Do question though if the oxycodone may be causing her bladder issues.    1.  Remove catheter next Wednesday 4/15/2020  2.  PVR every shift and straight cath if over >350cc of urine with PVR.  If needing to do a third time then leave the catheter in to straight drainage and consult NP again.    Diarrhea, unspecified type  Has her PRN loperamide 2mg 4x day prn for diarrhea.    Staff will have to help her to the restroom and can monitor what her stools appear to be like.  If formed and soft, that is great, bowels can move as she is limited on mobility " and taking many medications..  If loose and not formed than can have the Imodium.       Hyperlipidemia LDL goal <130  Remains on Zocor 20mg daily at .      Orders given to the    1.  Turn the K+ lab into a BMP for 4/10/2020  2.  Digoxin level on 4/10/2020 due to atrial Fib  3.  CBC to be done on 4/17/2020  4.  Discontinue celexa.  5.  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  Remove catheter on 4/15/2020 for trial without - neurogenic bladder.  7.  PVR every shift and if over 350cc of urine, may straight cath.  If doing it for the 3rd time then leave catheter in to straight drainage.    Electronically signed by:  ASHLEY Maldonado CNP     Video-Visit Details  Type of service:  Video Visit  Video End Time (time video stopped): 11AM  Distant Location (provider location):  [facility]

## 2020-04-10 NOTE — LETTER
"    4/10/2020        RE: Linn Sanon  [facility]  [address] [facility] [address]        [facility]   Linn Sanon is being evaluated via a billable video visit due to the restrictions of the Covid-19 pandemic.   The patient has been notified of following:  \"This video visit will be conducted via a call between you and your provider. We have found that certain health care needs can be provided without the need for an in-person physical exam.  This service lets us provide the care you need with a video conversation. If during the course of the call the provider feels a video visit is not appropriate, you will not be charged for this service.\"   The provider has received verbal consent for a Video Visit from the patient or first contact? Yes  Patient  or facility staff would like the video invitation sent by: Text to cell phone: [phone]  Video Start Time: 1:00 pm    [place] Medical Record Number:  [id number]  Place of Location at the time of visit: [facility]   Chief Complaint   Patient presents with     Video Visit     HPI:  Linn Sanon  is a 89 year old (11/13/1926), who is being seen today for a visit.  HPI information obtained from: facility chart records, facility staff, patient report and [facility] chart review. Today's concern is:    1. New onset atrial fibrillation (H) with RVR, suspect valvular a fib    2. Anticoagulation goal of INR 2 to 3    3. Long term current use of anticoagulant    4. Chronic congestive heart failure, unspecified heart failure type (H)    5. Closed bimalleolar fracture of right ankle with delayed healing, subsequent encounter      INR, BMP and digoxin level done today and so came to see how she has been doing since admission.  Linn is here for long term stay after receiving rehab.  Being in the AL setting was not a success for her.  While there she fell and fractured her right ankle.  No surgery due to her heart " "failure.  Currently ACE wrapped and has a splint present.  With how she appeared on admission visit, wanted a digoxin level done to make sure she is not toxic.  Overall, labs appear stable.    With assist from primary , able to see Linn today with a video visit.  First concern she stated when asked how she was doing was....\"No one every comes when I put the call light on.  I wait and wait.\"  She needs to be directed on being focused of what is being asked of her.     Past Medical and Surgical History reviewed in Epic today.  MEDICATIONS:    Current Outpatient Medications   Medication Sig Dispense Refill     acetaminophen (TYLENOL) 500 MG tablet Take 2 tablets (1,000 mg) by mouth 3 times daily 180 tablet 0     albuterol (PROAIR HFA/PROVENTIL HFA/VENTOLIN HFA) 108 (90 Base) MCG/ACT inhaler Inhale 2 puffs into the lungs every 4 hours as needed for shortness of breath / dyspnea or wheezing 1 Inhaler 0     ALPRAZolam (XANAX) 0.25 MG tablet Take 1 tablet (0.25 mg) by mouth 3 times daily 90 tablet 0     aspirin (ASA) 81 MG EC tablet Take 1 tablet (81 mg) by mouth daily 30 tablet 0     calcium carbonate 600 mg-vitamin D 400 units (CALTRATE) 600-400 MG-UNIT per tablet Take 1 tablet by mouth daily 30 tablet 0     citalopram (CELEXA) 10 MG tablet Take 1 tablet (10 mg) by mouth daily for 14 days Then stop 14 tablet 0     digoxin (LANOXIN) 125 MCG tablet Take 1 tablet (125 mcg) by mouth daily 30 tablet 0     gabapentin (NEURONTIN) 100 MG capsule Take 2 capsules (200 mg) by mouth At Bedtime 60 capsule 0     loperamide (IMODIUM) 2 MG capsule Take 1 capsule (2 mg) by mouth 4 times daily as needed for diarrhea 20 capsule 0     metolazone (ZAROXOLYN) 2.5 MG tablet Take 1 tablet (2.5 mg) by mouth every 48 hours as needed (weight fernie more than 2 pounds in 1 day or 5 pounds in 1 week) 15 tablet 0     metoprolol tartrate (LOPRESSOR) 25 MG tablet Take 1 tablet (25 mg) by mouth 2 times daily 60 tablet 0     miconazole (MICATIN) " "2 % external powder Apply topically 2 times daily 90 g 0     oxyCODONE (ROXICODONE) 5 MG tablet Take 0.5 tablets (2.5 mg) by mouth every 4 hours as needed for moderate to severe pain 10 tablet 0     potassium chloride ER (KLOR-CON M) 20 MEQ CR tablet Take 2 tablets (40 mEq) by mouth 2 times daily 120 tablet 0     QUEtiapine (SEROQUEL) 25 MG tablet Take 0.5 tablets (12.5 mg) by mouth At Bedtime 15 tablet 0     simvastatin (ZOCOR) 20 MG tablet Take 1 tablet (20 mg) by mouth At Bedtime 30 tablet 0     torsemide (DEMADEX) 20 MG tablet Take 1 tablet (20 mg) by mouth daily 30 tablet 0     warfarin ANTICOAGULANT (COUMADIN) 2.5 MG tablet Take 2.5 mg on Mon, Wed and Fri and take 5 mg on Tues, Thurs, Sat, and Sun, or take as advised by NH provider 60 tablet 0     REVIEW OF SYSTEMS: 4 point ROS including Respiratory, CV, GI and , other than that noted in the HPI,  is negative  Objective: /65   Pulse 58   Temp 98.2  F (36.8  C)   Resp 16   Ht 1.6 m (5' 3\")   Wt 84 kg (185 lb 1.6 oz)   SpO2 94%   BMI 32.79 kg/m    Limited visit exam done given COVID-19 precautions.   Assist of exam with     Sitting up in her wheelchair.  Alert and in her typical fashion, she is nervous.  Does not help that with the pandemic, her son can not be here and so she calls him MULTIPLE times during the day.  In turn then he calls the station.  Her hair is not well groomed.  Skin is pale, dry and warm to touch according to nursing.  4 point lift for transfers.  Extensive assist of 2 with toileting.    Heart rate regular/irregular.  Lungs are clear with oxygen in low 90's.    Lower extremities have +1 edema present.    Labs:   Component      Latest Ref Rng & Units 4/10/2020   Sodium      133 - 144 mmol/L 135   Potassium      3.4 - 5.3 mmol/L 4.5   Chloride      94 - 109 mmol/L 102   Carbon Dioxide      20 - 32 mmol/L 29   Anion Gap      3 - 14 mmol/L 4   Glucose      70 - 99 mg/dL 100 (H)   Urea Nitrogen      7 - 30 mg/dL 16 "   Creatinine      0.52 - 1.04 mg/dL 0.68   GFR Estimate      >60 mL/min/1.73:m2 75   GFR Estimate If Black      >60 mL/min/1.73:m2 87   Calcium      8.5 - 10.1 mg/dL 9.1   Digoxin Level      0.5 - 2.0 ug/L 0.6   INR      0.86 - 1.14 1.49 (H)       ASSESSMENT/PLAN:  New onset atrial fibrillation (H) with RVR, suspect valvular a fib  Anticoagulation goal of INR 2 to 3  Long term current use of anticoagulant  - INR = 1.49  Currently receiving coumadin 5mg po daily.  1.  Will keep Coumadin 5mg po daily  2.  INR in 1 week 4-    Chronic congestive heart failure, unspecified heart failure type (H)  Currently on Demadex 20mg daily.  Wanted to make sure she was not dehydrated.    Admission weight 176 and now 175.  Consider this stable.    No changes with the diuretic.  Continue to monitor daily weights.  Not exhibiting any SOB.    Closed bimalleolar fracture of right ankle with delayed healing, subsequent encounter  This will be difficult til healed.  Her anxiety limits her mobility and extensive assist for the restroom.  It will work out but can not assure her enough.   Will need to check when she needs a visit for follow up with orthopedics.  Is on Tylenol ES 1000mg TID for pain and prn oxycodone for breakthrough.       Orders given to the :  1.  Coumadin 5mg po daily  2.  INR on 4/17/2020 for Atrial Fib    Electronically signed by:  ASHLEY Maldonado CNP     Video-Visit Details  Type of service:  Video Visit  Video End Time (time video stopped): 1:15pm  Distant Location (provider location):  [facility]             Sincerely,        ASHLEY Maldonado CNP

## 2020-04-10 NOTE — PROGRESS NOTES
"[facility]   Linn Sanon is being evaluated via a billable video visit due to the restrictions of the Covid-19 pandemic.   The patient has been notified of following:  \"This video visit will be conducted via a call between you and your provider. We have found that certain health care needs can be provided without the need for an in-person physical exam.  This service lets us provide the care you need with a video conversation. If during the course of the call the provider feels a video visit is not appropriate, you will not be charged for this service.\"   The provider has received verbal consent for a Video Visit from the patient or first contact? Yes  Patient  or facility staff would like the video invitation sent by: Text to cell phone: [phone]  Video Start Time: 1:00 pm    [facility] Record Number:  [id number]  Place of Location at the time of visit: [facility]   Chief Complaint   Patient presents with     Video Visit     HPI:  Linn Sanon  is a 89 year old (11/13/1926), who is being seen today for a visit.  HPI information obtained from: facility chart records, facility staff, patient report and [facility] chart review. Today's concern is:    1. New onset atrial fibrillation (H) with RVR, suspect valvular a fib    2. Anticoagulation goal of INR 2 to 3    3. Long term current use of anticoagulant    4. Chronic congestive heart failure, unspecified heart failure type (H)    5. Closed bimalleolar fracture of right ankle with delayed healing, subsequent encounter      INR, BMP and digoxin level done today and so came to see how she has been doing since admission.  Linn is here for long term stay after receiving rehab.  Being in the AL setting was not a success for her.  While there she fell and fractured her right ankle.  No surgery due to her heart failure.  Currently ACE wrapped and has a splint present.  With how she appeared on admission visit, wanted a " "digoxin level done to make sure she is not toxic.  Overall, labs appear stable.    With assist from primary , able to see Linn today with a video visit.  First concern she stated when asked how she was doing was....\"No one every comes when I put the call light on.  I wait and wait.\"  She needs to be directed on being focused of what is being asked of her.     Past Medical and Surgical History reviewed in Epic today.  MEDICATIONS:    Current Outpatient Medications   Medication Sig Dispense Refill     acetaminophen (TYLENOL) 500 MG tablet Take 2 tablets (1,000 mg) by mouth 3 times daily 180 tablet 0     albuterol (PROAIR HFA/PROVENTIL HFA/VENTOLIN HFA) 108 (90 Base) MCG/ACT inhaler Inhale 2 puffs into the lungs every 4 hours as needed for shortness of breath / dyspnea or wheezing 1 Inhaler 0     ALPRAZolam (XANAX) 0.25 MG tablet Take 1 tablet (0.25 mg) by mouth 3 times daily 90 tablet 0     aspirin (ASA) 81 MG EC tablet Take 1 tablet (81 mg) by mouth daily 30 tablet 0     calcium carbonate 600 mg-vitamin D 400 units (CALTRATE) 600-400 MG-UNIT per tablet Take 1 tablet by mouth daily 30 tablet 0     citalopram (CELEXA) 10 MG tablet Take 1 tablet (10 mg) by mouth daily for 14 days Then stop 14 tablet 0     digoxin (LANOXIN) 125 MCG tablet Take 1 tablet (125 mcg) by mouth daily 30 tablet 0     gabapentin (NEURONTIN) 100 MG capsule Take 2 capsules (200 mg) by mouth At Bedtime 60 capsule 0     loperamide (IMODIUM) 2 MG capsule Take 1 capsule (2 mg) by mouth 4 times daily as needed for diarrhea 20 capsule 0     metolazone (ZAROXOLYN) 2.5 MG tablet Take 1 tablet (2.5 mg) by mouth every 48 hours as needed (weight fernie more than 2 pounds in 1 day or 5 pounds in 1 week) 15 tablet 0     metoprolol tartrate (LOPRESSOR) 25 MG tablet Take 1 tablet (25 mg) by mouth 2 times daily 60 tablet 0     miconazole (MICATIN) 2 % external powder Apply topically 2 times daily 90 g 0     oxyCODONE (ROXICODONE) 5 MG tablet Take 0.5 " "tablets (2.5 mg) by mouth every 4 hours as needed for moderate to severe pain 10 tablet 0     potassium chloride ER (KLOR-CON M) 20 MEQ CR tablet Take 2 tablets (40 mEq) by mouth 2 times daily 120 tablet 0     QUEtiapine (SEROQUEL) 25 MG tablet Take 0.5 tablets (12.5 mg) by mouth At Bedtime 15 tablet 0     simvastatin (ZOCOR) 20 MG tablet Take 1 tablet (20 mg) by mouth At Bedtime 30 tablet 0     torsemide (DEMADEX) 20 MG tablet Take 1 tablet (20 mg) by mouth daily 30 tablet 0     warfarin ANTICOAGULANT (COUMADIN) 2.5 MG tablet Take 2.5 mg on Mon, Wed and Fri and take 5 mg on Tues, Thurs, Sat, and Sun, or take as advised by NH provider 60 tablet 0     REVIEW OF SYSTEMS: 4 point ROS including Respiratory, CV, GI and , other than that noted in the HPI,  is negative  Objective: /65   Pulse 58   Temp 98.2  F (36.8  C)   Resp 16   Ht 1.6 m (5' 3\")   Wt 84 kg (185 lb 1.6 oz)   SpO2 94%   BMI 32.79 kg/m    Limited visit exam done given COVID-19 precautions.   Assist of exam with     Sitting up in her wheelchair.  Alert and in her typical fashion, she is nervous.  Does not help that with the pandemic, her son can not be here and so she calls him MULTIPLE times during the day.  In turn then he calls the station.  Her hair is not well groomed.  Skin is pale, dry and warm to touch according to nursing.  4 point lift for transfers.  Extensive assist of 2 with toileting.    Heart rate regular/irregular.  Lungs are clear with oxygen in low 90's.    Lower extremities have +1 edema present.    Labs:   Component      Latest Ref Rng & Units 4/10/2020   Sodium      133 - 144 mmol/L 135   Potassium      3.4 - 5.3 mmol/L 4.5   Chloride      94 - 109 mmol/L 102   Carbon Dioxide      20 - 32 mmol/L 29   Anion Gap      3 - 14 mmol/L 4   Glucose      70 - 99 mg/dL 100 (H)   Urea Nitrogen      7 - 30 mg/dL 16   Creatinine      0.52 - 1.04 mg/dL 0.68   GFR Estimate      >60 mL/min/1.73:m2 75   GFR Estimate If Black      " >60 mL/min/1.73:m2 87   Calcium      8.5 - 10.1 mg/dL 9.1   Digoxin Level      0.5 - 2.0 ug/L 0.6   INR      0.86 - 1.14 1.49 (H)       ASSESSMENT/PLAN:  New onset atrial fibrillation (H) with RVR, suspect valvular a fib  Anticoagulation goal of INR 2 to 3  Long term current use of anticoagulant  - INR = 1.49  Currently receiving coumadin 5mg po daily.  1.  Will keep Coumadin 5mg po daily  2.  INR in 1 week 4-    Chronic congestive heart failure, unspecified heart failure type (H)  Currently on Demadex 20mg daily.  Wanted to make sure she was not dehydrated.    Admission weight 176 and now 175.  Consider this stable.    No changes with the diuretic.  Continue to monitor daily weights.  Not exhibiting any SOB.    Closed bimalleolar fracture of right ankle with delayed healing, subsequent encounter  This will be difficult til healed.  Her anxiety limits her mobility and extensive assist for the restroom.  It will work out but can not assure her enough.   Will need to check when she needs a visit for follow up with orthopedics.  Is on Tylenol ES 1000mg TID for pain and prn oxycodone for breakthrough.       Orders given to the :  1.  Coumadin 5mg po daily  2.  INR on 4/17/2020 for Atrial Fib    Electronically signed by:  ASHLEY Maldonado CNP     Video-Visit Details  Type of service:  Video Visit  Video End Time (time video stopped): 1:15pm  Distant Location (provider location):  [facility]

## 2020-04-16 NOTE — TELEPHONE ENCOUNTER
Route to Specialty Schedulers. Please call [facility] and schedule a face to face follow up appointment with DANY Navarrete. She is in need of a new splint and xrays. Rosmery Brooke CMA, April 16, 2020

## 2020-04-17 PROBLEM — R93.89 ABNORMAL CXR: Status: RESOLVED | Noted: 2020-01-01 | Resolved: 2020-01-01

## 2020-04-17 PROBLEM — Z79.01 LONG TERM CURRENT USE OF ANTICOAGULANT: Status: ACTIVE | Noted: 2020-01-01

## 2020-04-17 PROBLEM — J96.01 ACUTE RESPIRATORY FAILURE WITH HYPOXIA (H): Status: RESOLVED | Noted: 2020-01-01 | Resolved: 2020-01-01

## 2020-04-17 PROBLEM — I50.9 CHRONIC CONGESTIVE HEART FAILURE, UNSPECIFIED HEART FAILURE TYPE (H): Status: ACTIVE | Noted: 2020-01-01

## 2020-04-17 PROBLEM — B34.8 PARAINFLUENZA TYPE 1 INFECTION: Status: RESOLVED | Noted: 2020-01-01 | Resolved: 2020-01-01

## 2020-04-17 PROBLEM — Z79.01 ANTICOAGULATION GOAL OF INR 2 TO 3: Status: ACTIVE | Noted: 2020-01-01

## 2020-04-17 PROBLEM — I50.9 ACUTE HEART FAILURE (H): Status: RESOLVED | Noted: 2020-01-01 | Resolved: 2020-01-01

## 2020-04-17 PROBLEM — Z51.81 ANTICOAGULATION GOAL OF INR 2 TO 3: Status: ACTIVE | Noted: 2020-01-01

## 2020-04-17 NOTE — TELEPHONE ENCOUNTER
[facility] TELEPHONE ENCOUNTER    Chief Complaint   Patient presents with     Urinary Retention       Linnrenan Sanon is a 89 year old  (11/13/1926),Nurse called today to report: unsuccessful voiding trial and now needs urinary cath reinsertion. Nursing does not know size to use     ASSESSMENT/PLAN  Unsuccessful voiding trial    Reinsert pack, 16 fr with 10 ml balloon    Update NP  Electronically signed by:   ASHLEY Torres CNP

## 2020-04-17 NOTE — LETTER
"    4/17/2020        RE: Linn Sanon  [facility]  [address] [facility] [address]        [facility]   Linn Sanon is being evaluated via a billable video visit due to the restrictions of the Covid-19 pandemic.   The patient has been notified of following:  \"This video visit will be conducted via a call between you and your provider. We have found that certain health care needs can be provided without the need for an in-person physical exam.  This service lets us provide the care you need with a video conversation. If during the course of the call the provider feels a video visit is not appropriate, you will not be charged for this service.\"   The provider has received verbal consent for a Video Visit from the patient or first contact? Yes  Patient  or facility staff would like the video invitation sent by: Text to cell phone: [phone]  Video Start Time: 1:45pm    [place] [facility] Medical Record Number:  [id number]  Place of Location at the time of visit: [facility]   Chief Complaint   Patient presents with     Video Visit     Nursing Home Acute     HPI:  Linn Sanon  is a 89 year old (11/13/1926), who is being seen today for a visit.  HPI information obtained from: facility chart records, facility staff, patient report and [facility] chart review. Today's concern is:    1. Pneumonia of both lower lobes due to infectious organism (H)    2. Urinary tract infection associated with indwelling urethral catheter, initial encounter (H)    3. Bilateral pleural effusion    4. New onset atrial fibrillation (H) with RVR, suspect valvular a fib    5. Long term current use of anticoagulant therapy    6. Anticoagulation goal of INR 2 to 3    7. Loose stools    8. Closed bimalleolar fracture of right ankle with delayed healing, subsequent encounter      Yesterday nursing notified this NP that Linn came down with a fever (100.2), cough, SOB, loose stools.  Started out " with giving orders for Influenza A & B and COVID 19 which came back negative.  While waiting for those results, nursing noted that when they needed to catheterize her for a residual of 900, her urine was milky in appearance.  UA/UC was thought to be sent in but in the end, it was only marked for UC.    With the flu swabs negative, then proceeded with a CXR and those results came back during the time of the visit, so more orders put into place.      Linn came with a catheter from the hospital.  The hospital attempted to remove it before discharge and unsuccessful.  On Wednesday after being here a week, removed the catheter again and did PVR every shift with orders to cath if >350cc.  If after the third time of having to cath her, then staff to keep the catheter in to straight drainage.  Nursing ended up calling the on call provider last night asking what french/cc to place.  Catheter back in place.      Linn is typically obsessed about her bowels.  She will call her son whenever she has gone or any other concern.  During the week the report was that she had gone 4x and staff stated the stool was soft and formed.  She is not on any stool softener and so ok with her results.  Her anxiety is expressed to her son whom in turn calls the  multiple times during the day.  Yesterday, it was reported that the stools were foul smelling and mixed for consistency.  Given her fever, decided to have her stools checked for C diff.  The first sample that went over was labeled wrong and so today, staff will collect another sample to send over to r/o c diff.      Linn had a INR done today which cam back at 6.40  Orders given for this today as well.      Linn has a bimalleolar fracture of right ankle with no surgical repair at this time so she is dependent on staff to help meet her needs.  Staff are getting to learn her behaviors as well as she to them.  She is a very nervous woman that is getting the best of her.  Her  "#1 complaint is that they do not come when she calls.  Despite telling her that there are other residents they are caring for, the nursing home is under different working order due to the pandemic occurring.      Another concern from this week is the complaint from Linn and her son at the care conference is that Linn feels that her hair is falling out \"I touch it and it comes out\".  Asked to review her medications and none of her medications cause alopecia.  Anxiety and stress cause hair loss and attempted to let her know that but do not think she is worried about that in the moment or did not want to hear that.      Past Medical and Surgical History reviewed in Epic today.  MEDICATIONS:    Current Outpatient Medications   Medication Sig Dispense Refill     acetaminophen (TYLENOL) 500 MG tablet Take 2 tablets (1,000 mg) by mouth 3 times daily 180 tablet 0     albuterol (PROAIR HFA/PROVENTIL HFA/VENTOLIN HFA) 108 (90 Base) MCG/ACT inhaler Inhale 2 puffs into the lungs every 4 hours as needed for shortness of breath / dyspnea or wheezing 1 Inhaler 0     ALPRAZolam (XANAX) 0.25 MG tablet Take 1 tablet (0.25 mg) by mouth 3 times daily 90 tablet 0     aspirin (ASA) 81 MG EC tablet Take 1 tablet (81 mg) by mouth daily 30 tablet 0     citalopram (CELEXA) 10 MG tablet Take 1 tablet (10 mg) by mouth daily for 14 days Then stop 14 tablet 0     digoxin (LANOXIN) 125 MCG tablet Take 1 tablet (125 mcg) by mouth daily 30 tablet 0     gabapentin (NEURONTIN) 100 MG capsule Take 2 capsules (200 mg) by mouth At Bedtime 60 capsule 0     loperamide (IMODIUM) 2 MG capsule Take 1 capsule (2 mg) by mouth 4 times daily as needed for diarrhea 20 capsule 0     metolazone (ZAROXOLYN) 2.5 MG tablet Take 1 tablet (2.5 mg) by mouth every 48 hours as needed (weight fernie more than 2 pounds in 1 day or 5 pounds in 1 week) 15 tablet 0     metoprolol tartrate (LOPRESSOR) 25 MG tablet Take 1 tablet (25 mg) by mouth 2 times daily 60 tablet 0     " "miconazole (MICATIN) 2 % external powder Apply topically 2 times daily 90 g 0     oxyCODONE (ROXICODONE) 5 MG tablet Take 0.5 tablets (2.5 mg) by mouth every 4 hours as needed for moderate to severe pain 10 tablet 0     potassium chloride ER (KLOR-CON M) 20 MEQ CR tablet Take 2 tablets (40 mEq) by mouth 2 times daily 120 tablet 0     QUEtiapine (SEROQUEL) 25 MG tablet Take 0.5 tablets (12.5 mg) by mouth At Bedtime 15 tablet 0     simvastatin (ZOCOR) 20 MG tablet Take 1 tablet (20 mg) by mouth At Bedtime 30 tablet 0     torsemide (DEMADEX) 20 MG tablet Take 1 tablet (20 mg) by mouth daily 30 tablet 0     warfarin ANTICOAGULANT (COUMADIN) 2.5 MG tablet Take 2.5 mg on Mon, Wed and Fri and take 5 mg on Tues, Thurs, Sat, and Sun, or take as advised by NH provider 60 tablet 0     REVIEW OF SYSTEMS: 4 point ROS including Respiratory, CV, GI and , other than that noted in the HPI,  is negative  Objective: BP (!) 140/80   Pulse 65   Temp 99.2  F (37.3  C)   Resp 20   Ht 1.575 m (5' 2\")   Wt 79.6 kg (175 lb 6.4 oz)   SpO2 90%   BMI 32.08 kg/m    Limited visit exam done given COVID-19 precautions.  Exam help from one of the team leads    Alert and oriented to self, family and surroundings.  Sitting up in wheelchair today with feet in a dependent position.  ACE wrap and brace on right foot and minimal edema present.  The left foot had +2+3 edema present and this one is not wrapped.  Wearing a gripper sock.      Can see in her face she is worried and looks 'rough'.  Still understand things as she starts to complain right away - but that is important to her.  Heart rate regular with a few skips of a beat.  No coughing heard but staff have heard more of a dry cough.  Lungs are diminished.  No oxygen being used.  Did not appear to be SOB during conversation.  Voice is clear but weaker.  Typically does not talk very loud.  Oxygen kalani in the low 90's.  Skin is pale which is her normal.  No signs of bleeding or bruising.  "     Labs:   Component      Latest Ref Rng & Units 4/17/2020   WBC      4.0 - 11.0 10e9/L 9.9   RBC Count      3.8 - 5.2 10e12/L 3.62 (L)   Hemoglobin      11.7 - 15.7 g/dL 9.7 (L)   Hematocrit      35.0 - 47.0 % 32.4 (L)   MCV      78 - 100 fl 90   MCH      26.5 - 33.0 pg 26.8   MCHC      31.5 - 36.5 g/dL 29.9 (L)   RDW      10.0 - 15.0 % 16.8 (H)   Platelet Count      150 - 450 10e9/L 334   Diff Method       Automated Method   % Neutrophils      % 64.5   % Lymphocytes      % 21.9   % Monocytes      % 12.5   % Eosinophils      % 0.5   % Basophils      % 0.3   % Immature Granulocytes      % 0.3   Nucleated RBCs      0 /100 0   Absolute Neutrophil      1.6 - 8.3 10e9/L 6.4   Absolute Lymphocytes      0.8 - 5.3 10e9/L 2.2   Absolute Monocytes      0.0 - 1.3 10e9/L 1.2   Absolute Basophils      0.0 - 0.2 10e9/L 0.0   Abs Immature Granulocytes      0 - 0.4 10e9/L 0.0   Absolute Nucleated RBC       0.0     Component      Latest Ref Rng & Units 4/10/2020 4/17/2020   INR      0.86 - 1.14 1.49 (H) 6.40 (HH)     Component      Latest Ref Rng & Units 4/16/2020   Influenza A/B Agn Specimen       Nasopharyngeal   Influenza A      NEG:Negative Negative   Influenza B      NEG:Negative Negative   SARS-CoV-2 Virus Specimen Source       Nasopharyngeal   SARS-CoV-2 PCR Result       NEGATIVE   SARS-CoV-2 PCR Comment       The Simplexa COVID-19 direct PCR assay by [employer] on the [employer] instrument has been . . .   COVID-19 Virus PCR to [facility] - Source       Nasopharyngeal   COVID-19 Virus PCR to [facility] - Result       Test received-See reflex to IDDL test SARS CoV2 (COVID-19) Virus RT-PCR     Culture Micro Abnormal    04/16/2020  2:20 PM  225    >100,000 colonies/mL   Escherichia coli   Susceptibility testing in progress     CXR taken by PPX:  Bilateral infiltrates in lower bases with small pleural effusions.  Heart size normal.  No CHF.      ASSESSMENT/PLAN:  Pneumonia of both lower lobes due to infectious organism (H)  With this  news, gave orders for Rocephin 1GM IM mixed with Lidocaine daily for 3 days.  Want to see what the UC shows for sensitivities and maybe one ABX for both infections.  Want her to have the Rocephin due to degree of her illness and abrupt change that occurred yesterday.    Has a PRN inhaler otherwise staff have used the PRN albuterol nebs on standing orders.    Urinary tract infection associated with indwelling urethral catheter, initial encounter (H)  E coli present but waiting on sensitives.  Decided to use Cipro 500mg twice a day x3 days until final results in.  This NP is on call part of the weekend and so will look for results.      Bilateral pleural effusion  Lower extremity edema  Currently on Demadex 20mg daily.  With seeing her left foot, want to increase the Demadex but feel there is too many changes at this time.  The pleural effusions may resolve on own once the pneumonia clear.  There is no heart failure seen on the x ray.    Do worry about her dehydrating and so will have a BMP done on Monday when INR is done.   Will leave the Demedex at 20mg daily.  Did speak about ACE wrapping the left leg.  Will need to speak to the supervisor this weekend to add that order.  Would be great to have her elevate her legs more but with her anxiety, do not see that occurring.     New onset atrial fibrillation (H) with RVR, suspect valvular a fib  Long term current use of anticoagulant therapy  Anticoagulation goal of INR 2 to 3  - INR is out of range 2-3.  Currently was taking coumadin 5mg daily.  Many reasons why it could be elevated.      At this time will hold the coumadin for 3 days (over the weekend) and have an INR on Monday.  Will be on 2 ABX and so potential of it coming down into range may not be there.  Will watch for bleeding and this NP can check in this weekend with supervisor for any adverse effects.    Loose stools  Has chronic diarrhea and her imodium is her back up.  For now would like to have staff send  in another sample to be safe for c diff and then can have the imodium if needed.    Will put her on a Probiotic 10 billion 1 cap daily for colon health.    Closed bimalleolar fracture of right ankle with delayed healing, subsequent encounter  Unfortunately, this will be a long road of recovery now that she has a fracture.  Dependent on staff more than ever and her anxiety hopefully will calm down.    Does have therapies.  Oxycodone for breakthrough pain.  Tylenol ES 1000mg TID.      Orders given to the  nurse whom assisted today in no particular order:  1.  Drop daily weights to 3x week - heart failure  2.  INR = 6.4  Hold the coumadin x3 days  3.  INR on 4/20/2020 - for Atrial Fib  4.  BMP on Monday due to edema  5.  Rocephin 1GM IM daily x3 days for bilateral pneumonia.  Ok to mix with lidocaine  6.  Cipro 500mg twice a day x3 days for UTI  7.  Send stool sample for c diff - loose stools  8.  Probiotic 1 cap daily for loose stools    Electronically signed by:  ASHLEY Maldonado CNP     Video-Visit Details  Type of service:  Video Visit  Video End Time (time video stopped): 2:05pm  Distant Location (provider location):  [place] [facility]             Sincerely,        ASHLEY Maldonado CNP

## 2020-04-17 NOTE — PROGRESS NOTES
"[facility]   Linn Sanon is being evaluated via a billable video visit due to the restrictions of the Covid-19 pandemic.   The patient has been notified of following:  \"This video visit will be conducted via a call between you and your provider. We have found that certain health care needs can be provided without the need for an in-person physical exam.  This service lets us provide the care you need with a video conversation. If during the course of the call the provider feels a video visit is not appropriate, you will not be charged for this service.\"   The provider has received verbal consent for a Video Visit from the patient or first contact? Yes  Patient  or facility staff would like the video invitation sent by: Text to cell phone: [phone]  Video Start Time: 1:45pm    [place] Medical Record Number:  [id number]  Place of Location at the time of visit: [facility]   Chief Complaint   Patient presents with     Video Visit     Nursing Home Acute     HPI:  Linn Sanon  is a 89 year old (11/13/1926), who is being seen today for a visit.  HPI information obtained from: facility chart records, facility staff, patient report and [facility] chart review. Today's concern is:    1. Pneumonia of both lower lobes due to infectious organism (H)    2. Urinary tract infection associated with indwelling urethral catheter, initial encounter (H)    3. Bilateral pleural effusion    4. New onset atrial fibrillation (H) with RVR, suspect valvular a fib    5. Long term current use of anticoagulant therapy    6. Anticoagulation goal of INR 2 to 3    7. Loose stools    8. Closed bimalleolar fracture of right ankle with delayed healing, subsequent encounter      Yesterday nursing notified this NP that Linn came down with a fever (100.2), cough, SOB, loose stools.  Started out with giving orders for Influenza A & B and COVID 19 which came back negative.  While waiting for those results, " nursing noted that when they needed to catheterize her for a residual of 900, her urine was milky in appearance.  UA/UC was thought to be sent in but in the end, it was only marked for UC.    With the flu swabs negative, then proceeded with a CXR and those results came back during the time of the visit, so more orders put into place.      Linn came with a catheter from the hospital.  The hospital attempted to remove it before discharge and unsuccessful.  On Wednesday after being here a week, removed the catheter again and did PVR every shift with orders to cath if >350cc.  If after the third time of having to cath her, then staff to keep the catheter in to straight drainage.  Nursing ended up calling the on call provider last night asking what french/cc to place.  Catheter back in place.      Linn is typically obsessed about her bowels.  She will call her son whenever she has gone or any other concern.  During the week the report was that she had gone 4x and staff stated the stool was soft and formed.  She is not on any stool softener and so ok with her results.  Her anxiety is expressed to her son whom in turn calls the  multiple times during the day.  Yesterday, it was reported that the stools were foul smelling and mixed for consistency.  Given her fever, decided to have her stools checked for C diff.  The first sample that went over was labeled wrong and so today, staff will collect another sample to send over to r/o c diff.      Linn had a INR done today which cam back at 6.40  Orders given for this today as well.      Linn has a bimalleolar fracture of right ankle with no surgical repair at this time so she is dependent on staff to help meet her needs.  Staff are getting to learn her behaviors as well as she to them.  She is a very nervous woman that is getting the best of her.  Her #1 complaint is that they do not come when she calls.  Despite telling her that there are other residents they  "are caring for, the nursing home is under different working order due to the pandemic occurring.      Another concern from this week is the complaint from Linn and her son at the care conference is that Linn feels that her hair is falling out \"I touch it and it comes out\".  Asked to review her medications and none of her medications cause alopecia.  Anxiety and stress cause hair loss and attempted to let her know that but do not think she is worried about that in the moment or did not want to hear that.      Past Medical and Surgical History reviewed in Epic today.  MEDICATIONS:    Current Outpatient Medications   Medication Sig Dispense Refill     acetaminophen (TYLENOL) 500 MG tablet Take 2 tablets (1,000 mg) by mouth 3 times daily 180 tablet 0     albuterol (PROAIR HFA/PROVENTIL HFA/VENTOLIN HFA) 108 (90 Base) MCG/ACT inhaler Inhale 2 puffs into the lungs every 4 hours as needed for shortness of breath / dyspnea or wheezing 1 Inhaler 0     ALPRAZolam (XANAX) 0.25 MG tablet Take 1 tablet (0.25 mg) by mouth 3 times daily 90 tablet 0     aspirin (ASA) 81 MG EC tablet Take 1 tablet (81 mg) by mouth daily 30 tablet 0     citalopram (CELEXA) 10 MG tablet Take 1 tablet (10 mg) by mouth daily for 14 days Then stop 14 tablet 0     digoxin (LANOXIN) 125 MCG tablet Take 1 tablet (125 mcg) by mouth daily 30 tablet 0     gabapentin (NEURONTIN) 100 MG capsule Take 2 capsules (200 mg) by mouth At Bedtime 60 capsule 0     loperamide (IMODIUM) 2 MG capsule Take 1 capsule (2 mg) by mouth 4 times daily as needed for diarrhea 20 capsule 0     metolazone (ZAROXOLYN) 2.5 MG tablet Take 1 tablet (2.5 mg) by mouth every 48 hours as needed (weight fernie more than 2 pounds in 1 day or 5 pounds in 1 week) 15 tablet 0     metoprolol tartrate (LOPRESSOR) 25 MG tablet Take 1 tablet (25 mg) by mouth 2 times daily 60 tablet 0     miconazole (MICATIN) 2 % external powder Apply topically 2 times daily 90 g 0     oxyCODONE (ROXICODONE) 5 MG " "tablet Take 0.5 tablets (2.5 mg) by mouth every 4 hours as needed for moderate to severe pain 10 tablet 0     potassium chloride ER (KLOR-CON M) 20 MEQ CR tablet Take 2 tablets (40 mEq) by mouth 2 times daily 120 tablet 0     QUEtiapine (SEROQUEL) 25 MG tablet Take 0.5 tablets (12.5 mg) by mouth At Bedtime 15 tablet 0     simvastatin (ZOCOR) 20 MG tablet Take 1 tablet (20 mg) by mouth At Bedtime 30 tablet 0     torsemide (DEMADEX) 20 MG tablet Take 1 tablet (20 mg) by mouth daily 30 tablet 0     warfarin ANTICOAGULANT (COUMADIN) 2.5 MG tablet Take 2.5 mg on Mon, Wed and Fri and take 5 mg on Tues, Thurs, Sat, and Sun, or take as advised by NH provider 60 tablet 0     REVIEW OF SYSTEMS: 4 point ROS including Respiratory, CV, GI and , other than that noted in the HPI,  is negative  Objective: BP (!) 140/80   Pulse 65   Temp 99.2  F (37.3  C)   Resp 20   Ht 1.575 m (5' 2\")   Wt 79.6 kg (175 lb 6.4 oz)   SpO2 90%   BMI 32.08 kg/m    Limited visit exam done given COVID-19 precautions.  Exam help from one of the team leads    Alert and oriented to self, family and surroundings.  Sitting up in wheelchair today with feet in a dependent position.  ACE wrap and brace on right foot and minimal edema present.  The left foot had +2+3 edema present and this one is not wrapped.  Wearing a gripper sock.      Can see in her face she is worried and looks 'rough'.  Still understand things as she starts to complain right away - but that is important to her.  Heart rate regular with a few skips of a beat.  No coughing heard but staff have heard more of a dry cough.  Lungs are diminished.  No oxygen being used.  Did not appear to be SOB during conversation.  Voice is clear but weaker.  Typically does not talk very loud.  Oxygen kalani in the low 90's.  Skin is pale which is her normal.  No signs of bleeding or bruising.      Labs:   Component      Latest Ref Rng & Units 4/17/2020   WBC      4.0 - 11.0 10e9/L 9.9   RBC Count      " 3.8 - 5.2 10e12/L 3.62 (L)   Hemoglobin      11.7 - 15.7 g/dL 9.7 (L)   Hematocrit      35.0 - 47.0 % 32.4 (L)   MCV      78 - 100 fl 90   MCH      26.5 - 33.0 pg 26.8   MCHC      31.5 - 36.5 g/dL 29.9 (L)   RDW      10.0 - 15.0 % 16.8 (H)   Platelet Count      150 - 450 10e9/L 334   Diff Method       Automated Method   % Neutrophils      % 64.5   % Lymphocytes      % 21.9   % Monocytes      % 12.5   % Eosinophils      % 0.5   % Basophils      % 0.3   % Immature Granulocytes      % 0.3   Nucleated RBCs      0 /100 0   Absolute Neutrophil      1.6 - 8.3 10e9/L 6.4   Absolute Lymphocytes      0.8 - 5.3 10e9/L 2.2   Absolute Monocytes      0.0 - 1.3 10e9/L 1.2   Absolute Basophils      0.0 - 0.2 10e9/L 0.0   Abs Immature Granulocytes      0 - 0.4 10e9/L 0.0   Absolute Nucleated RBC       0.0     Component      Latest Ref Rng & Units 4/10/2020 4/17/2020   INR      0.86 - 1.14 1.49 (H) 6.40 (HH)     Component      Latest Ref Rng & Units 4/16/2020   Influenza A/B Agn Specimen       Nasopharyngeal   Influenza A      NEG:Negative Negative   Influenza B      NEG:Negative Negative   SARS-CoV-2 Virus Specimen Source       Nasopharyngeal   SARS-CoV-2 PCR Result       NEGATIVE   SARS-CoV-2 PCR Comment       The Simplexa COVID-19 direct PCR assay by [employer] on the [employer] instrument has been . . .   COVID-19 Virus PCR to [facility] - Source       Nasopharyngeal   COVID-19 Virus PCR to [facility] - Result       Test received-See reflex to IDDL test SARS CoV2 (COVID-19) Virus RT-PCR     Culture Micro Abnormal    04/16/2020  2:20 PM  225    >100,000 colonies/mL   Escherichia coli   Susceptibility testing in progress     CXR taken by PPX:  Bilateral infiltrates in lower bases with small pleural effusions.  Heart size normal.  No CHF.      ASSESSMENT/PLAN:  Pneumonia of both lower lobes due to infectious organism (H)  With this news, gave orders for Rocephin 1GM IM mixed with Lidocaine daily for 3 days.  Want to see what the UC shows  for sensitivities and maybe one ABX for both infections.  Want her to have the Rocephin due to degree of her illness and abrupt change that occurred yesterday.    Has a PRN inhaler otherwise staff have used the PRN albuterol nebs on standing orders.    Urinary tract infection associated with indwelling urethral catheter, initial encounter (H)  E coli present but waiting on sensitives.  Decided to use Cipro 500mg twice a day x3 days until final results in.  This NP is on call part of the weekend and so will look for results.      Bilateral pleural effusion  Lower extremity edema  Currently on Demadex 20mg daily.  With seeing her left foot, want to increase the Demadex but feel there is too many changes at this time.  The pleural effusions may resolve on own once the pneumonia clear.  There is no heart failure seen on the x ray.    Do worry about her dehydrating and so will have a BMP done on Monday when INR is done.   Will leave the Demedex at 20mg daily.  Did speak about ACE wrapping the left leg.  Will need to speak to the supervisor this weekend to add that order.  Would be great to have her elevate her legs more but with her anxiety, do not see that occurring.     New onset atrial fibrillation (H) with RVR, suspect valvular a fib  Long term current use of anticoagulant therapy  Anticoagulation goal of INR 2 to 3  - INR is out of range 2-3.  Currently was taking coumadin 5mg daily.  Many reasons why it could be elevated.      At this time will hold the coumadin for 3 days (over the weekend) and have an INR on Monday.  Will be on 2 ABX and so potential of it coming down into range may not be there.  Will watch for bleeding and this NP can check in this weekend with supervisor for any adverse effects.    Loose stools  Has chronic diarrhea and her imodium is her back up.  For now would like to have staff send in another sample to be safe for c diff and then can have the imodium if needed.    Will put her on a  Probiotic 10 billion 1 cap daily for colon health.    Closed bimalleolar fracture of right ankle with delayed healing, subsequent encounter  Unfortunately, this will be a long road of recovery now that she has a fracture.  Dependent on staff more than ever and her anxiety hopefully will calm down.    Does have therapies.  Oxycodone for breakthrough pain.  Tylenol ES 1000mg TID.      Orders given to the  nurse whom assisted today in no particular order:  1.  Drop daily weights to 3x week - heart failure  2.  INR = 6.4  Hold the coumadin x3 days  3.  INR on 4/20/2020 - for Atrial Fib  4.  BMP on Monday due to edema  5.  Rocephin 1GM IM daily x3 days for bilateral pneumonia.  Ok to mix with lidocaine  6.  Cipro 500mg twice a day x3 days for UTI  7.  Send stool sample for c diff - loose stools  8.  Probiotic 1 cap daily for loose stools    Electronically signed by:  ASHLEY Maldonado CNP     Video-Visit Details  Type of service:  Video Visit  Video End Time (time video stopped): 2:05pm  Distant Location (provider location):  [facility]

## 2020-04-20 NOTE — TELEPHONE ENCOUNTER
[facility] TELEPHONE ENCOUNTER    Linn Sanon is a 89 year old  (11/13/1926),Nurse called today to report: INR 4.2 no signs of bleeding or clot. On abx for pneumonia. Last inr ~ 6. Lab day on Friday.     ASSESSMENT/PLAN  Hold warfarin Monday, Tuesday, give 2.5 mg Wednesday and Thursday. Recheck on Friday.     Alicia Ruiz NP

## 2020-04-22 NOTE — TELEPHONE ENCOUNTER
Spoke with patients son and he states she is unable to come in for a face to face appointment due to having pneumonia and is on quarantine. Patients son also stated he had no idea about a telephone visit that was scheduled for today.   Informed son that Dr. Morocho would like to see the patient in person for a ED follow up appointment.      Will send reminder for 2 weeks to call and follow up on patients condition and to potentially schedule.     Mellissa Tuttle CMA on 4/22/2020 at 9:32 AM

## 2020-04-22 NOTE — TELEPHONE ENCOUNTER
Spoke with Loulou nieves RN at the home. She states that the patient is not on quarantine as of know and that she is scheduled to finish the antibiotics on Friday 04/24/2020.     Dr. Morocho would like to get X-rays done and faxed back to us so he can see the status of the ankle before scheduling a visit. Faxing X-ray order to [phone].    Mellissa Tuttle, PAPA on 4/22/2020 at 10:45 AM

## 2020-04-23 NOTE — TELEPHONE ENCOUNTER
Order has been re faxed with providers signature on it.     Mellissa Tuttle CMA on 4/23/2020 at 10:38 AM

## 2020-04-23 NOTE — TELEPHONE ENCOUNTER
[place] [facility] calling and needs orders signed by dr mcclure for imaging. Please sign and fax back to [phone] attn:Zeinab needs ASAP . Thank You Daisy

## 2020-04-24 NOTE — TELEPHONE ENCOUNTER
Patient's son was very passionate and upset about the recommendations made and feels that it should be our responsibility to bring care to [facility] at the [facility].  Spoke with Melonie at [facility] who states that have no provider who can form custom orthopedic splints... I spent nearly 15 minutes explaining to the son the limitations we have as providers and where they can perform care.  I also described all of the ways in which we have worked to create a safe clinic for providing face to face care. He became even more upset and said that it is my job to figure this all out for them.      I will connect with ortho team on Monday.  Doesn't look like Dr. Morocho has ever even seen this patient.     Tiny MONCADA, Lead RN, BSN. . .  4/24/2020, 4:14 PM

## 2020-04-24 NOTE — TELEPHONE ENCOUNTER
[facility] TELEPHONE ENCOUNTER    Linn Sanon is a 89 year old  (11/13/1926),Nurse called today to report: inr 2.06 last one elevated and warfarin held for several doses. Was on abx. Previous dosing was 5 mg daily.     ASSESSMENT/PLAN  inr 2.06 give 5 mg Friday and saturday and 2.5 mg on Sunday. Recheck on monday    Alicia Ruiz NP

## 2020-04-24 NOTE — TELEPHONE ENCOUNTER
Informed nadira of Dr. Morocho's note, however, she states that the son does not want her coming to the clinic due to covid.  I attempted to contact son to explain importance of changing splint, he did not answer home or cell so I left a message to call back.     Tiny MONCADA, Lead RN, BSN. . .  4/24/2020, 3:51 PM

## 2020-04-24 NOTE — TELEPHONE ENCOUNTER
Reason for Call:  Other call back    Detailed comments: Melonie from [facility] called back stating they did an xray of Linn's ankle requested by Dr. Morocho.  Please call with results and if she needs an additional appointment.  Thanks    Phone Number Patient can be reached at: Other phone number:  [phone] / Melonie    Best Time: any    Can we leave a detailed message on this number? YES    Call taken on 4/24/2020 at 3:27 PM by Samantha Cortez

## 2020-04-27 NOTE — TELEPHONE ENCOUNTER
Unable to reach Melonie, will see if RAKESH Marie is able to address this message today with any other suggestions.     Tiny MONCADA, Lead RN, BSN. . .  4/27/2020, 3:12 PM

## 2020-04-27 NOTE — TELEPHONE ENCOUNTER
Updated Melonie that RAKESH Marie will contact son regarding this patient today.     Tiny MONCADA, Lead RN, BSN. . .  4/27/2020, 3:39 PM

## 2020-04-27 NOTE — LETTER
"    4/27/2020        RE: Linn Sanon  [facility]  [id number]  [facility] [address]        [facility]   Linn Sanon is being evaluated via a billable video visit due to the restrictions of the Covid-19 pandemic.   The patient has been notified of following:  \"This video visit will be conducted via a call between you and your provider. We have found that certain health care needs can be provided without the need for an in-person physical exam.  This service lets us provide the care you need with a video conversation. If during the course of the call the provider feels a video visit is not appropriate, you will not be charged for this service.\"   The provider has received verbal consent for a Video Visit from the patient or first contact? Yes  Patient  or facility staff would like the video invitation sent by: Text to cell phone: [phone]  Video Start Time: 2:20pm      [place] Medical Record Number:  [id number]  Place of Location at the time of visit: [facility]   Chief Complaint   Patient presents with     Video Visit     Nursing Home Acute     HPI:  Linn Sanon  is a 89 year old (11/13/1926), who is being seen today for a visit.  HPI information obtained from: facility chart records, facility staff, patient report and [facility] chart review. Today's concern is:  1. New onset atrial fibrillation (H) with RVR, suspect valvular a fib    2. Long term current use of anticoagulant    3. Anticoagulation goal of INR 2 to 3    4. Sedated due to medication    5. Generalized anxiety disorder      Resident had a INR done today and so needed to address.  When the video was brought into her room to talk with her, she was clearly over sedated.  Still trying to say her worries but eyes half closed and slow to respond, falling asleep.    Staff informed this NP that the on call NP attempted to help them out with increasing her Xanax due to her high anxiety.  " Orders were from 4/23 through 4/27 to have xanax 0.25mg in AM and 0.5mg twice a day and then return back to 0.25mg TID.  She is also on Seroquel and Gabapentin.      Past Medical and Surgical History reviewed in Epic today.  MEDICATIONS:    Current Outpatient Medications   Medication Sig Dispense Refill     acetaminophen (TYLENOL) 500 MG tablet Take 2 tablets (1,000 mg) by mouth 3 times daily 180 tablet 0     Acidophilus Lactobacillus CAPS Take 1 capsule by mouth daily 30 capsule 4     albuterol (PROAIR HFA/PROVENTIL HFA/VENTOLIN HFA) 108 (90 Base) MCG/ACT inhaler Inhale 2 puffs into the lungs every 4 hours as needed for shortness of breath / dyspnea or wheezing 1 Inhaler 0     ALPRAZolam (XANAX) 0.25 MG tablet Take 1 tablet (0.25 mg) by mouth 3 times daily 90 tablet 0     aspirin (ASA) 81 MG EC tablet Take 1 tablet (81 mg) by mouth daily 30 tablet 0     digoxin (LANOXIN) 125 MCG tablet Take 1 tablet (125 mcg) by mouth daily 30 tablet 0     gabapentin (NEURONTIN) 100 MG capsule Take 2 capsules (200 mg) by mouth At Bedtime 60 capsule 0     loperamide (IMODIUM) 2 MG capsule Take 1 capsule (2 mg) by mouth 4 times daily as needed for diarrhea 20 capsule 0     metolazone (ZAROXOLYN) 2.5 MG tablet Take 1 tablet (2.5 mg) by mouth every 48 hours as needed (weight fernie more than 2 pounds in 1 day or 5 pounds in 1 week) 15 tablet 0     metoprolol tartrate (LOPRESSOR) 25 MG tablet Take 1 tablet (25 mg) by mouth 2 times daily 60 tablet 0     miconazole (MICATIN) 2 % external powder Apply topically 2 times daily 90 g 0     oxyCODONE (ROXICODONE) 5 MG tablet Take 0.5 tablets (2.5 mg) by mouth every 4 hours as needed for moderate to severe pain 10 tablet 0     potassium chloride ER (KLOR-CON M) 20 MEQ CR tablet Take 2 tablets (40 mEq) by mouth 2 times daily 120 tablet 0     QUEtiapine (SEROQUEL) 25 MG tablet Take 0.5 tablets (12.5 mg) by mouth At Bedtime 15 tablet 0     simvastatin (ZOCOR) 20 MG tablet Take 1 tablet (20 mg) by  "mouth At Bedtime 30 tablet 0     torsemide (DEMADEX) 20 MG tablet Take 1 tablet (20 mg) by mouth daily 30 tablet 0     warfarin ANTICOAGULANT (COUMADIN) 2.5 MG tablet Take 2.5 mg on Mon, Wed and Fri and take 5 mg on Tues, Thurs, Sat, and Sun, or take as advised by NH provider 60 tablet 0     REVIEW OF SYSTEMS: limited and focusing on her worries versus answering questions.  Objective: /85   Pulse 68   Temp 98.8  F (37.1  C)   Resp 18   Ht 1.575 m (5' 2\")   Wt 78.1 kg (172 lb 1.6 oz)   SpO2 91%   BMI 31.48 kg/m    Limited visit exam done given COVID-19 precautions.     Sitting up in wheelchair.  Able to hold head up but but speech is almost slower, and falling asleep.    Nursing went to find staff to put her into bed for a nap.  Heart rate regular and strong.    Lungs are clear.    Labs:   Component      Latest Ref Rng & Units 4/24/2020 4/27/2020   INR      0.86 - 1.14 2.06 (H) 3.39 (H)       ASSESSMENT/PLAN:  New onset atrial fibrillation (H) with RVR, suspect valvular a fib  Long term current use of anticoagulant  Anticoagulation goal of INR 2 to 3  - INR has been hard to manage with new medicines and then prn oxycodone being used for her ankle fracture.    Will adjust her coumadin dose to be:  Hold coumadin tonight.  Start 5mg on M/W/F and 2.5mg other 4 days of week  INR on 5/4/2020    Sedated due to medication  Generalized anxiety disorder  - figured out that she has not received her afternoon dose yet of Xanax.  Do not feel she will be able to take safely either.    Will hold the next two doses of xanax.    Resume Xanax 0.25mg po TID at 0.25mg on 4/28/2020    Is on Seroquel 12.5mg at HS and Neurontin 200mg at HS.  Will be needing to look soon if feel these are effective.    Have strongly felt that her mind is stronger with anxiety than medications can help.  Want her to be comfortable but despite even being sedated she was telling of her worries.        Orders given:  1.  Hold coumadin for " tonight.  2.  Start coumadin 5mg M/W/F and 2.5mg all other times.  3.  INR on 5/4/2020  4.  Hold xanax for the rest of the day.  5.  Resume xanax 0.25mg TID tomorrow.      Electronically signed by:  ASHLEY Maldonado CNP     Video-Visit Details  Type of service:  Video Visit  Video End Time (time video stopped): 2:30pm  Distant Location (provider location):  [place]             Sincerely,        ASHLEY Maldonado CNP

## 2020-04-27 NOTE — TELEPHONE ENCOUNTER
I reviewed this case in detail today.  I have talked to Dr. Morocho previously about this patient and he definitely wants the patient to come in and get her splint taken down to make sure that her skin is intact.  She will also need x-rays.  She is now 5 weeks out from her right bimalleolar ankle fracture.  I did call and leave her voicemail that I would like to see her on Friday in clinic.  I am trying to call her son next to discuss this with him.    I talked with the son today. Talked to him for a very long time.  I explained Dr. Morocho's wishes to have the skin looked at and splint changed.  I also explained the importance of getting x-rays in clinic versus portable x-ray and the difference in quality.  He is nervous about transporting her.  I understand this but I do believe he can do it.  The patient does have a splint on so she will be protected in that way.  He understands this.  He is also worried about COVID-19 and bringing her into the hospital.  I explained to him that he would just come in the main entrance and then go to specialty clinic he would not be on the portion of the hospital that contains the inpatients.  He understands.  We talked in depth about why the patient needs to come in which is splint change and x-ray.  He also wanted to know if the provider that is at the nursing home could dothe skin check.  I talked to him about the fact that she needs to come out of the splint and then will need to go back into another splint or cast after that and that is something they do not do.  I also talked to him about the fact that we need quality x-rays.  He understands but he is still not convinced.   Tiny HERNÁNDEZ has talked to [facility] about wanting the patient to come in on Friday to my clinic.  I also talked to him about her coming in on Friday to my clinic.  He is still unsure about this.  I told him we hope to see her on Friday but if not I will keep her name down and discuss this again with Dr. Morocho.   He understands and appreciates the call.

## 2020-04-27 NOTE — TELEPHONE ENCOUNTER
Melonie from [facility] called back to ask about treatment and what Dr. Morocho would like to do.  Please call [phone]

## 2020-04-28 NOTE — PROGRESS NOTES
"[facility]   Linn Sanon is being evaluated via a billable video visit due to the restrictions of the Covid-19 pandemic.   The patient has been notified of following:  \"This video visit will be conducted via a call between you and your provider. We have found that certain health care needs can be provided without the need for an in-person physical exam.  This service lets us provide the care you need with a video conversation. If during the course of the call the provider feels a video visit is not appropriate, you will not be charged for this service.\"   The provider has received verbal consent for a Video Visit from the patient or first contact? Yes  Patient  or facility staff would like the video invitation sent by: Text to cell phone: [phone]  Video Start Time: 2:20pm      [facility] Medical Record Number:  [id number]  Place of Location at the time of visit: [facility]   Chief Complaint   Patient presents with     Video Visit     Nursing Home Acute     HPI:  Linn Sanon  is a 89 year old (11/13/1926), who is being seen today for a visit.  HPI information obtained from: facility chart records, facility staff, patient report and [facility] chart review. Today's concern is:  1. New onset atrial fibrillation (H) with RVR, suspect valvular a fib    2. Long term current use of anticoagulant    3. Anticoagulation goal of INR 2 to 3    4. Sedated due to medication    5. Generalized anxiety disorder      Resident had a INR done today and so needed to address.  When the video was brought into her room to talk with her, she was clearly over sedated.  Still trying to say her worries but eyes half closed and slow to respond, falling asleep.    Staff informed this NP that the on call NP attempted to help them out with increasing her Xanax due to her high anxiety.  Orders were from 4/23 through 4/27 to have xanax 0.25mg in AM and 0.5mg twice a day and then return back to 0.25mg TID.  " She is also on Seroquel and Gabapentin.      Past Medical and Surgical History reviewed in Epic today.  MEDICATIONS:    Current Outpatient Medications   Medication Sig Dispense Refill     acetaminophen (TYLENOL) 500 MG tablet Take 2 tablets (1,000 mg) by mouth 3 times daily 180 tablet 0     Acidophilus Lactobacillus CAPS Take 1 capsule by mouth daily 30 capsule 4     albuterol (PROAIR HFA/PROVENTIL HFA/VENTOLIN HFA) 108 (90 Base) MCG/ACT inhaler Inhale 2 puffs into the lungs every 4 hours as needed for shortness of breath / dyspnea or wheezing 1 Inhaler 0     ALPRAZolam (XANAX) 0.25 MG tablet Take 1 tablet (0.25 mg) by mouth 3 times daily 90 tablet 0     aspirin (ASA) 81 MG EC tablet Take 1 tablet (81 mg) by mouth daily 30 tablet 0     digoxin (LANOXIN) 125 MCG tablet Take 1 tablet (125 mcg) by mouth daily 30 tablet 0     gabapentin (NEURONTIN) 100 MG capsule Take 2 capsules (200 mg) by mouth At Bedtime 60 capsule 0     loperamide (IMODIUM) 2 MG capsule Take 1 capsule (2 mg) by mouth 4 times daily as needed for diarrhea 20 capsule 0     metolazone (ZAROXOLYN) 2.5 MG tablet Take 1 tablet (2.5 mg) by mouth every 48 hours as needed (weight fernie more than 2 pounds in 1 day or 5 pounds in 1 week) 15 tablet 0     metoprolol tartrate (LOPRESSOR) 25 MG tablet Take 1 tablet (25 mg) by mouth 2 times daily 60 tablet 0     miconazole (MICATIN) 2 % external powder Apply topically 2 times daily 90 g 0     oxyCODONE (ROXICODONE) 5 MG tablet Take 0.5 tablets (2.5 mg) by mouth every 4 hours as needed for moderate to severe pain 10 tablet 0     potassium chloride ER (KLOR-CON M) 20 MEQ CR tablet Take 2 tablets (40 mEq) by mouth 2 times daily 120 tablet 0     QUEtiapine (SEROQUEL) 25 MG tablet Take 0.5 tablets (12.5 mg) by mouth At Bedtime 15 tablet 0     simvastatin (ZOCOR) 20 MG tablet Take 1 tablet (20 mg) by mouth At Bedtime 30 tablet 0     torsemide (DEMADEX) 20 MG tablet Take 1 tablet (20 mg) by mouth daily 30 tablet 0      "warfarin ANTICOAGULANT (COUMADIN) 2.5 MG tablet Take 2.5 mg on Mon, Wed and Fri and take 5 mg on Tues, Thurs, Sat, and Sun, or take as advised by NH provider 60 tablet 0     REVIEW OF SYSTEMS: limited and focusing on her worries versus answering questions.  Objective: /85   Pulse 68   Temp 98.8  F (37.1  C)   Resp 18   Ht 1.575 m (5' 2\")   Wt 78.1 kg (172 lb 1.6 oz)   SpO2 91%   BMI 31.48 kg/m    Limited visit exam done given COVID-19 precautions.     Sitting up in wheelchair.  Able to hold head up but but speech is almost slower, and falling asleep.    Nursing went to find staff to put her into bed for a nap.  Heart rate regular and strong.    Lungs are clear.    Labs:   Component      Latest Ref Rng & Units 4/24/2020 4/27/2020   INR      0.86 - 1.14 2.06 (H) 3.39 (H)       ASSESSMENT/PLAN:  New onset atrial fibrillation (H) with RVR, suspect valvular a fib  Long term current use of anticoagulant  Anticoagulation goal of INR 2 to 3  - INR has been hard to manage with new medicines and then prn oxycodone being used for her ankle fracture.    Will adjust her coumadin dose to be:  Hold coumadin tonight.  Start 5mg on M/W/F and 2.5mg other 4 days of week  INR on 5/4/2020    Sedated due to medication  Generalized anxiety disorder  - figured out that she has not received her afternoon dose yet of Xanax.  Do not feel she will be able to take safely either.    Will hold the next two doses of xanax.    Resume Xanax 0.25mg po TID at 0.25mg on 4/28/2020    Is on Seroquel 12.5mg at HS and Neurontin 200mg at HS.  Will be needing to look soon if feel these are effective.    Have strongly felt that her mind is stronger with anxiety than medications can help.  Want her to be comfortable but despite even being sedated she was telling of her worries.        Orders given:  1.  Hold coumadin for tonight.  2.  Start coumadin 5mg M/W/F and 2.5mg all other times.  3.  INR on 5/4/2020  4.  Hold xanax for the rest of the " day.  5.  Resume xanax 0.25mg TID tomorrow.      Electronically signed by:  ASHLEY Maldonado CNP     Video-Visit Details  Type of service:  Video Visit  Video End Time (time video stopped): 2:30pm  Distant Location (provider location):  [facility]

## 2020-04-29 NOTE — TELEPHONE ENCOUNTER
I did further research on this patient and brainstorming on the plan.  I was noticing that she was not getting added to my schedule so I was pretty sure the son was not going to bring her in.  I contacted HERNANDEZ Lilly to see if he would be able to make a trip to [facility] to possibly take off the splint and will get the skin and put her back into either another Eliseo Adame splint or cast.  As we devised the plan to make this work we found more documentation that on 4/4/2020 the patient was removed from the Eliseo Adame splint and placed into a stirrup style Aircast per Dr. Scott secondary to the patient having some discomfort with the splint.  The plan was to get another x-ray in a few days and decide if she was going to ambulate on it that she may need surgery.  The patient was discharged on 4/6/2020 and has been at the transitional care since.  [facility] had talked to the facility and it sounds like she is in something may have been taken off so most likely she is not in a Eliseo Adame well-formed plaster splint but instead in that stirrup Aircast.  If this is the case then they are able to look at the skin there to make sure she has no skin defects.  [facility] was able to locate some portable x-rays that were done 5 days ago and it does look like the ankle is not healing much and not perfectly located.  We also found out that the patient is not much of an ambulator so she will not be using this ankle that much anyway.  We plan on keeping her nonweightbearing on the extremity and continuing with the air splint with frequent skin checks by the facility.  She can follow-up in 1 month with Dr. Morocho in clinic if possible or if she is doing okay she could follow-up as needed if she is not going to ambulate. HERNANDEZ Lilly was nice enough to talk to the NP at [facility] and relay all the info of the plan.  She can call us with any questions.    I wanted to relay this plan to this patient's son so I called  him today.I had to leave a voicemail with the above information.  I also tried his home number and left a voicemail for him to refer to his moble phone.  He is to call with any questions.

## 2020-04-29 NOTE — PROGRESS NOTES
"[facility]   Linn Sanon is being evaluated via a billable video visit due to the restrictions of the Covid-19 pandemic.   The patient has been notified of following:  \"This video visit will be conducted via a call between you and your provider. We have found that certain health care needs can be provided without the need for an in-person physical exam.  This service lets us provide the care you need with a video conversation. If during the course of the call the provider feels a video visit is not appropriate, you will not be charged for this service.\"   The provider has received verbal consent for a Video Visit from the patient or first contact? Yes  Patient  or facility staff would like the video invitation sent by: N/A   Video Start Time: 12:30pm      [facility] Medical Record Number:  [id number]  Place of Location at the time of visit: [facility]   Chief Complaint   Patient presents with     Video Visit     Nursing Home Acute     HPI:  Linn Sanon  is a 89 year old (11/13/1926), who is being seen today for a visit.  HPI information obtained from: facility chart records and facility staff. Today's concern is:    1. Lethargy    2. Generalized anxiety disorder    3. Neurogenic bladder      Nursing updating this NP today out of concern for Linn as she is up in the dining room eating.  From the back can see she is leaning in her w/c to the left.  More confused and lethargic.    Brainstormed with nurse manager about what may be occurring.  TIA vs UTI vs over sedation from medication.  Has not been getting PRN oxycodone more than once a day.    Has a catheter in place.  Feel that should come out for another trial of voiding.  Has not been able to empty bladder before leaving the hospital.      Past Medical and Surgical History reviewed in Epic today.  MEDICATIONS:    Current Outpatient Medications   Medication Sig Dispense Refill     acetaminophen (TYLENOL) 500 MG tablet Take 2 " "tablets (1,000 mg) by mouth 3 times daily 180 tablet 0     Acidophilus Lactobacillus CAPS Take 1 capsule by mouth daily 30 capsule 4     albuterol (PROAIR HFA/PROVENTIL HFA/VENTOLIN HFA) 108 (90 Base) MCG/ACT inhaler Inhale 2 puffs into the lungs every 4 hours as needed for shortness of breath / dyspnea or wheezing 1 Inhaler 0     ALPRAZolam (XANAX) 0.25 MG tablet Take 1 tablet (0.25 mg) by mouth 3 times daily 90 tablet 0     aspirin (ASA) 81 MG EC tablet Take 1 tablet (81 mg) by mouth daily 30 tablet 0     digoxin (LANOXIN) 125 MCG tablet Take 1 tablet (125 mcg) by mouth daily 30 tablet 0     gabapentin (NEURONTIN) 100 MG capsule Take 2 capsules (200 mg) by mouth At Bedtime 60 capsule 0     loperamide (IMODIUM) 2 MG capsule Take 1 capsule (2 mg) by mouth 4 times daily as needed for diarrhea 20 capsule 0     metolazone (ZAROXOLYN) 2.5 MG tablet Take 1 tablet (2.5 mg) by mouth every 48 hours as needed (weight fernie more than 2 pounds in 1 day or 5 pounds in 1 week) 15 tablet 0     metoprolol tartrate (LOPRESSOR) 25 MG tablet Take 1 tablet (25 mg) by mouth 2 times daily 60 tablet 0     miconazole (MICATIN) 2 % external powder Apply topically 2 times daily 90 g 0     oxyCODONE (ROXICODONE) 5 MG tablet Take 0.5 tablets (2.5 mg) by mouth every 4 hours as needed for moderate to severe pain 10 tablet 0     potassium chloride ER (KLOR-CON M) 20 MEQ CR tablet Take 2 tablets (40 mEq) by mouth 2 times daily 120 tablet 0     simvastatin (ZOCOR) 20 MG tablet Take 1 tablet (20 mg) by mouth At Bedtime 30 tablet 0     torsemide (DEMADEX) 20 MG tablet Take 1 tablet (20 mg) by mouth daily 30 tablet 0     warfarin ANTICOAGULANT (COUMADIN) 2.5 MG tablet Take 2.5 mg on Mon, Wed and Fri and take 5 mg on Tues, Thurs, Sat, and Sun, or take as advised by NH provider 60 tablet 0       REVIEW OF SYSTEMS: slow to respond, trying to eat her meal  Objective: /85   Pulse 80   Temp 97.6  F (36.4  C)   Resp 18   Ht 1.575 m (5' 2\")   Wt " 77.4 kg (170 lb 11.2 oz)   SpO2 90%   BMI 31.22 kg/m    Limited visit exam done given COVID-19 precautions.     Leaning to left in wheelchair.    Oxygen saturations 89-91% on RA, no respiratory distress.  No oxygen.  Skin is pale, dry and warm.    Eyes 1/2 closed.    Reviewed the chart and during the night was yelling for help but did not know what she wanted.  Stated she had pain but did not know were.  Oxycodone given and somewhat effective.      Labs:   Component      Latest Ref Rng & Units 4/20/2020   Sodium      133 - 144 mmol/L 136   Potassium      3.4 - 5.3 mmol/L 4.3   Chloride      94 - 109 mmol/L 101   Carbon Dioxide      20 - 32 mmol/L 32   Anion Gap      3 - 14 mmol/L 3   Glucose      70 - 99 mg/dL 97   Urea Nitrogen      7 - 30 mg/dL 14   Creatinine      0.52 - 1.04 mg/dL 0.80   GFR Estimate      >60 mL/min/1.73:m2 63   GFR Estimate If Black      >60 mL/min/1.73:m2 73   Calcium      8.5 - 10.1 mg/dL 9.2       ASSESSMENT/PLAN:  Lethargy  Generalized anxiety disorder  - medication reviewed and have made the decision to remove the seroquel from her regimen.  This was started in the hospital to help with her anxiety.  Gabapentin was started as well and she is on 200mg at HS.  Will leave the Gabapentin but feel that she probably is not tolerating the Seroquel.    Encourage fluids and eating to prevent dehydration.      Neurogenic bladder  Would like to repeat a UA/UC but has a catheter.  Should try a trial void as well.  Is her leaning from an infection?  Have seen symptoms like this in past on others so want to make sure it is not overlooked.  Feel that medications being used are causing her bladder not to function probably.  See below for orders      Orders given to :  1.  Discontinue Seroquel  2.  Remove catheter  3.  UA/UC due to lethargy and weakness  4.  PVR every 8 hours and cath if over 300 ml of urine.  If need to do a third time then leave a 16F 10cc catheter in and then maybe talk about  starting Flomax.    Electronically signed by:  ASHLEY Maldonado CNP     Video-Visit Details  Type of service:  Video Visit  Video End Time (time video stopped): 12:40pm  Distant Location (provider location):  [facility]

## 2020-04-29 NOTE — LETTER
"    4/29/2020        RE: Linn Sanon  [facility]  [address]  [facility] [address]        [place] [facility]   Linn Sanon is being evaluated via a billable video visit due to the restrictions of the Covid-19 pandemic.   The patient has been notified of following:  \"This video visit will be conducted via a call between you and your provider. We have found that certain health care needs can be provided without the need for an in-person physical exam.  This service lets us provide the care you need with a video conversation. If during the course of the call the provider feels a video visit is not appropriate, you will not be charged for this service.\"   The provider has received verbal consent for a Video Visit from the patient or first contact? Yes  Patient  or facility staff would like the video invitation sent by: N/A   Video Start Time: 12:30pm      [facility] Medical Record Number:  [id number]  Place of Location at the time of visit: [facility]   Chief Complaint   Patient presents with     Video Visit     Nursing Home Acute     HPI:  Linn Sanon  is a 89 year old (11/13/1926), who is being seen today for a visit.  HPI information obtained from: facility chart records and facility staff. Today's concern is:    1. Lethargy    2. Generalized anxiety disorder    3. Neurogenic bladder      Nursing updating this NP today out of concern for Linn as she is up in the dining room eating.  From the back can see she is leaning in her w/c to the left.  More confused and lethargic.    Brainstormed with nurse manager about what may be occurring.  TIA vs UTI vs over sedation from medication.  Has not been getting PRN oxycodone more than once a day.    Has a catheter in place.  Feel that should come out for another trial of voiding.  Has not been able to empty bladder before leaving the hospital.      Past Medical and Surgical History reviewed in Epic " today.  MEDICATIONS:    Current Outpatient Medications   Medication Sig Dispense Refill     acetaminophen (TYLENOL) 500 MG tablet Take 2 tablets (1,000 mg) by mouth 3 times daily 180 tablet 0     Acidophilus Lactobacillus CAPS Take 1 capsule by mouth daily 30 capsule 4     albuterol (PROAIR HFA/PROVENTIL HFA/VENTOLIN HFA) 108 (90 Base) MCG/ACT inhaler Inhale 2 puffs into the lungs every 4 hours as needed for shortness of breath / dyspnea or wheezing 1 Inhaler 0     ALPRAZolam (XANAX) 0.25 MG tablet Take 1 tablet (0.25 mg) by mouth 3 times daily 90 tablet 0     aspirin (ASA) 81 MG EC tablet Take 1 tablet (81 mg) by mouth daily 30 tablet 0     digoxin (LANOXIN) 125 MCG tablet Take 1 tablet (125 mcg) by mouth daily 30 tablet 0     gabapentin (NEURONTIN) 100 MG capsule Take 2 capsules (200 mg) by mouth At Bedtime 60 capsule 0     loperamide (IMODIUM) 2 MG capsule Take 1 capsule (2 mg) by mouth 4 times daily as needed for diarrhea 20 capsule 0     metolazone (ZAROXOLYN) 2.5 MG tablet Take 1 tablet (2.5 mg) by mouth every 48 hours as needed (weight fernie more than 2 pounds in 1 day or 5 pounds in 1 week) 15 tablet 0     metoprolol tartrate (LOPRESSOR) 25 MG tablet Take 1 tablet (25 mg) by mouth 2 times daily 60 tablet 0     miconazole (MICATIN) 2 % external powder Apply topically 2 times daily 90 g 0     oxyCODONE (ROXICODONE) 5 MG tablet Take 0.5 tablets (2.5 mg) by mouth every 4 hours as needed for moderate to severe pain 10 tablet 0     potassium chloride ER (KLOR-CON M) 20 MEQ CR tablet Take 2 tablets (40 mEq) by mouth 2 times daily 120 tablet 0     simvastatin (ZOCOR) 20 MG tablet Take 1 tablet (20 mg) by mouth At Bedtime 30 tablet 0     torsemide (DEMADEX) 20 MG tablet Take 1 tablet (20 mg) by mouth daily 30 tablet 0     warfarin ANTICOAGULANT (COUMADIN) 2.5 MG tablet Take 2.5 mg on Mon, Wed and Fri and take 5 mg on Tues, Thurs, Sat, and Sun, or take as advised by NH provider 60 tablet 0       REVIEW OF SYSTEMS: slow  "to respond, trying to eat her meal  Objective: /85   Pulse 80   Temp 97.6  F (36.4  C)   Resp 18   Ht 1.575 m (5' 2\")   Wt 77.4 kg (170 lb 11.2 oz)   SpO2 90%   BMI 31.22 kg/m    Limited visit exam done given COVID-19 precautions.     Leaning to left in wheelchair.    Oxygen saturations 89-91% on RA, no respiratory distress.  No oxygen.  Skin is pale, dry and warm.    Eyes 1/2 closed.    Reviewed the chart and during the night was yelling for help but did not know what she wanted.  Stated she had pain but did not know were.  Oxycodone given and somewhat effective.      Labs:   Component      Latest Ref Rng & Units 4/20/2020   Sodium      133 - 144 mmol/L 136   Potassium      3.4 - 5.3 mmol/L 4.3   Chloride      94 - 109 mmol/L 101   Carbon Dioxide      20 - 32 mmol/L 32   Anion Gap      3 - 14 mmol/L 3   Glucose      70 - 99 mg/dL 97   Urea Nitrogen      7 - 30 mg/dL 14   Creatinine      0.52 - 1.04 mg/dL 0.80   GFR Estimate      >60 mL/min/1.73:m2 63   GFR Estimate If Black      >60 mL/min/1.73:m2 73   Calcium      8.5 - 10.1 mg/dL 9.2       ASSESSMENT/PLAN:  Lethargy  Generalized anxiety disorder  - medication reviewed and have made the decision to remove the seroquel from her regimen.  This was started in the hospital to help with her anxiety.  Gabapentin was started as well and she is on 200mg at HS.  Will leave the Gabapentin but feel that she probably is not tolerating the Seroquel.    Encourage fluids and eating to prevent dehydration.      Neurogenic bladder  Would like to repeat a UA/UC but has a catheter.  Should try a trial void as well.  Is her leaning from an infection?  Have seen symptoms like this in past on others so want to make sure it is not overlooked.  Feel that medications being used are causing her bladder not to function probably.  See below for orders      Orders given to :  1.  Discontinue Seroquel  2.  Remove catheter  3.  UA/UC due to lethargy and weakness  4.  PVR " every 8 hours and cath if over 300 ml of urine.  If need to do a third time then leave a 16F 10cc catheter in and then maybe talk about starting Flomax.    Electronically signed by:  ASHLEY Maldonado CNP     Video-Visit Details  Type of service:  Video Visit  Video End Time (time video stopped): 12:40pm  Distant Location (provider location):  [facility]             Sincerely,        ASHLEY Maldonado CNP

## 2020-04-30 NOTE — TELEPHONE ENCOUNTER
Reason for Call:  Other call back    Detailed comments: Patient is in the [facility] and nurse Melonie is calling to ask how wound should be wrapped .  Please call her back as soon as possible, she is with the patient Melonie [phone]    Phone Number Patient can be reached at: Other phone number:   Melonie [phone]    Best Time: any    Can we leave a detailed message on this number? YES    Call taken on 4/30/2020 at 11:30 AM by Samantha Cortez

## 2020-04-30 NOTE — PROGRESS NOTES
"[place] [facility]   Linn Sanon is being evaluated via a billable video visit due to the restrictions of the Covid-19 pandemic.   The patient has been notified of following:  \"This video visit will be conducted via a call between you and your provider. We have found that certain health care needs can be provided without the need for an in-person physical exam.  This service lets us provide the care you need with a video conversation. If during the course of the call the provider feels a video visit is not appropriate, you will not be charged for this service.\"   The provider has received verbal consent for a Video Visit from the patient or first contact? Yes  Patient  or facility staff would like the video invitation sent by: N/A   Video Start Time: 1:45pm    [place] Medical Record Number:  [id number]  Place of Location at the time of visit: [facility]   Chief Complaint   Patient presents with     Video Visit     Nursing Home Acute     HPI:  Linn Sanon  is a 89 year old (11/13/1926), who is being seen today for a visit.  HPI information obtained from: facility chart records, facility staff, patient report and [facility] chart review. Today's concern is:    1. Urinary retention    2. Neurogenic bladder    3. Closed bimalleolar fracture of right ankle with delayed healing, subsequent encounter      - following up with nursing today in regards to Linn's UA/UC.  They also asked for this NP to see her right leg as they have it unwrapped right now and waiting a call back from ortho clinic about what  To do about splinting since the son does not want her to go to the clinic with the pandemic occurring.  She has some redness between her toes and on the ball of her foot.  Catheter was removed and now the most recent PVR was 900+ ml of urine.     Past Medical and Surgical History reviewed in Epic today.  MEDICATIONS:    Current Outpatient Medications   Medication Sig Dispense Refill     " acetaminophen (TYLENOL) 500 MG tablet Take 2 tablets (1,000 mg) by mouth 3 times daily 180 tablet 0     Acidophilus Lactobacillus CAPS Take 1 capsule by mouth daily 30 capsule 4     albuterol (PROAIR HFA/PROVENTIL HFA/VENTOLIN HFA) 108 (90 Base) MCG/ACT inhaler Inhale 2 puffs into the lungs every 4 hours as needed for shortness of breath / dyspnea or wheezing 1 Inhaler 0     ALPRAZolam (XANAX) 0.25 MG tablet Take 1 tablet (0.25 mg) by mouth 3 times daily 90 tablet 0     aspirin (ASA) 81 MG EC tablet Take 1 tablet (81 mg) by mouth daily 30 tablet 0     digoxin (LANOXIN) 125 MCG tablet Take 1 tablet (125 mcg) by mouth daily 30 tablet 0     gabapentin (NEURONTIN) 100 MG capsule Take 2 capsules (200 mg) by mouth At Bedtime 60 capsule 0     loperamide (IMODIUM) 2 MG capsule Take 1 capsule (2 mg) by mouth 4 times daily as needed for diarrhea 20 capsule 0     metolazone (ZAROXOLYN) 2.5 MG tablet Take 1 tablet (2.5 mg) by mouth every 48 hours as needed (weight fernie more than 2 pounds in 1 day or 5 pounds in 1 week) 15 tablet 0     metoprolol tartrate (LOPRESSOR) 25 MG tablet Take 1 tablet (25 mg) by mouth 2 times daily 60 tablet 0     miconazole (MICATIN) 2 % external powder Apply topically 2 times daily 90 g 0     oxyCODONE (ROXICODONE) 5 MG tablet Take 0.5 tablets (2.5 mg) by mouth every 4 hours as needed for moderate to severe pain 10 tablet 0     potassium chloride ER (KLOR-CON M) 20 MEQ CR tablet Take 2 tablets (40 mEq) by mouth 2 times daily 120 tablet 0     QUEtiapine (SEROQUEL) 25 MG tablet Take 0.5 tablets (12.5 mg) by mouth At Bedtime 15 tablet 0     simvastatin (ZOCOR) 20 MG tablet Take 1 tablet (20 mg) by mouth At Bedtime 30 tablet 0     torsemide (DEMADEX) 20 MG tablet Take 1 tablet (20 mg) by mouth daily 30 tablet 0     warfarin ANTICOAGULANT (COUMADIN) 2.5 MG tablet Take 2.5 mg on Mon, Wed and Fri and take 5 mg on Tues, Thurs, Sat, and Sun, or take as advised by NH provider 60 tablet 0     REVIEW OF SYSTEMS:  "more alert today then yesterday.  Today she is trying to eat her lunch in her room while the splint is off her leg.    Objective: /85   Pulse 76   Temp 98.3  F (36.8  C)   Resp 18   Ht 1.575 m (5' 2\")   Wt 77.4 kg (170 lb 11.2 oz)   SpO2 93%   BMI 31.22 kg/m    Limited visit exam done given COVID-19 precautions.     Alert and responding to questions.    Legs unwrapped today.    Staff showing between toes there is redness.  Ball of right foot is irritated and so applying cavilon wipe to that area for protection..  +3 edema present in both legs  Healing bruises note on right leg.  Keeping open to air for now so skin can breath until hearing back from MD office.    Labs:   Component      Latest Ref Rng & Units 4/29/2020   Color Urine       Yellow   Appearance Urine       Slightly Cloudy   Glucose Urine      NEG:Negative mg/dL Negative   Bilirubin Urine      NEG:Negative Negative   Ketones Urine      NEG:Negative mg/dL Negative   Specific Gravity Urine      1.003 - 1.035 1.013   Blood Urine      NEG:Negative Large (A)   pH Urine      5.0 - 7.0 pH 5.0   Protein Albumin Urine      NEG:Negative mg/dL Negative   Urobilinogen mg/dL      0.0 - 2.0 mg/dL 0.0   Nitrite Urine      NEG:Negative Negative   Leukocyte Esterase Urine      NEG:Negative Trace (A)   Source       Unspecified Urine   WBC Urine      0 - 5 /HPF 11 (H)   RBC Urine      0 - 2 /HPF 81 (H)   Bacteria Urine      NEG:Negative /HPF Moderate (A)   Yeast Urine      NEG:Negative /HPF Few (A)   Squamous Epithelial /HPF Urine      0 - 1 /HPF <1   Mucous Urine      NEG:Negative /LPF Present (A)   Hyaline Casts      0 - 2 /LPF 13 (H)       ASSESSMENT/PLAN:  Urinary retention  Neurogenic bladder  - will start Flomax as she is retaining urine and unable to empty it.  May reinstert catheter if needing to cath her for a third time if PVR >300cc.  If reinserted then will remove on 5/14/2020 for trial removal for voiding.      Closed bimalleolar fracture of right " ankle with delayed healing, subsequent encounter  Instructed to use lambs wool between toes for cushion.  Ok to use cavilon wipe on ball of foot for protection.  They are just awaiting call from clinic about what to do about a splint since she can not go get a cast like the surgeon wanted.        Orders given to :  1.  Flomax 0.4mg po daily for urinary retention  2.  If one more time of needing to straight cath resident, then reinsert catheter and remove then on 5/14/2020 after the flomax has had 2 weeks to get into her system.    Electronically signed by:  ASHLEY Maldonado CNP     Video-Visit Details  Type of service:  Video Visit  Video End Time (time video stopped): 2pm  Distant Location (provider location):  [place] [facility]

## 2020-04-30 NOTE — LETTER
"    4/30/2020        RE: Linn Sanon  [facility]  [address]  [facility] [address]        [place] [facility]   Linn Sanon is being evaluated via a billable video visit due to the restrictions of the Covid-19 pandemic.   The patient has been notified of following:  \"This video visit will be conducted via a call between you and your provider. We have found that certain health care needs can be provided without the need for an in-person physical exam.  This service lets us provide the care you need with a video conversation. If during the course of the call the provider feels a video visit is not appropriate, you will not be charged for this service.\"   The provider has received verbal consent for a Video Visit from the patient or first contact? Yes  Patient  or facility staff would like the video invitation sent by: N/A   Video Start Time: 1:45pm    [place] Medical Record Number:  [id number]  Place of Location at the time of visit: [facility]   Chief Complaint   Patient presents with     Video Visit     Nursing Home Acute     HPI:  Linn Sanon  is a 89 year old (11/13/1926), who is being seen today for a visit.  HPI information obtained from: facility chart records, facility staff, patient report and [facility] chart review. Today's concern is:    1. Urinary retention    2. Neurogenic bladder    3. Closed bimalleolar fracture of right ankle with delayed healing, subsequent encounter      - following up with nursing today in regards to Linn's UA/UC.  They also asked for this NP to see her right leg as they have it unwrapped right now and waiting a call back from ortho clinic about what  To do about splinting since the son does not want her to go to the clinic with the pandemic occurring.  She has some redness between her toes and on the ball of her foot.  Catheter was removed and now the most recent PVR was 900+ ml of urine.     Past Medical and Surgical " History reviewed in Epic today.  MEDICATIONS:    Current Outpatient Medications   Medication Sig Dispense Refill     acetaminophen (TYLENOL) 500 MG tablet Take 2 tablets (1,000 mg) by mouth 3 times daily 180 tablet 0     Acidophilus Lactobacillus CAPS Take 1 capsule by mouth daily 30 capsule 4     albuterol (PROAIR HFA/PROVENTIL HFA/VENTOLIN HFA) 108 (90 Base) MCG/ACT inhaler Inhale 2 puffs into the lungs every 4 hours as needed for shortness of breath / dyspnea or wheezing 1 Inhaler 0     ALPRAZolam (XANAX) 0.25 MG tablet Take 1 tablet (0.25 mg) by mouth 3 times daily 90 tablet 0     aspirin (ASA) 81 MG EC tablet Take 1 tablet (81 mg) by mouth daily 30 tablet 0     digoxin (LANOXIN) 125 MCG tablet Take 1 tablet (125 mcg) by mouth daily 30 tablet 0     gabapentin (NEURONTIN) 100 MG capsule Take 2 capsules (200 mg) by mouth At Bedtime 60 capsule 0     loperamide (IMODIUM) 2 MG capsule Take 1 capsule (2 mg) by mouth 4 times daily as needed for diarrhea 20 capsule 0     metolazone (ZAROXOLYN) 2.5 MG tablet Take 1 tablet (2.5 mg) by mouth every 48 hours as needed (weight fernie more than 2 pounds in 1 day or 5 pounds in 1 week) 15 tablet 0     metoprolol tartrate (LOPRESSOR) 25 MG tablet Take 1 tablet (25 mg) by mouth 2 times daily 60 tablet 0     miconazole (MICATIN) 2 % external powder Apply topically 2 times daily 90 g 0     oxyCODONE (ROXICODONE) 5 MG tablet Take 0.5 tablets (2.5 mg) by mouth every 4 hours as needed for moderate to severe pain 10 tablet 0     potassium chloride ER (KLOR-CON M) 20 MEQ CR tablet Take 2 tablets (40 mEq) by mouth 2 times daily 120 tablet 0     QUEtiapine (SEROQUEL) 25 MG tablet Take 0.5 tablets (12.5 mg) by mouth At Bedtime 15 tablet 0     simvastatin (ZOCOR) 20 MG tablet Take 1 tablet (20 mg) by mouth At Bedtime 30 tablet 0     torsemide (DEMADEX) 20 MG tablet Take 1 tablet (20 mg) by mouth daily 30 tablet 0     warfarin ANTICOAGULANT (COUMADIN) 2.5 MG tablet Take 2.5 mg on Mon, Wed and  "Fri and take 5 mg on Tues, Thurs, Sat, and Sun, or take as advised by NH provider 60 tablet 0     REVIEW OF SYSTEMS: more alert today then yesterday.  Today she is trying to eat her lunch in her room while the splint is off her leg.    Objective: /85   Pulse 76   Temp 98.3  F (36.8  C)   Resp 18   Ht 1.575 m (5' 2\")   Wt 77.4 kg (170 lb 11.2 oz)   SpO2 93%   BMI 31.22 kg/m    Limited visit exam done given COVID-19 precautions.     Alert and responding to questions.    Legs unwrapped today.    Staff showing between toes there is redness.  Ball of right foot is irritated and so applying cavilon wipe to that area for protection..  +3 edema present in both legs  Healing bruises note on right leg.  Keeping open to air for now so skin can breath until hearing back from MD office.    Labs:   Component      Latest Ref Rng & Units 4/29/2020   Color Urine       Yellow   Appearance Urine       Slightly Cloudy   Glucose Urine      NEG:Negative mg/dL Negative   Bilirubin Urine      NEG:Negative Negative   Ketones Urine      NEG:Negative mg/dL Negative   Specific Gravity Urine      1.003 - 1.035 1.013   Blood Urine      NEG:Negative Large (A)   pH Urine      5.0 - 7.0 pH 5.0   Protein Albumin Urine      NEG:Negative mg/dL Negative   Urobilinogen mg/dL      0.0 - 2.0 mg/dL 0.0   Nitrite Urine      NEG:Negative Negative   Leukocyte Esterase Urine      NEG:Negative Trace (A)   Source       Unspecified Urine   WBC Urine      0 - 5 /HPF 11 (H)   RBC Urine      0 - 2 /HPF 81 (H)   Bacteria Urine      NEG:Negative /HPF Moderate (A)   Yeast Urine      NEG:Negative /HPF Few (A)   Squamous Epithelial /HPF Urine      0 - 1 /HPF <1   Mucous Urine      NEG:Negative /LPF Present (A)   Hyaline Casts      0 - 2 /LPF 13 (H)       ASSESSMENT/PLAN:  Urinary retention  Neurogenic bladder  - will start Flomax as she is retaining urine and unable to empty it.  May reinstert catheter if needing to cath her for a third time if PVR >300cc.  " If reinserted then will remove on 5/14/2020 for trial removal for voiding.      Closed bimalleolar fracture of right ankle with delayed healing, subsequent encounter  Instructed to use lambs wool between toes for cushion.  Ok to use cavilon wipe on ball of foot for protection.  They are just awaiting call from clinic about what to do about a splint since she can not go get a cast like the surgeon wanted.        Orders given to :  1.  Flomax 0.4mg po daily for urinary retention  2.  If one more time of needing to straight cath resident, then reinsert catheter and remove then on 5/14/2020 after the flomax has had 2 weeks to get into her system.    Electronically signed by:  ASHLEY Maldonado CNP     Video-Visit Details  Type of service:  Video Visit  Video End Time (time video stopped): 2pm  Distant Location (provider location):  [place] [facility]             Sincerely,        ASHLEY Maldonado CNP

## 2020-04-30 NOTE — TELEPHONE ENCOUNTER
I talked to Melonie today at length about this patient.  It sounds like she is in a type of Ortho-Glass posterior splint.  What Melonie was worried about was some redness on the fourth and fifth toe between the toes and also on the medial side of the right big toe.  It sounds like they were backing Ace wrap around the toes also.  The wheeze are little bit red but they are blanchable and she does not have any pain at the wounds and there is no drainage.  The largest one is about point 3 cm in diameter.  They do not look infected per Melonie.  They actually have a wound nurse there that will be looking at the toes tomorrow.  I will yield to their judgment as the day are there and looking at the patient.  I also mentioned the nurse practitioner there Ekaterina could also take a look.  My plan going forward is to have them to not wrap the toes at all with the Ace wrap to keep any pressure off the toes.  She understands and put in an order for this.  They are to keep the splint on 24 hours a day but take it off and once a day to check the skin.  She should continue nonweightbearing status on the right lower extremity.  Melonie confirmed that this patient is nonambulatory on that right lower extremity.  She should continue the splint for the next month and in a month she should follow-up with Dr. Morocho.

## 2020-04-30 NOTE — TELEPHONE ENCOUNTER
Spoke with Melonie and she is wondering about getting orders with instructions on how to clean and remove the wrap. They are wondering how low on the leg should they unwrap as well as how frequently they should be checking and if she is to keep the splint on at all times. Per Melonie the son still does not want to bring the patient in for an appointment. Confirmed with Melonie again that the patient is not mobile and will not be using the ankle.     Routing to provider for orders regarding the care for the patients ankle.     Mellissa Tuttle CMA on 4/30/2020 at 12:37 PM

## 2020-05-04 NOTE — LETTER
"    5/4/2020        RE: Linn Sanon  [facility]  [address]  [facility] [address]        [place] [facility]   Linn Sanon is being evaluated via a billable video visit due to the restrictions of the Covid-19 pandemic.   The patient has been notified of following:  \"This video visit will be conducted via a call between you and your provider. We have found that certain health care needs can be provided without the need for an in-person physical exam.  This service lets us provide the care you need with a video conversation. If during the course of the call the provider feels a video visit is not appropriate, you will not be charged for this service.\"   The provider has received verbal consent for a Video Visit from the patient or first contact? Yes  Patient  or facility staff would like the video invitation sent by: N/A   Video Start Time: 9:50am    [place] Medical Record Number:  [id number]  Place of Location at the time of visit: [facility]   Chief Complaint   Patient presents with     RECHECK     HPI:  Linn Sanon  is a 89 year old (11/13/1926), who is being seen today for a visit.  HPI information obtained from: facility chart records, facility staff, patient report and [facility] chart review. Today's concern is:  1. Anticoagulation goal of INR 2 to 3    2. New onset atrial fibrillation (H) with RVR, suspect valvular a fib    3. Long term current use of anticoagulant    4. Generalized anxiety disorder      - INR done today and has had some recent medication changes like her Seroquel removed and the Xanax put back to the way she has had it for some time.  Staff report she is more alert than over sedated last week.    INR has been fluctuating with her health status and medication change.  INR came back elevated today.    Past Medical and Surgical History reviewed in Epic today.  MEDICATIONS:    Current Outpatient Medications   Medication Sig Dispense Refill "     acetaminophen (TYLENOL) 500 MG tablet Take 2 tablets (1,000 mg) by mouth 3 times daily 180 tablet 0     Acidophilus Lactobacillus CAPS Take 1 capsule by mouth daily 30 capsule 4     albuterol (PROAIR HFA/PROVENTIL HFA/VENTOLIN HFA) 108 (90 Base) MCG/ACT inhaler Inhale 2 puffs into the lungs every 4 hours as needed for shortness of breath / dyspnea or wheezing 1 Inhaler 0     ALPRAZolam (XANAX) 0.25 MG tablet Take 1 tablet (0.25 mg) by mouth 3 times daily 90 tablet 0     aspirin (ASA) 81 MG EC tablet Take 1 tablet (81 mg) by mouth daily 30 tablet 0     digoxin (LANOXIN) 125 MCG tablet Take 1 tablet (125 mcg) by mouth daily 30 tablet 0     gabapentin (NEURONTIN) 100 MG capsule Take 2 capsules (200 mg) by mouth At Bedtime 60 capsule 0     loperamide (IMODIUM) 2 MG capsule Take 1 capsule (2 mg) by mouth 4 times daily as needed for diarrhea 20 capsule 0     metolazone (ZAROXOLYN) 2.5 MG tablet Take 1 tablet (2.5 mg) by mouth every 48 hours as needed (weight fernie more than 2 pounds in 1 day or 5 pounds in 1 week) 15 tablet 0     metoprolol tartrate (LOPRESSOR) 25 MG tablet Take 1 tablet (25 mg) by mouth 2 times daily 60 tablet 0     miconazole (MICATIN) 2 % external powder Apply topically 2 times daily 90 g 0     oxyCODONE (ROXICODONE) 5 MG tablet Take 0.5 tablets (2.5 mg) by mouth every 4 hours as needed for moderate to severe pain 10 tablet 0     potassium chloride ER (KLOR-CON M) 20 MEQ CR tablet Take 2 tablets (40 mEq) by mouth 2 times daily 120 tablet 0     QUEtiapine (SEROQUEL) 25 MG tablet Take 0.5 tablets (12.5 mg) by mouth At Bedtime 15 tablet 0     simvastatin (ZOCOR) 20 MG tablet Take 1 tablet (20 mg) by mouth At Bedtime 30 tablet 0     torsemide (DEMADEX) 20 MG tablet Take 1 tablet (20 mg) by mouth daily 30 tablet 0     warfarin ANTICOAGULANT (COUMADIN) 2.5 MG tablet Take 2.5 mg on Mon, Wed and Fri and take 5 mg on Tues, Thurs, Sat, and Sun, or take as advised by NH provider 60 tablet 0     REVIEW OF  SYSTEMS: limited but able to answer question.  Denies pain of the chest or SOB.    Objective: /85   Pulse 67   Temp 98.3  F (36.8  C)   Resp 18   Wt 76.1 kg (167 lb 12.8 oz)   SpO2 97%   BMI 30.69 kg/m    Limited visit exam done given COVID-19 precautions.     Linn was in her recliner in her room.  Alert but eyes 1/2 closed.  Quicker to respond to questions versus falling asleep during the conversation.    Coloring is pale, skin is dry and warm.  Heart rate regular/irregular.  No visible distress.  No oxygen.  No coughing.      Labs:   Component      Latest Ref Rng & Units 4/27/2020 5/4/2020   INR      0.86 - 1.14 3.39 (H) 5.00 (H)       ASSESSMENT/PLAN:  New onset atrial fibrillation (H) with RVR, suspect valvular a fib  Anticoagulation goal of INR 2 to 3  Long term current use of anticoagulant  - INR at 5.00 today.  Currently on coumadin 2.5mg Su, Tu, Th and Sa with 5mg on M/W/F.  Changes in her medications can affect her INR.    1.  Hold coumadin for 2 days  2.  INR on Wednesday in which staff will have to draw and bring over to hospital lab for atrial Fib.    Generalized anxiety disorder  Just in appearance, happy to see she is looking better or at least more alert.  Very nervous in general but way to over sedated.  The Seroquel at 12.5mg at HS was discontinued and left with the Neurontin that was started in the hospital.  Will leave the Neurontin at the dose that it is at.  May need to break the dosing apart and that may lighten her alertness as well.  Keeping her on xanax 0.25mg TID.      Orders to :  1.  Hold coumadin for 2 days  2.  INR on Wednesday for atrial fib    Electronically signed by:  SAHLEY Maldonado CNP     Video-Visit Details  Type of service:  Video Visit  Video End Time (time video stopped): 10am  Distant Location (provider location):  [place] [facility]             Sincerely,        ASHLEY Maldonado CNP

## 2020-05-04 NOTE — PROGRESS NOTES
"[facility]   Linn Sanon is being evaluated via a billable video visit due to the restrictions of the Covid-19 pandemic.   The patient has been notified of following:  \"This video visit will be conducted via a call between you and your provider. We have found that certain health care needs can be provided without the need for an in-person physical exam.  This service lets us provide the care you need with a video conversation. If during the course of the call the provider feels a video visit is not appropriate, you will not be charged for this service.\"   The provider has received verbal consent for a Video Visit from the patient or first contact? Yes  Patient  or facility staff would like the video invitation sent by: N/A   Video Start Time: 9:50am    [facility] Medical Record Number:  [id number]  Place of Location at the time of visit: [facility]   Chief Complaint   Patient presents with     RECHECK     HPI:  Linn Sanon  is a 89 year old (11/13/1926), who is being seen today for a visit.  HPI information obtained from: facility chart records, facility staff, patient report and [facility] chart review. Today's concern is:  1. Anticoagulation goal of INR 2 to 3    2. New onset atrial fibrillation (H) with RVR, suspect valvular a fib    3. Long term current use of anticoagulant    4. Generalized anxiety disorder      - INR done today and has had some recent medication changes like her Seroquel removed and the Xanax put back to the way she has had it for some time.  Staff report she is more alert than over sedated last week.    INR has been fluctuating with her health status and medication change.  INR came back elevated today.    Past Medical and Surgical History reviewed in Epic today.  MEDICATIONS:    Current Outpatient Medications   Medication Sig Dispense Refill     acetaminophen (TYLENOL) 500 MG tablet Take 2 tablets (1,000 mg) by mouth 3 times daily 180 tablet 0     " Acidophilus Lactobacillus CAPS Take 1 capsule by mouth daily 30 capsule 4     albuterol (PROAIR HFA/PROVENTIL HFA/VENTOLIN HFA) 108 (90 Base) MCG/ACT inhaler Inhale 2 puffs into the lungs every 4 hours as needed for shortness of breath / dyspnea or wheezing 1 Inhaler 0     ALPRAZolam (XANAX) 0.25 MG tablet Take 1 tablet (0.25 mg) by mouth 3 times daily 90 tablet 0     aspirin (ASA) 81 MG EC tablet Take 1 tablet (81 mg) by mouth daily 30 tablet 0     digoxin (LANOXIN) 125 MCG tablet Take 1 tablet (125 mcg) by mouth daily 30 tablet 0     gabapentin (NEURONTIN) 100 MG capsule Take 2 capsules (200 mg) by mouth At Bedtime 60 capsule 0     loperamide (IMODIUM) 2 MG capsule Take 1 capsule (2 mg) by mouth 4 times daily as needed for diarrhea 20 capsule 0     metolazone (ZAROXOLYN) 2.5 MG tablet Take 1 tablet (2.5 mg) by mouth every 48 hours as needed (weight fernie more than 2 pounds in 1 day or 5 pounds in 1 week) 15 tablet 0     metoprolol tartrate (LOPRESSOR) 25 MG tablet Take 1 tablet (25 mg) by mouth 2 times daily 60 tablet 0     miconazole (MICATIN) 2 % external powder Apply topically 2 times daily 90 g 0     oxyCODONE (ROXICODONE) 5 MG tablet Take 0.5 tablets (2.5 mg) by mouth every 4 hours as needed for moderate to severe pain 10 tablet 0     potassium chloride ER (KLOR-CON M) 20 MEQ CR tablet Take 2 tablets (40 mEq) by mouth 2 times daily 120 tablet 0     QUEtiapine (SEROQUEL) 25 MG tablet Take 0.5 tablets (12.5 mg) by mouth At Bedtime 15 tablet 0     simvastatin (ZOCOR) 20 MG tablet Take 1 tablet (20 mg) by mouth At Bedtime 30 tablet 0     torsemide (DEMADEX) 20 MG tablet Take 1 tablet (20 mg) by mouth daily 30 tablet 0     warfarin ANTICOAGULANT (COUMADIN) 2.5 MG tablet Take 2.5 mg on Mon, Wed and Fri and take 5 mg on Tues, Thurs, Sat, and Sun, or take as advised by NH provider 60 tablet 0     REVIEW OF SYSTEMS: limited but able to answer question.  Denies pain of the chest or SOB.    Objective: /85   Pulse  67   Temp 98.3  F (36.8  C)   Resp 18   Wt 76.1 kg (167 lb 12.8 oz)   SpO2 97%   BMI 30.69 kg/m    Limited visit exam done given COVID-19 precautions.     Linn was in her recliner in her room.  Alert but eyes 1/2 closed.  Quicker to respond to questions versus falling asleep during the conversation.    Coloring is pale, skin is dry and warm.  Heart rate regular/irregular.  No visible distress.  No oxygen.  No coughing.      Labs:   Component      Latest Ref Rng & Units 4/27/2020 5/4/2020   INR      0.86 - 1.14 3.39 (H) 5.00 (H)       ASSESSMENT/PLAN:  New onset atrial fibrillation (H) with RVR, suspect valvular a fib  Anticoagulation goal of INR 2 to 3  Long term current use of anticoagulant  - INR at 5.00 today.  Currently on coumadin 2.5mg [place], Tu, Th and Sa with 5mg on M/W/F.  Changes in her medications can affect her INR.    1.  Hold coumadin for 2 days  2.  INR on Wednesday in which staff will have to draw and bring over to hospital lab for atrial Fib.    Generalized anxiety disorder  Just in appearance, happy to see she is looking better or at least more alert.  Very nervous in general but way to over sedated.  The Seroquel at 12.5mg at HS was discontinued and left with the Neurontin that was started in the hospital.  Will leave the Neurontin at the dose that it is at.  May need to break the dosing apart and that may lighten her alertness as well.  Keeping her on xanax 0.25mg TID.      Orders to :  1.  Hold coumadin for 2 days  2.  INR on Wednesday for atrial fib    Electronically signed by:  ASHLEY Maldonado CNP     Video-Visit Details  Type of service:  Video Visit  Video End Time (time video stopped): 10am  Distant Location (provider location):  [facility]

## 2020-05-11 NOTE — LETTER
"    5/11/2020        RE: Linn Sanon  [facility]  [id number]  [facility] [id number]        [facility]   Linn Sanon is being evaluated via a billable video visit due to the restrictions of the Covid-19 pandemic.   The patient has been notified of following:  \"This video visit will be conducted via a call between you and your provider. We have found that certain health care needs can be provided without the need for an in-person physical exam.  This service lets us provide the care you need with a video conversation. If during the course of the call the provider feels a video visit is not appropriate, you will not be charged for this service.\"   The provider has received verbal consent for a Video Visit from the patient or first contact? Yes  Patient  or facility staff would like the video invitation sent by: N/A   Video Start Time: 1:20pm      [facility] Medical Record Number:  [id number]  Place of Location at the time of visit: [facility]   Chief Complaint   Patient presents with     RECHECK     HPI:  Linn Sanon  is a 89 year old (11/13/1926), who is being seen today for a visit.  HPI information obtained from: facility chart records, facility staff, patient report and [facility] chart review. Today's concern is:    1. Atrial fibrillation with RVR (H) - suspect valvular a fib    2. Anticoagulation goal of INR 2 to 3    3. Long term current use of anticoagulant    4. Bruise      Came to see Linn today per request of nursing due to a bruise/lump on her left leg.  They asked for it to be seen more as a FYI.  After their investigation, it was suspected that where the sling from the 4 point lift sits on her legs a bruise and lump developed.  Linn uses a lift due to her right fractured ankle and weight bearing status.  Linn was sitting in bed and eating her lunch.  She is alert and pleasant but worried about her health per her usual.  Pleased to see " her more alert now after the Seroquel was discontinued.  Still is on Neurontin 200mg at  for her anxiety.    Past Medical and Surgical History reviewed in Epic today.  MEDICATIONS:    Current Outpatient Medications   Medication Sig Dispense Refill     acetaminophen (TYLENOL) 500 MG tablet Take 2 tablets (1,000 mg) by mouth 3 times daily 180 tablet 0     Acidophilus Lactobacillus CAPS Take 1 capsule by mouth daily 30 capsule 4     albuterol (PROAIR HFA/PROVENTIL HFA/VENTOLIN HFA) 108 (90 Base) MCG/ACT inhaler Inhale 2 puffs into the lungs every 4 hours as needed for shortness of breath / dyspnea or wheezing 1 Inhaler 0     ALPRAZolam (XANAX) 0.25 MG tablet Take 1 tablet (0.25 mg) by mouth 3 times daily 90 tablet 0     aspirin (ASA) 81 MG EC tablet Take 1 tablet (81 mg) by mouth daily 30 tablet 0     digoxin (LANOXIN) 125 MCG tablet Take 1 tablet (125 mcg) by mouth daily 30 tablet 0     gabapentin (NEURONTIN) 100 MG capsule Take 2 capsules (200 mg) by mouth At Bedtime 60 capsule 0     loperamide (IMODIUM) 2 MG capsule Take 1 capsule (2 mg) by mouth 4 times daily as needed for diarrhea 20 capsule 0     metolazone (ZAROXOLYN) 2.5 MG tablet Take 1 tablet (2.5 mg) by mouth every 48 hours as needed (weight fernie more than 2 pounds in 1 day or 5 pounds in 1 week) 15 tablet 0     metoprolol tartrate (LOPRESSOR) 25 MG tablet Take 1 tablet (25 mg) by mouth 2 times daily 60 tablet 0     miconazole (MICATIN) 2 % external powder Apply topically 2 times daily 90 g 0     oxyCODONE (ROXICODONE) 5 MG tablet Take 0.5 tablets (2.5 mg) by mouth every 4 hours as needed for moderate to severe pain 10 tablet 0     potassium chloride ER (KLOR-CON M) 20 MEQ CR tablet Take 2 tablets (40 mEq) by mouth 2 times daily 120 tablet 0     simvastatin (ZOCOR) 20 MG tablet Take 1 tablet (20 mg) by mouth At Bedtime 30 tablet 0     tamsulosin (FLOMAX) 0.4 MG capsule Take 0.4 mg by mouth daily       torsemide (DEMADEX) 20 MG tablet Take 1 tablet (20 mg)  by mouth daily 30 tablet 0     warfarin ANTICOAGULANT (COUMADIN) 2.5 MG tablet Take 2.5 mg on Mon, Wed and Fri and take 5 mg on Tues, Thurs, Sat, and Sun, or take as advised by NH provider 60 tablet 0     REVIEW OF SYSTEMS: 4 point ROS including Respiratory, CV, GI and , other than that noted in the HPI,  is negative  Objective: BP (!) 86/49   Pulse 80   Temp 98.5  F (36.9  C)   Resp 17   Wt 74 kg (163 lb 3.2 oz)   SpO2 91%   BMI 29.85 kg/m    Limited visit exam done given COVID-19 precautions.     In bed with the HOB elevated to almost 70 degrees so she can eat and elevate her legs.    Able to see bruise/lump on outside of leg, no signs of infection.    Heart rate regular/irregular  Lungs are clear.  +3 edema in lower left leg.      Labs:   Component      Latest Ref Rng & Units 5/8/2020 5/11/2020   INR      0.86 - 1.14 3.30 (H) 2.11 (H)       ASSESSMENT/PLAN:  Atrial fibrillation with RVR (H) - suspect valvular a fib  Anticoagulation goal of INR 2 to 3  Long term current use of anticoagulant  - had held her coumadin on Friday and 1mg on Sa and Sun.  Range should be 2-3   Has had varying INR's since admission and part due to her alertness and medications.      1.  Will order coumadin 2mg on Tu and Fr and 1mg all other days.  2.  INR to be done in one week for A fib    Bruise  Continue to monitor lump and bruise.  Feel it will absorb  Back in without troubles.    Orders given to the :  1.  Coumadin 2mg on Tu and Fr and 1mg on all other days  2.  INR in one week - Atrial fib  3.  Ok to use a tubi- socks for left leg due to edema.      Electronically signed by:  ASHLEY Maldonado CNP     Video-Visit Details  Type of service:  Video Visit  Video End Time (time video stopped): 1:35pm  Distant Location (provider location):  [facility]           Sincerely,        ASHLEY Maldonado CNP

## 2020-05-11 NOTE — PROGRESS NOTES
"[facility]   Linn Sanon is being evaluated via a billable video visit due to the restrictions of the Covid-19 pandemic.   The patient has been notified of following:  \"This video visit will be conducted via a call between you and your provider. We have found that certain health care needs can be provided without the need for an in-person physical exam.  This service lets us provide the care you need with a video conversation. If during the course of the call the provider feels a video visit is not appropriate, you will not be charged for this service.\"   The provider has received verbal consent for a Video Visit from the patient or first contact? Yes  Patient  or facility staff would like the video invitation sent by: N/A   Video Start Time: 1:20pm      [facility] Medical Record Number:  [id number]  Place of Location at the time of visit: [facility]   Chief Complaint   Patient presents with     RECHECK     HPI:  Linn Sanon  is a 89 year old (11/13/1926), who is being seen today for a visit.  HPI information obtained from: facility chart records, facility staff, patient report and [facility] chart review. Today's concern is:    1. Atrial fibrillation with RVR (H) - suspect valvular a fib    2. Anticoagulation goal of INR 2 to 3    3. Long term current use of anticoagulant    4. Bruise      Came to see Linn today per request of nursing due to a bruise/lump on her left leg.  They asked for it to be seen more as a FYI.  After their investigation, it was suspected that where the sling from the 4 point lift sits on her legs a bruise and lump developed.  Linn uses a lift due to her right fractured ankle and weight bearing status.  Linn was sitting in bed and eating her lunch.  She is alert and pleasant but worried about her health per her usual.  Pleased to see her more alert now after the Seroquel was discontinued.  Still is on Neurontin 200mg at HS for her anxiety.    Past " Medical and Surgical History reviewed in Epic today.  MEDICATIONS:    Current Outpatient Medications   Medication Sig Dispense Refill     acetaminophen (TYLENOL) 500 MG tablet Take 2 tablets (1,000 mg) by mouth 3 times daily 180 tablet 0     Acidophilus Lactobacillus CAPS Take 1 capsule by mouth daily 30 capsule 4     albuterol (PROAIR HFA/PROVENTIL HFA/VENTOLIN HFA) 108 (90 Base) MCG/ACT inhaler Inhale 2 puffs into the lungs every 4 hours as needed for shortness of breath / dyspnea or wheezing 1 Inhaler 0     ALPRAZolam (XANAX) 0.25 MG tablet Take 1 tablet (0.25 mg) by mouth 3 times daily 90 tablet 0     aspirin (ASA) 81 MG EC tablet Take 1 tablet (81 mg) by mouth daily 30 tablet 0     digoxin (LANOXIN) 125 MCG tablet Take 1 tablet (125 mcg) by mouth daily 30 tablet 0     gabapentin (NEURONTIN) 100 MG capsule Take 2 capsules (200 mg) by mouth At Bedtime 60 capsule 0     loperamide (IMODIUM) 2 MG capsule Take 1 capsule (2 mg) by mouth 4 times daily as needed for diarrhea 20 capsule 0     metolazone (ZAROXOLYN) 2.5 MG tablet Take 1 tablet (2.5 mg) by mouth every 48 hours as needed (weight fernie more than 2 pounds in 1 day or 5 pounds in 1 week) 15 tablet 0     metoprolol tartrate (LOPRESSOR) 25 MG tablet Take 1 tablet (25 mg) by mouth 2 times daily 60 tablet 0     miconazole (MICATIN) 2 % external powder Apply topically 2 times daily 90 g 0     oxyCODONE (ROXICODONE) 5 MG tablet Take 0.5 tablets (2.5 mg) by mouth every 4 hours as needed for moderate to severe pain 10 tablet 0     potassium chloride ER (KLOR-CON M) 20 MEQ CR tablet Take 2 tablets (40 mEq) by mouth 2 times daily 120 tablet 0     simvastatin (ZOCOR) 20 MG tablet Take 1 tablet (20 mg) by mouth At Bedtime 30 tablet 0     tamsulosin (FLOMAX) 0.4 MG capsule Take 0.4 mg by mouth daily       torsemide (DEMADEX) 20 MG tablet Take 1 tablet (20 mg) by mouth daily 30 tablet 0     warfarin ANTICOAGULANT (COUMADIN) 2.5 MG tablet Take 2.5 mg on Mon, Wed and Fri and  take 5 mg on Tues, Thurs, Sat, and Sun, or take as advised by NH provider 60 tablet 0     REVIEW OF SYSTEMS: 4 point ROS including Respiratory, CV, GI and , other than that noted in the HPI,  is negative  Objective: BP (!) 86/49   Pulse 80   Temp 98.5  F (36.9  C)   Resp 17   Wt 74 kg (163 lb 3.2 oz)   SpO2 91%   BMI 29.85 kg/m    Limited visit exam done given COVID-19 precautions.     In bed with the HOB elevated to almost 70 degrees so she can eat and elevate her legs.    Able to see bruise/lump on outside of leg, no signs of infection.    Heart rate regular/irregular  Lungs are clear.  +3 edema in lower left leg.      Labs:   Component      Latest Ref Rng & Units 5/8/2020 5/11/2020   INR      0.86 - 1.14 3.30 (H) 2.11 (H)       ASSESSMENT/PLAN:  Atrial fibrillation with RVR (H) - suspect valvular a fib  Anticoagulation goal of INR 2 to 3  Long term current use of anticoagulant  - had held her coumadin on Friday and 1mg on Sa and Sun.  Range should be 2-3   Has had varying INR's since admission and part due to her alertness and medications.      1.  Will order coumadin 2mg on Tu and Fr and 1mg all other days.  2.  INR to be done in one week for A fib    Bruise  Continue to monitor lump and bruise.  Feel it will absorb  Back in without troubles.    Orders given to the :  1.  Coumadin 2mg on Tu and Fr and 1mg on all other days  2.  INR in one week - Atrial fib  3.  Ok to use a tubi- socks for left leg due to edema.      Electronically signed by:  ASHLEY Maldonado CNP     Video-Visit Details  Type of service:  Video Visit  Video End Time (time video stopped): 1:35pm  Distant Location (provider location):  [facility]

## 2020-05-13 NOTE — PROGRESS NOTES
"[place] [facility] Reg[facility]   Linn Sanon is being evaluated via a billable video visit due to the restrictions of the Covid-19 pandemic.   The patient has been notified of following:  \"This video visit will be conducted via a call between you and your provider. We have found that certain health care needs can be provided without the need for an in-person physical exam.  This service lets us provide the care you need with a video conversation. If during the course of the call the provider feels a video visit is not appropriate, you will not be charged for this service.\"   The provider has received verbal consent for a Video Visit from the patient or first contact? Yes  Patient or facility staff would like the video invitation sent by: N/A   Video Start Time: 10:28  Received verbal consent to use Care Everywhere in order to access labs, documents, histories, and all other needed information to provide care at current facility.  [place] Medical Record Number:  [id number]  Place of Location at the time of visit: [facility]   Chief Complaint   Patient presents with     Nursing Home Regulatory   Linn Sanon  is a 89 year old  (11/13/1926), admitted to the above facility from  [facility]. Hospital stay 03/23/2020 through 04/06/2020..  Admitted to this facility for  rehab, medical management and nursing care.    HPI: HPI information obtained from: facility chart records, facility staff, patient report and [facility] chart review.     Brief Summary of Hospital Course:  - Pt was admitted to [facility] rehab after a hospitalization for parainfluenza infection and general weakness, from 01/23 until 02/27. Had a fall at [facility], was found to have bimalleolar ankle fx (managed medically 2/2 multiple comorbidities and current COVID-19 pandemic). Also was found to have acute on chronic CHF 2/2 MR and severe pulmonary HTN and severe aortic stenosis (seen on Echo) treated with " IV lasix, transitioned to po lasix.   - hospitalization was c/w with a new A-fib started on BB and digoxin and OAC. Also, urinary retention failed catheter removal.   - noted to have anxiety, Celexa GDR initiated 2/2 diarrhea, started on Seroquel and Neurontin.     Updates on Status Since Skilled nursing Admission:  - GNP reports Resident continued to have anxiety issue, Seroquel was stopped, on xanax and Neurontin, and now more alert.   - GNP reports started on flomax for urinary retention. RN reports today was first PVR and there was 439 ml, encourage to urinated, repeated scan revealed 211, remained asymptomatic.   - RN reports LLE lump and improving, no bruise. Leg today is 2+. Resident reports slight numbness over the leg.   - Resident reports aching pain in the ankle, aggravated with movements, better with meds. Pt reports used to have a cream for her left leg and knee pain.   - Pt denies chest pain, orthopnea, SIMMONS.   =============================================================  CODE STATUS/ADVANCE DIRECTIVES DISCUSSION:   DNR / DNI  Patient's living condition: lives alone  ALLERGIES: No known drug allergies  PAST MEDICAL HISTORY:  has a past medical history of Closed fracture of navicular (scaphoid) bone of wrist, Generalized osteoarthrosis, unspecified site, Lump or mass in breast, Measles without mention of complication, Mumps without mention of complication, Pure hypercholesterolemia, Scarlet fever, Unspecified closed fracture of ankle, and Varicella without mention of complication.  PAST SURGICAL HISTORY:   has a past surgical history that includes APPENDECTOMY (age 11); EXCISION BREAST LESION W XRAY MARKER, OPEN SINGLE (1995); REMV CATARACT EXTRA CAP,INSERT LENS, W/O ECP (9/18/2008); REMV CATARACT EXTRACAP,INSERT LENS, W/O ECP (10/16/2008); and Laser YAG capsulotomy (Left, 9/18/2014).  FAMILY HISTORY: family history includes Arthritis in her sister; Cardiovascular in her father; Diabetes in her father;  Heart Disease in her paternal grandfather; Respiratory in her brother and sister; Thyroid Disease in her sister.  SOCIAL HISTORY:   reports that she has never smoked. She has never used smokeless tobacco. She reports that she does not drink alcohol or use drugs.    MEDICATIONS:  Current Outpatient Medications   Medication Sig Dispense Refill     acetaminophen (TYLENOL) 500 MG tablet Take 2 tablets (1,000 mg) by mouth 3 times daily 180 tablet 0     Acidophilus Lactobacillus CAPS Take 1 capsule by mouth daily 30 capsule 4     albuterol (PROAIR HFA/PROVENTIL HFA/VENTOLIN HFA) 108 (90 Base) MCG/ACT inhaler Inhale 2 puffs into the lungs every 4 hours as needed for shortness of breath / dyspnea or wheezing 1 Inhaler 0     ALPRAZolam (XANAX) 0.25 MG tablet Take 1 tablet (0.25 mg) by mouth 3 times daily 90 tablet 0     aspirin (ASA) 81 MG EC tablet Take 1 tablet (81 mg) by mouth daily 30 tablet 0     digoxin (LANOXIN) 125 MCG tablet Take 1 tablet (125 mcg) by mouth daily 30 tablet 0     gabapentin (NEURONTIN) 100 MG capsule Take 2 capsules (200 mg) by mouth At Bedtime 60 capsule 0     loperamide (IMODIUM) 2 MG capsule Take 1 capsule (2 mg) by mouth 4 times daily as needed for diarrhea 20 capsule 0     metolazone (ZAROXOLYN) 2.5 MG tablet Take 1 tablet (2.5 mg) by mouth every 48 hours as needed (weight fernie more than 2 pounds in 1 day or 5 pounds in 1 week) 15 tablet 0     metoprolol tartrate (LOPRESSOR) 25 MG tablet Take 1 tablet (25 mg) by mouth 2 times daily 60 tablet 0     miconazole (MICATIN) 2 % external powder Apply topically 2 times daily 90 g 0     oxyCODONE (ROXICODONE) 5 MG tablet Take 0.5 tablets (2.5 mg) by mouth every 4 hours as needed for moderate to severe pain 10 tablet 0     potassium chloride ER (KLOR-CON M) 20 MEQ CR tablet Take 2 tablets (40 mEq) by mouth 2 times daily 120 tablet 0     simvastatin (ZOCOR) 20 MG tablet Take 1 tablet (20 mg) by mouth At Bedtime 30 tablet 0     tamsulosin (FLOMAX) 0.4 MG  "capsule Take 0.4 mg by mouth daily       torsemide (DEMADEX) 20 MG tablet Take 1 tablet (20 mg) by mouth daily 30 tablet 0     warfarin ANTICOAGULANT (COUMADIN) 2.5 MG tablet Take 2.5 mg on Mon, Wed and Fri and take 5 mg on Tues, Thurs, Sat, and Sun, or take as advised by NH provider 60 tablet 0     REVIEW OF SYSTEMS: 10 point ROS of systems including Constitutional, Eyes, Respiratory, Cardiovascular, Gastroenterology, Genitourinary, Integumentary, Musculoskeletal, Psychiatric were all negative except for pertinent positives noted in my HPI.  Objective: /61   Pulse 86   Temp 98.9  F (37.2  C)   Resp 18   Ht 1.575 m (5' 2\")   Wt 74 kg (163 lb 3.2 oz)   SpO2 (!) 89%   BMI 29.85 kg/m    Limited visit exam done given COVID-19 precautions.   GENERAL APPEARANCE:  in no distress  RESP:  unlabored breathing  M/S:   no joint deformity noted  SKIN:  no rash noted  NEURO:   no purposeful movement in upper and lower extremities  PSYCH:  affect and mood normal    Labs: Reviewed in the chart and EHR.      ASSESSMENT/PLAN:  ------------------------------  Acute on chronic HFpEF (H)  Aortic valve stenosis, moderate to severe (H)  Severe Pulmonary HTN (H)  Moderate Mitral Stenosis  Controlled Atrial Fibrillation (H)  Essential hypertension with goal blood pressure less than 140/90  - compensated.  - AS worsening since 2016. monitor clinically.   - rate control, on metoprolol and digoxin  -  on coumadin with INR followed closely    Closed bimalleolar fracture of right ankle with delayed healing, subsequent encounter   Left leg pain  - medical management  - started rehab program, making a progress, continue until desired goal is achieved.   - will start asper cream qid prn pain.   - continue to follow on Ortho's recommendations.     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     Neurogenic bladder:   - pack;s " catheter removed, PVR imitated.   - may use straight cath up to 3 x, if continued to have urinary retention, reinsert the pack's catheter and urology consult.     Hyperlipidemia LDL goal <130: on statin. Consider stopping given limited life expectancy and lack of benefit at this age.       Electronically signed by:  Cassy Christianson MD     Video-Visit Details  Type of service:  Video Visit  Video End Time (time video stopped): 10:37  Distant Location (provider location):  [place] [facility]

## 2020-05-13 NOTE — TELEPHONE ENCOUNTER
I called the son just to make sure he got my message that I left on his voicemail on 4/29/20.  He did. He was happy to get my call to check on him and her.  She is doing ok.  They are watching the ankle skin each day.  He was happy I circled around to touch base with him.  I told him to touch base with Dr. Morocho's team in a few weeks to a month.  He understands.

## 2020-05-14 NOTE — LETTER
"    5/14/2020        RE: Linn Sanon  [facility]  [id number]  [place] [address]        [place] [facility] Reg[facility]   Linn Sanon is being evaluated via a billable video visit due to the restrictions of the Covid-19 pandemic.   The patient has been notified of following:  \"This video visit will be conducted via a call between you and your provider. We have found that certain health care needs can be provided without the need for an in-person physical exam.  This service lets us provide the care you need with a video conversation. If during the course of the call the provider feels a video visit is not appropriate, you will not be charged for this service.\"   The provider has received verbal consent for a Video Visit from the patient or first contact? Yes  Patient or facility staff would like the video invitation sent by: N/A   Video Start Time: 10:28  Received verbal consent to use Care Everywhere in order to access labs, documents, histories, and all other needed information to provide care at current facility.  [place] Medical Record Number:  [id number]  Place of Location at the time of visit: [facility]   Chief Complaint   Patient presents with     Nursing Home Regulatory   Linn Sanon  is a 89 year old  (11/13/1926), admitted to the above facility from  [facility]. Hospital stay 03/23/2020 through 04/06/2020..  Admitted to this facility for  rehab, medical management and nursing care.    HPI: HPI information obtained from: facility chart records, facility staff, patient report and [facility] chart review.     Brief Summary of Hospital Course:  - Pt was admitted to [facility] rehab after a hospitalization for parainfluenza infection and general weakness, from 01/23 until 02/27. Had a fall at [facility], was found to have bimalleolar ankle fx (managed medically 2/2 multiple comorbidities and current COVID-19 pandemic). Also was found " to have acute on chronic CHF 2/2 MR and severe pulmonary HTN and severe aortic stenosis (seen on Echo) treated with IV lasix, transitioned to po lasix.   - hospitalization was c/w with a new A-fib started on BB and digoxin and OAC. Also, urinary retention failed catheter removal.   - noted to have anxiety, Celexa GDR initiated 2/2 diarrhea, started on Seroquel and Neurontin.     Updates on Status Since Skilled nursing Admission:  - GNP reports Resident continued to have anxiety issue, Seroquel was stopped, on xanax and Neurontin, and now more alert.   - GNP reports started on flomax for urinary retention. RN reports today was first PVR and there was 439 ml, encourage to urinated, repeated scan revealed 211, remained asymptomatic.   - RN reports LLE lump and improving, no bruise. Leg today is 2+. Resident reports slight numbness over the leg.   - Resident reports aching pain in the ankle, aggravated with movements, better with meds. Pt reports used to have a cream for her left leg and knee pain.   - Pt denies chest pain, orthopnea, SIMMONS.   =============================================================  CODE STATUS/ADVANCE DIRECTIVES DISCUSSION:   DNR / DNI  Patient's living condition: lives alone  ALLERGIES: No known drug allergies  PAST MEDICAL HISTORY:  has a past medical history of Closed fracture of navicular (scaphoid) bone of wrist, Generalized osteoarthrosis, unspecified site, Lump or mass in breast, Measles without mention of complication, Mumps without mention of complication, Pure hypercholesterolemia, Scarlet fever, Unspecified closed fracture of ankle, and Varicella without mention of complication.  PAST SURGICAL HISTORY:   has a past surgical history that includes APPENDECTOMY (age 11); EXCISION BREAST LESION W XRAY MARKER, OPEN SINGLE (1995); REMV CATARACT EXTRA CAP,INSERT LENS, W/O ECP (9/18/2008); REMV CATARACT EXTRACAP,INSERT LENS, W/O ECP (10/16/2008); and Laser YAG capsulotomy (Left,  9/18/2014).  FAMILY HISTORY: family history includes Arthritis in her sister; Cardiovascular in her father; Diabetes in her father; Heart Disease in her paternal grandfather; Respiratory in her brother and sister; Thyroid Disease in her sister.  SOCIAL HISTORY:   reports that she has never smoked. She has never used smokeless tobacco. She reports that she does not drink alcohol or use drugs.    MEDICATIONS:  Current Outpatient Medications   Medication Sig Dispense Refill     acetaminophen (TYLENOL) 500 MG tablet Take 2 tablets (1,000 mg) by mouth 3 times daily 180 tablet 0     Acidophilus Lactobacillus CAPS Take 1 capsule by mouth daily 30 capsule 4     albuterol (PROAIR HFA/PROVENTIL HFA/VENTOLIN HFA) 108 (90 Base) MCG/ACT inhaler Inhale 2 puffs into the lungs every 4 hours as needed for shortness of breath / dyspnea or wheezing 1 Inhaler 0     ALPRAZolam (XANAX) 0.25 MG tablet Take 1 tablet (0.25 mg) by mouth 3 times daily 90 tablet 0     aspirin (ASA) 81 MG EC tablet Take 1 tablet (81 mg) by mouth daily 30 tablet 0     digoxin (LANOXIN) 125 MCG tablet Take 1 tablet (125 mcg) by mouth daily 30 tablet 0     gabapentin (NEURONTIN) 100 MG capsule Take 2 capsules (200 mg) by mouth At Bedtime 60 capsule 0     loperamide (IMODIUM) 2 MG capsule Take 1 capsule (2 mg) by mouth 4 times daily as needed for diarrhea 20 capsule 0     metolazone (ZAROXOLYN) 2.5 MG tablet Take 1 tablet (2.5 mg) by mouth every 48 hours as needed (weight fernie more than 2 pounds in 1 day or 5 pounds in 1 week) 15 tablet 0     metoprolol tartrate (LOPRESSOR) 25 MG tablet Take 1 tablet (25 mg) by mouth 2 times daily 60 tablet 0     miconazole (MICATIN) 2 % external powder Apply topically 2 times daily 90 g 0     oxyCODONE (ROXICODONE) 5 MG tablet Take 0.5 tablets (2.5 mg) by mouth every 4 hours as needed for moderate to severe pain 10 tablet 0     potassium chloride ER (KLOR-CON M) 20 MEQ CR tablet Take 2 tablets (40 mEq) by mouth 2 times daily 120  "tablet 0     simvastatin (ZOCOR) 20 MG tablet Take 1 tablet (20 mg) by mouth At Bedtime 30 tablet 0     tamsulosin (FLOMAX) 0.4 MG capsule Take 0.4 mg by mouth daily       torsemide (DEMADEX) 20 MG tablet Take 1 tablet (20 mg) by mouth daily 30 tablet 0     warfarin ANTICOAGULANT (COUMADIN) 2.5 MG tablet Take 2.5 mg on Mon, Wed and Fri and take 5 mg on Tues, Thurs, Sat, and Sun, or take as advised by NH provider 60 tablet 0     REVIEW OF SYSTEMS: 10 point ROS of systems including Constitutional, Eyes, Respiratory, Cardiovascular, Gastroenterology, Genitourinary, Integumentary, Musculoskeletal, Psychiatric were all negative except for pertinent positives noted in my HPI.  Objective: /61   Pulse 86   Temp 98.9  F (37.2  C)   Resp 18   Ht 1.575 m (5' 2\")   Wt 74 kg (163 lb 3.2 oz)   SpO2 (!) 89%   BMI 29.85 kg/m    Limited visit exam done given COVID-19 precautions.   GENERAL APPEARANCE:  in no distress  RESP:  unlabored breathing  M/S:   no joint deformity noted  SKIN:  no rash noted  NEURO:   no purposeful movement in upper and lower extremities  PSYCH:  affect and mood normal    Labs: Reviewed in the chart and EHR.      ASSESSMENT/PLAN:  ------------------------------  Acute on chronic HFpEF (H)  Aortic valve stenosis, moderate to severe (H)  Severe Pulmonary HTN (H)  Moderate Mitral Stenosis  Controlled Atrial Fibrillation (H)  Essential hypertension with goal blood pressure less than 140/90  - compensated.  - AS worsening since 2016. monitor clinically.   - rate control, on metoprolol and digoxin  -  on coumadin with INR followed closely    Closed bimalleolar fracture of right ankle with delayed healing, subsequent encounter   Left leg pain  - medical management  - started rehab program, making a progress, continue until desired goal is achieved.   - will start asper cream qid prn pain.   - continue to follow on Ortho's recommendations.     Psychotic disorder due to medical condition with " delusions  Generalized anxiety disorder  Sleep disturbances  Anxiety, chronic, likley SHARIF  - controlled.     Neurogenic bladder:   - pack;s catheter removed, PVR imitated.   - may use straight cath up to 3 x, if continued to have urinary retention, reinsert the pack's catheter and urology consult.     Hyperlipidemia LDL goal <130: on statin. Consider stopping given limited life expectancy and lack of benefit at this age.       Electronically signed by:  Cassy Christianson MD     Video-Visit Details  Type of service:  Video Visit  Video End Time (time video stopped): 10:37  Distant Location (provider location):  [place] [facility]           Sincerely,        Cassy Christianson MD

## 2020-05-18 NOTE — PROGRESS NOTES
"[place] [facility]   Linn Sanon is being evaluated via a billable video visit due to the restrictions of the Covid-19 pandemic.   The patient has been notified of following:  \"This video visit will be conducted via a call between you and your provider. We have found that certain health care needs can be provided without the need for an in-person physical exam.  This service lets us provide the care you need with a video conversation. If during the course of the call the provider feels a video visit is not appropriate, you will not be charged for this service.\"   The provider has received verbal consent for a Video Visit from the patient or first contact? Yes  Patient  or facility staff would like the video invitation sent by: N/A   Video Start Time: 11:30am      [place] Medical Record Number:  [id number]  Place of Location at the time of visit: [facility]   Chief Complaint   Patient presents with     Video Visit     Nursing Home Acute     HPI:  Linn Sanon  is a 89 year old (11/13/1926), who is being seen today for a visit.  HPI information obtained from: facility chart records, facility staff, patient report and [facility] chart review. Today's concern is:    1. Atrial fibrillation with RVR (H) - suspect valvular a fib    2. Anticoagulation goal of INR 2 to 3    3. Long term current use of anticoagulant    4. Neurogenic bladder    5. Generalized anxiety disorder      INR done today and so came to see Linn with assist from nursing.  Linn needed the catheter replaced due to high PVRs.  Discussed when next time she will have the catheter removed.  Flomax started on 4/30 and has used oxycodone x1 in a two week look back.      Past Medical and Surgical History reviewed in Epic today.  MEDICATIONS:    Current Outpatient Medications   Medication Sig Dispense Refill     acetaminophen (TYLENOL) 500 MG tablet Take 2 tablets (1,000 mg) by mouth 3 times daily 180 tablet 0     Acidophilus " Lactobacillus CAPS Take 1 capsule by mouth daily 30 capsule 4     albuterol (PROAIR HFA/PROVENTIL HFA/VENTOLIN HFA) 108 (90 Base) MCG/ACT inhaler Inhale 2 puffs into the lungs every 4 hours as needed for shortness of breath / dyspnea or wheezing 1 Inhaler 0     ALPRAZolam (XANAX) 0.25 MG tablet Take 1 tablet (0.25 mg) by mouth 3 times daily 90 tablet 0     aspirin (ASA) 81 MG EC tablet Take 1 tablet (81 mg) by mouth daily 30 tablet 0     digoxin (LANOXIN) 125 MCG tablet Take 1 tablet (125 mcg) by mouth daily 30 tablet 0     gabapentin (NEURONTIN) 100 MG capsule Take 2 capsules (200 mg) by mouth At Bedtime 60 capsule 0     loperamide (IMODIUM) 2 MG capsule Take 1 capsule (2 mg) by mouth 4 times daily as needed for diarrhea 20 capsule 0     metolazone (ZAROXOLYN) 2.5 MG tablet Take 1 tablet (2.5 mg) by mouth every 48 hours as needed (weight fernie more than 2 pounds in 1 day or 5 pounds in 1 week) 15 tablet 0     metoprolol tartrate (LOPRESSOR) 25 MG tablet Take 1 tablet (25 mg) by mouth 2 times daily 60 tablet 0     miconazole (MICATIN) 2 % external powder Apply topically 2 times daily 90 g 0     oxyCODONE (ROXICODONE) 5 MG tablet Take 0.5 tablets (2.5 mg) by mouth every 4 hours as needed for moderate to severe pain 10 tablet 0     potassium chloride ER (KLOR-CON M) 20 MEQ CR tablet Take 2 tablets (40 mEq) by mouth 2 times daily 120 tablet 0     simvastatin (ZOCOR) 20 MG tablet Take 1 tablet (20 mg) by mouth At Bedtime 30 tablet 0     tamsulosin (FLOMAX) 0.4 MG capsule Take 0.4 mg by mouth daily       torsemide (DEMADEX) 20 MG tablet Take 1 tablet (20 mg) by mouth daily 30 tablet 0     warfarin ANTICOAGULANT (COUMADIN) 2.5 MG tablet Take 2.5 mg on Mon, Wed and Fri and take 5 mg on Tues, Thurs, Sat, and Sun, or take as advised by NH provider 60 tablet 0     REVIEW OF SYSTEMS: could not get her to do a review of systems, seems tired and could not understand the video portion of the visit.  Objective: /61   Pulse  "95   Temp 98.5  F (36.9  C)   Resp 18   Ht 1.575 m (5' 2\")   Wt 74.2 kg (163 lb 9.6 oz)   SpO2 92%   BMI 29.92 kg/m    Limited visit exam done given COVID-19 precautions.     Sitting in her wheelchair eating her lunch.  Would look into the ipad but could not put things together about the visit.  Typically she has been pretty good about holding a conversation.  Heart rate regular/irregular  No respiratory distress.  No use of oxygen.  Coloring is pale, skin reported as dry and warm to touch.    Labs:     Component      Latest Ref Rng & Units 5/11/2020 5/18/2020   INR      0.86 - 1.14 2.11 (H) 1.54 (H)     ASSESSMENT/PLAN:  Atrial fibrillation with RVR (H) - suspect valvular a fib  Anticoagulation goal of INR 2 to 3  Long term current use of anticoagulant  - currently on coumadin 2mg on Tu and Fri with 1mg all other days.  INR went subtherapeutic.  Will adjust her coumadin again  1.  1mg on M/W/F and 2mg all other days  2.  INR on 5/27/2020    Neurogenic bladder  Is on the flomax 0.4mg daily.  She may need more time to see if that dose will work.  Will plan to have catheter removed on 5/27/2020.  PVRs to be done once a shift and if PVR >350ml, then offer to catheter her and if need to again by third time, may consider other solutions.    Generalized anxiety disorder  Do not like how she seems tired.  Will do a dose reduction with this medication as well.  Did better after Seroquel was discontinued.  Do suspect she is sensitive to medications.  1.  Lower the gabapentin to 100mg at HS.    Orders given to the :  1.  Coumadin 1mg on M/W/F and 2mg all other days  2.  INR on 5/27/2020  3.  Change gabapentin to 100mg at HS from 200mg in PM for anxiety.  4.  Remove catheter next Wednesday for trial without.  Check PVR and straight cath if over 350cc and call if needing to do a straight cath the third time.      Electronically signed by:  ASHLEY Maldonado CNP     Video-Visit Details  Type of " service:  Video Visit  Video End Time (time video stopped): 11:45am  Distant Location (provider location):  [facility]

## 2020-05-18 NOTE — LETTER
"    5/18/2020        RE: Linn Sanon  [facility]  [address] [facility] [address]        [facility]   Linn Sanon is being evaluated via a billable video visit due to the restrictions of the Covid-19 pandemic.   The patient has been notified of following:  \"This video visit will be conducted via a call between you and your provider. We have found that certain health care needs can be provided without the need for an in-person physical exam.  This service lets us provide the care you need with a video conversation. If during the course of the call the provider feels a video visit is not appropriate, you will not be charged for this service.\"   The provider has received verbal consent for a Video Visit from the patient or first contact? Yes  Patient  or facility staff would like the video invitation sent by: N/A   Video Start Time: 11:30am      [facility] Medical Record Number:  [id number]  Place of Location at the time of visit: [facility]   Chief Complaint   Patient presents with     Video Visit     Nursing Home Acute     HPI:  Linn Sanon  is a 89 year old (11/13/1926), who is being seen today for a visit.  HPI information obtained from: facility chart records, facility staff, patient report and [facility] chart review. Today's concern is:    1. Atrial fibrillation with RVR (H) - suspect valvular a fib    2. Anticoagulation goal of INR 2 to 3    3. Long term current use of anticoagulant    4. Neurogenic bladder    5. Generalized anxiety disorder      INR done today and so came to see Linn with assist from nursing.  Linn needed the catheter replaced due to high PVRs.  Discussed when next time she will have the catheter removed.  Flomax started on 4/30 and has used oxycodone x1 in a two week look back.      Past Medical and Surgical History reviewed in Epic today.  MEDICATIONS:    Current Outpatient Medications   Medication Sig Dispense Refill     " acetaminophen (TYLENOL) 500 MG tablet Take 2 tablets (1,000 mg) by mouth 3 times daily 180 tablet 0     Acidophilus Lactobacillus CAPS Take 1 capsule by mouth daily 30 capsule 4     albuterol (PROAIR HFA/PROVENTIL HFA/VENTOLIN HFA) 108 (90 Base) MCG/ACT inhaler Inhale 2 puffs into the lungs every 4 hours as needed for shortness of breath / dyspnea or wheezing 1 Inhaler 0     ALPRAZolam (XANAX) 0.25 MG tablet Take 1 tablet (0.25 mg) by mouth 3 times daily 90 tablet 0     aspirin (ASA) 81 MG EC tablet Take 1 tablet (81 mg) by mouth daily 30 tablet 0     digoxin (LANOXIN) 125 MCG tablet Take 1 tablet (125 mcg) by mouth daily 30 tablet 0     gabapentin (NEURONTIN) 100 MG capsule Take 2 capsules (200 mg) by mouth At Bedtime 60 capsule 0     loperamide (IMODIUM) 2 MG capsule Take 1 capsule (2 mg) by mouth 4 times daily as needed for diarrhea 20 capsule 0     metolazone (ZAROXOLYN) 2.5 MG tablet Take 1 tablet (2.5 mg) by mouth every 48 hours as needed (weight fernie more than 2 pounds in 1 day or 5 pounds in 1 week) 15 tablet 0     metoprolol tartrate (LOPRESSOR) 25 MG tablet Take 1 tablet (25 mg) by mouth 2 times daily 60 tablet 0     miconazole (MICATIN) 2 % external powder Apply topically 2 times daily 90 g 0     oxyCODONE (ROXICODONE) 5 MG tablet Take 0.5 tablets (2.5 mg) by mouth every 4 hours as needed for moderate to severe pain 10 tablet 0     potassium chloride ER (KLOR-CON M) 20 MEQ CR tablet Take 2 tablets (40 mEq) by mouth 2 times daily 120 tablet 0     simvastatin (ZOCOR) 20 MG tablet Take 1 tablet (20 mg) by mouth At Bedtime 30 tablet 0     tamsulosin (FLOMAX) 0.4 MG capsule Take 0.4 mg by mouth daily       torsemide (DEMADEX) 20 MG tablet Take 1 tablet (20 mg) by mouth daily 30 tablet 0     warfarin ANTICOAGULANT (COUMADIN) 2.5 MG tablet Take 2.5 mg on Mon, Wed and Fri and take 5 mg on Tues, Thurs, Sat, and Sun, or take as advised by NH provider 60 tablet 0     REVIEW OF SYSTEMS: could not get her to do a  "review of systems, seems tired and could not understand the video portion of the visit.  Objective: /61   Pulse 95   Temp 98.5  F (36.9  C)   Resp 18   Ht 1.575 m (5' 2\")   Wt 74.2 kg (163 lb 9.6 oz)   SpO2 92%   BMI 29.92 kg/m    Limited visit exam done given COVID-19 precautions.     Sitting in her wheelchair eating her lunch.  Would look into the ipad but could not put things together about the visit.  Typically she has been pretty good about holding a conversation.  Heart rate regular/irregular  No respiratory distress.  No use of oxygen.  Coloring is pale, skin reported as dry and warm to touch.    Labs:     Component      Latest Ref Rng & Units 5/11/2020 5/18/2020   INR      0.86 - 1.14 2.11 (H) 1.54 (H)     ASSESSMENT/PLAN:  Atrial fibrillation with RVR (H) - suspect valvular a fib  Anticoagulation goal of INR 2 to 3  Long term current use of anticoagulant  - currently on coumadin 2mg on Tu and Fri with 1mg all other days.  INR went subtherapeutic.  Will adjust her coumadin again  1.  1mg on M/W/F and 2mg all other days  2.  INR on 5/27/2020    Neurogenic bladder  Is on the flomax 0.4mg daily.  She may need more time to see if that dose will work.  Will plan to have catheter removed on 5/27/2020.  PVRs to be done once a shift and if PVR >350ml, then offer to catheter her and if need to again by third time, may consider other solutions.    Generalized anxiety disorder  Do not like how she seems tired.  Will do a dose reduction with this medication as well.  Did better after Seroquel was discontinued.  Do suspect she is sensitive to medications.  1.  Lower the gabapentin to 100mg at HS.    Orders given to the :  1.  Coumadin 1mg on M/W/F and 2mg all other days  2.  INR on 5/27/2020  3.  Change gabapentin to 100mg at HS from 200mg in PM for anxiety.  4.  Remove catheter next Wednesday for trial without.  Check PVR and straight cath if over 350cc and call if needing to do a straight cath the " third time.      Electronically signed by:  ASHLEY Maldonado CNP     Video-Visit Details  Type of service:  Video Visit  Video End Time (time video stopped): 11:45am  Distant Location (provider location):  [facility]             Sincerely,        ASHLEY Maldonado CNP

## 2020-05-27 NOTE — LETTER
"    5/27/2020        RE: Linn Sanon  [facility]  [address] [facility] [address]        [facility]   Linn Sanon is being evaluated via a billable video visit due to the restrictions of the Covid-19 pandemic.   The patient has been notified of following:  \"This video visit will be conducted via a call between you and your provider. We have found that certain health care needs can be provided without the need for an in-person physical exam.  This service lets us provide the care you need with a video conversation. If during the course of the call the provider feels a video visit is not appropriate, you will not be charged for this service.\"   The provider has received verbal consent for a Video Visit from the patient or first contact? Yes  Patient  or facility staff would like the video invitation sent by: N/A   Video Start Time: 1:45pm      [facility] Medical Record Number:  [id number]  Place of Location at the time of visit: [facility]   Chief Complaint   Patient presents with     Video Visit     Nursing Home Acute     INR RESULTS     HPI:  Linn Sanon  is a 89 year old (11/13/1926), who is being seen today for a visit.  HPI information obtained from: facility chart records, facility staff, patient report and [facility] chart review. Today's concern is:    1. Controlled atrial fibrillation (H)    2. Aortic valve stenosis, etiology of cardiac valve disease unspecified    3. Long term current use of anticoagulant    4. Encounter for therapeutic drug monitoring    5. Neurogenic bladder      INR done today and so came to see how Linn was doing in general.  She was in her room sitting up in her wheelchair.  She appears tired but it may just be the way she looks into the ipad.  Continues to worry about her health.    Catheter came out today as a trial without.  Is on Flomax due to multiple failed attempts of removing the catheter.  Had removed " medications that could contribute to her bladder but did not really matter.    Past Medical and Surgical History reviewed in Epic today.  MEDICATIONS:    Current Outpatient Medications   Medication Sig Dispense Refill     acetaminophen (TYLENOL) 500 MG tablet Take 2 tablets (1,000 mg) by mouth 3 times daily 180 tablet 0     Acidophilus Lactobacillus CAPS Take 1 capsule by mouth daily 30 capsule 4     albuterol (PROAIR HFA/PROVENTIL HFA/VENTOLIN HFA) 108 (90 Base) MCG/ACT inhaler Inhale 2 puffs into the lungs every 4 hours as needed for shortness of breath / dyspnea or wheezing 1 Inhaler 0     ALPRAZolam (XANAX) 0.25 MG tablet Take 1 tablet (0.25 mg) by mouth 3 times daily 90 tablet 0     aspirin (ASA) 81 MG EC tablet Take 1 tablet (81 mg) by mouth daily 30 tablet 0     digoxin (LANOXIN) 125 MCG tablet Take 1 tablet (125 mcg) by mouth daily 30 tablet 0     gabapentin (NEURONTIN) 100 MG capsule Take 2 capsules (200 mg) by mouth At Bedtime 60 capsule 0     loperamide (IMODIUM) 2 MG capsule Take 1 capsule (2 mg) by mouth 4 times daily as needed for diarrhea 20 capsule 0     metolazone (ZAROXOLYN) 2.5 MG tablet Take 1 tablet (2.5 mg) by mouth every 48 hours as needed (weight fernie more than 2 pounds in 1 day or 5 pounds in 1 week) 15 tablet 0     metoprolol tartrate (LOPRESSOR) 25 MG tablet Take 1 tablet (25 mg) by mouth 2 times daily 60 tablet 0     miconazole (MICATIN) 2 % external powder Apply topically 2 times daily 90 g 0     oxyCODONE (ROXICODONE) 5 MG tablet Take 0.5 tablets (2.5 mg) by mouth every 4 hours as needed for moderate to severe pain 20 tablet 0     potassium chloride ER (KLOR-CON M) 20 MEQ CR tablet Take 2 tablets (40 mEq) by mouth 2 times daily 120 tablet 0     simvastatin (ZOCOR) 20 MG tablet Take 1 tablet (20 mg) by mouth At Bedtime 30 tablet 0     tamsulosin (FLOMAX) 0.4 MG capsule Take 0.4 mg by mouth daily       torsemide (DEMADEX) 20 MG tablet Take 1 tablet (20 mg) by mouth daily 30 tablet 0      "trolamine salicylate (ASPERCREME) 10 % external cream Apply topically 4 times daily as needed for moderate pain       warfarin ANTICOAGULANT (COUMADIN) 2.5 MG tablet Take 2.5 mg on Mon, Wed and Fri and take 5 mg on Tues, Thurs, Sat, and Sun, or take as advised by NH provider 60 tablet 0     REVIEW OF SYSTEMS: 4 point ROS including Respiratory, CV, GI and , other than that noted in the HPI,  is negative  Objective: /75   Pulse 81   Temp 97.7  F (36.5  C)   Resp 18   Ht 1.575 m (5' 2\")   Wt 75.9 kg (167 lb 4.8 oz)   SpO2 94%   BMI 30.60 kg/m    Limited visit exam done given COVID-19 precautions.     Alert and talkative after she could remember who this NP was.  Heart rate regular/irregular.  Lungs are clear.  No oxygen used.    Skin is pink/pale, warm and dry.    Does have a air cast for her right leg.        Labs:   Component      Latest Ref Rng & Units 5/18/2020 5/27/2020   INR      0.86 - 1.14 1.54 (H) 1.64 (H)       ASSESSMENT/PLAN:  Controlled atrial fibrillation (H)  Aortic valve stenosis, etiology of cardiac valve disease unspecified  Long term current use of anticoagulant  Encounter for therapeutic drug monitoring  - currently on coumadin 1mg M/W/F and 2mg all other days.  INR did not really move from where it was.  No signs of bleeding or c/o of heart palpitations  1.  Increase the coumadin to 2mg po daily  2.  INR check on 6/5/2020    Neurogenic bladder  Is on Flomax 0.4mg po daily.  Catheter out now and too early to  results.  Would be ideal if this successful and she would not need the catheter anymore.    Nursing will keep this NP informed of results.      Orders given to the  RN:  1.  Coumadin 2mg daily for atrial fib  2.  INR on 6/5/2020      Electronically signed by:  ASHLEY Maldonado CNP     Video-Visit Details  Type of service:  Video Visit  Video End Time (time video stopped): 2pm  Distant Location (provider location):  [facility] "             Sincerely,        ASHLEY Maldonado CNP

## 2020-05-27 NOTE — PROGRESS NOTES
"[facility]   Linn Sanon is being evaluated via a billable video visit due to the restrictions of the Covid-19 pandemic.   The patient has been notified of following:  \"This video visit will be conducted via a call between you and your provider. We have found that certain health care needs can be provided without the need for an in-person physical exam.  This service lets us provide the care you need with a video conversation. If during the course of the call the provider feels a video visit is not appropriate, you will not be charged for this service.\"   The provider has received verbal consent for a Video Visit from the patient or first contact? Yes  Patient  or facility staff would like the video invitation sent by: N/A   Video Start Time: 1:45pm      [facility] Medical Record Number:  [id number]  Place of Location at the time of visit: [facility]   Chief Complaint   Patient presents with     Video Visit     Nursing Home Acute     INR RESULTS     HPI:  Linn Sanon  is a 89 year old (11/13/1926), who is being seen today for a visit.  HPI information obtained from: facility chart records, facility staff, patient report and [facility] chart review. Today's concern is:    1. Controlled atrial fibrillation (H)    2. Aortic valve stenosis, etiology of cardiac valve disease unspecified    3. Long term current use of anticoagulant    4. Encounter for therapeutic drug monitoring    5. Neurogenic bladder      INR done today and so came to see how Linn was doing in general.  She was in her room sitting up in her wheelchair.  She appears tired but it may just be the way she looks into the ipad.  Continues to worry about her health.    Catheter came out today as a trial without.  Is on Flomax due to multiple failed attempts of removing the catheter.  Had removed medications that could contribute to her bladder but did not really matter.    Past Medical and Surgical History reviewed in " Epic today.  MEDICATIONS:    Current Outpatient Medications   Medication Sig Dispense Refill     acetaminophen (TYLENOL) 500 MG tablet Take 2 tablets (1,000 mg) by mouth 3 times daily 180 tablet 0     Acidophilus Lactobacillus CAPS Take 1 capsule by mouth daily 30 capsule 4     albuterol (PROAIR HFA/PROVENTIL HFA/VENTOLIN HFA) 108 (90 Base) MCG/ACT inhaler Inhale 2 puffs into the lungs every 4 hours as needed for shortness of breath / dyspnea or wheezing 1 Inhaler 0     ALPRAZolam (XANAX) 0.25 MG tablet Take 1 tablet (0.25 mg) by mouth 3 times daily 90 tablet 0     aspirin (ASA) 81 MG EC tablet Take 1 tablet (81 mg) by mouth daily 30 tablet 0     digoxin (LANOXIN) 125 MCG tablet Take 1 tablet (125 mcg) by mouth daily 30 tablet 0     gabapentin (NEURONTIN) 100 MG capsule Take 2 capsules (200 mg) by mouth At Bedtime 60 capsule 0     loperamide (IMODIUM) 2 MG capsule Take 1 capsule (2 mg) by mouth 4 times daily as needed for diarrhea 20 capsule 0     metolazone (ZAROXOLYN) 2.5 MG tablet Take 1 tablet (2.5 mg) by mouth every 48 hours as needed (weight fernie more than 2 pounds in 1 day or 5 pounds in 1 week) 15 tablet 0     metoprolol tartrate (LOPRESSOR) 25 MG tablet Take 1 tablet (25 mg) by mouth 2 times daily 60 tablet 0     miconazole (MICATIN) 2 % external powder Apply topically 2 times daily 90 g 0     oxyCODONE (ROXICODONE) 5 MG tablet Take 0.5 tablets (2.5 mg) by mouth every 4 hours as needed for moderate to severe pain 20 tablet 0     potassium chloride ER (KLOR-CON M) 20 MEQ CR tablet Take 2 tablets (40 mEq) by mouth 2 times daily 120 tablet 0     simvastatin (ZOCOR) 20 MG tablet Take 1 tablet (20 mg) by mouth At Bedtime 30 tablet 0     tamsulosin (FLOMAX) 0.4 MG capsule Take 0.4 mg by mouth daily       torsemide (DEMADEX) 20 MG tablet Take 1 tablet (20 mg) by mouth daily 30 tablet 0     trolamine salicylate (ASPERCREME) 10 % external cream Apply topically 4 times daily as needed for moderate pain       warfarin  "ANTICOAGULANT (COUMADIN) 2.5 MG tablet Take 2.5 mg on Mon, Wed and Fri and take 5 mg on Tues, Thurs, Sat, and Sun, or take as advised by NH provider 60 tablet 0     REVIEW OF SYSTEMS: 4 point ROS including Respiratory, CV, GI and , other than that noted in the HPI,  is negative  Objective: /75   Pulse 81   Temp 97.7  F (36.5  C)   Resp 18   Ht 1.575 m (5' 2\")   Wt 75.9 kg (167 lb 4.8 oz)   SpO2 94%   BMI 30.60 kg/m    Limited visit exam done given COVID-19 precautions.     Alert and talkative after she could remember who this NP was.  Heart rate regular/irregular.  Lungs are clear.  No oxygen used.    Skin is pink/pale, warm and dry.    Does have a air cast for her right leg.        Labs:   Component      Latest Ref Rng & Units 5/18/2020 5/27/2020   INR      0.86 - 1.14 1.54 (H) 1.64 (H)       ASSESSMENT/PLAN:  Controlled atrial fibrillation (H)  Aortic valve stenosis, etiology of cardiac valve disease unspecified  Long term current use of anticoagulant  Encounter for therapeutic drug monitoring  - currently on coumadin 1mg M/W/F and 2mg all other days.  INR did not really move from where it was.  No signs of bleeding or c/o of heart palpitations  1.  Increase the coumadin to 2mg po daily  2.  INR check on 6/5/2020    Neurogenic bladder  Is on Flomax 0.4mg po daily.  Catheter out now and too early to  results.  Would be ideal if this successful and she would not need the catheter anymore.    Nursing will keep this NP informed of results.      Orders given to the  RN:  1.  Coumadin 2mg daily for atrial fib  2.  INR on 6/5/2020      Electronically signed by:  ASHLEY Maldonado CNP     Video-Visit Details  Type of service:  Video Visit  Video End Time (time video stopped): 2pm  Distant Location (provider location):  [facility]         "

## 2020-06-01 NOTE — TELEPHONE ENCOUNTER
Reason for Call:  Other call back    Detailed comments: Pt's son would like you to call him. They are wondering about her putting weight on her leg/ ankle.     Phone Number Patient can be reached at: Home number on file [phone] (home)    Best Time: NA    Can we leave a detailed message on this number? YES    Call taken on 6/1/2020 at 12:32 PM by Alisha Mendoza

## 2020-06-02 NOTE — TELEPHONE ENCOUNTER
I called the son back. I was told she does not ambulate on that leg at all because of her knee.   I did have to leave him a voicemail today. Our plan as of 4/29/2020 was to have the patient nonweightbearing and follow-up with Dr. Morocho in 1 month.  I believe the safest approach would probably be to continue the nonweightbearing status until Dr. Morocho gives the okay to increase weightbearing status however like I said before I was under the impression she does not ambulate or weight-bear on this leg.  I would imagine the best mode of treatment would be for her to get an x-ray before this is made but I would run it by the Dr. Morocho team  To see what their specific preference would be.  I relayed this message in a voicemail to the son today.

## 2020-06-03 NOTE — TELEPHONE ENCOUNTER
Per [facility]. And Son they want to know what the next steps are. X ray? Plan of care?     Please call [facility] [phone]  Zeinab

## 2020-06-03 NOTE — TELEPHONE ENCOUNTER
I called Zeinab.  I had to leave her voicemail.  I mention that we were under the understanding that she does not weight-bear on this leg anyways because of her knee.  Either way on 4/29/2020 I had mentioned when I talked to Dr. Morocho that she can follow-up with Dr. Morocho in a month which would be around now and get an x-ray and be assessed in clinic.  I know the son is reluctant to bring her in however this would be a better option to figure out what the next step is.  This way we could examine her.  However that being said if she is not can ambulate on the leg at all anyways then she would not need to come in and she can continue nonweightbearing status.    Please call Zeinab at [phone] and relay this message as I am unsure if the message got through yet.  I am off tomorrow.  Thank you so much.

## 2020-06-04 NOTE — TELEPHONE ENCOUNTER
Son Gaurav called and said he did get your messages.     He would like a call to talk about x rays.    [phone] (Gaurav)

## 2020-06-04 NOTE — TELEPHONE ENCOUNTER
[facility] calling wondering if it would be possible to do the xray at the [facility] (they have this capability) please call [facility] direct line at [phone] ok to leave a message.

## 2020-06-04 NOTE — TELEPHONE ENCOUNTER
I talked to [facility] they will do a rt ankle x-rays and then send us the disc to review.  Son does not want to bring her in to the clinic.    Farrah/LAZARO

## 2020-06-05 NOTE — PROGRESS NOTES
"[facility] Regulatory   Linn Sanon is being evaluated via a billable video visit due to the restrictions of the Covid-19 pandemic.   The patient has been notified of following:  \"This video visit will be conducted via a call between you and your provider. We have found that certain health care needs can be provided without the need for an in-person physical exam.  This service lets us provide the care you need with a video conversation. If during the course of the call the provider feels a video visit is not appropriate, you will not be charged for this service.\"   The provider has received verbal consent for a Video Visit from the patient or first contact? Yes  Patient or facility staff would like the video invitation sent by: N/A   Video Start Time: 10AM    [facility] Medical Record Number:  [id number]  Place of Location at the time of visit: [facility]   Chief Complaint   Patient presents with     Video Visit     Regulatory     HPI:  Linn Sanon  is a 89 year old (11/13/1926), who is being seen today for a Regulatory visit.  HPI information obtained from: facility chart records, facility staff, patient report and [facility] chart review. Today's concern is:    1. Atrial fibrillation with RVR (H) - suspect valvular a fib    2. Long term current use of anticoagulant    3. Encounter for therapeutic drug monitoring    4. Chronic congestive heart failure, unspecified heart failure type (H)    5. Severe pulmonary hypertension (H) per Echo Jan 2020    6. Closed bimalleolar fracture of right ankle with delayed healing, subsequent encounter    7. 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 Neurogenic bladder      Came to see Linn today as she had a INR done but also was due for a routine visit this month.    Did speak with nursing about her weight gain of 6 lbs in one month.  She does retain fluid in her legs.  Tubi  stocking used to left leg.  Right foot " has an ACE wrap as well as a splint present that is taken off for 2 hours a day to let air to her skin.    Linn had a right foot x-ray on 6/4/2020 and awaiting to hear from orthopedics.      Past Medical and Surgical History reviewed in Epic today.  MEDICATIONS:    Current Outpatient Medications   Medication Sig Dispense Refill     acetaminophen (TYLENOL) 500 MG tablet Take 2 tablets (1,000 mg) by mouth 3 times daily 180 tablet 0     Acidophilus Lactobacillus CAPS Take 1 capsule by mouth daily 30 capsule 4     albuterol (PROAIR HFA/PROVENTIL HFA/VENTOLIN HFA) 108 (90 Base) MCG/ACT inhaler Inhale 2 puffs into the lungs every 4 hours as needed for shortness of breath / dyspnea or wheezing 1 Inhaler 0     ALPRAZolam (XANAX) 0.25 MG tablet Take 1 tablet (0.25 mg) by mouth 3 times daily 90 tablet 0     aspirin (ASA) 81 MG EC tablet Take 1 tablet (81 mg) by mouth daily 30 tablet 0     digoxin (LANOXIN) 125 MCG tablet Take 1 tablet (125 mcg) by mouth daily 30 tablet 0     gabapentin (NEURONTIN) 100 MG capsule Take 2 capsules (200 mg) by mouth At Bedtime 60 capsule 0     loperamide (IMODIUM) 2 MG capsule Take 1 capsule (2 mg) by mouth 4 times daily as needed for diarrhea 20 capsule 0     metolazone (ZAROXOLYN) 2.5 MG tablet Take 1 tablet (2.5 mg) by mouth every 48 hours as needed (weight fernie more than 2 pounds in 1 day or 5 pounds in 1 week) 15 tablet 0     metoprolol tartrate (LOPRESSOR) 25 MG tablet Take 1 tablet (25 mg) by mouth 2 times daily 60 tablet 0     miconazole (MICATIN) 2 % external powder Apply topically 2 times daily 90 g 0     oxyCODONE (ROXICODONE) 5 MG tablet Take 0.5 tablets (2.5 mg) by mouth every 4 hours as needed for moderate to severe pain 20 tablet 0     potassium chloride ER (KLOR-CON M) 20 MEQ CR tablet Take 2 tablets (40 mEq) by mouth 2 times daily 120 tablet 0     simvastatin (ZOCOR) 20 MG tablet Take 1 tablet (20 mg) by mouth At Bedtime 30 tablet 0     tamsulosin (FLOMAX) 0.4 MG capsule Take  0.4 mg by mouth daily       torsemide (DEMADEX) 20 MG tablet Take 1 tablet (20 mg) by mouth daily 30 tablet 0     trolamine salicylate (ASPERCREME) 10 % external cream Apply topically 4 times daily as needed for moderate pain       warfarin ANTICOAGULANT (COUMADIN) 2.5 MG tablet Take 2.5 mg on Mon, Wed and Fri and take 5 mg on Tues, Thurs, Sat, and Sun, or take as advised by NH provider 60 tablet 0     REVIEW OF SYSTEMS: 4 point ROS including Respiratory, CV, GI and , other than that noted in the HPI,  is negative  Objective: /60   Pulse 85   Temp 92  F (33.3  C)   Resp 19   Wt 76.2 kg (167 lb 14.4 oz)   SpO2 92%   BMI 30.71 kg/m    Limited visit exam done given COVID-19 precautions.   GENERAL APPEARANCE:  Alert, in no distress, cooperative, worries and repeats same conversation.    EYES:  her eyes appear closed and could be the way she is looking at the video screen.  wears corrective lenses.  no signs of rubbing her eyes.  sclera's clear  RESP:  lungs clear to auscultation , no respiratory distress  CV:  Palpation and auscultation of heart done , heart rate regular/irregular.  +3 edema in lower legs.  ABDOMEN:  normal bowel sounds, soft, nontender, no hepatosplenomegaly or other masses, no guarding or rebound  M/S:   Gait and station abnormal staff use a 4 point lift for transfers.  not bearing weight on right leg.  staff propel w/c long distances  SKIN:  inner right leg wound that is becoming smaller in size with triad paste dressing twice a day  PSYCH:  oriented to person and place, worries/anxiety long standing.  will repeat same thought process or worry multiple times.  , affect and mood normal       Labs:   Component      Latest Ref Rng & Units 5/27/2020 6/5/2020   INR      0.86 - 1.14 1.64 (H) 1.78 (H)       ASSESSMENT/PLAN:  Atrial fibrillation with RVR (H) - suspect valvular a fib  Long term current use of anticoagulant  Encounter for therapeutic drug monitoring  - INR thelma with dose of  coumadin 2mg po daily.  No signs of bleeding.  No acute Atrial Fib episodes.  Is on digoxin 125mcg po daily as well.  Will change coumadin to be:  3mg po M/W/F and 2mg all other days of week  INR in 2 weeks.    Chronic congestive heart failure, unspecified heart failure type (H)  Breathing wise she shows no signs of heart failure.  Her legs are swollen and receives Demadex 20mg po daily and K+ 40meq twice a day.  Does have an order for PRN Metolazone with parameters.  Encouraged staff to give her a dose today due to weight gain in one week.    Is on a M/W/F weight check as well.    She needs to elevate her legs more than anything.  Encouraged her to lay in bed 1-2 hours a day to elevate the feet above her heard and no be in the w/c constantly.    No change to the diuretic.    Severe pulmonary hypertension (H) per Echo Jan 2020  Blood pressures range from 120-130's/60-70's.  Does take Metoprolol 25mg twice a day.  No changes.  Blood pressures stable.    Closed bimalleolar fracture of right ankle with delayed healing, subsequent encounter  Nursing, son, and ortho hold conversations to when and what to do with her right ankle.  With the pandemic occurring, son is nervous about her going to the clinic and so attempting to manage as well as can be through xrays being done here and so forth.    Receives Tylenol 1000mg TID and prn oxycodone 2.5mg every 4 hours prn for pain.      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  At one time attempted Celexa and tried to lower her Xanax but this was a failure.  Hospital took away the Celexa and attempted an antipsychotic and Gabapentin.  Became over sedated and so took away the antipsychotic and left the Xanax TID as well as the gabapentin.  Attempted to increase the Xanax and that affected her.  Split her dose apart of the gabapentin and did not agree with that as she was awake a lot during the night.  At this time she  is on Xanax 0.25mg po TID and gabapentin 200mg at HS.    Staff have to redirect her with her questions but there is the fine line of over sedation or her worries.      Neurogenic bladder  Catheter is out and able to void now with Flomax 0.4mg po daily.  Thought about her medications that may affect her bladder and adjusted what could be done before adding Flomax.  Continue to monitor for complaints of bladder issues.      Orders given to the :  1.  Change coumadin to 3mg on M/W/F and 2mg all other days  2.  INR in 2 weeks for atrial fib  3.  Wound care nurse to change dressing orders to triad paste to right lower leg.    4.  BMP in 2 weeks - CHF  5.  Use the PRN Metolazone today due to edema and weight increase.      Electronically signed by:  ASHLEY Maldonado CNP     Video-Visit Details  Type of service:  Video Visit  Video End Time (time video stopped): 10/20am  Distant Location (provider location):  [facility]

## 2020-06-10 NOTE — TELEPHONE ENCOUNTER
Son is calling to get results of the xrays if possible. Please call Les at [phone] ok to leave a message

## 2020-06-11 NOTE — TELEPHONE ENCOUNTER
[facility] TELEPHONE ENCOUNTER    Linn Sanon is a 89 year old  (11/13/1926),Nurse called today to report: more short of breath and this is not usual for her. Does have CHF and PRN metolazone. Lung sounds are clear.    ASSESSMENT/PLAN  1. Give prn metolazone now  2. Check for covid19   3. If not improved tomorrow may need further workup.     Alicia Ruiz NP

## 2020-06-12 NOTE — TELEPHONE ENCOUNTER
Reason for Call:  Other call back    Detailed comments: Zeinab is Linn's caregiver and called stating the family is inquiring what the next step is with Linn's ankle.  She states the MRI disc was sent to us for review.  Please call Zeinab back at [phone]    Phone Number Patient can be reached at: Other phone number:  [phone]*    Best Time: any    Can we leave a detailed message on this number? YES    Call taken on 6/12/2020 at 10:56 AM by Samantha Cortez

## 2020-06-12 NOTE — TELEPHONE ENCOUNTER
Writing nurse returned call to Zeinab who is patients care giver. Zeinab wanted to relay some questions for the patients family. They are requesting orders and an update as to when the patient will be weight bearing and if she needs to continue with therapy.  Will route to providers for recommendation.    Angélica Dooley RN on 6/12/2020 at 4:22 PM

## 2020-06-15 NOTE — TELEPHONE ENCOUNTER
Spoke with Zeinab, we have not received the CD that her HUC sent to us last week. She is going to fax the paper copy of the results that were reported in the mean time. Dr. Morocho is not back in clinic until Wednesday. Will await CD/paper report.      Tiny MONCADA, Lead RN, BSN. . .  6/15/2020, 3:55 PM

## 2020-06-15 NOTE — TELEPHONE ENCOUNTER
Little Mojica calls back wondering about the results of the right ankle xray they sent over last Monday for you to review.

## 2020-06-15 NOTE — ED AVS SNAPSHOT
[facility] Emergency Department  [address] [place] [address]  Phone:  [phone]  Fax:  [phone]                                    Linn Sanon   MRN: [id number]    Department:  [facility] Emergency Department   Date of Visit:  6/15/2020           After Visit Summary Signature Page    I have received my discharge instructions, and my questions have been answered. I have discussed any challenges I see with this plan with the nurse or doctor.    ..........................................................................................................................................  Patient/Patient Representative Signature      ..........................................................................................................................................  Patient Representative Print Name and Relationship to Patient    ..................................................               ................................................  Date                                   Time    ..........................................................................................................................................  Reviewed by Signature/Title    ...................................................              ..............................................  Date                                               Time          22EPIC Rev 08/18

## 2020-06-16 NOTE — TELEPHONE ENCOUNTER
Caregiver was called and a voicemail was left explaining that Dr. Morocho recommends to stay non weight baring at this time and new xrays in 6 weeks and hopefully more healing will be done during that time. I left the clinics phone number and relayed to call back with any further questions or concerns.

## 2020-06-16 NOTE — ED NOTES
Called [facility] and spoke with Bhavana nurse, about pt.  Verified that pt can have her son at the nursing home to visit her on comfort cares.  Verified that they can administer oral morphine not IV.  Plan is to send pt back to the [facility] home on comfort cares.  Will call son to notify him of the plan as soon as transport is arranged.

## 2020-06-16 NOTE — TELEPHONE ENCOUNTER
[facility] TELEPHONE ENCOUNTER    Chief Complaint   Patient presents with     End-of-Life     Linn Sanon is a 89 year old  (11/13/1926),Nurse called today to report: Requesting end-of-life medications as patient is having more discomfort and son is present    ASSESSMENT/PLAN    Atropine drops 1%. Take 2 drops q1h PRN for excessive secretions    Increase Morphine Sulfate to 5 mg PO/SL q2h PRN    Lorazepam 2 mg/ml. Take 1 mg PO/SL q2h PRN. Ok to remove from e-kit.    Called and left message for nurse manager later to discontinue scheduled Alprazolam. Nurse Manager returned call and spoke to son. Requested [facility] orders.    Electronically signed by:   ASHLEY Walsh CNP

## 2020-06-16 NOTE — DISCHARGE INSTRUCTIONS
Morphine, 5 mg every 3 hours as needed for labored breathing and/or discomfort.  Oxygen as needed for comfort.

## 2020-06-16 NOTE — TELEPHONE ENCOUNTER
xrays reviewed.    Would continue to recommend nonwt bearing at this time.    New xrays in 6 weeks, hopefully more healing at that time to allow for some weightbearing

## 2020-06-16 NOTE — ED PROVIDER NOTES
History     Chief Complaint   Patient presents with     ams     HPI  Linn Sanon is a 89 year old female who presents to the emergency room via EMS from [facility].  Patient reportedly has been declining throughout the day and became unresponsive with labored breathing so EMS was called.  Patient has history of CHF, on warfarin, has a pulsed that clearly states patient wants comfort focused treatment allowing for natural death, she is a DNR/DNI.  Patient arrives here occasionally moaning, she does not follow directions and is not able to answer verbally.  There was no reported fever from the nursing home.  I discussed patient with her son, Chantal who agrees with POLST and thinks patient would only want comfort care at this time.    Allergies:  Allergies   Allergen Reactions     No Known Drug Allergies        Problem List:    Patient Active Problem List    Diagnosis Date Noted     Anticoagulation goal of INR 2 to 3 04/17/2020     Priority: Medium     Long term current use of anticoagulant 04/17/2020     Priority: Medium     Chronic congestive heart failure, unspecified heart failure type (H) 04/17/2020     Priority: Medium     Chronic diarrhea 04/04/2020     Priority: Medium     Pressure injury of skin of right foot-laterally at proximal phalanx 5th toe and distal 5th metatarsal 04/04/2020     Priority: Medium     Urinary retention 04/03/2020     Priority: Medium     Hypopotassemia 04/01/2020     Priority: Medium     Metabolic alkalosis 04/01/2020     Priority: Medium     Closed bimalleolar fracture of right ankle 03/23/2020     Priority: Medium     Bilateral pleural effusion 03/23/2020     Priority: Medium     Severe pulmonary hypertension (H) per Echo Jan 2020 03/23/2020     Priority: Medium     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 03/23/2020     Priority: Medium     Anasarca 03/23/2020     Priority: Medium     Hyponatremia-admit Na 128 03/23/2020     Priority: Medium     Generalized anxiety  disorder 02/11/2020     Priority: Medium     Weakness 01/19/2020     Priority: Medium     History of TIA (transient ischemic attack) and stroke 03/24/2017     Priority: Medium     Essential hypertension with goal blood pressure less than 140/90 09/09/2016     Priority: Medium     Aortic valve stenosis, moderate to severe (increase from mild in 2016) 09/09/2016     Priority: Medium     Health Care Home 12/18/2012     Priority: Medium     Prema Ramirez RN-PHN  FPA / [facility]   [phone]    DX V65.8 REPLACED WITH [facility] (04/08/2013)       Spinal stenosis 07/05/2011     Priority: Medium     HYPERLIPIDEMIA LDL GOAL <130 10/31/2010     Priority: Medium        Past Medical History:    Past Medical History:   Diagnosis Date     Abnormal CXR-bilateral lower lung opacities, suspect atelectasis 3/23/2020     Acute heart failure (H) 3/27/2020     Acute respiratory failure with hypoxia (H) 1/19/2020     Closed fracture of navicular (scaphoid) bone of wrist      Generalized osteoarthrosis, unspecified site      Lump or mass in breast      Measles without mention of complication      Mumps without mention of complication      Parainfluenza type 1 infection 1/19/2020     Pure hypercholesterolemia      Scarlet fever      Unspecified closed fracture of ankle      Varicella without mention of complication        Past Surgical History:    Past Surgical History:   Procedure Laterality Date     C APPENDECTOMY  age 11     HC EXCISION BREAST LESION W XRAY MARKER, OPEN SINGLE  1995    right     HC REMV CATARACT EXTRACAP,INSERT LENS, W/O ECP  9/18/2008    Right eye     HC REMV CATARACT EXTRACAP,INSERT LENS, W/O ECP  10/16/2008    Left eye     LASER YAG CAPSULOTOMY Left 9/18/2014    Procedure: LASER YAG CAPSULOTOMY;  Surgeon: Rafi Ramos MD;  Location:  OR       Family History:    Family History   Problem Relation Age of Onset     Diabetes Father      Cardiovascular Father       Respiratory Brother         asthma     Respiratory Sister         asthma     Arthritis Sister      Heart Disease Paternal Grandfather      Thyroid Disease Sister        Social History:  Marital Status:   [5]  Social History     Tobacco Use     Smoking status: Never Smoker     Smokeless tobacco: Never Used   Substance Use Topics     Alcohol use: No     Drug use: No        Medications:    acetaminophen (TYLENOL) 500 MG tablet  Acidophilus Lactobacillus CAPS  albuterol (PROAIR HFA/PROVENTIL HFA/VENTOLIN HFA) 108 (90 Base) MCG/ACT inhaler  ALPRAZolam (XANAX) 0.25 MG tablet  aspirin (ASA) 81 MG EC tablet  digoxin (LANOXIN) 125 MCG tablet  gabapentin (NEURONTIN) 100 MG capsule  loperamide (IMODIUM) 2 MG capsule  metolazone (ZAROXOLYN) 2.5 MG tablet  metoprolol tartrate (LOPRESSOR) 25 MG tablet  miconazole (MICATIN) 2 % external powder  oxyCODONE (ROXICODONE) 5 MG tablet  potassium chloride ER (KLOR-CON M) 20 MEQ CR tablet  simvastatin (ZOCOR) 20 MG tablet  tamsulosin (FLOMAX) 0.4 MG capsule  torsemide (DEMADEX) 20 MG tablet  trolamine salicylate (ASPERCREME) 10 % external cream  warfarin ANTICOAGULANT (COUMADIN) 2.5 MG tablet          Review of Systems   All other systems reviewed and are negative.      Physical Exam          Physical Exam  Constitutional:       Appearance: Normal appearance. She is ill-appearing.      Comments: Minimally responsive   HENT:      Head: Normocephalic.      Mouth/Throat:      Mouth: Mucous membranes are dry.   Eyes:      Extraocular Movements: Extraocular movements intact.      Comments: Pupils are sluggish but equal bilaterally   Neck:      Musculoskeletal: Normal range of motion.   Cardiovascular:      Rate and Rhythm: Rhythm irregular.   Pulmonary:      Comments: Diminished lung sounds throughout with labored breathing and hypoxia  Abdominal:      General: Bowel sounds are normal.      Palpations: Abdomen is soft.   Musculoskeletal:         General: No deformity.   Skin:      Capillary Refill: Capillary refill takes 2 to 3 seconds.      Coloration: Skin is pale.   Neurological:      Mental Status: She is unresponsive.      GCS: GCS eye subscore is 1. GCS verbal subscore is 2. GCS motor subscore is 3.         ED Course        Procedures    No results found for this or any previous visit (from the past 24 hour(s)).    Medications   morphine (PF) injection 4 mg (4 mg Intravenous Given 6/15/20 1927)       Assessments & Plan (with Medical Decision Making)  End-of-life care in 89-year-old female patient has POLST that is clear in identifying comfort care decision making dated in April of this year.  I spoke with patient's son Chantal who agrees with comfort care, end-of-life care.  Home was notified the patient will be sent back with Roxanol for comfort.  I attempted to call patient's son on his cell phone in the phone was busy several times, message left on his home phone.  [facility] will allow for visitors for end-of-life care.  Patient son did make it here to our facility to spend time with patient at bedside and did follow EMS crew back to [facility] with his mom.  Patient discharged back to nursing home via EMS.     I have reviewed the nursing notes.    I have reviewed the findings, diagnosis, plan and need for follow up with the patient.    New Prescriptions    MORPHINE SULFATE, HIGH CONCENTRATE, (ROXANOL-CONCENTRATED) 20 MG/ML CONCENTRATED SOLUTION    Take 0.25 mLs (5 mg) by mouth every 3 hours as needed for shortness of breath / dyspnea or moderate to severe pain       Final diagnoses:   Altered mental status, unspecified altered mental status type   End of life care       6/15/2020   [facility] EMERGENCY DEPARTMENT            Nayana Pink, ASHLEY CNP  06/15/20 2024

## 2020-06-16 NOTE — ED NOTES
Son present to dept and brought back to see pt. Updated son with plan of care for discharge and transport back to [facility]. Son will go to pt facility to spend rest of time with pt. Pt appeared to be more comfortable with morphine administration. VS wnl. AVS, paperwork and meds given to EMS for [facility] staff.

## 2020-06-16 NOTE — ED TRIAGE NOTES
Pt from [facility] DNR/DNI.  Pt was noted by staff to be unresponsive x 3pm.  Medics brought pt into ED per [facility].   speaks w/family who affirm pt to have comfort cares only.

## 2020-06-17 NOTE — PROGRESS NOTES
"[facility]   Linn Sanon is being evaluated via a billable video visit due to the restrictions of the Covid-19 pandemic.   The patient has been notified of following:  \"This video visit will be conducted via a call between you and your provider. We have found that certain health care needs can be provided without the need for an in-person physical exam.  This service lets us provide the care you need with a video conversation. If during the course of the call the provider feels a video visit is not appropriate, you will not be charged for this service.\"   The provider has received verbal consent for a Video Visit from the patient or first contact? Yes  Patient  or facility staff would like the video invitation sent by: N/A   Video Start Time: 2:30pm    [place] Medical Record Number:  [id number]  Place of Location at the time of visit: [facility]   Chief Complaint   Patient presents with     Video Visit     Nursing Home Acute     HPI:  Linn Sanon  is a 89 year old (11/13/1926), who is being seen today for a visit.  HPI information obtained from: facility chart records, facility staff and discussion with son. Today's concern is:    1. Chronic congestive heart failure, unspecified heart failure type (H)    2. Atrial fibrillation with RVR (H) - suspect valvular a fib    3. Generalized anxiety disorder    4. Acute cystitis without hematuria    5. Closed bimalleolar fracture of right ankle with routine healing, subsequent encounter    6. Caring for the dying      Came to see Linn today briefly as she is currently in actively dying.  This NP received a call last night from staff as they felt this NP would be the one to understand the request from the son given her mother's state of health.    On 6/15, staff found her with low O2 sat = 65% and not really responding.  After several attempts to contact son, staff called the on call NP for ok to send to ED if son desired.  She was " comfort care so did not send her until son decided to later that night.  She did go to the ED and she was sent back with comfort measures.   Hospice initiated.  Yesterday the son had a hard time with her mother's condition on the emotional side.  Both Linn and he worry in great amounts.  Staff felt to help him and her, a UA/UC was obtained and UA appears positive.  Staff asked if an ABX could be given to see if she would have any response.  Rocephin 1GM IM given x1 dose.    Now today hospice RN called this NP and basically stated she did not think Linn would have long to live.  Unresponsive to touch or voice.  Non-comfort medications were discontinued earlier.  She appeared comfortable.  Hospice admitted her under heart failure.  Also feel her Atrial Fibrillation was a factor and probably her anxiety/stress she would put on herself for everything.  Many times it would be explained to her what was to occur but within seconds she would repeat the questions again.  Linn did not respond to the IM injection of Rocephin.      This NP wanted to see Linn but son did not want to discuss anything anymore.  While on video with the nurse, he did come to the team leads office and she and the son went back to [facility]'s room.  At that time able to speak with the son and give condolences.  Reviewed briefly with him what has occurred.  Part of his healing process was to know what ever happened with the xray results of the right ankle fracture.  Nursing was still waiting on a answer from orthopedics.          Past Medical and Surgical History reviewed in Epic today.  MEDICATIONS:    Current Outpatient Medications   Medication Sig Dispense Refill     acetaminophen (TYLENOL) 500 MG tablet Take 2 tablets (1,000 mg) by mouth 3 times daily 180 tablet 0     Acidophilus Lactobacillus CAPS Take 1 capsule by mouth daily 30 capsule 4     albuterol (PROAIR HFA/PROVENTIL HFA/VENTOLIN HFA) 108 (90 Base) MCG/ACT inhaler Inhale 2 puffs  into the lungs every 4 hours as needed for shortness of breath / dyspnea or wheezing 1 Inhaler 0     aspirin (ASA) 81 MG EC tablet Take 1 tablet (81 mg) by mouth daily 30 tablet 0     digoxin (LANOXIN) 125 MCG tablet Take 1 tablet (125 mcg) by mouth daily 30 tablet 0     gabapentin (NEURONTIN) 100 MG capsule Take 2 capsules (200 mg) by mouth At Bedtime 60 capsule 0     loperamide (IMODIUM) 2 MG capsule Take 1 capsule (2 mg) by mouth 4 times daily as needed for diarrhea 20 capsule 0     LORazepam (ATIVAN) 2 MG/ML (HIGH CONC) solution Take 0.5 mLs (1 mg) by mouth every 2 hours as needed for anxiety 30 mL 0     metolazone (ZAROXOLYN) 2.5 MG tablet Take 1 tablet (2.5 mg) by mouth every 48 hours as needed (weight fernie more than 2 pounds in 1 day or 5 pounds in 1 week) 15 tablet 0     metoprolol tartrate (LOPRESSOR) 25 MG tablet Take 1 tablet (25 mg) by mouth 2 times daily 60 tablet 0     miconazole (MICATIN) 2 % external powder Apply topically 2 times daily 90 g 0     morphine sulfate, high concentrate, (ROXANOL-CONCENTRATED) 20 MG/ML concentrated solution Take 0.25 mLs (5 mg) by mouth every 2 hours as needed for shortness of breath / dyspnea or moderate to severe pain 30 mL 0     oxyCODONE (ROXICODONE) 5 MG tablet Take 0.5 tablets (2.5 mg) by mouth every 4 hours as needed for moderate to severe pain 20 tablet 0     potassium chloride ER (KLOR-CON M) 20 MEQ CR tablet Take 2 tablets (40 mEq) by mouth 2 times daily 120 tablet 0     simvastatin (ZOCOR) 20 MG tablet Take 1 tablet (20 mg) by mouth At Bedtime 30 tablet 0     tamsulosin (FLOMAX) 0.4 MG capsule Take 0.4 mg by mouth daily       torsemide (DEMADEX) 20 MG tablet Take 1 tablet (20 mg) by mouth daily 30 tablet 0     trolamine salicylate (ASPERCREME) 10 % external cream Apply topically 4 times daily as needed for moderate pain       warfarin ANTICOAGULANT (COUMADIN) 2.5 MG tablet Take 2.5 mg on Mon, Wed and Fri and take 5 mg on Tues, Thurs, Sat, and Sun, or take as  "advised by NH provider 60 tablet 0     REVIEW OF SYSTEMS: unresponsive  Objective: /75   Pulse 85   Temp 98.6  F (37  C)   Resp 10   Ht 1.575 m (5' 2\")   Wt 80.5 kg (177 lb 6.4 oz)   SpO2 93%   BMI 32.45 kg/m    Limited visit exam done given COVID-19 precautions.     Linn is lying in bed and appears comfortable.  Skin is pale.    Oxygen at 3.5L/min    PRN Lorazepam, Morphine and Atropine being utilized.    Labs:   N/A    ASSESSMENT/PLAN:  1. Chronic congestive heart failure, unspecified heart failure type (H)    2. Atrial fibrillation with RVR (H) - suspect valvular a fib    3. Generalized anxiety disorder    4. Acute cystitis without hematuria    5. Closed bimalleolar fracture of right ankle with routine healing, subsequent encounter    6. Caring for the dying      Discussed with son about the +UA and willing to give another dose of Rocephin to ease his mind.  Feel he is more accepting of what is occurring now then what was reported yesterday.  He knows she only probably has hours left and he is at the point he does not want to leave her but he is also very tired.  He knows staff will let him know if he is not there.    Did listen to the nurse let him know that they were waiting on orders from orthopedics.  Since the ankle fracture and the pandemic, care for the ankle has been semi-difficult.  Had been in therapy in the past and not bearing weight.    Hospice has taken care of the medications and so no new orders from this NP.  Glad to have spoken to the son.  Touched base on her health issues and agree with hospice for their admission to the program.        Electronically signed by:  ASHLEY Maldonado CNP       Video-Visit Details  Type of service:  Video Visit  Video End Time (time video stopped): 2:50pm  Distant Location (provider location):  [facility]         "

## 2020-06-17 NOTE — LETTER
"    6/17/2020        RE: Linn Sanon  [facility]  [id number]  [facility] [address]        [facility]   Linn Sanon is being evaluated via a billable video visit due to the restrictions of the Covid-19 pandemic.   The patient has been notified of following:  \"This video visit will be conducted via a call between you and your provider. We have found that certain health care needs can be provided without the need for an in-person physical exam.  This service lets us provide the care you need with a video conversation. If during the course of the call the provider feels a video visit is not appropriate, you will not be charged for this service.\"   The provider has received verbal consent for a Video Visit from the patient or first contact? Yes  Patient  or facility staff would like the video invitation sent by: N/A   Video Start Time: 2:30pm    [place] Medical Record Number:  [id number]  Place of Location at the time of visit: [facility]   Chief Complaint   Patient presents with     Video Visit     Nursing Home Acute     HPI:  Linn Sanon  is a 89 year old (11/13/1926), who is being seen today for a visit.  HPI information obtained from: facility chart records, facility staff and discussion with son. Today's concern is:    1. Chronic congestive heart failure, unspecified heart failure type (H)    2. Atrial fibrillation with RVR (H) - suspect valvular a fib    3. Generalized anxiety disorder    4. Acute cystitis without hematuria    5. Closed bimalleolar fracture of right ankle with routine healing, subsequent encounter    6. Caring for the dying      Came to see Linn today briefly as she is currently in actively dying.  This NP received a call last night from staff as they felt this NP would be the one to understand the request from the son given her mother's state of health.    On 6/15, staff found her with low O2 sat = 65% and not really responding.  " After several attempts to contact son, staff called the on call NP for ok to send to ED if son desired.  She was comfort care so did not send her until son decided to later that night.  She did go to the ED and she was sent back with comfort measures.   Hospice initiated.  Yesterday the son had a hard time with her mother's condition on the emotional side.  Both Linn and he worry in great amounts.  Staff felt to help him and her, a UA/UC was obtained and UA appears positive.  Staff asked if an ABX could be given to see if she would have any response.  Rocephin 1GM IM given x1 dose.    Now today hospice RN called this NP and basically stated she did not think Linn would have long to live.  Unresponsive to touch or voice.  Non-comfort medications were discontinued earlier.  She appeared comfortable.  Hospice admitted her under heart failure.  Also feel her Atrial Fibrillation was a factor and probably her anxiety/stress she would put on herself for everything.  Many times it would be explained to her what was to occur but within seconds she would repeat the questions again.  Linn did not respond to the IM injection of Rocephin.      This NP wanted to see Linn but son did not want to discuss anything anymore.  While on video with the nurse, he did come to the team leads office and she and the son went back to [facility]'s room.  At that time able to speak with the son and give condolences.  Reviewed briefly with him what has occurred.  Part of his healing process was to know what ever happened with the xray results of the right ankle fracture.  Nursing was still waiting on a answer from orthopedics.          Past Medical and Surgical History reviewed in Epic today.  MEDICATIONS:    Current Outpatient Medications   Medication Sig Dispense Refill     acetaminophen (TYLENOL) 500 MG tablet Take 2 tablets (1,000 mg) by mouth 3 times daily 180 tablet 0     Acidophilus Lactobacillus CAPS Take 1 capsule by mouth daily 30  capsule 4     albuterol (PROAIR HFA/PROVENTIL HFA/VENTOLIN HFA) 108 (90 Base) MCG/ACT inhaler Inhale 2 puffs into the lungs every 4 hours as needed for shortness of breath / dyspnea or wheezing 1 Inhaler 0     aspirin (ASA) 81 MG EC tablet Take 1 tablet (81 mg) by mouth daily 30 tablet 0     digoxin (LANOXIN) 125 MCG tablet Take 1 tablet (125 mcg) by mouth daily 30 tablet 0     gabapentin (NEURONTIN) 100 MG capsule Take 2 capsules (200 mg) by mouth At Bedtime 60 capsule 0     loperamide (IMODIUM) 2 MG capsule Take 1 capsule (2 mg) by mouth 4 times daily as needed for diarrhea 20 capsule 0     LORazepam (ATIVAN) 2 MG/ML (HIGH CONC) solution Take 0.5 mLs (1 mg) by mouth every 2 hours as needed for anxiety 30 mL 0     metolazone (ZAROXOLYN) 2.5 MG tablet Take 1 tablet (2.5 mg) by mouth every 48 hours as needed (weight fernie more than 2 pounds in 1 day or 5 pounds in 1 week) 15 tablet 0     metoprolol tartrate (LOPRESSOR) 25 MG tablet Take 1 tablet (25 mg) by mouth 2 times daily 60 tablet 0     miconazole (MICATIN) 2 % external powder Apply topically 2 times daily 90 g 0     morphine sulfate, high concentrate, (ROXANOL-CONCENTRATED) 20 MG/ML concentrated solution Take 0.25 mLs (5 mg) by mouth every 2 hours as needed for shortness of breath / dyspnea or moderate to severe pain 30 mL 0     oxyCODONE (ROXICODONE) 5 MG tablet Take 0.5 tablets (2.5 mg) by mouth every 4 hours as needed for moderate to severe pain 20 tablet 0     potassium chloride ER (KLOR-CON M) 20 MEQ CR tablet Take 2 tablets (40 mEq) by mouth 2 times daily 120 tablet 0     simvastatin (ZOCOR) 20 MG tablet Take 1 tablet (20 mg) by mouth At Bedtime 30 tablet 0     tamsulosin (FLOMAX) 0.4 MG capsule Take 0.4 mg by mouth daily       torsemide (DEMADEX) 20 MG tablet Take 1 tablet (20 mg) by mouth daily 30 tablet 0     trolamine salicylate (ASPERCREME) 10 % external cream Apply topically 4 times daily as needed for moderate pain       warfarin ANTICOAGULANT  "(COUMADIN) 2.5 MG tablet Take 2.5 mg on Mon, Wed and Fri and take 5 mg on Tues, Thurs, Sat, and Sun, or take as advised by NH provider 60 tablet 0     REVIEW OF SYSTEMS: unresponsive  Objective: /75   Pulse 85   Temp 98.6  F (37  C)   Resp 10   Ht 1.575 m (5' 2\")   Wt 80.5 kg (177 lb 6.4 oz)   SpO2 93%   BMI 32.45 kg/m    Limited visit exam done given COVID-19 precautions.     Linn is lying in bed and appears comfortable.  Skin is pale.    Oxygen at 3.5L/min    PRN Lorazepam, Morphine and Atropine being utilized.    Labs:   N/A    ASSESSMENT/PLAN:  1. Chronic congestive heart failure, unspecified heart failure type (H)    2. Atrial fibrillation with RVR (H) - suspect valvular a fib    3. Generalized anxiety disorder    4. Acute cystitis without hematuria    5. Closed bimalleolar fracture of right ankle with routine healing, subsequent encounter    6. Caring for the dying      Discussed with son about the +UA and willing to give another dose of Rocephin to ease his mind.  Feel he is more accepting of what is occurring now then what was reported yesterday.  He knows she only probably has hours left and he is at the point he does not want to leave her but he is also very tired.  He knows staff will let him know if he is not there.    Did listen to the nurse let him know that they were waiting on orders from orthopedics.  Since the ankle fracture and the pandemic, care for the ankle has been semi-difficult.  Had been in therapy in the past and not bearing weight.    Hospice has taken care of the medications and so no new orders from this NP.  Glad to have spoken to the son.  Touched base on her health issues and agree with hospice for their admission to the program.        Electronically signed by:  ASHLEY Maldonado CNP       Video-Visit Details  Type of service:  Video Visit  Video End Time (time video stopped): 2:50pm  Distant Location (provider location):  [place] [facility] "             Sincerely,        ASHLEY Maldonado CNP

## 2020-08-18 NOTE — PROGRESS NOTES
Bedside report received from SONYA Casey. POC discussed with pt; all questions answered at this time. Fall and seizure precautions in place.    S-(situation): Patient registered to Observation. Patient arrived to room 268 via cart from ED    B-(background): Upper respiratory viral infection    A-(assessment): Pt is A&O.  VSS.  Afebrile.  Sats are 94% on 2 liters.  Very wet sounding lungs.  Has coarse cx posterior and wheezes in upper anterior. Using accessory muscles to breathe.  Has chronic pain in lower back and knee.  Stated she fell at home.  Skin has bruises and bottom red from diarrhea but is blanchable.    R-(recommendations): Orders and observation goals reviewed with pt    Nursing Observation criteria listed below was met:    Skin issues/needs documented:NA  Isolation needs addressed, if appropriate: Yes  Fall Prevention: Education given and documented: Yes  Education Assessment documented:Yes  Education Documented (Pre-existing chronic infection such as, MRSA/VRE need education on admission): No  OBS video/handout Reviewed & DocumentedYes  Medication Reconciliation Complete: Yes  New medication patient education completed and documented (Possible Side Effects of Common Medications handout): Yes  Home medications if not able to send immediately home with family stored here: NA  Reminder note placed in discharge instructions: NA  Patient has discharge needs (If yes, please explain): No

## 2021-09-26 NOTE — PROGRESS NOTES
Occupational Therapy  Facility/Department: [facility] AT THE [facility] MED SURG  Daily Treatment Note  NAME: Nancy Arguello  : 1965  MRN: [id number]    Date of Service: 2021    Discharge Recommendations:  Continue to assess pending progress       Assessment      Safety Devices  Type of devices: Call light within reach; Left in chair (nursing was in room with patient)         Patient Diagnosis(es): The primary encounter diagnosis was Pneumonia due to COVID-19 virus. Diagnoses of Acute respiratory failure with hypoxia (HCC) and Hypoxia were also pertinent to this visit. has a past medical history of Acute pulmonary embolism (HCC), Anxiety, Asthma, Constipation, chronic, CPAP (continuous positive airway pressure) dependence, Diabetes mellitus ([facility] [address] Embolism - blood clot, Esophageal reflux, Factor V Leiden ([facility] [address] Heart murmur, HTN (hypertension), Hyperlipidemia, Irritable bladder, MTHFR mutation, MVP (mitral valve prolapse), Palpitations, Pulmonary embolism ([facility] [address] Rhinitis, allergic, Sleep apnea, and Venous thromboembolism. has a past surgical history that includes Cholecystectomy; Mandible fracture surgery (); Tubal ligation; Upper gastrointestinal endoscopy (2010); pr colsc flx w/rmvl of tumor polyp lesion snare tq (3/27/2017); pr colsc flexible w/control bleeding any method (3/27/2017); Cataract removal with implant (Left, 2018); pr xcapsl ctrc rmvl insj io lens prosth w/o ecp (Left, 2018); Colonoscopy (2021); Upper gastrointestinal endoscopy (N/A, 2021); and Colonoscopy (N/A, 2021).     Restrictions  Restrictions/Precautions  Restrictions/Precautions: Isolation, General Precautions  Subjective   General  Chart Reviewed: Yes  Patient assessed for rehabilitation services?: Yes  Response to previous treatment: Patient with no complaints from previous session  Family / Caregiver Present: No  Referring Practitioner: Dr. Cooney Sendilda  Diagnosis: acute respiratory failure wtih [facility]  [place] Medical Record Number:  [id number]  Place of Service where encounter took place:  [facility] [place] [facility] [[id number]]  Chief Complaint   Patient presents with     Nursing Home Acute       HPI:    Linn Sanon  is a 89 year old (11/13/1926), who is being seen today for an episodic care visit.  HPI information obtained from: facility chart records, facility staff, patient report and [facility] chart review. Today's concern is:    1. Parainfluenza type 1 infection    2. Weakness    3. Essential hypertension with goal blood pressure less than 140/90    4. Generalized anxiety disorder      Came to see Linn today as she was in therapies.  Figure it was in a public area that she could not retain this NP for extended period of time.  Staff are noting that she will not them leave her room as she is so anxiety ridden despite xanax given 3x day.    This NP has known Linn for some time and now she is a patient here for rehab after having parainfluenza type 1 infection.  Her spouse was here over 10 years ago and has had many relative here as well.    She was happy to talk.  Worried about her bowels but otherwise mood seems bright.  Did have injection in right knee but did not say anything about it today.      Past Medical and Surgical History reviewed in Epic today.    MEDICATIONS:  Current Outpatient Medications   Medication Sig Dispense Refill     ALPRAZolam (XANAX) 0.5 MG tablet Take 0.5 mg by mouth 3 times daily       aspirin 81 MG EC tablet Take 1 tablet by mouth daily.       Calcium Carb-Cholecalciferol (CALCIUM 600+D) 600-800 MG-UNIT TABS Take 1 tablet by mouth daily       fluticasone (ARNUITY ELLIPTA) 200 MCG/ACT inhaler Inhale 1 puff into the lungs daily       guaiFENesin (MUCINEX) 600 MG 12 hr tablet Take 1 tablet (600 mg) by mouth 2 times daily       losartan (COZAAR) 100 MG tablet Take 1 tablet (100 mg) by mouth daily 90 tablet 3     Multiple Vitamin  "(ONE-A-DAY ESSENTIAL) TABS Take  by mouth. 30 tablet      SENNA-docusate sodium (SENNA S) 8.6-50 MG tablet Take 1 tablet by mouth At Bedtime       simvastatin (ZOCOR) 20 MG tablet Take 1 tablet (20 mg) by mouth At Bedtime 90 tablet 3     acetaminophen 500 MG CAPS Take 500 mg by mouth every 6 hours as needed for pain       ALPRAZolam (XANAX) 0.5 MG tablet Take 1 tablet (0.5 mg) by mouth 3 times daily as needed for anxiety 60 tablet 0     ibuprofen (ADVIL/MOTRIN) 600 MG tablet Take 1 tablet (600 mg) by mouth every 6 hours as needed for other (mild pain)       ipratropium - albuterol 0.5 mg/2.5 mg/3 mL (DUONEB) 0.5-2.5 (3) MG/3ML neb solution Take 1 vial (3 mLs) by nebulization every 4 hours as needed for wheezing or shortness of breath / dyspnea         REVIEW OF SYSTEMS:  4 point ROS including Respiratory, CV, GI and , other than that noted in the HPI,  is negative    Objective:  BP (!) 149/69   Pulse 84   Temp 96.8  F (36  C)   Resp 20   Ht 1.575 m (5' 2\")   Wt 79.2 kg (174 lb 8 oz)   SpO2 96%   BMI 31.92 kg/m    Exam:  GENERAL APPEARANCE:  Alert, appears healthy, oriented, anxious, cooperative  EYES:  EOM, conjunctivae, lids, pupils and irises normal, PERRL, wears corrective lenses  RESP:  respiratory effort and palpation of chest normal, lungs clear to auscultation , no respiratory distress  CV:  Palpation and auscultation of heart done , regular rate and rhythm, no murmur, rub, or gallop  ABDOMEN:  normal bowel sounds, soft, nontender, no hepatosplenomegaly or other masses, no guarding or rebound  M/S:   Gait and station abnormal uses w/c around unit, uses a walker for ambulation, staff assist with transfers  SKIN:  pale, warm and dry  PSYCH:  oriented X 3, worried about her health and needs reassurance, anxious    Labs:   Will be having a CBC and BMP tomorrow    ASSESSMENT/PLAN:  Parainfluenza type 1 infection  Weakness  Resolved/ing.  No use of oxygen.  Occasion congested cough heard.    Attending " hypoxia  Subjective  Subjective: Patient reported she is going home today. During visit, SN came in to assess patient's O2 level with & without O2. General Comment  Comments: Patient agreeable to ADLs this session  Vital Signs  Patient Currently in Pain: No   Orientation  Orientation  Overall Orientation Status: Within Normal Limits  Objective             Balance  Sitting Balance: Independent  Standing Balance: Supervision  Functional Mobility  Functional - Mobility Device: No device  Activity:  (within patient's room x 2 mins with SPO2 remaining above 90%)                                                  Type of ROM/Therapeutic Exercise  Comment: BUE therex to increase strength/endurance for ease of fxl tasks utilizing 2# free weight x 20 reps x all planes all while standing. . SPO2 >90%                    Plan   Plan  Times per week: 7x/week  Times per day: Daily  Current Treatment Recommendations: Strengthening, Patient/Caregiver Education & Training, Balance Training, Functional Mobility Training, Endurance Training, Safety Education & Training, Self-Care / ADL  G-Code     OutComes Score                                                  AM-PAC Score             Goals  Short term goals  Time Frame for Short term goals: 90 days  Short term goal 1: Pt will verbalize 4 ECWS strategies for implementation/use at home to improve safety and independence with ADL and functional tasks. Short term goal 2: Pt will tolerate standing for greater tahn 3 minutes without LOB or SOB while engage in functional tasks to improve safety and independence with ADL and functional mobility. Short term goal 3: Pt will complete TB ADL Otto with use of AE PRN to ensure safe return home. Short term goal 4: Pt will complete toileting routine at standard toilet Otto to improve safety and independence with ADL task.        Therapy Time   Individual Concurrent Group Co-treatment   Time In 1002         Time Out 1020         Minutes [address] , REYES/L therapies due to how weak she became.    Son is around periodically to oversee her care.  He is her primary caregiver despite not living with her.      Essential hypertension with goal blood pressure less than 140/90  Vitals daily.  B/P's range from 110-140's/60-70's.  Remains on Cozaar 100mg daily  No changes.    Generalized anxiety disorder  Is on Xanax 0.5mg three times a day.  Still worries.  Hope that when she is more comfortable she will feel more confident in making decisions and not having to run everything past everyone for reassurance.  No changes at this time.  Goal is short term yet and so do not wish to change her medication around as she knows what do dol      Orders written by provider at facility  No new orders today      Electronically signed by:  ASHLEY Maldonado CNP

## 2024-08-20 NOTE — TELEPHONE ENCOUNTER
Ankle fracture   Ary Garcia is a 25 year old female presenting to the walk-in clinic today for viral symptoms. Pt reports fever, cough, and sore throat. Pt reports these symptoms started on Sunday, thought they were just allergies at first. Pt took temperature this morning and it was 100.2. Pt reports taking OTC Dayquil this morning.     Swabs/Specimens collected during triage process:  COVID/FLU/RSV - rapid    BP greater than 140/90: NA   Recheck completed: NA    PPE worn during room process  Writer: Level 3 face mask and gloves  Patient: Mask     Triaged to: room 41    Patient would like communication of their results via:    [employer], preferred    Cell Phone:   Telephone Information:   Mobile [phone]     Okay to leave a message containing results? Yes    Pt advised that staff will contact them with positive results only for URI testing. Pt verbalised understanding. Pt aware results will be available on [employer].